# Patient Record
Sex: FEMALE | Race: WHITE | Employment: OTHER | URBAN - METROPOLITAN AREA
[De-identification: names, ages, dates, MRNs, and addresses within clinical notes are randomized per-mention and may not be internally consistent; named-entity substitution may affect disease eponyms.]

---

## 2017-01-02 ENCOUNTER — PATIENT MESSAGE (OUTPATIENT)
Dept: FAMILY MEDICINE CLINIC | Facility: CLINIC | Age: 62
End: 2017-01-02

## 2017-01-03 RX ORDER — PANTOPRAZOLE SODIUM 40 MG/1
40 TABLET, DELAYED RELEASE ORAL
Qty: 30 TABLET | Refills: 11 | Status: SHIPPED | OUTPATIENT
Start: 2017-01-03 | End: 2017-11-19

## 2017-01-06 ENCOUNTER — TELEPHONE (OUTPATIENT)
Dept: FAMILY MEDICINE CLINIC | Facility: CLINIC | Age: 62
End: 2017-01-06

## 2017-01-06 ENCOUNTER — NURSE ONLY (OUTPATIENT)
Dept: FAMILY MEDICINE CLINIC | Facility: CLINIC | Age: 62
End: 2017-01-06

## 2017-01-06 DIAGNOSIS — Z86.711 HISTORY OF PULMONARY EMBOLISM: ICD-10-CM

## 2017-01-06 DIAGNOSIS — Z79.01 ENCOUNTER FOR MONITORING COUMADIN THERAPY: Primary | ICD-10-CM

## 2017-01-06 DIAGNOSIS — Z79.01 ANTICOAGULATION GOAL OF INR 2.5 TO 3.5: ICD-10-CM

## 2017-01-06 DIAGNOSIS — Z51.81 ANTICOAGULATION GOAL OF INR 2.5 TO 3.5: ICD-10-CM

## 2017-01-06 DIAGNOSIS — Z51.81 ENCOUNTER FOR MONITORING COUMADIN THERAPY: Primary | ICD-10-CM

## 2017-01-06 LAB
INR BLD: 2.56 (ref 0.89–1.12)
PSA SERPL DL<=0.01 NG/ML-MCNC: 28.5 SECONDS (ref 12.3–14.8)

## 2017-01-06 PROCEDURE — 85610 PROTHROMBIN TIME: CPT | Performed by: FAMILY MEDICINE

## 2017-01-06 PROCEDURE — 36415 COLL VENOUS BLD VENIPUNCTURE: CPT | Performed by: FAMILY MEDICINE

## 2017-01-09 ENCOUNTER — PATIENT MESSAGE (OUTPATIENT)
Dept: FAMILY MEDICINE CLINIC | Facility: CLINIC | Age: 62
End: 2017-01-09

## 2017-01-09 NOTE — TELEPHONE ENCOUNTER
From: Connie Quintero  To: Jessica Hua MD  Sent: 1/9/2017 1:56 PM CST  Subject: Non-Urgent Medical Question    Just wanted to let you know that when blowing my nose there is blood everytime.  Was doing this for about a week, so I bought a humidifier thinkin

## 2017-01-13 ENCOUNTER — MED REC SCAN ONLY (OUTPATIENT)
Dept: FAMILY MEDICINE CLINIC | Facility: CLINIC | Age: 62
End: 2017-01-13

## 2017-01-19 ENCOUNTER — TELEPHONE (OUTPATIENT)
Dept: FAMILY MEDICINE CLINIC | Facility: CLINIC | Age: 62
End: 2017-01-19

## 2017-01-25 ENCOUNTER — OFFICE VISIT (OUTPATIENT)
Dept: FAMILY MEDICINE CLINIC | Facility: CLINIC | Age: 62
End: 2017-01-25

## 2017-01-25 VITALS
BODY MASS INDEX: 45.79 KG/M2 | OXYGEN SATURATION: 98 % | TEMPERATURE: 98 F | WEIGHT: 242.5 LBS | HEIGHT: 61 IN | SYSTOLIC BLOOD PRESSURE: 124 MMHG | DIASTOLIC BLOOD PRESSURE: 82 MMHG | RESPIRATION RATE: 16 BRPM | HEART RATE: 76 BPM

## 2017-01-25 DIAGNOSIS — K44.9 HIATAL HERNIA: ICD-10-CM

## 2017-01-25 DIAGNOSIS — R05.9 COUGH: ICD-10-CM

## 2017-01-25 DIAGNOSIS — K21.00 GASTROESOPHAGEAL REFLUX DISEASE WITH ESOPHAGITIS: Primary | ICD-10-CM

## 2017-01-25 PROCEDURE — 99214 OFFICE O/P EST MOD 30 MIN: CPT | Performed by: FAMILY MEDICINE

## 2017-01-25 NOTE — PROGRESS NOTES
HPI:   Vivian Campbell is a 64year old female who presents for upper respiratory symptoms for  5  days. Patient reports sore throat, congestion, clear colored nasal discharge, cough is keeping pt up at night.       Current Outpatient Prescriptions:  Budesonid total) by mouth nightly. Disp: 30 tablet Rfl: 0   QUEtiapine Fumarate (SEROQUEL) 100 MG Oral Tab Take 1 tablet by mouth nightly. Disp:  Rfl: 0   Venlafaxine HCl ER (EFFEXOR XR) 150 MG Oral Capsule SR 24 Hr Take 300 mg by mouth daily.    Disp:  Rfl:    Pablo Foote developed, well nourished,in no apparent distress  SKIN: no rashes,no suspicious lesions  EYES:PERRLA, EOMI, normal optic disk,conjunctiva are clear  HEENT: atraumatic, normocephalic,ears and throat are clear  NECK: supple,no adenopathy,no bruits  LUNGS: n

## 2017-01-27 ENCOUNTER — TELEPHONE (OUTPATIENT)
Dept: FAMILY MEDICINE CLINIC | Facility: CLINIC | Age: 62
End: 2017-01-27

## 2017-01-27 NOTE — TELEPHONE ENCOUNTER
Per Dr. Kindra miller to give sample of pulmicort    We have 1 sample    Called the pt and advised- she v/u

## 2017-02-06 ENCOUNTER — NURSE ONLY (OUTPATIENT)
Dept: FAMILY MEDICINE CLINIC | Facility: CLINIC | Age: 62
End: 2017-02-06

## 2017-02-06 ENCOUNTER — PATIENT MESSAGE (OUTPATIENT)
Dept: FAMILY MEDICINE CLINIC | Facility: CLINIC | Age: 62
End: 2017-02-06

## 2017-02-06 DIAGNOSIS — Z79.01 ENCOUNTER FOR MONITORING COUMADIN THERAPY: ICD-10-CM

## 2017-02-06 DIAGNOSIS — Z51.81 ENCOUNTER FOR MONITORING COUMADIN THERAPY: ICD-10-CM

## 2017-02-06 DIAGNOSIS — Z86.711 HISTORY OF PULMONARY EMBOLISM: Primary | ICD-10-CM

## 2017-02-06 PROCEDURE — 85610 PROTHROMBIN TIME: CPT | Performed by: FAMILY MEDICINE

## 2017-02-06 RX ORDER — ATORVASTATIN CALCIUM 40 MG/1
40 TABLET, FILM COATED ORAL NIGHTLY
Qty: 30 TABLET | Refills: 8 | Status: SHIPPED | OUTPATIENT
Start: 2017-02-06 | End: 2017-11-19

## 2017-02-06 RX ORDER — PANTOPRAZOLE SODIUM 40 MG/1
40 TABLET, DELAYED RELEASE ORAL
Qty: 30 TABLET | Refills: 11 | Status: CANCELLED | OUTPATIENT
Start: 2017-02-06

## 2017-02-06 NOTE — TELEPHONE ENCOUNTER
Last OV 1/25/17 (Avelina Kim), 12/20/16 Sobia Gamboa)  Last CMP & Lipid 12-4-2015  Last refilled:  3/22/16 Atorvastatin  #30  8 refills      Pantoprazole already sent 1/3/17 #30  11 refills

## 2017-02-06 NOTE — TELEPHONE ENCOUNTER
From: Rossy Mi  To: Edilberto Avilez MD  Sent: 2/5/2017 8:36 PM CST  Subject: Medication Renewal Request    Original authorizing provider: MD Rossy Jain would like a refill of the following medications:  ATORVASTATIN CALCIUM 40 MG Oral

## 2017-02-06 NOTE — PROGRESS NOTES
Requesting shingles injection. She has medicare for [employer], medicaid for secondary. States she has a Part D and under the impression that this injection is covered.  I checked with her pharmacy, she has [employer] part D the injection @ her pharmacy

## 2017-02-07 LAB
INR BLD: 3.24 (ref 0.89–1.12)
PSA SERPL DL<=0.01 NG/ML-MCNC: 34.3 SECONDS (ref 12.3–14.8)

## 2017-02-07 NOTE — TELEPHONE ENCOUNTER
From: Rachael Saucedo  To: Tip Baker MD  Sent: 2/6/2017 5:32 PM CST  Subject: Non-Urgent Medical Question    Dr. Felicia Herrera I wanted to apologize for the way I acted today while talking to the nurse.  I know it's not an excuse but I had just gotten into an argu

## 2017-02-07 NOTE — TELEPHONE ENCOUNTER
From: Camron Spears  To: Arun Urrutia MD  Sent: 2/6/2017 6:04 PM CST  Subject: Prescription Question    Dr. Cecily Daniel this is the third message I have sent you today but I just got a message that my insurance has denied me any refills on my protonex.

## 2017-02-09 ENCOUNTER — TELEPHONE (OUTPATIENT)
Dept: FAMILY MEDICINE CLINIC | Facility: CLINIC | Age: 62
End: 2017-02-09

## 2017-02-09 NOTE — TELEPHONE ENCOUNTER
Called the pt and she states Roc Loera is pharmacy manager       I called the [facility] to see about a phone number and ID for [employer] so we can get a preferred drug list. The pharmacy tech states that he pantoprazole went through for $1.20    Called t

## 2017-02-20 ENCOUNTER — TELEPHONE (OUTPATIENT)
Dept: FAMILY MEDICINE CLINIC | Facility: CLINIC | Age: 62
End: 2017-02-20

## 2017-02-20 ENCOUNTER — HOSPITAL ENCOUNTER (OUTPATIENT)
Dept: GENERAL RADIOLOGY | Age: 62
Discharge: HOME | End: 2017-02-20
Attending: FAMILY MEDICINE
Payer: MEDICARE

## 2017-02-20 ENCOUNTER — OFFICE VISIT (OUTPATIENT)
Dept: FAMILY MEDICINE CLINIC | Facility: CLINIC | Age: 62
End: 2017-02-20

## 2017-02-20 VITALS
BODY MASS INDEX: 46 KG/M2 | RESPIRATION RATE: 12 BRPM | SYSTOLIC BLOOD PRESSURE: 110 MMHG | TEMPERATURE: 99 F | HEART RATE: 68 BPM | WEIGHT: 241.63 LBS | DIASTOLIC BLOOD PRESSURE: 82 MMHG

## 2017-02-20 DIAGNOSIS — Z59.9 FINANCIAL PROBLEMS: ICD-10-CM

## 2017-02-20 DIAGNOSIS — M54.5 CHRONIC BILATERAL LOW BACK PAIN, WITH SCIATICA PRESENCE UNSPECIFIED: ICD-10-CM

## 2017-02-20 DIAGNOSIS — Z00.00 ENCOUNTER FOR ANNUAL HEALTH EXAMINATION: Primary | ICD-10-CM

## 2017-02-20 DIAGNOSIS — Z51.81 ANTICOAGULATION GOAL OF INR 2.5 TO 3.5: ICD-10-CM

## 2017-02-20 DIAGNOSIS — J30.89 PERENNIAL ALLERGIC RHINITIS, UNSPECIFIED ALLERGIC RHINITIS TRIGGER: ICD-10-CM

## 2017-02-20 DIAGNOSIS — M48.05 SPINAL STENOSIS OF THORACOLUMBAR REGION: ICD-10-CM

## 2017-02-20 DIAGNOSIS — K21.00 GASTROESOPHAGEAL REFLUX DISEASE WITH ESOPHAGITIS: ICD-10-CM

## 2017-02-20 DIAGNOSIS — M17.12 PRIMARY OSTEOARTHRITIS OF LEFT KNEE: ICD-10-CM

## 2017-02-20 DIAGNOSIS — D68.52 PROTHROMBIN MUTATION (HCC): ICD-10-CM

## 2017-02-20 DIAGNOSIS — Z86.711 HISTORY OF PULMONARY EMBOLISM: ICD-10-CM

## 2017-02-20 DIAGNOSIS — F33.41 RECURRENT MAJOR DEPRESSIVE DISORDER, IN PARTIAL REMISSION (HCC): ICD-10-CM

## 2017-02-20 DIAGNOSIS — Z00.00 ENCOUNTER FOR ANNUAL HEALTH EXAMINATION: ICD-10-CM

## 2017-02-20 DIAGNOSIS — Z79.01 ANTICOAGULATION GOAL OF INR 2.5 TO 3.5: ICD-10-CM

## 2017-02-20 DIAGNOSIS — Z65.8: ICD-10-CM

## 2017-02-20 DIAGNOSIS — F41.0 PANIC ATTACKS: ICD-10-CM

## 2017-02-20 DIAGNOSIS — Z01.818 PRE-OP EVALUATION: ICD-10-CM

## 2017-02-20 DIAGNOSIS — G89.29 CHRONIC BILATERAL LOW BACK PAIN, WITH SCIATICA PRESENCE UNSPECIFIED: ICD-10-CM

## 2017-02-20 DIAGNOSIS — E78.2 MIXED HYPERLIPIDEMIA: ICD-10-CM

## 2017-02-20 DIAGNOSIS — Z91.89 AT HIGH RISK FOR SELF HARM: ICD-10-CM

## 2017-02-20 DIAGNOSIS — R73.9 ELEVATED BLOOD SUGAR: ICD-10-CM

## 2017-02-20 DIAGNOSIS — E55.9 VITAMIN D DEFICIENCY: ICD-10-CM

## 2017-02-20 DIAGNOSIS — J45.20 MILD INTERMITTENT ASTHMA WITHOUT COMPLICATION: ICD-10-CM

## 2017-02-20 DIAGNOSIS — K44.9 HIATAL HERNIA: ICD-10-CM

## 2017-02-20 LAB
BASOPHILS # BLD AUTO: 0.05 X10(3) UL (ref 0–0.1)
BASOPHILS NFR BLD AUTO: 1 %
EOSINOPHIL # BLD AUTO: 0.16 X10(3) UL (ref 0–0.3)
EOSINOPHIL NFR BLD AUTO: 3.1 %
ERYTHROCYTE [DISTWIDTH] IN BLOOD BY AUTOMATED COUNT: 13.9 % (ref 11.5–16)
HCT VFR BLD AUTO: 40.5 % (ref 34–50)
HGB BLD-MCNC: 13.7 G/DL (ref 12–16)
IMMATURE GRANULOCYTE COUNT: 0.01 X10(3) UL (ref 0–1)
IMMATURE GRANULOCYTE RATIO %: 0.2 %
INR BLD: 3.2 (ref 0.89–1.12)
LYMPHOCYTES # BLD AUTO: 1.2 X10(3) UL (ref 0.9–4)
LYMPHOCYTES NFR BLD AUTO: 23.1 %
MCH RBC QN AUTO: 31.6 PG (ref 27–33.2)
MCHC RBC AUTO-ENTMCNC: 33.8 G/DL (ref 31–37)
MCV RBC AUTO: 93.5 FL (ref 81–100)
MONOCYTES # BLD AUTO: 0.33 X10(3) UL (ref 0.1–0.6)
MONOCYTES NFR BLD AUTO: 6.3 %
NEUTROPHIL ABS PRELIM: 3.45 X10 (3) UL (ref 1.3–6.7)
NEUTROPHILS # BLD AUTO: 3.45 X10(3) UL (ref 1.3–6.7)
NEUTROPHILS NFR BLD AUTO: 66.3 %
PLATELET # BLD AUTO: 280 10(3)UL (ref 150–450)
PSA SERPL DL<=0.01 NG/ML-MCNC: 34 SECONDS (ref 12.3–14.8)
RBC # BLD AUTO: 4.33 X10(6)UL (ref 3.8–5.1)
RED CELL DISTRIBUTION WIDTH-SD: 47.9 FL (ref 35.1–46.3)
WBC # BLD AUTO: 5.2 X10(3) UL (ref 4–13)

## 2017-02-20 PROCEDURE — 81001 URINALYSIS AUTO W/SCOPE: CPT | Performed by: FAMILY MEDICINE

## 2017-02-20 PROCEDURE — G0009 ADMIN PNEUMOCOCCAL VACCINE: HCPCS | Performed by: FAMILY MEDICINE

## 2017-02-20 PROCEDURE — 80053 COMPREHEN METABOLIC PANEL: CPT | Performed by: FAMILY MEDICINE

## 2017-02-20 PROCEDURE — 90670 PCV13 VACCINE IM: CPT | Performed by: FAMILY MEDICINE

## 2017-02-20 PROCEDURE — 87086 URINE CULTURE/COLONY COUNT: CPT | Performed by: FAMILY MEDICINE

## 2017-02-20 PROCEDURE — 83036 HEMOGLOBIN GLYCOSYLATED A1C: CPT | Performed by: FAMILY MEDICINE

## 2017-02-20 PROCEDURE — 71020 XR CHEST PA + LAT CHEST (CPT=71020): CPT | Performed by: RADIOLOGY

## 2017-02-20 PROCEDURE — 99214 OFFICE O/P EST MOD 30 MIN: CPT | Performed by: FAMILY MEDICINE

## 2017-02-20 PROCEDURE — G0402 INITIAL PREVENTIVE EXAM: HCPCS | Performed by: FAMILY MEDICINE

## 2017-02-20 PROCEDURE — 85730 THROMBOPLASTIN TIME PARTIAL: CPT | Performed by: FAMILY MEDICINE

## 2017-02-20 PROCEDURE — 85610 PROTHROMBIN TIME: CPT | Performed by: FAMILY MEDICINE

## 2017-02-20 PROCEDURE — 85025 COMPLETE CBC W/AUTO DIFF WBC: CPT | Performed by: FAMILY MEDICINE

## 2017-02-20 PROCEDURE — 36415 COLL VENOUS BLD VENIPUNCTURE: CPT | Performed by: FAMILY MEDICINE

## 2017-02-20 PROCEDURE — G0403 EKG FOR INITIAL PREVENT EXAM: HCPCS | Performed by: FAMILY MEDICINE

## 2017-02-20 RX ORDER — ENOXAPARIN SODIUM 100 MG/ML
INJECTION SUBCUTANEOUS
Qty: 2 ML | Refills: 0 | Status: SHIPPED | OUTPATIENT
Start: 2017-02-20 | End: 2017-04-21 | Stop reason: ALTCHOICE

## 2017-02-20 SDOH — ECONOMIC STABILITY - INCOME SECURITY: PROBLEM RELATED TO HOUSING AND ECONOMIC CIRCUMSTANCES, UNSPECIFIED: Z59.9

## 2017-02-20 NOTE — PROGRESS NOTES
HPI:   Jose G Chapman is a 64year old female who presents for a Medicare Subsequent Annual Wellness visit (Pt already had Initial Annual Wellness). Pt is here for her medicare AWV in addition to her pre-op (see H&P).     She has no new health concerns f Mellissa Mosquera MD:  Venlafaxine HCl ER (EFFEXOR XR) 150 MG Oral Capsule SR 24 Hr Take 2 capsules (300 mg total) by mouth daily. QUEtiapine Fumarate 100 MG Oral Tab Take 1 tablet (100 mg total) by mouth nightly.    Atorvastatin Calcium 40 MG Oral Tab Take 1 tabl surgical history that includes tubal ligation (1981); cholecystectomy (2006); ir ivc filter placement; other surgical history; and tonsillectomy (age 25).     Her family history includes Anxiety in her daughter; Cancer in her father; Depression in her daugh H&P)    SUGGESTED VACCINATIONS - Influenza, Pneumococcal, Zoster, Tetanus     Immunization History   Administered Date(s) Administered   • Influenza Vaccine, No Preserv, 3YR + 11/17/2016   • Pneumovax 23 (Lot Mgr) 12/04/2013       ASSESSMENT AND OTHER RELE Future    Mild intermittent asthma without complication    Recurrent major depressive disorder, in partial remission (HCC)    Panic attacks    Vitamin D deficiency    Mixed hyperlipidemia    Perennial allergic rhinitis, unspecified allergic rhinitis trigge aspirin therapy For the following reasons:   Already on other anticoagulant usage such as Plavix, Xarelto, Lovenox, or warfarin          Diet assessment: fair     Advanced Directive:  Living Will on file in [facility] [address]?   Marija Shah does not have a Living Will o Problems?: No      Fall/Risk Assessment                                                              Depression Screening (PHQ-2/PHQ-9): Over the LAST 2 WEEKS   Little interest or pleasure in doing things (over the last two weeks)?: Not at all    Feeling d Bone Density Screening      Dexascan Every two years No results found for this or any previous visit. No flowsheet data found.     Pap and Pelvic      Pap: Every 3 yrs age 21-65 or Pap+HPV every 5 yrs age 33-67, age 72 and older at high risk Pap Smear,3 Y flowsheet data found. Dilated Eye exam  Annually No flowsheet data found. No flowsheet data found. COPD      Spirometry Testing Annually Spirometry date:  No flowsheet data found.          Template: MELODY MANRIQUE MEDICARE ANNUAL ASSESSMENT FEMALE [60885]

## 2017-02-20 NOTE — PATIENT INSTRUCTIONS
Recommended Websites for Advanced Directives    SeekAl[employer].no. org/publications/Documents/personal_dec. pdf  An information packet, including necessary form from the Medical Direct [address] website. http://[email or web address] [place]state. [place]/public/books/adv

## 2017-02-20 NOTE — H&P
Liana Aldridge is a 64year old female who presents for a pre-operative physical exam. Patient is to have total left knee replacement, to be done by Dr. Lj Pan at [facility] on 3/6/17. HPI:   Pt complains of nothing new.  She has been stressed but is (300 mg total) by mouth 2 (two) times daily. Disp: 180 capsule Rfl: 3   cetirizine (ZYRTEC) 10 MG Oral Tab Take 10 mg by mouth daily. Disp:  Rfl:    LORazepam (ATIVAN) 0.5 MG Oral Tab Take 0.5 mg by mouth every 4 (four) hours as needed for Anxiety.  Disp: • Diabetes Mother    • Heart Disorder Mother    • Hypertension Sister    • Hypertension Brother       Social History:     Smoking Status: Never Smoker                      Smokeless Status: Never Used                        Alcohol Use: No following conditions: remote hx of PE with underlying prothrombin mutation: pt on chronic coumadin anticoagulation and has IVC filter in place; she has been given instructions for bridging her coumadin with lovenox leading up to surgery, rec repeat INR day pulmonary embolism  Anticoagulation goal of inr 2.5 to 3.5  Poor conflict management skills  At high risk for self harm  Financial problems  Gastroesophageal reflux disease with esophagitis  Hiatal hernia  Chronic bilateral low back pain, with sciatica pre

## 2017-02-21 ENCOUNTER — TELEPHONE (OUTPATIENT)
Dept: FAMILY MEDICINE CLINIC | Facility: CLINIC | Age: 62
End: 2017-02-21

## 2017-02-21 LAB
ALBUMIN SERPL-MCNC: 3.9 G/DL (ref 3.5–4.8)
ALP LIVER SERPL-CCNC: 82 U/L (ref 50–130)
ALT SERPL-CCNC: 20 U/L (ref 14–54)
APTT PPP: 66.9 SECONDS (ref 25–34)
AST SERPL-CCNC: 16 U/L (ref 15–41)
BILIRUB SERPL-MCNC: 0.4 MG/DL (ref 0.1–2)
BILIRUB UR QL STRIP.AUTO: NEGATIVE
BUN BLD-MCNC: 17 MG/DL (ref 8–20)
CALCIUM BLD-MCNC: 10 MG/DL (ref 8.3–10.3)
CHLORIDE: 108 MMOL/L (ref 101–111)
CO2: 24 MMOL/L (ref 22–32)
COLOR UR AUTO: YELLOW
CREAT BLD-MCNC: 1.07 MG/DL (ref 0.55–1.02)
EST. AVERAGE GLUCOSE BLD GHB EST-MCNC: 123 MG/DL (ref 68–126)
GLUCOSE BLD-MCNC: 94 MG/DL (ref 70–99)
GLUCOSE UR STRIP.AUTO-MCNC: NEGATIVE MG/DL
HBA1C MFR BLD HPLC: 5.9 % (ref ?–5.7)
HYALINE CASTS #/AREA URNS AUTO: PRESENT /LPF
KETONES UR STRIP.AUTO-MCNC: NEGATIVE MG/DL
M PROTEIN MFR SERPL ELPH: 7.3 G/DL (ref 6.1–8.3)
NITRITE UR QL STRIP.AUTO: NEGATIVE
PH UR STRIP.AUTO: 5 [PH] (ref 4.5–8)
POTASSIUM SERPL-SCNC: 4.1 MMOL/L (ref 3.6–5.1)
PROT UR STRIP.AUTO-MCNC: NEGATIVE MG/DL
RBC UR QL AUTO: NEGATIVE
SODIUM SERPL-SCNC: 141 MMOL/L (ref 136–144)
SP GR UR STRIP.AUTO: 1.03 (ref 1–1.03)
UROBILINOGEN UR STRIP.AUTO-MCNC: <2 MG/DL

## 2017-02-21 NOTE — TELEPHONE ENCOUNTER
Yes, take them as long as she's in the parameters of when she has to be NPO (she'll need to double check with her surgeon regarding that)

## 2017-02-22 PROBLEM — D68.52 PROTHROMBIN MUTATION (HCC): Status: ACTIVE | Noted: 2017-02-22

## 2017-03-07 ENCOUNTER — MED REC SCAN ONLY (OUTPATIENT)
Dept: FAMILY MEDICINE CLINIC | Facility: CLINIC | Age: 62
End: 2017-03-07

## 2017-03-08 ENCOUNTER — TELEPHONE (OUTPATIENT)
Dept: FAMILY MEDICINE CLINIC | Facility: CLINIC | Age: 62
End: 2017-03-08

## 2017-03-10 ENCOUNTER — NURSE ONLY (OUTPATIENT)
Dept: FAMILY MEDICINE CLINIC | Facility: CLINIC | Age: 62
End: 2017-03-10

## 2017-03-10 DIAGNOSIS — Z86.718 HISTORY OF DVT (DEEP VEIN THROMBOSIS): Primary | ICD-10-CM

## 2017-03-10 DIAGNOSIS — N17.9 AKI (ACUTE KIDNEY INJURY) (HCC): ICD-10-CM

## 2017-03-10 LAB
BUN BLD-MCNC: 12 MG/DL (ref 8–20)
CALCIUM BLD-MCNC: 9.6 MG/DL (ref 8.3–10.3)
CHLORIDE: 110 MMOL/L (ref 101–111)
CO2: 28 MMOL/L (ref 22–32)
CREAT BLD-MCNC: 1.29 MG/DL (ref 0.55–1.02)
GLUCOSE BLD-MCNC: 88 MG/DL (ref 70–99)
INR BLD: 2.48 (ref 0.89–1.11)
POTASSIUM SERPL-SCNC: 3.7 MMOL/L (ref 3.6–5.1)
PSA SERPL DL<=0.01 NG/ML-MCNC: 27.3 SECONDS (ref 12–14.3)
SODIUM SERPL-SCNC: 145 MMOL/L (ref 136–144)

## 2017-03-10 PROCEDURE — 85610 PROTHROMBIN TIME: CPT | Performed by: FAMILY MEDICINE

## 2017-03-10 PROCEDURE — 80048 BASIC METABOLIC PNL TOTAL CA: CPT | Performed by: FAMILY MEDICINE

## 2017-03-10 PROCEDURE — 36415 COLL VENOUS BLD VENIPUNCTURE: CPT | Performed by: FAMILY MEDICINE

## 2017-03-10 NOTE — PROGRESS NOTES
Patient has order from Dr Misael Alan for BMP and INR  Dx Hx DVT/PE and SHERRON with instructions for PCP to further f/u    Mint and blue tube drawn per Brenda Trevizo right arm X1 attempt

## 2017-03-11 ENCOUNTER — PATIENT MESSAGE (OUTPATIENT)
Dept: FAMILY MEDICINE CLINIC | Facility: CLINIC | Age: 62
End: 2017-03-11

## 2017-03-13 ENCOUNTER — TELEPHONE (OUTPATIENT)
Dept: FAMILY MEDICINE CLINIC | Facility: CLINIC | Age: 62
End: 2017-03-13

## 2017-03-13 NOTE — TELEPHONE ENCOUNTER
From: Dariel Drake  To: Glen Vincent MD  Sent: 3/11/2017 11:23 AM CST  Subject: Test Results Question    Dr. Betzy Fang, Was wondering if you had gotten the results back from my blood work on Thursday, March 10th. Please let me know as soon as you get them.  I

## 2017-03-13 NOTE — TELEPHONE ENCOUNTER
Her INR goal is 2.5-3.5, so we're fine, keep her coumadin at 4mg daily (was her typical pre-op dose as well) and recheck in 1 week

## 2017-03-13 NOTE — TELEPHONE ENCOUNTER
Left message for Cooper Simmons [facility] with the instructions for the coumadin- advised to call back if any questions

## 2017-03-15 DIAGNOSIS — Z51.81 ENCOUNTER FOR MONITORING COUMADIN THERAPY: ICD-10-CM

## 2017-03-15 DIAGNOSIS — R79.89 ELEVATED SERUM CREATININE: Primary | ICD-10-CM

## 2017-03-15 DIAGNOSIS — Z79.01 ENCOUNTER FOR MONITORING COUMADIN THERAPY: ICD-10-CM

## 2017-03-15 DIAGNOSIS — Z86.711 HISTORY OF PULMONARY EMBOLISM: ICD-10-CM

## 2017-03-16 ENCOUNTER — TELEPHONE (OUTPATIENT)
Dept: FAMILY MEDICINE CLINIC | Facility: CLINIC | Age: 62
End: 2017-03-16

## 2017-03-16 NOTE — TELEPHONE ENCOUNTER
Discussed with Dr Avelina Kim who states patient should go to ER for evaluation. Left information on Cuco's voicemail.   Also called and spoke with patient and let her know of Dr Fredo Martinez recommendation to be seen in the ER due to recent surgery and her histor

## 2017-03-16 NOTE — TELEPHONE ENCOUNTER
Spoke with Brynn Deras home health RN who states patient says she is SOB and having chills at night. No fevers. Vitals have been WNL   She is taking minimal pain meds, 2-3 norco daily.    Patient is post op day 10, left knee replacement  States she has been ove

## 2017-03-17 ENCOUNTER — TELEPHONE (OUTPATIENT)
Dept: FAMILY MEDICINE CLINIC | Facility: CLINIC | Age: 62
End: 2017-03-17

## 2017-03-17 NOTE — TELEPHONE ENCOUNTER
Spoke with the pt and the hospital did blood work, CT scan, ultrasound and xray all were negative but there was no answers to why she is SOB- she states that her RBC count was low at 3.33 and her HGB/HCT 1.4/31.1  I advised that the hgb is ok- advised michel

## 2017-03-17 NOTE — TELEPHONE ENCOUNTER
Noted and agree with advice given. I agree atelectasis after surgery could be a cause of this since the ER did not find blood clot or infection or cardiac concern (thank goodness) to explain how she is feeling.  Definitely get back on her effexor--while I'm

## 2017-03-17 NOTE — TELEPHONE ENCOUNTER
Called the pt and advised of the recommendations for the neb treatments to possibly help with her SOB- advised of the notes from Dr. Peri Gay and she v/u  I asked her to call if she has other questions- she v/u

## 2017-03-17 NOTE — TELEPHONE ENCOUNTER
CAlled home health and advised of the information that Dr. Lalitha Willingham recommended for the pt and I did tell the home health that the pt has not been on her antidepressants for about a week and to tread lightly.  Cooper Simmons  Would like to know when to check her INR aga

## 2017-03-17 NOTE — TELEPHONE ENCOUNTER
INR already scheduled for Monday (pt reports she was already notified by home health they are coming out Monday to do INR).  Pt went to ER last night so we did get an update from [place] as well, thanks

## 2017-03-20 ENCOUNTER — TELEPHONE (OUTPATIENT)
Dept: FAMILY MEDICINE CLINIC | Facility: CLINIC | Age: 62
End: 2017-03-20

## 2017-03-20 NOTE — TELEPHONE ENCOUNTER
Spoke with the pt and advised of the notes from Dr. Cande Meehan- she states that the pain is terrible- I advised that per Dr. Cande Meehan it is ok for her to take it.  She v/u she states that she will call Dr. Adamaris Lane office for the med

## 2017-03-24 ENCOUNTER — TELEPHONE (OUTPATIENT)
Dept: FAMILY MEDICINE CLINIC | Facility: CLINIC | Age: 62
End: 2017-03-24

## 2017-03-24 NOTE — TELEPHONE ENCOUNTER
Left message for Mace Gins with the detailed notes from Dr. Luis Lynn on this pt- advised to call if questions

## 2017-03-24 NOTE — TELEPHONE ENCOUNTER
As long as she is on norco continue the miralax 17gm daily, but I would suggest mixing it with 4oz of prune or pear juice which can also stimulate bowels. She could do a dose of MOM as well to get things moving.  INR is fine at 2.5, can recheck monthly now

## 2017-03-24 NOTE — TELEPHONE ENCOUNTER
INR  -2.5   Coumadin 4 mg Q HS. Is compliant with med. She had a salad yesterday. Patient is taking Norco for pain and hasn't had a BM for 2 days. Patient sent a [employer] message last night to Dr Rambo Wright.   Please advise what she can take for constipation

## 2017-03-27 ENCOUNTER — TELEPHONE (OUTPATIENT)
Dept: FAMILY MEDICINE CLINIC | Facility: CLINIC | Age: 62
End: 2017-03-27

## 2017-03-27 RX ORDER — WARFARIN SODIUM 4 MG/1
TABLET ORAL
Qty: 30 TABLET | Refills: 11 | Status: SHIPPED | OUTPATIENT
Start: 2017-03-27 | End: 2018-03-31

## 2017-03-27 RX ORDER — WARFARIN SODIUM 4 MG/1
4 TABLET ORAL DAILY
Qty: 30 TABLET | Refills: 6 | Status: CANCELLED | OUTPATIENT
Start: 2017-03-27

## 2017-03-27 NOTE — TELEPHONE ENCOUNTER
Last fill 8/16/16 #30 with 5 refills  Last OV 2/20/17 physical  Future Appointments  Date Time Provider Juliana Lea   4/24/2017 10:00 AM [facility] NURSE ISABELLA Caceres     Last INR 3/10/17  INR 2.48

## 2017-04-06 ENCOUNTER — PATIENT MESSAGE (OUTPATIENT)
Dept: FAMILY MEDICINE CLINIC | Facility: CLINIC | Age: 62
End: 2017-04-06

## 2017-04-06 RX ORDER — BUPROPION HYDROCHLORIDE 300 MG/1
300 TABLET ORAL DAILY
Qty: 30 TABLET | Refills: 1 | Status: ON HOLD | OUTPATIENT
Start: 2017-04-06 | End: 2017-08-23

## 2017-04-06 NOTE — TELEPHONE ENCOUNTER
From: Dariel Drake  To: Glen Vincent MD  Sent: 4/6/2017 9:48 AM CDT  Subject: Prescription Question    Dr. Betzy Fang   I only take the 300mg now not both so guess the 150 needs to come off my chart.     Thank you    It wouldn't let me reply to your message so

## 2017-04-06 NOTE — TELEPHONE ENCOUNTER
From: Hilario Monsivais  To: Padilla Cho MD  Sent: 4/6/2017 9:18 AM CDT  Subject: Prescription Question    Hi Dr. Luis Lynn,    I am in the process of finding a new Psychiatrist, it's not that I didn't like Dr. Escudero Lie I just could afford to go to [facility]

## 2017-04-21 ENCOUNTER — OFFICE VISIT (OUTPATIENT)
Dept: FAMILY MEDICINE CLINIC | Facility: CLINIC | Age: 62
End: 2017-04-21

## 2017-04-21 VITALS
DIASTOLIC BLOOD PRESSURE: 78 MMHG | WEIGHT: 235.63 LBS | BODY MASS INDEX: 45 KG/M2 | HEART RATE: 84 BPM | SYSTOLIC BLOOD PRESSURE: 118 MMHG | TEMPERATURE: 99 F | RESPIRATION RATE: 16 BRPM | OXYGEN SATURATION: 98 %

## 2017-04-21 DIAGNOSIS — E61.1 IRON DEFICIENCY: ICD-10-CM

## 2017-04-21 DIAGNOSIS — Z51.81 ANTICOAGULATION GOAL OF INR 2.5 TO 3.5: ICD-10-CM

## 2017-04-21 DIAGNOSIS — Z79.01 ANTICOAGULATION GOAL OF INR 2.5 TO 3.5: ICD-10-CM

## 2017-04-21 DIAGNOSIS — Z79.01 ENCOUNTER FOR MONITORING COUMADIN THERAPY: ICD-10-CM

## 2017-04-21 DIAGNOSIS — Z51.81 ENCOUNTER FOR MONITORING COUMADIN THERAPY: ICD-10-CM

## 2017-04-21 DIAGNOSIS — E55.9 VITAMIN D DEFICIENCY: ICD-10-CM

## 2017-04-21 DIAGNOSIS — D68.52 PROTHROMBIN MUTATION (HCC): ICD-10-CM

## 2017-04-21 DIAGNOSIS — R53.83 FATIGUE, UNSPECIFIED TYPE: Primary | ICD-10-CM

## 2017-04-21 PROCEDURE — 82728 ASSAY OF FERRITIN: CPT | Performed by: FAMILY MEDICINE

## 2017-04-21 PROCEDURE — 82607 VITAMIN B-12: CPT | Performed by: FAMILY MEDICINE

## 2017-04-21 PROCEDURE — 82306 VITAMIN D 25 HYDROXY: CPT | Performed by: FAMILY MEDICINE

## 2017-04-21 PROCEDURE — 84443 ASSAY THYROID STIM HORMONE: CPT | Performed by: FAMILY MEDICINE

## 2017-04-21 PROCEDURE — 85610 PROTHROMBIN TIME: CPT | Performed by: FAMILY MEDICINE

## 2017-04-21 PROCEDURE — 80053 COMPREHEN METABOLIC PANEL: CPT | Performed by: FAMILY MEDICINE

## 2017-04-21 PROCEDURE — 85025 COMPLETE CBC W/AUTO DIFF WBC: CPT | Performed by: FAMILY MEDICINE

## 2017-04-21 PROCEDURE — 99214 OFFICE O/P EST MOD 30 MIN: CPT | Performed by: FAMILY MEDICINE

## 2017-04-21 RX ORDER — GABAPENTIN 300 MG/1
CAPSULE ORAL
Qty: 180 CAPSULE | Refills: 3 | Status: ON HOLD | OUTPATIENT
Start: 2017-04-21 | End: 2017-08-23

## 2017-04-21 NOTE — PROGRESS NOTES
Vivian Campbell is a 64year old female. HPI:   Pt is concerned about feeling super tired and run down, just wanting to sleep. She is exhausted after just taking a shower and getting dressed. Wants to go back to sleep.     She is so happy with her knee meadows hours as needed for Wheezing or Shortness of Breath. Disp: 180 vial Rfl: 0   atenolol 25 MG Oral Tab Take 0.5 tablets (12.5 mg total) by mouth 2 (two) times daily.  (Patient taking differently: Take 25 mg by mouth daily.  ) Disp: 90 tablet Rfl: 3   cetirizi exertion  CARDIOVASCULAR: denies chest pain on exertion  GI: denies abdominal pain and denies heartburn; no blood in stool  NEURO: denies headaches    EXAM:   /78 mmHg  Pulse 84  Temp(Src) 99 °F (37.2 °C) (Temporal)  Resp 16  Wt 235 lb 9.6 oz  SpO2 9

## 2017-04-24 ENCOUNTER — MED REC SCAN ONLY (OUTPATIENT)
Dept: FAMILY MEDICINE CLINIC | Facility: CLINIC | Age: 62
End: 2017-04-24

## 2017-04-24 ENCOUNTER — PATIENT MESSAGE (OUTPATIENT)
Dept: FAMILY MEDICINE CLINIC | Facility: CLINIC | Age: 62
End: 2017-04-24

## 2017-04-24 DIAGNOSIS — R53.82 CHRONIC FATIGUE: Primary | ICD-10-CM

## 2017-04-24 NOTE — TELEPHONE ENCOUNTER
From: Max Arce  To: Klaudia Dial MD  Sent: 4/24/2017 10:08 AM CDT  Subject: Other    Dr. Desiree Hanna     If u still want me to do the sleep study I'll agree to do it.      Tay Duval

## 2017-04-25 RX ORDER — ENALAPRIL MALEATE 10 MG/1
TABLET ORAL
Qty: 180 TABLET | Refills: 3 | Status: ON HOLD | OUTPATIENT
Start: 2017-04-25 | End: 2017-08-23

## 2017-05-06 ENCOUNTER — TELEPHONE (OUTPATIENT)
Dept: FAMILY MEDICINE CLINIC | Facility: CLINIC | Age: 62
End: 2017-05-06

## 2017-05-12 RX ORDER — ATENOLOL 25 MG/1
12.5 TABLET ORAL 2 TIMES DAILY
Qty: 90 TABLET | Refills: 3 | Status: ON HOLD | OUTPATIENT
Start: 2017-05-12 | End: 2017-08-23

## 2017-05-12 NOTE — TELEPHONE ENCOUNTER
From: Lashon Lyn  To: Jun Steele MD  Sent: 5/12/2017 1:21 PM CDT  Subject: Medication Renewal Request    Original authorizing provider: MD Lashon Gaviria would like a refill of the following medications:  atenolol 25 MG Oral Tab [Shell

## 2017-05-23 ENCOUNTER — MED REC SCAN ONLY (OUTPATIENT)
Dept: FAMILY MEDICINE CLINIC | Facility: CLINIC | Age: 62
End: 2017-05-23

## 2017-05-23 NOTE — TELEPHONE ENCOUNTER
23-May-2017 01:18 It is minimally excreted by kidneys into urine, so for mild to moderate renal impairment there is no contraindication to trying it, we'll just want to check BMP 1-2 months after starting celebrex, so I'm fine with her trying it

## 2017-06-02 ENCOUNTER — NURSE ONLY (OUTPATIENT)
Dept: FAMILY MEDICINE CLINIC | Facility: CLINIC | Age: 62
End: 2017-06-02

## 2017-06-02 DIAGNOSIS — Z79.01 ENCOUNTER FOR MONITORING COUMADIN THERAPY: ICD-10-CM

## 2017-06-02 DIAGNOSIS — Z51.81 ENCOUNTER FOR MONITORING COUMADIN THERAPY: ICD-10-CM

## 2017-06-02 DIAGNOSIS — Z86.711 HISTORY OF PULMONARY EMBOLISM: ICD-10-CM

## 2017-06-02 PROCEDURE — 85610 PROTHROMBIN TIME: CPT | Performed by: FAMILY MEDICINE

## 2017-06-19 ENCOUNTER — PATIENT MESSAGE (OUTPATIENT)
Dept: FAMILY MEDICINE CLINIC | Facility: CLINIC | Age: 62
End: 2017-06-19

## 2017-06-19 ENCOUNTER — TELEPHONE (OUTPATIENT)
Dept: FAMILY MEDICINE CLINIC | Facility: CLINIC | Age: 62
End: 2017-06-19

## 2017-06-19 NOTE — TELEPHONE ENCOUNTER
Called the pt and scheduled a follow up visit  Future Appointments  Date Time Provider Juliana Lea   6/21/2017 1:20 PM Yevette Severin, MD [facility] Rima Schroeder   7/7/2017 2:00 PM [facility] NURSE ISABELLA Schroeder     Records requested

## 2017-06-19 NOTE — TELEPHONE ENCOUNTER
From: James Ray  To: Yenifer Early MD  Sent: 6/19/2017 12:07 PM CDT  Subject: Other    Hi Dr. Pam Ibarra,    I'm suppose to follow-up with my orthopedic Dr. but I can't get in to see him until July 18th at 4 o'clock.  I was wondering if I should see u or wait

## 2017-06-19 NOTE — TELEPHONE ENCOUNTER
Encounter Messages  Expand All  Collapse All     Other      From   Liana Aldridge    To   Kiera Cano MD    Sent   6/19/2017 12:07 PM        Hi Dr. Daniel Rowe,     I'm suppose to follow-up with my orthopedic DrRebekah but I can't get in to see him until July 18th at 4

## 2017-06-21 ENCOUNTER — OFFICE VISIT (OUTPATIENT)
Dept: FAMILY MEDICINE CLINIC | Facility: CLINIC | Age: 62
End: 2017-06-21

## 2017-06-21 VITALS
SYSTOLIC BLOOD PRESSURE: 100 MMHG | RESPIRATION RATE: 14 BRPM | DIASTOLIC BLOOD PRESSURE: 62 MMHG | TEMPERATURE: 99 F | HEART RATE: 76 BPM

## 2017-06-21 DIAGNOSIS — R39.15 URINARY URGENCY: Primary | ICD-10-CM

## 2017-06-21 DIAGNOSIS — M25.561 ACUTE PAIN OF RIGHT KNEE: ICD-10-CM

## 2017-06-21 DIAGNOSIS — R30.0 DYSURIA: ICD-10-CM

## 2017-06-21 PROCEDURE — 87086 URINE CULTURE/COLONY COUNT: CPT | Performed by: FAMILY MEDICINE

## 2017-06-21 PROCEDURE — 99213 OFFICE O/P EST LOW 20 MIN: CPT | Performed by: FAMILY MEDICINE

## 2017-06-21 PROCEDURE — 81003 URINALYSIS AUTO W/O SCOPE: CPT | Performed by: FAMILY MEDICINE

## 2017-06-21 RX ORDER — HYDROCODONE BITARTRATE AND ACETAMINOPHEN 5; 325 MG/1; MG/1
1 TABLET ORAL EVERY 6 HOURS PRN
Qty: 30 TABLET | Refills: 0 | Status: SHIPPED | OUTPATIENT
Start: 2017-06-21 | End: 2017-09-15 | Stop reason: ALTCHOICE

## 2017-06-21 NOTE — PROGRESS NOTES
Ana Addison is a 64year old female. HPI:   Pt is here for ER f/u.     ER: Shaila 58  Date: 6/18/17  Reason for ER visit: fell on Sunday, tripped over something on her patio at home, landed on her right side, hit knee hard and also gave herself a black eye on r XL, 300 MG Oral Tablet 24 Hr Take 1 tablet (300 mg total) by mouth daily.  Disp: 30 tablet Rfl: 1   WARFARIN SODIUM 4 MG Oral Tab TAKE ONE TABLET BY MOUTH ONCE DAILY Disp: 30 tablet Rfl: 11   Venlafaxine HCl ER (EFFEXOR XR) 150 MG Oral Capsule SR 24 Hr Take CHOLECYSTECTOMY  2006    IR IVC FILTER PLACEMENT      OTHER SURGICAL HISTORY      Comment carpal tunnel right wrist (1981);  Green field filter (PE 2004)    TONSILLECTOMY  age 25      Family History   Problem Relation Age of Onset   • Depression Daughter

## 2017-06-22 ENCOUNTER — TELEPHONE (OUTPATIENT)
Dept: FAMILY MEDICINE CLINIC | Facility: CLINIC | Age: 62
End: 2017-06-22

## 2017-06-22 DIAGNOSIS — M25.561 ACUTE PAIN OF RIGHT KNEE: Primary | ICD-10-CM

## 2017-06-24 ENCOUNTER — TELEPHONE (OUTPATIENT)
Dept: FAMILY MEDICINE CLINIC | Facility: CLINIC | Age: 62
End: 2017-06-24

## 2017-06-24 NOTE — TELEPHONE ENCOUNTER
----- Message from Selina Florentino DO sent at 6/24/2017  8:11 AM CDT -----  Can notify Torey Shaji her urine culture was essentially negative for  a UTI, if her Sx persist she needs to call

## 2017-06-27 ENCOUNTER — TELEPHONE (OUTPATIENT)
Dept: FAMILY MEDICINE CLINIC | Facility: CLINIC | Age: 62
End: 2017-06-27

## 2017-06-27 ENCOUNTER — OFFICE VISIT (OUTPATIENT)
Dept: FAMILY MEDICINE CLINIC | Facility: CLINIC | Age: 62
End: 2017-06-27

## 2017-06-27 VITALS
HEART RATE: 72 BPM | SYSTOLIC BLOOD PRESSURE: 122 MMHG | RESPIRATION RATE: 12 BRPM | TEMPERATURE: 98 F | DIASTOLIC BLOOD PRESSURE: 78 MMHG

## 2017-06-27 DIAGNOSIS — M70.41 PREPATELLAR BURSITIS OF RIGHT KNEE: Primary | ICD-10-CM

## 2017-06-27 DIAGNOSIS — M25.561 ACUTE PAIN OF RIGHT KNEE: ICD-10-CM

## 2017-06-27 DIAGNOSIS — L03.115 CELLULITIS OF KNEE, RIGHT: ICD-10-CM

## 2017-06-27 PROCEDURE — 99214 OFFICE O/P EST MOD 30 MIN: CPT | Performed by: FAMILY MEDICINE

## 2017-06-27 RX ORDER — CEFDINIR 300 MG/1
300 CAPSULE ORAL 2 TIMES DAILY
Status: DISCONTINUED | OUTPATIENT
Start: 2017-06-27 | End: 2017-08-07 | Stop reason: ALTCHOICE

## 2017-06-27 NOTE — TELEPHONE ENCOUNTER
Spoke with Zenaida García at Physical Therapy and she states that the top of the knee is more red and more tender- she can feels some masses around the knee cap. The knee is HOT. Per anton the rest of the leg is healing from a fall. Would like some direction.     Spok

## 2017-06-27 NOTE — PROGRESS NOTES
Camron Shelburn is a 64year old female.   HPI:   Edgard Neighbor fell about a week ago and injured her face and her knees as well, there was a visible laceration across the anterior patella and then in the last couple of days she developed redness and swelling over the by mouth every 4 (four) hours as needed for Anxiety. Disp:  Rfl:    Calcium Carbonate Antacid 600 MG Oral Chew Tab Chew 1,000-1,200 mg by mouth daily. Disp:  Rfl:    cholecalciferol (DRISDOL) 1000 UNITS Oral Cap Take 5,000 Units by mouth daily.  Indications understanding of these issues and agrees to the plan. The patient is asked to return in 10 days to recheck.

## 2017-06-27 NOTE — TELEPHONE ENCOUNTER
Pt is at therapy right now and the pt's knee isn't looking good.  Therapist would like to speak with nurse

## 2017-07-05 ENCOUNTER — OFFICE VISIT (OUTPATIENT)
Dept: FAMILY MEDICINE CLINIC | Facility: CLINIC | Age: 62
End: 2017-07-05

## 2017-07-05 VITALS
TEMPERATURE: 98 F | HEART RATE: 80 BPM | RESPIRATION RATE: 16 BRPM | SYSTOLIC BLOOD PRESSURE: 108 MMHG | BODY MASS INDEX: 43.99 KG/M2 | HEIGHT: 61 IN | WEIGHT: 233 LBS | DIASTOLIC BLOOD PRESSURE: 60 MMHG

## 2017-07-05 DIAGNOSIS — Z79.01 ENCOUNTER FOR MONITORING COUMADIN THERAPY: ICD-10-CM

## 2017-07-05 DIAGNOSIS — Z51.81 ENCOUNTER FOR MONITORING COUMADIN THERAPY: ICD-10-CM

## 2017-07-05 DIAGNOSIS — M25.561 ACUTE PAIN OF RIGHT KNEE: ICD-10-CM

## 2017-07-05 DIAGNOSIS — S80.01XS CONTUSION OF RIGHT KNEE, SEQUELA: ICD-10-CM

## 2017-07-05 DIAGNOSIS — Z79.01 ANTICOAGULATION GOAL OF INR 2.5 TO 3.5: ICD-10-CM

## 2017-07-05 DIAGNOSIS — Z51.81 ANTICOAGULATION GOAL OF INR 2.5 TO 3.5: ICD-10-CM

## 2017-07-05 DIAGNOSIS — Z86.711 HISTORY OF PULMONARY EMBOLISM: Primary | ICD-10-CM

## 2017-07-05 LAB
INR BLD: 2.86 (ref 0.89–1.11)
PSA SERPL DL<=0.01 NG/ML-MCNC: 30.6 SECONDS (ref 12–14.3)

## 2017-07-05 PROCEDURE — 99213 OFFICE O/P EST LOW 20 MIN: CPT | Performed by: FAMILY MEDICINE

## 2017-07-05 PROCEDURE — 85610 PROTHROMBIN TIME: CPT | Performed by: FAMILY MEDICINE

## 2017-07-05 NOTE — PROGRESS NOTES
Bhargavi Mcfarland is a 64year old female.   HPI:   Bam Patel fell about a week ago and injured her face and her knees as well, there was a visible laceration across the anterior patella and then in the last couple of days she developed redness and swelling over the Inhalation Nebu Soln Take 3 mL (2.5 mg total) by nebulization every 4 (four) hours as needed for Wheezing or Shortness of Breath. Disp: 180 vial Rfl: 0   cetirizine (ZYRTEC) 10 MG Oral Tab Take 10 mg by mouth daily.  Disp:  Rfl:    LORazepam (ATIVAN) 0.5 MG edema and tenderness around the patella but much less than it was previously , also noted tenderness to palpation over the insertion of the patellar tendon    ASSESSMENT AND PLAN:   History of pulmonary embolism  (primary encounter diagnosis)  Encounter fo

## 2017-07-06 ENCOUNTER — TELEPHONE (OUTPATIENT)
Dept: FAMILY MEDICINE CLINIC | Facility: CLINIC | Age: 62
End: 2017-07-06

## 2017-07-06 DIAGNOSIS — Z51.81 ANTICOAGULATION GOAL OF INR 2.5 TO 3.5: ICD-10-CM

## 2017-07-06 DIAGNOSIS — Z86.711 HISTORY OF PULMONARY EMBOLISM: Primary | ICD-10-CM

## 2017-07-06 DIAGNOSIS — Z79.01 ENCOUNTER FOR MONITORING COUMADIN THERAPY: ICD-10-CM

## 2017-07-06 DIAGNOSIS — Z51.81 ENCOUNTER FOR MONITORING COUMADIN THERAPY: ICD-10-CM

## 2017-07-06 DIAGNOSIS — Z79.01 ANTICOAGULATION GOAL OF INR 2.5 TO 3.5: ICD-10-CM

## 2017-07-06 NOTE — TELEPHONE ENCOUNTER
----- Message from Ghada Calle DO sent at 7/5/2017  8:34 PM CDT -----  Please let patient know or leave message that the INR is normal, continue current Coumadin dose and repeat INR in 1 month. Thanks.

## 2017-07-13 RX ORDER — ALBUTEROL SULFATE 90 UG/1
2 AEROSOL, METERED RESPIRATORY (INHALATION) EVERY 4 HOURS PRN
Qty: 1 INHALER | Refills: 6 | Status: SHIPPED | OUTPATIENT
Start: 2017-07-13 | End: 2017-07-14

## 2017-07-13 NOTE — TELEPHONE ENCOUNTER
Called the pt to see who fills this for her- she used to get in the mail and is almost out she states that she does have asthma  ACT on 6/22/17=22

## 2017-07-14 ENCOUNTER — MED REC SCAN ONLY (OUTPATIENT)
Dept: FAMILY MEDICINE CLINIC | Facility: CLINIC | Age: 62
End: 2017-07-14

## 2017-07-14 RX ORDER — ALBUTEROL SULFATE 90 UG/1
2 AEROSOL, METERED RESPIRATORY (INHALATION) EVERY 4 HOURS PRN
Qty: 1 INHALER | Refills: 3 | Status: SHIPPED | OUTPATIENT
Start: 2017-07-14 | End: 2019-01-03

## 2017-07-27 ENCOUNTER — TELEPHONE (OUTPATIENT)
Dept: FAMILY MEDICINE CLINIC | Facility: CLINIC | Age: 62
End: 2017-07-27

## 2017-07-27 NOTE — TELEPHONE ENCOUNTER
Patient states that she has only done one nebulizing treatment at 9:30am.  Patient states her o2 sat at physical therapy after sitting for 5 minutes was between 91% and 92%. Patient has taken her inhaler x 3 today. Patient states she still cannot breath.

## 2017-07-27 NOTE — TELEPHONE ENCOUNTER
PT been doing neb treatments as suggested, but still having trouble breathing.  PUlse at Physical Therapy between 91-92

## 2017-07-27 NOTE — TELEPHONE ENCOUNTER
Dr. Morales Quintero from [place] [facility] calls. He saw this patient for bronchitis, exacerbation of asthme. While there they checked her INR = 5.0. She's on 4 mg coumadin qd.  He told her to hold the coumadin this evening and call this office tomorrow for further instructi

## 2017-07-28 ENCOUNTER — TELEPHONE (OUTPATIENT)
Dept: FAMILY MEDICINE CLINIC | Facility: CLINIC | Age: 62
End: 2017-07-28

## 2017-07-28 ENCOUNTER — PATIENT MESSAGE (OUTPATIENT)
Dept: FAMILY MEDICINE CLINIC | Facility: CLINIC | Age: 62
End: 2017-07-28

## 2017-07-28 NOTE — TELEPHONE ENCOUNTER
Patient was seen at [facility] yesterday 07/27/17. DX: Bronchitis. Patient spoke with Dr Quique Jones regarding this and DS wants her to see [address] in 10 days. [address] is full.  Patient has a nurse visit on 08/07/17 at 2 pm and wants to know if we can change it to a visit with Jimmy Fisher

## 2017-07-28 NOTE — TELEPHONE ENCOUNTER
Spoke with the pt and we did change her NV on the 7th to a hospital F/u  Future Appointments  Date Time Provider Juliana Lea   7/31/2017 2:00 PM EM[facility] [address] [place] [address],[address] EMG Olivier Zambrano   8/7/2017 3:00 PM Tomy Vargas MD [facility] EM[facility] Olivier Zambrano

## 2017-07-28 NOTE — TELEPHONE ENCOUNTER
From: Ron Face  To: Fiordaliza Vargas DO  Sent: 7/28/2017 10:16 AM CDT  Subject: Prescription Question    Dr. Emperatriz Almonte ,    I did end up going to [facility] ER last night because I was really struggling to breath.  They did blood work and my INR was 5.0 so the

## 2017-07-31 ENCOUNTER — NURSE ONLY (OUTPATIENT)
Dept: FAMILY MEDICINE CLINIC | Facility: CLINIC | Age: 62
End: 2017-07-31

## 2017-07-31 ENCOUNTER — MED REC SCAN ONLY (OUTPATIENT)
Dept: FAMILY MEDICINE CLINIC | Facility: CLINIC | Age: 62
End: 2017-07-31

## 2017-07-31 DIAGNOSIS — Z79.01 ENCOUNTER FOR MONITORING COUMADIN THERAPY: ICD-10-CM

## 2017-07-31 DIAGNOSIS — Z86.711 HISTORY OF PULMONARY EMBOLISM: ICD-10-CM

## 2017-07-31 DIAGNOSIS — Z51.81 ENCOUNTER FOR MONITORING COUMADIN THERAPY: ICD-10-CM

## 2017-07-31 PROCEDURE — 36415 COLL VENOUS BLD VENIPUNCTURE: CPT | Performed by: FAMILY MEDICINE

## 2017-07-31 PROCEDURE — 85610 PROTHROMBIN TIME: CPT | Performed by: FAMILY MEDICINE

## 2017-07-31 NOTE — PROGRESS NOTES
Blue tube drawn from right arm with straight needle x1.  Pt shannon well    Pt signed care everywhere release forms for [facility], [facility], and [facility]

## 2017-08-01 LAB
INR BLD: 1.95 (ref 0.89–1.11)
PSA SERPL DL<=0.01 NG/ML-MCNC: 22.5 SECONDS (ref 12–14.3)

## 2017-08-07 ENCOUNTER — OFFICE VISIT (OUTPATIENT)
Dept: FAMILY MEDICINE CLINIC | Facility: CLINIC | Age: 62
End: 2017-08-07

## 2017-08-07 VITALS
SYSTOLIC BLOOD PRESSURE: 100 MMHG | WEIGHT: 233.38 LBS | DIASTOLIC BLOOD PRESSURE: 64 MMHG | RESPIRATION RATE: 8 BRPM | TEMPERATURE: 98 F | BODY MASS INDEX: 44 KG/M2 | HEART RATE: 60 BPM

## 2017-08-07 DIAGNOSIS — Z51.81 ANTICOAGULATION GOAL OF INR 2.5 TO 3.5: ICD-10-CM

## 2017-08-07 DIAGNOSIS — Z86.711 HISTORY OF PULMONARY EMBOLISM: ICD-10-CM

## 2017-08-07 DIAGNOSIS — Z09 FOLLOW-UP EXAM AFTER TREATMENT: Primary | ICD-10-CM

## 2017-08-07 DIAGNOSIS — J40 BRONCHITIS: ICD-10-CM

## 2017-08-07 DIAGNOSIS — Z79.01 ENCOUNTER FOR MONITORING COUMADIN THERAPY: ICD-10-CM

## 2017-08-07 DIAGNOSIS — Z51.81 ENCOUNTER FOR MONITORING COUMADIN THERAPY: ICD-10-CM

## 2017-08-07 DIAGNOSIS — Z79.01 ANTICOAGULATION GOAL OF INR 2.5 TO 3.5: ICD-10-CM

## 2017-08-07 PROCEDURE — 99213 OFFICE O/P EST LOW 20 MIN: CPT | Performed by: FAMILY MEDICINE

## 2017-08-07 PROCEDURE — 85610 PROTHROMBIN TIME: CPT | Performed by: FAMILY MEDICINE

## 2017-08-07 NOTE — PROGRESS NOTES
Renetta Deluca is a 64year old female. HPI:   Pt is here for ER f/u.     ER: Rafael Zazueta  Date: 7/27  Reason for ER visit: 3 days of feeling like she could not breathe, could not get a deep breath, then started coughing and then breathing got worse and it w tablet Rfl: 11   Venlafaxine HCl ER (EFFEXOR XR) 150 MG Oral Capsule SR 24 Hr Take 2 capsules (300 mg total) by mouth daily. Disp: 60 capsule Rfl: 2   QUEtiapine Fumarate 100 MG Oral Tab Take 1 tablet (100 mg total) by mouth nightly.  Disp: 30 tablet Rfl: 2 • Hypertension Sister    • Hypertension Brother       Smoking status: Never Smoker                                                              Smokeless tobacco: Never Used                      Alcohol use:  No                   REVIEW OF SYSTEMS:   GEN

## 2017-08-08 LAB
INR BLD: 2.88 (ref 0.89–1.11)
PSA SERPL DL<=0.01 NG/ML-MCNC: 30.8 SECONDS (ref 12–14.3)

## 2017-08-15 ENCOUNTER — TELEPHONE (OUTPATIENT)
Dept: FAMILY MEDICINE CLINIC | Facility: CLINIC | Age: 62
End: 2017-08-15

## 2017-08-15 PROBLEM — M19.90 OSTEOARTHRITIS: Status: ACTIVE | Noted: 2017-08-15

## 2017-08-15 PROBLEM — I10 UNSPECIFIED ESSENTIAL HYPERTENSION: Status: ACTIVE | Noted: 2017-08-15

## 2017-08-15 PROBLEM — E66.01 MORBID OBESITY (HCC): Status: ACTIVE | Noted: 2017-08-15

## 2017-08-15 NOTE — TELEPHONE ENCOUNTER
Pt called because she is in the hospital [facility] for suicidal ideations and she did not get to see the response from Dr. Ambar Ford as she doesn't have her phone right now.   I read the notes from the mychart and the pt states that she feels fine on the lowe

## 2017-08-17 ENCOUNTER — HOSPITAL ENCOUNTER (OUTPATIENT)
Dept: PHYSICAL THERAPY | Facility: HOSPITAL | Age: 62
Setting detail: THERAPIES SERIES
Discharge: HOME | End: 2017-08-17
Payer: MEDICARE

## 2017-08-17 PROCEDURE — 97110 THERAPEUTIC EXERCISES: CPT

## 2017-08-17 PROCEDURE — 97161 PT EVAL LOW COMPLEX 20 MIN: CPT

## 2017-08-21 ENCOUNTER — TELEPHONE (OUTPATIENT)
Dept: FAMILY MEDICINE CLINIC | Facility: CLINIC | Age: 62
End: 2017-08-21

## 2017-08-21 NOTE — TELEPHONE ENCOUNTER
Kristel Almodovar from [facility] called.  Dr Nellie Godinez would like Dr Joaquim Calderon to give her a call so that she can discuss patient and her BP medication, etc.

## 2017-08-21 NOTE — TELEPHONE ENCOUNTER
Spoke with the pt and she has what she thinks is nerve pain in her shoulder- she wonders if there is something that Dr. Brice Keller can do for her.   I advised that unfortunately since Dr. Brice Keller is not the attending physician that she is not going to be able to do

## 2017-08-21 NOTE — TELEPHONE ENCOUNTER
Patient called wanting to talk with Chetan Cabrera RN. Please call patient back at [facility] [phone] and you have to use her password which is  [email or web address] to get to talk to her.

## 2017-08-21 NOTE — TELEPHONE ENCOUNTER
She wanted to touch base (per pt's request/permission), let me know pt medically stable, doing okay, but not sure when discharge will occur, she still struggling with some paranoia and depressive symptoms. BP is running a bit low systolic often <846.  LEt's

## 2017-08-24 ENCOUNTER — TELEPHONE (OUTPATIENT)
Dept: FAMILY MEDICINE CLINIC | Facility: CLINIC | Age: 62
End: 2017-08-24

## 2017-08-24 NOTE — TELEPHONE ENCOUNTER
Pt called, states the dosage for atenolo,atenolol 25 MG Oral Tab,  needs to be corrected-she takes 1 whole tablet a day instead of 1/2. Pharmacy-[facility].     Please call pt at [phone]

## 2017-08-24 NOTE — TELEPHONE ENCOUNTER
Patient states when she was at the hospital her dose was changed. Patient states she just remembered she has an appt with Dr Peri Gay tomorrow and will discuss with MD at that time.   Confirmed appt for 8/25 at 2 pm

## 2017-08-25 ENCOUNTER — OFFICE VISIT (OUTPATIENT)
Dept: FAMILY MEDICINE CLINIC | Facility: CLINIC | Age: 62
End: 2017-08-25

## 2017-08-25 VITALS
WEIGHT: 225.38 LBS | RESPIRATION RATE: 14 BRPM | HEART RATE: 70 BPM | DIASTOLIC BLOOD PRESSURE: 82 MMHG | TEMPERATURE: 98 F | BODY MASS INDEX: 43 KG/M2 | SYSTOLIC BLOOD PRESSURE: 120 MMHG

## 2017-08-25 DIAGNOSIS — F33.1 MODERATE EPISODE OF RECURRENT MAJOR DEPRESSIVE DISORDER (HCC): ICD-10-CM

## 2017-08-25 DIAGNOSIS — M62.838 MUSCLE SPASM: ICD-10-CM

## 2017-08-25 DIAGNOSIS — Z09 HOSPITAL DISCHARGE FOLLOW-UP: ICD-10-CM

## 2017-08-25 DIAGNOSIS — G44.209 TENSION HEADACHE: Primary | ICD-10-CM

## 2017-08-25 PROCEDURE — 99214 OFFICE O/P EST MOD 30 MIN: CPT | Performed by: FAMILY MEDICINE

## 2017-08-25 NOTE — PATIENT INSTRUCTIONS
[employer] You Tube videos by Josesito Castillo on dysfunctional families  ----    Look up podcasts--see if anything else interests you to listen to

## 2017-08-25 NOTE — PROGRESS NOTES
Hugh Benjamin is a 64year old female. HPI:   Pt is here for pain in the back of her head. She was in [facility] for a week recently d/t suicidal ideations (this was her 5th hospitalization for it). She got out on Wed.  She was not happy with her care t Oral Tab TAKE ONE TABLET BY MOUTH ONCE DAILY Disp: 30 tablet Rfl: 11   Atorvastatin Calcium 40 MG Oral Tab Take 1 tablet (40 mg total) by mouth nightly.  Disp: 30 tablet Rfl: 8   Pantoprazole Sodium (PROTONIX) 40 MG Oral Tab EC Take 1 tablet (40 mg total) b Diabetes Mother    • Heart Disorder Mother    • Psychiatric Mother      dementia   • Hypertension Sister    • Hypertension Brother       Smoking status: Never Smoker                                                              Smokeless tobacco: Never Used asked her to continue to discuss this with her counselor and referred her to some online resources for improving dysfunctional relationships; I also asked her to consider new hobbies/reading or listening material just to switch things up for her brain--may

## 2017-08-26 ENCOUNTER — TELEPHONE (OUTPATIENT)
Dept: FAMILY MEDICINE CLINIC | Facility: CLINIC | Age: 62
End: 2017-08-26

## 2017-08-26 NOTE — TELEPHONE ENCOUNTER
Pt called, said she talked to Dr. Sayra Ralph last night. Pt states that Atenolol will not be produced until some time in Oct.  She needs something in place of it. Pt said Dr. Sayra Ralph told her Dr. Carol Collado will be in today (?) to prescribe her something different.

## 2017-08-26 NOTE — TELEPHONE ENCOUNTER
Pt is on atenolol 25mg and it is out of stock indefinetly. Can you change the medication.     I called the pt and she does have enough medication until Monday- advised that I will ask  to address this on Monday- she v/u

## 2017-08-28 RX ORDER — METOPROLOL SUCCINATE 25 MG/1
25 TABLET, EXTENDED RELEASE ORAL DAILY
Qty: 90 TABLET | Refills: 0 | Status: SHIPPED | OUTPATIENT
Start: 2017-08-28 | End: 2017-11-27

## 2017-08-28 NOTE — TELEPHONE ENCOUNTER
Script sent for metoprolol xr 25mg once a day.  Either schedule nurse visit in 2 weeks for bp check or call or my chart in 2 weeks with a few blood pressure readings on new med

## 2017-08-28 NOTE — TELEPHONE ENCOUNTER
Pt has an upcoming nurse visit that we will check her BP  Future Appointments  Date Time Provider Juliana Lea   9/8/2017 3:00 PM  [facility] [address] EM[facility] Jerrell Neves

## 2017-09-08 ENCOUNTER — NURSE ONLY (OUTPATIENT)
Dept: FAMILY MEDICINE CLINIC | Facility: CLINIC | Age: 62
End: 2017-09-08

## 2017-09-08 VITALS — DIASTOLIC BLOOD PRESSURE: 88 MMHG | SYSTOLIC BLOOD PRESSURE: 140 MMHG

## 2017-09-08 DIAGNOSIS — Z51.81 ENCOUNTER FOR MONITORING COUMADIN THERAPY: ICD-10-CM

## 2017-09-08 DIAGNOSIS — Z79.01 ENCOUNTER FOR MONITORING COUMADIN THERAPY: ICD-10-CM

## 2017-09-08 DIAGNOSIS — Z86.711 HISTORY OF PULMONARY EMBOLISM: ICD-10-CM

## 2017-09-08 PROCEDURE — 85610 PROTHROMBIN TIME: CPT | Performed by: FAMILY MEDICINE

## 2017-09-09 LAB
INR BLD: 3.8 (ref 0.89–1.11)
PSA SERPL DL<=0.01 NG/ML-MCNC: 38.4 SECONDS (ref 12–14.3)

## 2017-09-15 ENCOUNTER — OFFICE VISIT (OUTPATIENT)
Dept: FAMILY MEDICINE CLINIC | Facility: CLINIC | Age: 62
End: 2017-09-15

## 2017-09-15 VITALS
TEMPERATURE: 98 F | SYSTOLIC BLOOD PRESSURE: 144 MMHG | DIASTOLIC BLOOD PRESSURE: 90 MMHG | OXYGEN SATURATION: 98 % | BODY MASS INDEX: 43 KG/M2 | WEIGHT: 225 LBS | HEART RATE: 83 BPM | RESPIRATION RATE: 18 BRPM

## 2017-09-15 DIAGNOSIS — J20.9 BRONCHOSPASM WITH BRONCHITIS, ACUTE: ICD-10-CM

## 2017-09-15 DIAGNOSIS — J01.00 ACUTE MAXILLARY SINUSITIS, RECURRENCE NOT SPECIFIED: Primary | ICD-10-CM

## 2017-09-15 PROCEDURE — 99213 OFFICE O/P EST LOW 20 MIN: CPT | Performed by: NURSE PRACTITIONER

## 2017-09-15 RX ORDER — BENZONATATE 200 MG/1
200 CAPSULE ORAL 3 TIMES DAILY PRN
Qty: 20 CAPSULE | Refills: 0 | Status: SHIPPED | OUTPATIENT
Start: 2017-09-15 | End: 2017-12-22

## 2017-09-15 RX ORDER — CEFDINIR 300 MG/1
300 CAPSULE ORAL 2 TIMES DAILY
Qty: 20 CAPSULE | Refills: 0 | Status: SHIPPED | OUTPATIENT
Start: 2017-09-15 | End: 2017-09-25

## 2017-09-15 NOTE — PATIENT INSTRUCTIONS
Bronchospasm (Adult)    Bronchospasm occurs when the airways (bronchial tubes) go into spasm and contract. This makes it hard to breathe and causes wheezing (a high-pitched whistling sound). Bronchospasm can also cause frequent coughing without wheezing. Note: If you are age 72 or older, have a chronic lung disease or condition that affects your immune system, or you smoke, we recommend getting pneumococcal vaccinations, as well as an influenza vaccination (flu shot) every autumn.  Ask your healthcare provi · Drink lots of water or other fluids (at least 10 glasses a day) during an attack. This will loosen lung secretions and make it easier to breathe. If you have heart or kidney disease, check with your doctor before you drink extra fluids.   · Take prescribe

## 2017-09-15 NOTE — PROGRESS NOTES
CHIEF COMPLAINT:   Patient presents with:  Cough      HPI:   Day Wilkerson is a 58year old female who presents for upper respiratory symptoms for  4 days.  Patient reports congestion, cough with green, yellow colored sputum, cough is keeping pt up at [facility] WARFARIN SODIUM 4 MG Oral Tab TAKE ONE TABLET BY MOUTH ONCE DAILY Disp: 30 tablet Rfl: 11   Atorvastatin Calcium 40 MG Oral Tab Take 1 tablet (40 mg total) by mouth nightly.  Disp: 30 tablet Rfl: 8   Pantoprazole Sodium (PROTONIX) 40 MG Oral Tab EC Take 1 t GI: denies N/V/C or abdominal pain  NEURO: Denies headaches    EXAM:   /90 (BP Location: Left arm, Patient Position: Sitting)   Pulse 83   Temp 98.3 °F (36.8 °C) (Oral)   Resp 18   Wt 225 lb   SpO2 98%   BMI 42.51 kg/m²   GENERAL: well developed, wel Bronchospasm is due to irritation, inflammation, or allergic reaction of the airways. People with asthma get bronchospasm. However, not everyone with bronchospasm has asthma.   Being exposed to harmful fumes, a recent case of bronchitis, exercise, or a flar · You are coughing up lots of dark-colored sputum (mucus). · You do not start to improve within 24 hours.   Call 911, or get immediate medical care  Contact emergency services if any of these occur:  · Coughing up bloody sputum (mucus)  · Chest pain with e · Take prescribed medicine exactly at the times advised. If you take an inhaled medicine to help with breathing, do not use it more than once every 4 hours, unless told to do so.  If prescribed an antibiotic or prednisone, take all of the medicine, even if

## 2017-09-21 ENCOUNTER — TELEPHONE (OUTPATIENT)
Dept: FAMILY MEDICINE CLINIC | Facility: CLINIC | Age: 62
End: 2017-09-21

## 2017-09-21 NOTE — TELEPHONE ENCOUNTER
I'd give it a little more time--without any other concerning features this is likely a post infectious cough, should run it's course.  If fever, resp distress, sob, coughing up blood or not resolved in a week let me know

## 2017-09-21 NOTE — TELEPHONE ENCOUNTER
Patient is still on antibiotic from [facility] as well as cough medication. No chills, no more fatigue than normal.  States nothing else out of the ordinary. States cough was better but returned today. Patient aware Dr Kimberly Poole out of the office until tomorrow.

## 2017-09-22 ENCOUNTER — NURSE ONLY (OUTPATIENT)
Dept: FAMILY MEDICINE CLINIC | Facility: CLINIC | Age: 62
End: 2017-09-22

## 2017-09-22 DIAGNOSIS — Z79.01 ANTICOAGULATION GOAL OF INR 2.5 TO 3.5: Primary | ICD-10-CM

## 2017-09-22 DIAGNOSIS — Z51.81 ANTICOAGULATION GOAL OF INR 2.5 TO 3.5: Primary | ICD-10-CM

## 2017-09-22 DIAGNOSIS — Z86.711 HISTORY OF PULMONARY EMBOLISM: ICD-10-CM

## 2017-09-22 LAB
INR BLD: 2.38 (ref 0.89–1.11)
PSA SERPL DL<=0.01 NG/ML-MCNC: 26.4 SECONDS (ref 12–14.3)

## 2017-09-22 PROCEDURE — 85610 PROTHROMBIN TIME: CPT | Performed by: FAMILY MEDICINE

## 2017-09-22 PROCEDURE — 36415 COLL VENOUS BLD VENIPUNCTURE: CPT | Performed by: FAMILY MEDICINE

## 2017-09-25 ENCOUNTER — TELEPHONE (OUTPATIENT)
Dept: FAMILY MEDICINE CLINIC | Facility: CLINIC | Age: 62
End: 2017-09-25

## 2017-09-25 NOTE — TELEPHONE ENCOUNTER
Patient is scheduled for a nurse visit on Friday 10/20/17 at 2 pm for an INR Blood draw. Need Orders.

## 2017-10-09 ENCOUNTER — HOSPITAL ENCOUNTER (OUTPATIENT)
Age: 62
Discharge: HOME | End: 2017-10-09
Attending: EMERGENCY MEDICINE
Payer: MEDICARE

## 2017-10-09 ENCOUNTER — APPOINTMENT (OUTPATIENT)
Dept: GENERAL RADIOLOGY | Age: 62
End: 2017-10-09
Attending: EMERGENCY MEDICINE
Payer: MEDICARE

## 2017-10-09 VITALS
DIASTOLIC BLOOD PRESSURE: 76 MMHG | SYSTOLIC BLOOD PRESSURE: 140 MMHG | TEMPERATURE: 98 F | HEART RATE: 72 BPM | OXYGEN SATURATION: 98 % | RESPIRATION RATE: 16 BRPM

## 2017-10-09 DIAGNOSIS — S46.911A STRAIN OF RIGHT SHOULDER, INITIAL ENCOUNTER: Primary | ICD-10-CM

## 2017-10-09 PROCEDURE — 99213 OFFICE O/P EST LOW 20 MIN: CPT

## 2017-10-09 PROCEDURE — 73030 X-RAY EXAM OF SHOULDER: CPT | Performed by: EMERGENCY MEDICINE

## 2017-10-09 PROCEDURE — 73060 X-RAY EXAM OF HUMERUS: CPT | Performed by: EMERGENCY MEDICINE

## 2017-10-09 RX ORDER — HYDROCODONE BITARTRATE AND ACETAMINOPHEN 5; 325 MG/1; MG/1
1 TABLET ORAL NIGHTLY PRN
Qty: 7 TABLET | Refills: 0 | Status: SHIPPED | OUTPATIENT
Start: 2017-10-09 | End: 2017-10-17

## 2017-10-09 NOTE — ED PROVIDER NOTES
Patient presents with:  Shoulder Pain    HPI:     Hugh Benjamin is a 58year old female who presents with chief complaint of R shoulder injury. 2 hours pta pt was pulling up her pants and felt a pop and intense pain in the R shoulder.   States she has been ago and felt a pop to right lateral upper arm/shoulder. Patient states is it painful to move right arm. Patient is having pain from lateral shoulder down right arm to mid lateral humerus. FINDINGS:  Shoulder arthritis is detailed on the shoulder x-rays. AVS for detailed discharge instructions.

## 2017-10-09 NOTE — ED INITIAL ASSESSMENT (HPI)
2 hours ago, pulled up her pants  And heard a \"pop. \" increase pain right upper shoulder and right upper arm, pain 10/10

## 2017-10-17 ENCOUNTER — TELEPHONE (OUTPATIENT)
Dept: FAMILY MEDICINE CLINIC | Facility: CLINIC | Age: 62
End: 2017-10-17

## 2017-10-17 ENCOUNTER — NURSE ONLY (OUTPATIENT)
Dept: FAMILY MEDICINE CLINIC | Facility: CLINIC | Age: 62
End: 2017-10-17

## 2017-10-17 DIAGNOSIS — Z79.01 ANTICOAGULATION GOAL OF INR 2.5 TO 3.5: ICD-10-CM

## 2017-10-17 DIAGNOSIS — Z51.81 ENCOUNTER FOR MONITORING COUMADIN THERAPY: Primary | ICD-10-CM

## 2017-10-17 DIAGNOSIS — Z79.01 ENCOUNTER FOR MONITORING COUMADIN THERAPY: Primary | ICD-10-CM

## 2017-10-17 DIAGNOSIS — Z51.81 ANTICOAGULATION GOAL OF INR 2.5 TO 3.5: ICD-10-CM

## 2017-10-17 PROCEDURE — 36415 COLL VENOUS BLD VENIPUNCTURE: CPT | Performed by: FAMILY MEDICINE

## 2017-10-17 PROCEDURE — 85610 PROTHROMBIN TIME: CPT | Performed by: FAMILY MEDICINE

## 2017-10-17 RX ORDER — HYDROCODONE BITARTRATE AND ACETAMINOPHEN 5; 325 MG/1; MG/1
1 TABLET ORAL NIGHTLY PRN
Qty: 30 TABLET | Refills: 0 | Status: SHIPPED | OUTPATIENT
Start: 2017-10-17 | End: 2017-10-24

## 2017-10-17 NOTE — TELEPHONE ENCOUNTER
Patient picked up script for [facility], [address] [place] [address] # [id number].

## 2017-10-17 NOTE — PROGRESS NOTES
Blue tube drawn from right arm with butterfly needle- did draw a waste tube prior-d/t using a butterfly needle

## 2017-11-16 ENCOUNTER — NURSE ONLY (OUTPATIENT)
Dept: FAMILY MEDICINE CLINIC | Facility: CLINIC | Age: 62
End: 2017-11-16

## 2017-11-16 DIAGNOSIS — Z51.81 ENCOUNTER FOR MONITORING COUMADIN THERAPY: Primary | ICD-10-CM

## 2017-11-16 DIAGNOSIS — Z79.01 ENCOUNTER FOR MONITORING COUMADIN THERAPY: Primary | ICD-10-CM

## 2017-11-16 PROCEDURE — 85610 PROTHROMBIN TIME: CPT | Performed by: FAMILY MEDICINE

## 2017-11-16 PROCEDURE — 36415 COLL VENOUS BLD VENIPUNCTURE: CPT | Performed by: FAMILY MEDICINE

## 2017-11-17 ENCOUNTER — TELEPHONE (OUTPATIENT)
Dept: FAMILY MEDICINE CLINIC | Facility: CLINIC | Age: 62
End: 2017-11-17

## 2017-11-19 DIAGNOSIS — F32.A DEPRESSION, UNSPECIFIED DEPRESSION TYPE: ICD-10-CM

## 2017-11-20 RX ORDER — QUETIAPINE 200 MG/1
TABLET, FILM COATED ORAL
Qty: 90 TABLET | Refills: 0 | OUTPATIENT
Start: 2017-11-20

## 2017-11-20 RX ORDER — PANTOPRAZOLE SODIUM 40 MG/1
TABLET, DELAYED RELEASE ORAL
Qty: 90 TABLET | Refills: 3 | Status: SHIPPED | OUTPATIENT
Start: 2017-11-20 | End: 2018-10-16

## 2017-11-20 RX ORDER — ATORVASTATIN CALCIUM 40 MG/1
TABLET, FILM COATED ORAL
Qty: 90 TABLET | Refills: 3 | Status: SHIPPED | OUTPATIENT
Start: 2017-11-20 | End: 2018-11-12

## 2017-11-20 NOTE — TELEPHONE ENCOUNTER
LRF   Pantoprazole 1/3/17 #30 with 11  Atorvastatin  2/6/17 #30 with 8  Quetiapine  8/23/17  #30- I am not sure that you have every filled this script- I think it might go through psych?       LOV  8/25/17    She has not had a lipid panel since 12/4/15

## 2017-11-22 ENCOUNTER — HOSPITAL ENCOUNTER (OUTPATIENT)
Dept: MAMMOGRAPHY | Age: 62
Discharge: HOME | End: 2017-11-22
Attending: FAMILY MEDICINE
Payer: MEDICARE

## 2017-11-22 DIAGNOSIS — Z00.00 ROUTINE ADULT HEALTH MAINTENANCE: ICD-10-CM

## 2017-11-22 PROCEDURE — 77067 SCR MAMMO BI INCL CAD: CPT | Performed by: FAMILY MEDICINE

## 2017-11-27 RX ORDER — METOPROLOL SUCCINATE 25 MG/1
25 TABLET, EXTENDED RELEASE ORAL DAILY
Qty: 90 TABLET | Refills: 3 | Status: SHIPPED | OUTPATIENT
Start: 2017-11-27 | End: 2018-11-26

## 2017-11-27 NOTE — TELEPHONE ENCOUNTER
LOV: 8/25/17  /82  Last Refill: 8/28/17  #90 0 refills    Future Appointments  Date Time Provider Juliana Lea   12/14/2017 2:00 PM [facility], 11/27/17, 2:32 PM

## 2017-12-14 ENCOUNTER — NURSE ONLY (OUTPATIENT)
Dept: FAMILY MEDICINE CLINIC | Facility: CLINIC | Age: 62
End: 2017-12-14

## 2017-12-14 DIAGNOSIS — Z51.81 ENCOUNTER FOR MONITORING COUMADIN THERAPY: Primary | ICD-10-CM

## 2017-12-14 DIAGNOSIS — Z79.01 ENCOUNTER FOR MONITORING COUMADIN THERAPY: Primary | ICD-10-CM

## 2017-12-14 PROCEDURE — 85610 PROTHROMBIN TIME: CPT | Performed by: FAMILY MEDICINE

## 2017-12-14 PROCEDURE — 36415 COLL VENOUS BLD VENIPUNCTURE: CPT | Performed by: FAMILY MEDICINE

## 2017-12-14 NOTE — PROGRESS NOTES
Pt arrived for blood draw for PT/INR. 1 blue tube was drawn out of the [facility] using a straight needle and 1 attempt. Pt tolerated and was sent home in stable condition.     Marely Parks, 12/14/17, 2:23 PM

## 2017-12-15 ENCOUNTER — MED REC SCAN ONLY (OUTPATIENT)
Dept: FAMILY MEDICINE CLINIC | Facility: CLINIC | Age: 62
End: 2017-12-15

## 2017-12-20 ENCOUNTER — TELEPHONE (OUTPATIENT)
Dept: FAMILY MEDICINE CLINIC | Facility: CLINIC | Age: 62
End: 2017-12-20

## 2017-12-20 DIAGNOSIS — M25.611 DECREASED ROM OF RIGHT SHOULDER: ICD-10-CM

## 2017-12-20 DIAGNOSIS — R29.898 RIGHT ARM WEAKNESS: Primary | ICD-10-CM

## 2017-12-20 NOTE — TELEPHONE ENCOUNTER
Rosalia Collazo whom is a physical therapist working with pt would wants to touch base with [facility] or nurse.    Please return call to [phone] Option 2

## 2017-12-20 NOTE — TELEPHONE ENCOUNTER
Torn rotator cuff- right  Mini Hinds is the therapist    Per the pt  it is ok to talk to the therapist Mini Hinds

## 2017-12-20 NOTE — TELEPHONE ENCOUNTER
Left message for the pt to call back- I need to get her permission to speak with the gentleman that called

## 2017-12-21 ENCOUNTER — PATIENT MESSAGE (OUTPATIENT)
Dept: FAMILY MEDICINE CLINIC | Facility: CLINIC | Age: 62
End: 2017-12-21

## 2017-12-21 NOTE — TELEPHONE ENCOUNTER
From: Sydni Nelson  To: Tri Fischer MD  Sent: 12/21/2017 2:51 PM CST  Subject: Non-Urgent Medical Question    Hi Dr. Kiya Guadarrama,     Don't know if u got my first message or not. I have a really bad cough again.  I coughed all night last night and my chest is a

## 2017-12-21 NOTE — TELEPHONE ENCOUNTER
Spoke with Florencio Paris at [facility] in [place].     Per Florencio Paris patient contacted him to see if there is anything they can do to help with arm range of motion and functional strength in her right arm from the shoulder injury that happene

## 2017-12-26 ENCOUNTER — PATIENT MESSAGE (OUTPATIENT)
Dept: FAMILY MEDICINE CLINIC | Facility: CLINIC | Age: 62
End: 2017-12-26

## 2017-12-27 ENCOUNTER — HOSPITAL ENCOUNTER (OUTPATIENT)
Age: 62
Discharge: HOME | End: 2017-12-27
Payer: MEDICARE

## 2017-12-27 ENCOUNTER — PATIENT MESSAGE (OUTPATIENT)
Dept: FAMILY MEDICINE CLINIC | Facility: CLINIC | Age: 62
End: 2017-12-27

## 2017-12-27 VITALS
RESPIRATION RATE: 16 BRPM | TEMPERATURE: 99 F | OXYGEN SATURATION: 95 % | BODY MASS INDEX: 42.48 KG/M2 | HEART RATE: 94 BPM | SYSTOLIC BLOOD PRESSURE: 157 MMHG | HEIGHT: 61 IN | WEIGHT: 225 LBS | DIASTOLIC BLOOD PRESSURE: 99 MMHG

## 2017-12-27 DIAGNOSIS — J20.9 ACUTE BRONCHITIS, UNSPECIFIED ORGANISM: Primary | ICD-10-CM

## 2017-12-27 PROCEDURE — 99214 OFFICE O/P EST MOD 30 MIN: CPT

## 2017-12-27 PROCEDURE — 99213 OFFICE O/P EST LOW 20 MIN: CPT

## 2017-12-27 RX ORDER — DOXYCYCLINE HYCLATE 100 MG/1
100 CAPSULE ORAL 2 TIMES DAILY
Qty: 14 CAPSULE | Refills: 0 | Status: SHIPPED | OUTPATIENT
Start: 2017-12-27 | End: 2018-01-03

## 2017-12-27 RX ORDER — CODEINE PHOSPHATE AND GUAIFENESIN 10; 100 MG/5ML; MG/5ML
10 SOLUTION ORAL NIGHTLY PRN
Qty: 118 ML | Refills: 0 | Status: SHIPPED | OUTPATIENT
Start: 2017-12-27 | End: 2018-02-26 | Stop reason: ALTCHOICE

## 2017-12-27 RX ORDER — METHYLPREDNISOLONE 4 MG/1
TABLET ORAL
Qty: 1 PACKAGE | Refills: 0 | Status: SHIPPED | OUTPATIENT
Start: 2017-12-27 | End: 2018-02-26 | Stop reason: ALTCHOICE

## 2017-12-27 NOTE — ED PROVIDER NOTES
Patient Seen in: [id number] [facility]    History   Patient presents with:  Cough/URI    Stated Complaint: cough    HPI    Patient is a pleasant 40-year-old female.   2 months prior to arrival, patient was treated for a \"severe episode of bronc Triage Vitals [12/27/17 1338]  BP: 157/99  Pulse: 94  Resp: 16  Temp: 98.7 °F (37.1 °C)  Temp src: Oral  SpO2: 95 %  O2 Device: None (Room air)    Current:/99   Pulse 94   Temp 98.7 °F (37.1 °C) (Oral)   Resp 16   Ht 154.9 cm (5' 1\")   Wt 102.1 kg Refills: 0    guaiFENesin-codeine (CHERATUSSIN AC) 100-10 MG/5ML Oral Solution  Take 10 mL by mouth nightly as needed for cough. Qty: 118 mL Refills: 0    Doxycycline Hyclate 100 MG Oral Cap  Take 1 capsule (100 mg total) by mouth 2 (two) times daily.   Qt

## 2017-12-27 NOTE — TELEPHONE ENCOUNTER
From: Hilario Monsivais  To: Padilla Cho MD  Sent: 12/26/2017 11:01 PM CST  Subject: Non-Urgent Medical Question    Hi. Dr. Luis Lynn,     Sorry it's so late but wanted to let u know that I still have the cough except it has gotten worse.  When I cough it sounds k

## 2017-12-27 NOTE — TELEPHONE ENCOUNTER
From: Camron Spears  To: Arun Urrutia MD  Sent: 12/27/2017 5:47 PM CST  Subject: Other    Dr. Fausto Boothe,    I want to let you know that I went to [facility] this afternoon. My cough just wasn't going away actually at times it was worse.  The  gave

## 2018-01-08 ENCOUNTER — NURSE ONLY (OUTPATIENT)
Dept: FAMILY MEDICINE CLINIC | Facility: CLINIC | Age: 63
End: 2018-01-08

## 2018-01-08 DIAGNOSIS — Z86.711 HISTORY OF PULMONARY EMBOLISM: ICD-10-CM

## 2018-01-08 DIAGNOSIS — Z79.01 ENCOUNTER FOR MONITORING COUMADIN THERAPY: ICD-10-CM

## 2018-01-08 DIAGNOSIS — Z51.81 ENCOUNTER FOR MONITORING COUMADIN THERAPY: ICD-10-CM

## 2018-01-08 PROCEDURE — 85610 PROTHROMBIN TIME: CPT | Performed by: FAMILY MEDICINE

## 2018-01-08 NOTE — PROGRESS NOTES
Blue tube drawn from right arm with butterfly needle x1.  Pt shannon well  One waste tube drawn prior to the blue tube sample

## 2018-01-09 ENCOUNTER — TELEPHONE (OUTPATIENT)
Dept: FAMILY MEDICINE CLINIC | Facility: CLINIC | Age: 63
End: 2018-01-09

## 2018-01-09 LAB
INR BLD: 5.15 (ref 0.89–1.11)
PSA SERPL DL<=0.01 NG/ML-MCNC: 49 SECONDS (ref 12–14.3)

## 2018-01-09 NOTE — TELEPHONE ENCOUNTER
Received a call from the lab regarding a critical lab. Patient had PT/INR drawn yesterday. Her PT/INR level is 49/5. 15. Per review of chart Patient's range is 2.5-3.5.    I called back Patient, no answer, left message about elevated lab and to hold off on t

## 2018-01-13 ENCOUNTER — NURSE ONLY (OUTPATIENT)
Dept: FAMILY MEDICINE CLINIC | Facility: CLINIC | Age: 63
End: 2018-01-13

## 2018-01-13 ENCOUNTER — PATIENT MESSAGE (OUTPATIENT)
Dept: FAMILY MEDICINE CLINIC | Facility: CLINIC | Age: 63
End: 2018-01-13

## 2018-01-13 ENCOUNTER — TELEPHONE (OUTPATIENT)
Dept: FAMILY MEDICINE CLINIC | Facility: CLINIC | Age: 63
End: 2018-01-13

## 2018-01-13 DIAGNOSIS — Z79.01 ENCOUNTER FOR MONITORING COUMADIN THERAPY: ICD-10-CM

## 2018-01-13 DIAGNOSIS — Z86.711 HISTORY OF PULMONARY EMBOLISM: Primary | ICD-10-CM

## 2018-01-13 DIAGNOSIS — Z51.81 ENCOUNTER FOR MONITORING COUMADIN THERAPY: ICD-10-CM

## 2018-01-13 PROCEDURE — 36415 COLL VENOUS BLD VENIPUNCTURE: CPT | Performed by: FAMILY MEDICINE

## 2018-01-13 NOTE — PROGRESS NOTES
Pt here for PT/INR. Unable to draw blood here in the office. Order given to patient to go [facility] with instructions for [facility] to call office with results.  - jesus Reeves.

## 2018-01-13 NOTE — TELEPHONE ENCOUNTER
Spoke to Ligia at [facility]. Advised the PT/INR needs to be called to Dr. Braulio Nj - on call. Ligia states that labs are picked up at the end of the day and will call if critical.  Otherwise, results will be faxed.

## 2018-01-14 LAB
INR: 1.1
PT: 11.9 SEC (ref 9–11.5)

## 2018-01-15 ENCOUNTER — TELEPHONE (OUTPATIENT)
Dept: FAMILY MEDICINE CLINIC | Facility: CLINIC | Age: 63
End: 2018-01-15

## 2018-01-15 NOTE — TELEPHONE ENCOUNTER
From: Dariusz Ortega  To: Dani Bay MD  Sent: 1/13/2018 5:43 PM CST  Subject: Other    Hi Dr. Cathern Hatchet,    I'm sure you know about my appointment today. I was there for my INR but wasn't able to get it done. I had two different nurses try to draw my blood.

## 2018-01-15 NOTE — TELEPHONE ENCOUNTER
Pt had her INR drawn on Saturday @ [facility] and the result is 1.1  Prior to this her INR was high at 5+. Her instructions were to hold coumadin for 2 doses and then 1/2 dose then back to regular dose of 4mg.  She went back to her dose of 4mg on Friday and zack alvarenga

## 2018-01-15 NOTE — TELEPHONE ENCOUNTER
Left message for the pt with the instructions for the coumadin and recheck- advised to call if question

## 2018-01-18 ENCOUNTER — TELEPHONE (OUTPATIENT)
Dept: FAMILY MEDICINE CLINIC | Facility: CLINIC | Age: 63
End: 2018-01-18

## 2018-01-18 RX ORDER — PANTOPRAZOLE SODIUM 40 MG/1
TABLET, DELAYED RELEASE ORAL
Qty: 30 TABLET | Refills: 0 | OUTPATIENT
Start: 2018-01-18

## 2018-01-18 NOTE — TELEPHONE ENCOUNTER
Pt called, said per her MyChart, a prescription was denied. Pt would like to know why?    Please call pt at [phone]

## 2018-01-18 NOTE — TELEPHONE ENCOUNTER
Medication refilled on 11 20 2017 #90 with 3 refills   Receipt confirmed by pharmacy on 11 20 2017 1:09 pm

## 2018-01-18 NOTE — TELEPHONE ENCOUNTER
Patient advised that the reason Pantoprazole had been canceled yesterday was because this was refilled on 11/20/17 for #90 with 3 refills. Patient verbalized understanding.

## 2018-01-19 ENCOUNTER — NURSE ONLY (OUTPATIENT)
Dept: FAMILY MEDICINE CLINIC | Facility: CLINIC | Age: 63
End: 2018-01-19

## 2018-01-19 DIAGNOSIS — Z79.01 ENCOUNTER FOR MONITORING COUMADIN THERAPY: ICD-10-CM

## 2018-01-19 DIAGNOSIS — R73.9 ELEVATED BLOOD SUGAR: ICD-10-CM

## 2018-01-19 DIAGNOSIS — Z51.81 ANTICOAGULATION GOAL OF INR 2.5 TO 3.5: ICD-10-CM

## 2018-01-19 DIAGNOSIS — Z79.01 ANTICOAGULATION GOAL OF INR 2.5 TO 3.5: ICD-10-CM

## 2018-01-19 DIAGNOSIS — Z51.81 ENCOUNTER FOR MONITORING COUMADIN THERAPY: ICD-10-CM

## 2018-01-19 DIAGNOSIS — E78.2 MIXED HYPERLIPIDEMIA: ICD-10-CM

## 2018-01-19 DIAGNOSIS — Z86.711 HISTORY OF PULMONARY EMBOLISM: ICD-10-CM

## 2018-01-19 LAB
INR BLD: 2.42 (ref 0.89–1.11)
PSA SERPL DL<=0.01 NG/ML-MCNC: 26.8 SECONDS (ref 12–14.3)

## 2018-01-19 PROCEDURE — 36415 COLL VENOUS BLD VENIPUNCTURE: CPT | Performed by: FAMILY MEDICINE

## 2018-01-19 PROCEDURE — 85610 PROTHROMBIN TIME: CPT | Performed by: FAMILY MEDICINE

## 2018-01-22 RX ORDER — PANTOPRAZOLE SODIUM 40 MG/1
TABLET, DELAYED RELEASE ORAL
Qty: 90 TABLET | Refills: 3 | Status: CANCELLED
Start: 2018-01-22

## 2018-01-23 NOTE — TELEPHONE ENCOUNTER
From: Dariel Drake  Sent: 1/22/2018 6:27 PM CST  Subject: Medication Renewal Request    Jacey Saleem.  Inder Gavin would like a refill of the following medications:     PANTOPRAZOLE SODIUM 40 MG Oral Tab EC Nick Valerio MD]    Preferred pharmacy: [place] [id number]

## 2018-01-25 ENCOUNTER — NURSE ONLY (OUTPATIENT)
Dept: FAMILY MEDICINE CLINIC | Facility: CLINIC | Age: 63
End: 2018-01-25

## 2018-01-25 DIAGNOSIS — Z51.81 ENCOUNTER FOR MONITORING COUMADIN THERAPY: ICD-10-CM

## 2018-01-25 DIAGNOSIS — Z79.01 ENCOUNTER FOR MONITORING COUMADIN THERAPY: ICD-10-CM

## 2018-01-25 DIAGNOSIS — Z86.711 HISTORY OF PULMONARY EMBOLISM: ICD-10-CM

## 2018-01-25 LAB
INR BLD: 2.76 (ref 0.89–1.11)
PSA SERPL DL<=0.01 NG/ML-MCNC: 29.7 SECONDS (ref 12–14.3)

## 2018-01-25 PROCEDURE — 85610 PROTHROMBIN TIME: CPT | Performed by: FAMILY MEDICINE

## 2018-01-25 NOTE — PROGRESS NOTES
1 blue tube was collected out of R ac using straight needle and  1 attempt.     Pt tolerated and was sent home in stable condition

## 2018-02-24 ENCOUNTER — MED REC SCAN ONLY (OUTPATIENT)
Dept: FAMILY MEDICINE CLINIC | Facility: CLINIC | Age: 63
End: 2018-02-24

## 2018-02-26 ENCOUNTER — OFFICE VISIT (OUTPATIENT)
Dept: FAMILY MEDICINE CLINIC | Facility: CLINIC | Age: 63
End: 2018-02-26

## 2018-02-26 VITALS
HEART RATE: 74 BPM | BODY MASS INDEX: 44 KG/M2 | SYSTOLIC BLOOD PRESSURE: 122 MMHG | RESPIRATION RATE: 14 BRPM | DIASTOLIC BLOOD PRESSURE: 80 MMHG | TEMPERATURE: 98 F | WEIGHT: 232 LBS

## 2018-02-26 DIAGNOSIS — M15.9 PRIMARY OSTEOARTHRITIS INVOLVING MULTIPLE JOINTS: ICD-10-CM

## 2018-02-26 DIAGNOSIS — Z13.31 DEPRESSION SCREENING: ICD-10-CM

## 2018-02-26 DIAGNOSIS — Z59.9 FINANCIAL PROBLEMS: ICD-10-CM

## 2018-02-26 DIAGNOSIS — Z13.29 THYROID DISORDER SCREEN: ICD-10-CM

## 2018-02-26 DIAGNOSIS — Z51.81 ENCOUNTER FOR MONITORING COUMADIN THERAPY: ICD-10-CM

## 2018-02-26 DIAGNOSIS — K21.00 GASTROESOPHAGEAL REFLUX DISEASE WITH ESOPHAGITIS: ICD-10-CM

## 2018-02-26 DIAGNOSIS — F41.0 PANIC ATTACKS: ICD-10-CM

## 2018-02-26 DIAGNOSIS — Z79.01 ANTICOAGULATION GOAL OF INR 2.5 TO 3.5: ICD-10-CM

## 2018-02-26 DIAGNOSIS — Z79.01 ENCOUNTER FOR MONITORING COUMADIN THERAPY: ICD-10-CM

## 2018-02-26 DIAGNOSIS — Z51.81 ANTICOAGULATION GOAL OF INR 2.5 TO 3.5: ICD-10-CM

## 2018-02-26 DIAGNOSIS — E78.2 MIXED HYPERLIPIDEMIA: ICD-10-CM

## 2018-02-26 DIAGNOSIS — Z00.00 ENCOUNTER FOR ANNUAL HEALTH EXAMINATION: Primary | ICD-10-CM

## 2018-02-26 DIAGNOSIS — R73.9 ELEVATED BLOOD SUGAR: ICD-10-CM

## 2018-02-26 DIAGNOSIS — K44.9 HIATAL HERNIA: ICD-10-CM

## 2018-02-26 DIAGNOSIS — F33.2 MAJOR DEPRESSIVE DISORDER, RECURRENT SEVERE WITHOUT PSYCHOTIC FEATURES (HCC): ICD-10-CM

## 2018-02-26 DIAGNOSIS — Z65.8: ICD-10-CM

## 2018-02-26 DIAGNOSIS — E66.01 MORBID OBESITY (HCC): ICD-10-CM

## 2018-02-26 DIAGNOSIS — M48.05 SPINAL STENOSIS OF THORACOLUMBAR REGION: ICD-10-CM

## 2018-02-26 DIAGNOSIS — M54.5 CHRONIC BILATERAL LOW BACK PAIN, WITH SCIATICA PRESENCE UNSPECIFIED: ICD-10-CM

## 2018-02-26 DIAGNOSIS — E55.9 VITAMIN D DEFICIENCY: ICD-10-CM

## 2018-02-26 DIAGNOSIS — G89.29 CHRONIC BILATERAL LOW BACK PAIN, WITH SCIATICA PRESENCE UNSPECIFIED: ICD-10-CM

## 2018-02-26 DIAGNOSIS — Z86.711 HISTORY OF PULMONARY EMBOLISM: ICD-10-CM

## 2018-02-26 DIAGNOSIS — Z11.59 NEED FOR HEPATITIS C SCREENING TEST: ICD-10-CM

## 2018-02-26 DIAGNOSIS — D68.52 PROTHROMBIN MUTATION (HCC): ICD-10-CM

## 2018-02-26 DIAGNOSIS — J45.20 MILD INTERMITTENT ASTHMA WITHOUT COMPLICATION: ICD-10-CM

## 2018-02-26 DIAGNOSIS — I10 ESSENTIAL HYPERTENSION: ICD-10-CM

## 2018-02-26 LAB
25-HYDROXYVITAMIN D (TOTAL): 48.8 NG/ML (ref 30–100)
ALBUMIN SERPL-MCNC: 3.8 G/DL (ref 3.5–4.8)
ALP LIVER SERPL-CCNC: 106 U/L (ref 50–130)
ALT SERPL-CCNC: 17 U/L (ref 14–54)
AST SERPL-CCNC: 17 U/L (ref 15–41)
BASOPHILS # BLD AUTO: 0.05 X10(3) UL (ref 0–0.1)
BASOPHILS NFR BLD AUTO: 1 %
BILIRUB SERPL-MCNC: 0.3 MG/DL (ref 0.1–2)
BUN BLD-MCNC: 13 MG/DL (ref 8–20)
CALCIUM BLD-MCNC: 9.6 MG/DL (ref 8.3–10.3)
CHLORIDE: 103 MMOL/L (ref 101–111)
CHOLEST SMN-MCNC: 193 MG/DL (ref ?–200)
CO2: 31 MMOL/L (ref 22–32)
CREAT BLD-MCNC: 1.09 MG/DL (ref 0.55–1.02)
EOSINOPHIL # BLD AUTO: 0.2 X10(3) UL (ref 0–0.3)
EOSINOPHIL NFR BLD AUTO: 4.1 %
ERYTHROCYTE [DISTWIDTH] IN BLOOD BY AUTOMATED COUNT: 14.3 % (ref 11.5–16)
EST. AVERAGE GLUCOSE BLD GHB EST-MCNC: 123 MG/DL (ref 68–126)
GLUCOSE BLD-MCNC: 92 MG/DL (ref 70–99)
HBA1C MFR BLD HPLC: 5.9 % (ref ?–5.7)
HCT VFR BLD AUTO: 41.4 % (ref 34–50)
HDLC SERPL-MCNC: 61 MG/DL (ref 45–?)
HDLC SERPL: 3.16 {RATIO} (ref ?–4.44)
HEPATITIS C VIRUS AB INTERPRETATION: NONREACTIVE
HGB BLD-MCNC: 13.4 G/DL (ref 12–16)
IMMATURE GRANULOCYTE COUNT: 0.01 X10(3) UL (ref 0–1)
IMMATURE GRANULOCYTE RATIO %: 0.2 %
INR BLD: 3.66 (ref 0.89–1.11)
LDLC SERPL CALC-MCNC: 92 MG/DL (ref ?–130)
LYMPHOCYTES # BLD AUTO: 1.3 X10(3) UL (ref 0.9–4)
LYMPHOCYTES NFR BLD AUTO: 26.5 %
M PROTEIN MFR SERPL ELPH: 7.6 G/DL (ref 6.1–8.3)
MCH RBC QN AUTO: 30.3 PG (ref 27–33.2)
MCHC RBC AUTO-ENTMCNC: 32.4 G/DL (ref 31–37)
MCV RBC AUTO: 93.7 FL (ref 81–100)
MONOCYTES # BLD AUTO: 0.35 X10(3) UL (ref 0.1–1)
MONOCYTES NFR BLD AUTO: 7.1 %
NEUTROPHIL ABS PRELIM: 2.99 X10 (3) UL (ref 1.3–6.7)
NEUTROPHILS # BLD AUTO: 2.99 X10(3) UL (ref 1.3–6.7)
NEUTROPHILS NFR BLD AUTO: 61.1 %
NONHDLC SERPL-MCNC: 132 MG/DL (ref ?–130)
PLATELET # BLD AUTO: 297 10(3)UL (ref 150–450)
POTASSIUM SERPL-SCNC: 3.9 MMOL/L (ref 3.6–5.1)
PSA SERPL DL<=0.01 NG/ML-MCNC: 36.4 SECONDS (ref 12–14.3)
RBC # BLD AUTO: 4.42 X10(6)UL (ref 3.8–5.1)
RED CELL DISTRIBUTION WIDTH-SD: 49.5 FL (ref 35.1–46.3)
SODIUM SERPL-SCNC: 140 MMOL/L (ref 136–144)
TRIGL SERPL-MCNC: 198 MG/DL (ref ?–150)
TSI SER-ACNC: 1.52 MIU/ML (ref 0.35–5.5)
VLDLC SERPL CALC-MCNC: 40 MG/DL (ref 5–40)
WBC # BLD AUTO: 4.9 X10(3) UL (ref 4–13)

## 2018-02-26 PROCEDURE — 80061 LIPID PANEL: CPT | Performed by: FAMILY MEDICINE

## 2018-02-26 PROCEDURE — G0438 PPPS, INITIAL VISIT: HCPCS | Performed by: FAMILY MEDICINE

## 2018-02-26 PROCEDURE — 82306 VITAMIN D 25 HYDROXY: CPT | Performed by: FAMILY MEDICINE

## 2018-02-26 PROCEDURE — 84443 ASSAY THYROID STIM HORMONE: CPT | Performed by: FAMILY MEDICINE

## 2018-02-26 PROCEDURE — 85025 COMPLETE CBC W/AUTO DIFF WBC: CPT | Performed by: FAMILY MEDICINE

## 2018-02-26 PROCEDURE — 80053 COMPREHEN METABOLIC PANEL: CPT | Performed by: FAMILY MEDICINE

## 2018-02-26 PROCEDURE — 86803 HEPATITIS C AB TEST: CPT | Performed by: FAMILY MEDICINE

## 2018-02-26 PROCEDURE — 83036 HEMOGLOBIN GLYCOSYLATED A1C: CPT | Performed by: FAMILY MEDICINE

## 2018-02-26 PROCEDURE — 85610 PROTHROMBIN TIME: CPT | Performed by: FAMILY MEDICINE

## 2018-02-26 SDOH — ECONOMIC STABILITY - INCOME SECURITY: PROBLEM RELATED TO HOUSING AND ECONOMIC CIRCUMSTANCES, UNSPECIFIED: Z59.9

## 2018-02-26 NOTE — PATIENT INSTRUCTIONS
Recommended Websites for Advanced Directives    [employer]no. org/publications/Documents/personal_dec. pdf  An information packet, including necessary form from the [address] website. http://www. [place]state. [place]/public/books/adv

## 2018-02-26 NOTE — PROGRESS NOTES
HPI:   Dayna Aguilar is a 58year old female who presents for a Medicare Subsequent Annual Wellness visit (Pt already had Initial Annual Wellness). Pt updates me she is planning on R knee replacement in the near future, no date scheduled yet.  She can't months). No new concerns for me today, she has a plan for all her medical issues.     Fall/Risk Assessment abnormal    She has been screened for Falls and is High Risk: Fall/Risk Scorin    Increasing activity, increasing vitamin D, and/or physical t GYNECOLOGY)    Patient Active Problem List:     Poor conflict management skills     Depression     Financial problems     History of pulmonary embolism     Encounter for monitoring coumadin therapy     Anticoagulation goal of INR 2.5 to 3.5     Chronic elke (two) times daily. TraZODone HCl 100 MG Oral Tab Take 1 tablet (100 mg total) by mouth nightly as needed for Sleep. Venlafaxine HCl ER (EFFEXOR XR) 150 MG Oral Capsule SR 24 Hr Take 2 capsules (300 mg total) by mouth daily.    QUEtiapine Fumarate 200 MG EXAM:   /80   Pulse 74   Temp 98 °F (36.7 °C) (Temporal)   Resp 14   Wt 232 lb   BMI 43.84 kg/m²  Estimated body mass index is 43.84 kg/m² as calculated from the following:    Height as of 12/27/17: 61\". Weight as of this encounter: 232 lb. 12/04/2013, 03/20/2014   • TDAP 11/01/2012   • Zoster Vaccine Live (Zostavax) 05/20/2017   Deferred Date(s) Deferred   • Influenza Vaccine, No Preserv, 3YR + 12/04/2015   • Zoster Vaccine Live (Zostavax) 12/04/2015        ASSESSMENT AND OTHER RELEVANT  controlled, CPM  Morbid obesity ([facility] [address]  -encouraged healthy lifetyle habits and modest weight loss  Prothrombin mutation ([facility] [address]  with  History of pulmonary embolism  And need for  Encounter for monitoring coumadin therapy  -CPM with anticoagulation  Primary Exam only, or if medically necessary Electrocardiogram date02/20/2017     Colorectal Cancer Screening      Colonoscopy Screen every 10 years Colonoscopy,10 Years due on 09/04/2022 Update Health Maintenance if applicable    Flex Sigmoidoscopy Screen every 1 your prescription benefits, but Medicare does not cover unless Medically needed    Zoster   Not covered by Medicare Part B No vaccine history found This may be covered with your pharmacy  prescription benefits      [address] [facility] [address] [facility]

## 2018-03-01 ENCOUNTER — MED REC SCAN ONLY (OUTPATIENT)
Dept: FAMILY MEDICINE CLINIC | Facility: CLINIC | Age: 63
End: 2018-03-01

## 2018-03-02 ENCOUNTER — TELEPHONE (OUTPATIENT)
Dept: FAMILY MEDICINE CLINIC | Facility: CLINIC | Age: 63
End: 2018-03-02

## 2018-03-02 NOTE — TELEPHONE ENCOUNTER
Called Dr. Patrice Spence office, spoke to [facility], she was unable to see the report for Fuad Latham. She states she will have Brianda Medina call us back on Monday to possibly give us information Dr. Cleo Salinas is looking for.      FYI to Dr. Cleo Salinas

## 2018-03-02 NOTE — TELEPHONE ENCOUNTER
----- Message from Karina Nugent MD sent at 3/1/2018  1:04 PM CST -----  Regarding: colonoscopy/egd  I received the scope records from Dr Con Camacho office, but it doesn't say when he recommends repeating EGD and colonoscopy, can you please find out for us, t

## 2018-03-05 NOTE — TELEPHONE ENCOUNTER
Message   Received:  Today   Message Contents   Maura Blevins Nurse   Caller: Provider (Today,  9:41 AM)             Provider, Geoffrey Alicea from Dr. Leti Mariano' office, returned our call from this past Friday.   Please call Geoffrey Alicea at [phone]

## 2018-03-05 NOTE — TELEPHONE ENCOUNTER
I thought Dr. Aburto Tirado name was on the last colonoscopy report I received. ..  Doesn't look like it's scanned in yet to confirm that

## 2018-03-07 ENCOUNTER — CHARTING TRANS (OUTPATIENT)
Dept: OTHER | Age: 63
End: 2018-03-07

## 2018-03-07 NOTE — TELEPHONE ENCOUNTER
Called [employer] and Spoke with Jonathon- Gian Frey did a colonoscopy in 2012 and there are no recommendations for repeating the scopes- asked if we could get that recommendation  The doctor is not in the office today but a note will be sent and someone will melly

## 2018-03-08 ENCOUNTER — TELEPHONE (OUTPATIENT)
Dept: FAMILY MEDICINE CLINIC | Facility: CLINIC | Age: 63
End: 2018-03-08

## 2018-03-08 NOTE — TELEPHONE ENCOUNTER
Jazmine from Dr. Davey Aquino office called. Dr. Nivia Patterson has not seen pt in over 4 years. Has no information regarding colonoscopy or who performed it. Dr. Nivia Patterson states we should find out from pt who did the colonoscopy and contact that provider.   Any questions p

## 2018-03-08 NOTE — TELEPHONE ENCOUNTER
Spoke with pt and advised we need name and number of doctor who did colonoscopy. Pt states she can't remember.   Pt stated when she figures out who it was she will call us back

## 2018-03-09 NOTE — TELEPHONE ENCOUNTER
Spoke with Geoffrey Alicea @ Dr. Leti Mariano office to see when the pt was to have follow up scopes- she states 7years so she is due in 2019    Updated her health maintenance

## 2018-03-19 ENCOUNTER — OFFICE VISIT (OUTPATIENT)
Dept: FAMILY MEDICINE CLINIC | Facility: CLINIC | Age: 63
End: 2018-03-19

## 2018-03-19 ENCOUNTER — HOSPITAL ENCOUNTER (OUTPATIENT)
Dept: GENERAL RADIOLOGY | Age: 63
Discharge: HOME | End: 2018-03-19
Attending: FAMILY MEDICINE
Payer: MEDICARE

## 2018-03-19 VITALS
TEMPERATURE: 98 F | SYSTOLIC BLOOD PRESSURE: 124 MMHG | HEART RATE: 74 BPM | DIASTOLIC BLOOD PRESSURE: 76 MMHG | WEIGHT: 233 LBS | BODY MASS INDEX: 44 KG/M2

## 2018-03-19 DIAGNOSIS — M17.11 PRIMARY OSTEOARTHRITIS OF RIGHT KNEE: Primary | ICD-10-CM

## 2018-03-19 DIAGNOSIS — I10 ESSENTIAL HYPERTENSION: ICD-10-CM

## 2018-03-19 DIAGNOSIS — Z95.828 PRESENCE OF IVC FILTER: ICD-10-CM

## 2018-03-19 DIAGNOSIS — Z01.818 PRE-OP EVALUATION: ICD-10-CM

## 2018-03-19 DIAGNOSIS — Z86.711 HISTORY OF PULMONARY EMBOLISM: ICD-10-CM

## 2018-03-19 DIAGNOSIS — Z51.81 ANTICOAGULATION GOAL OF INR 2.5 TO 3.5: ICD-10-CM

## 2018-03-19 DIAGNOSIS — Z79.01 ANTICOAGULATION GOAL OF INR 2.5 TO 3.5: ICD-10-CM

## 2018-03-19 DIAGNOSIS — M17.11 PRIMARY OSTEOARTHRITIS OF RIGHT KNEE: ICD-10-CM

## 2018-03-19 PROCEDURE — 36415 COLL VENOUS BLD VENIPUNCTURE: CPT | Performed by: FAMILY MEDICINE

## 2018-03-19 PROCEDURE — 85025 COMPLETE CBC W/AUTO DIFF WBC: CPT | Performed by: FAMILY MEDICINE

## 2018-03-19 PROCEDURE — 99214 OFFICE O/P EST MOD 30 MIN: CPT | Performed by: FAMILY MEDICINE

## 2018-03-19 PROCEDURE — 93000 ELECTROCARDIOGRAM COMPLETE: CPT | Performed by: FAMILY MEDICINE

## 2018-03-19 PROCEDURE — 85610 PROTHROMBIN TIME: CPT | Performed by: FAMILY MEDICINE

## 2018-03-19 PROCEDURE — 85730 THROMBOPLASTIN TIME PARTIAL: CPT | Performed by: FAMILY MEDICINE

## 2018-03-19 PROCEDURE — 71046 X-RAY EXAM CHEST 2 VIEWS: CPT | Performed by: FAMILY MEDICINE

## 2018-03-19 PROCEDURE — 80053 COMPREHEN METABOLIC PANEL: CPT | Performed by: FAMILY MEDICINE

## 2018-03-19 RX ORDER — ENOXAPARIN SODIUM 100 MG/ML
40 INJECTION SUBCUTANEOUS 2 TIMES DAILY
Qty: 11.2 ML | Refills: 0 | Status: SHIPPED | OUTPATIENT
Start: 2018-03-19 | End: 2018-04-02

## 2018-03-19 RX ORDER — CELECOXIB 200 MG/1
200 CAPSULE ORAL DAILY
COMMUNITY
Start: 2018-03-06 | End: 2018-07-06 | Stop reason: ALTCHOICE

## 2018-03-19 NOTE — PATIENT INSTRUCTIONS
Stop warfarin 6 days before surgery.   2 days later start lovenox 40 twice a day, last dose 24hours before surgery, restarting lovenox and coumadin post-op and continuing until INR 2.5-3.5

## 2018-03-19 NOTE — H&P
Farnaz Waldrop is a 58year old female who presents for a pre-operative physical exam and clearance requested by her surgeon. Patient is to have R TKR, to be done by Dr. Molly Sanchez at [facility] on 4/2/18. HPI:   Pt complains of nothing.   She is excited WARFARIN SODIUM 4 MG Oral Tab TAKE ONE TABLET BY MOUTH ONCE DAILY Disp: 30 tablet Rfl: 11   cetirizine (ZYRTEC) 10 MG Oral Tab Take 10 mg by mouth daily.  Disp:  Rfl:    LORazepam (ATIVAN) 0.5 MG Oral Tab Take 0.5 mg by mouth every 4 (four) hours as neede denies any unusual skin lesions  EYES:denies blurred vision or double vision  HEENT: denies nasal congestion, sinus pain or ST  LUNGS: denies shortness of breath with exertion  CARDIOVASCULAR: denies chest pain on exertion  GI: denies abdominal pain,denies surgery, restarting lovenox and coumadin post-op and continuing lovenox until INR 2.5-3.5

## 2018-03-20 LAB
ALBUMIN SERPL-MCNC: 4.1 G/DL (ref 3.5–4.8)
ALP LIVER SERPL-CCNC: 95 U/L (ref 50–130)
ALT SERPL-CCNC: 22 U/L (ref 14–54)
APTT PPP: 65.3 SECONDS (ref 25–34)
AST SERPL-CCNC: 22 U/L (ref 15–41)
BASOPHILS # BLD AUTO: 0.03 X10(3) UL (ref 0–0.1)
BASOPHILS NFR BLD AUTO: 0.8 %
BILIRUB SERPL-MCNC: 0.4 MG/DL (ref 0.1–2)
BUN BLD-MCNC: 14 MG/DL (ref 8–20)
CALCIUM BLD-MCNC: 9.9 MG/DL (ref 8.3–10.3)
CHLORIDE: 107 MMOL/L (ref 101–111)
CO2: 28 MMOL/L (ref 22–32)
CREAT BLD-MCNC: 1.08 MG/DL (ref 0.55–1.02)
EOSINOPHIL # BLD AUTO: 0.15 X10(3) UL (ref 0–0.3)
EOSINOPHIL NFR BLD AUTO: 4.1 %
ERYTHROCYTE [DISTWIDTH] IN BLOOD BY AUTOMATED COUNT: 14.3 % (ref 11.5–16)
GLUCOSE BLD-MCNC: 82 MG/DL (ref 70–99)
HCT VFR BLD AUTO: 42.8 % (ref 34–50)
HGB BLD-MCNC: 13.5 G/DL (ref 12–16)
IMMATURE GRANULOCYTE COUNT: 0 X10(3) UL (ref 0–1)
IMMATURE GRANULOCYTE RATIO %: 0 %
INR BLD: 3.04 (ref 0.9–1.1)
LYMPHOCYTES # BLD AUTO: 1.17 X10(3) UL (ref 0.9–4)
LYMPHOCYTES NFR BLD AUTO: 31.7 %
M PROTEIN MFR SERPL ELPH: 7.5 G/DL (ref 6.1–8.3)
MCH RBC QN AUTO: 30.5 PG (ref 27–33.2)
MCHC RBC AUTO-ENTMCNC: 31.5 G/DL (ref 31–37)
MCV RBC AUTO: 96.6 FL (ref 81–100)
MONOCYTES # BLD AUTO: 0.34 X10(3) UL (ref 0.1–1)
MONOCYTES NFR BLD AUTO: 9.2 %
NEUTROPHIL ABS PRELIM: 2 X10 (3) UL (ref 1.3–6.7)
NEUTROPHILS # BLD AUTO: 2 X10(3) UL (ref 1.3–6.7)
NEUTROPHILS NFR BLD AUTO: 54.2 %
PLATELET # BLD AUTO: 227 10(3)UL (ref 150–450)
POTASSIUM SERPL-SCNC: 4.2 MMOL/L (ref 3.6–5.1)
PSA SERPL DL<=0.01 NG/ML-MCNC: 32.4 SECONDS (ref 12.4–14.7)
RBC # BLD AUTO: 4.43 X10(6)UL (ref 3.8–5.1)
RED CELL DISTRIBUTION WIDTH-SD: 51.6 FL (ref 35.1–46.3)
SODIUM SERPL-SCNC: 141 MMOL/L (ref 136–144)
WBC # BLD AUTO: 3.7 X10(3) UL (ref 4–13)

## 2018-03-21 ENCOUNTER — TELEPHONE (OUTPATIENT)
Dept: FAMILY MEDICINE CLINIC | Facility: CLINIC | Age: 63
End: 2018-03-21

## 2018-03-21 NOTE — TELEPHONE ENCOUNTER
Pt's cough she had on Monday has gotten worse. Wants to know what else she can do for it.    Please return call to [phone]

## 2018-03-21 NOTE — TELEPHONE ENCOUNTER
Spoke with the pt and she states that it sounds like a seal when she coughs. Using inhaler and taking benadryl- no fever sob chills        She states that she had this same cough on Monday when she was here.   Pt is just concerned- she doesn't want her meadows

## 2018-03-21 NOTE — TELEPHONE ENCOUNTER
Has she responded well to steroid for cough before? IF so, we could try that (though could someone please run by her surgeon if they're okay with steroid leading up to surgery?  I would do 5 day prednisone 60mg x1 day, 40mg x 4 days)

## 2018-03-21 NOTE — TELEPHONE ENCOUNTER
Called the pt and she has responseded well in the past with steroids.  Advised the pt that I will call her once I  Speak with the surgeon's office

## 2018-03-21 NOTE — TELEPHONE ENCOUNTER
[facility] pre-admission is calling to get the H&P, labs, EKG, and chest xray results.    Please Fax to [phone]

## 2018-03-22 NOTE — TELEPHONE ENCOUNTER
Call to patient. Advised that medication was last refilled on 3/19/18. Patient states the pharmacy is waiting on insurance approval before they can fill this medication. Patient states her surgery is April 2.

## 2018-03-23 ENCOUNTER — TELEPHONE (OUTPATIENT)
Dept: FAMILY MEDICINE CLINIC | Facility: CLINIC | Age: 63
End: 2018-03-23

## 2018-03-23 RX ORDER — PREDNISONE 20 MG/1
TABLET ORAL
Qty: 11 TABLET | Refills: 0 | Status: SHIPPED | OUTPATIENT
Start: 2018-03-23 | End: 2018-04-17 | Stop reason: ALTCHOICE

## 2018-03-23 RX ORDER — ENOXAPARIN SODIUM 100 MG/ML
INJECTION SUBCUTANEOUS
Qty: 11.2 ML | Refills: 0 | OUTPATIENT
Start: 2018-03-23

## 2018-03-23 NOTE — TELEPHONE ENCOUNTER
Prior auth for lovenox is done and no prior auth required- called the pharmacy and they tell me that the order was cancelled- I called the pt to verify that she did not get the medication- she states that she did not    Called the pt and advised that the s

## 2018-03-26 NOTE — TELEPHONE ENCOUNTER
Received a fax from [facility] that the quantity override has been approved  For 11.2ml/14days  This authorization is valid until 3/24/19      Left message for the pt that the override has been approved

## 2018-03-31 ENCOUNTER — TELEPHONE (OUTPATIENT)
Dept: FAMILY MEDICINE CLINIC | Facility: CLINIC | Age: 63
End: 2018-03-31

## 2018-03-31 RX ORDER — WARFARIN SODIUM 4 MG/1
TABLET ORAL
Qty: 90 TABLET | Refills: 0 | Status: SHIPPED | OUTPATIENT
Start: 2018-03-31 | End: 2018-06-30

## 2018-03-31 NOTE — TELEPHONE ENCOUNTER
Called Patient regarding interaction with Celebrex and Warfarin. Patient staes she has been taking Celebrex for the past 3 weeks  States she has a surgery on Monday (4/2/18) and her last dose of Celebrex will be on Sunday (4/1/18).    Explained that Anika

## 2018-04-04 ENCOUNTER — TELEPHONE (OUTPATIENT)
Dept: FAMILY MEDICINE CLINIC | Facility: CLINIC | Age: 63
End: 2018-04-04

## 2018-04-05 ENCOUNTER — TELEPHONE (OUTPATIENT)
Dept: FAMILY MEDICINE CLINIC | Facility: CLINIC | Age: 63
End: 2018-04-05

## 2018-04-05 NOTE — TELEPHONE ENCOUNTER
[facility] RN CALLED AND WANTS DR CHATMAN TO KNOW THAT PATIENTS PT WAS 23.7 & HER INR WAS 2.0 THIS MORNING. PATIENT TOOK 4 MG OF COUMADIN & 40 LOVENOX LAST NIGHT. PATIENT HAD  RT KNEE SURGERY ON 04/02/18. PATIENT WILL HAVE  RN & PT AT THIS TIME.

## 2018-04-05 NOTE — TELEPHONE ENCOUNTER
Continue her lovenox and current coumadin dose and recheck INR Monday (need to stay on lovenox until INR 2.5-3.5) thanks

## 2018-04-09 ENCOUNTER — MED REC SCAN ONLY (OUTPATIENT)
Dept: FAMILY MEDICINE CLINIC | Facility: CLINIC | Age: 63
End: 2018-04-09

## 2018-04-09 ENCOUNTER — TELEPHONE (OUTPATIENT)
Dept: FAMILY MEDICINE CLINIC | Facility: CLINIC | Age: 63
End: 2018-04-09

## 2018-04-09 NOTE — TELEPHONE ENCOUNTER
Left message for the home health nurse to stop the lovenox and check INR on Friday      Also sent a mychart to the patient with the instructions

## 2018-04-09 NOTE — TELEPHONE ENCOUNTER
Spoke with home health- Mook Purvis and she has the INR for [facility]    INR 3.0 today   She is on Coumadin- 4 mg night  lovenox BID    Home health scheduled until 4/20/18

## 2018-04-11 ENCOUNTER — PATIENT MESSAGE (OUTPATIENT)
Dept: FAMILY MEDICINE CLINIC | Facility: CLINIC | Age: 63
End: 2018-04-11

## 2018-04-11 NOTE — TELEPHONE ENCOUNTER
From: Max Arce  To: Klaudia Dial MD  Sent: 4/11/2018 2:11 PM CDT  Subject: Non-Urgent Medical Question    Hi Dr. Desiree Hanna,    Just wanted to let you know that I [facility] to [facility] night because I was in so much pain from my right leg and knee.  Kermit Goldberg

## 2018-04-12 ENCOUNTER — TELEPHONE (OUTPATIENT)
Dept: FAMILY MEDICINE CLINIC | Facility: CLINIC | Age: 63
End: 2018-04-12

## 2018-04-12 NOTE — TELEPHONE ENCOUNTER
PT WAS SEEN AT [place] [id number] ON 4/9- PT HAD TOTAL KNEE REPLACEMENT, PT WAS IN A LOT OF PAIN.  POSS CELLULITIS    PLEASE ASK FOR RECORDS, PT COMING IN ON 4/17    THANK YOU

## 2018-04-12 NOTE — TELEPHONE ENCOUNTER
Sent fax request for records from 4/9/18 hospitalization to [facility] [phone].     Future Appointments  Date Time Provider Juliana Lea   4/17/2018 11:40 AM Sabrina Ramirez MD [facility] MARK Medrano

## 2018-04-13 ENCOUNTER — TELEPHONE (OUTPATIENT)
Dept: FAMILY MEDICINE CLINIC | Facility: CLINIC | Age: 63
End: 2018-04-13

## 2018-04-13 NOTE — TELEPHONE ENCOUNTER
Chaim Clark from [address] [place] [address] called, pt's INR result today is 4.9. She also wanted to let us know that pt started a new antibiotic, Clindamycin 300 mg, 4 x's a day for 10 days, on 4/1/18.    Please call Chaim Clark back at [phone]

## 2018-04-13 NOTE — TELEPHONE ENCOUNTER
clinda may be to blame; skip her next coumadin dose, then take 1/2 her usual coumadin dose until further notice (while on clinda) and recheck INR Tues

## 2018-04-13 NOTE — TELEPHONE ENCOUNTER
Called Lillian Sons- home health nurse and advised of the orders for Tay Duval to hold one dose of coumadin and then take 1/2 of her usual dose while on the clinda- Recheck INR on Tuesday    She verbalized understanding

## 2018-04-16 ENCOUNTER — PATIENT MESSAGE (OUTPATIENT)
Dept: FAMILY MEDICINE CLINIC | Facility: CLINIC | Age: 63
End: 2018-04-16

## 2018-04-17 ENCOUNTER — OFFICE VISIT (OUTPATIENT)
Dept: FAMILY MEDICINE CLINIC | Facility: CLINIC | Age: 63
End: 2018-04-17

## 2018-04-17 ENCOUNTER — TELEPHONE (OUTPATIENT)
Dept: FAMILY MEDICINE CLINIC | Facility: CLINIC | Age: 63
End: 2018-04-17

## 2018-04-17 VITALS
HEART RATE: 80 BPM | SYSTOLIC BLOOD PRESSURE: 116 MMHG | TEMPERATURE: 99 F | RESPIRATION RATE: 16 BRPM | DIASTOLIC BLOOD PRESSURE: 84 MMHG | WEIGHT: 226 LBS | HEIGHT: 61 IN | BODY MASS INDEX: 42.67 KG/M2

## 2018-04-17 DIAGNOSIS — Z79.01 CHRONIC ANTICOAGULATION: ICD-10-CM

## 2018-04-17 DIAGNOSIS — Z09 FOLLOW-UP EXAM: ICD-10-CM

## 2018-04-17 DIAGNOSIS — L03.115 CELLULITIS OF RIGHT LOWER EXTREMITY: Primary | ICD-10-CM

## 2018-04-17 PROCEDURE — 36415 COLL VENOUS BLD VENIPUNCTURE: CPT | Performed by: FAMILY MEDICINE

## 2018-04-17 PROCEDURE — 99213 OFFICE O/P EST LOW 20 MIN: CPT | Performed by: FAMILY MEDICINE

## 2018-04-17 PROCEDURE — 1111F DSCHRG MED/CURRENT MED MERGE: CPT | Performed by: FAMILY MEDICINE

## 2018-04-17 PROCEDURE — 85610 PROTHROMBIN TIME: CPT | Performed by: FAMILY MEDICINE

## 2018-04-17 RX ORDER — CLINDAMYCIN HYDROCHLORIDE 300 MG/1
CAPSULE ORAL
Refills: 0 | COMMUNITY
Start: 2018-04-11 | End: 2018-07-06 | Stop reason: ALTCHOICE

## 2018-04-17 NOTE — TELEPHONE ENCOUNTER
From: Rachael Saucedo  To: Tip Baker MD  Sent: 4/16/2018 7:02 PM CDT  Subject: Non-Urgent Medical Question    Hi Dr. Felicia Herrera,    Just talked to the home health nurse and she was wondering if you were going to do the blood draw tomorrow for INR while I'm the

## 2018-04-17 NOTE — TELEPHONE ENCOUNTER
Pt calling with regard to blood draw. Home Health nurse wants to know if Marvin Pulliam should do finger stick or if our office will draw blood today during her appt. Pt sent note via [employer] last night, but needs to know so she can let [place] know.   Can leave detailed me

## 2018-04-17 NOTE — PROGRESS NOTES
Tamiko Vincent is a 58year old female. HPI:   Pt is here for ER f/u.     ER: rush eulalio  Date: 4/10/18  Reason for ER visit: acutely worsening R knee pain 1 week after TKR; diagnosed with cellulitis  Records received and reviewed: YES  Pertinent results/di hours as needed for Wheezing or Shortness of Breath. Disp: 1 Inhaler Rfl: 3   cetirizine (ZYRTEC) 10 MG Oral Tab Take 10 mg by mouth daily. Disp:  Rfl:    LORazepam (ATIVAN) 0.5 MG Oral Tab Take 0.5 mg by mouth every 4 (four) hours as needed for Anxiety.  D Alcohol use:  No                   REVIEW OF SYSTEMS:   GENERAL HEALTH: feels well otherwise  SKIN: denies any new unusual skin lesions or rashes; redness of RLE improving  RESPIRATORY: denies shortness of breath   CARDIOVASCULAR: denies chest pain  G

## 2018-04-17 NOTE — TELEPHONE ENCOUNTER
They can do finger stick--  If she needs other blood work we could do it here, but I don't know that she does and won't until our visit so might as well get the finger stick done if they going out there today, thanks

## 2018-04-18 ENCOUNTER — TELEPHONE (OUTPATIENT)
Dept: FAMILY MEDICINE CLINIC | Facility: CLINIC | Age: 63
End: 2018-04-18

## 2018-04-23 ENCOUNTER — NURSE ONLY (OUTPATIENT)
Dept: FAMILY MEDICINE CLINIC | Facility: CLINIC | Age: 63
End: 2018-04-23

## 2018-04-23 DIAGNOSIS — Z86.711 HISTORY OF PULMONARY EMBOLISM: ICD-10-CM

## 2018-04-23 DIAGNOSIS — Z79.01 ENCOUNTER FOR MONITORING COUMADIN THERAPY: Primary | ICD-10-CM

## 2018-04-23 DIAGNOSIS — Z51.81 ENCOUNTER FOR MONITORING COUMADIN THERAPY: Primary | ICD-10-CM

## 2018-04-23 DIAGNOSIS — Z79.01 ANTICOAGULATION GOAL OF INR 2.5 TO 3.5: ICD-10-CM

## 2018-04-23 DIAGNOSIS — Z51.81 ANTICOAGULATION GOAL OF INR 2.5 TO 3.5: ICD-10-CM

## 2018-04-23 DIAGNOSIS — M25.561 RIGHT KNEE PAIN, UNSPECIFIED CHRONICITY: ICD-10-CM

## 2018-04-23 PROCEDURE — 85610 PROTHROMBIN TIME: CPT | Performed by: FAMILY MEDICINE

## 2018-04-23 PROCEDURE — 36415 COLL VENOUS BLD VENIPUNCTURE: CPT | Performed by: FAMILY MEDICINE

## 2018-04-23 NOTE — PROGRESS NOTES
Blue tube drawn from right hand with 23g butterfly   needle x1, after 1 unsuccessful attempt in the right arm. Pt shannon well.  Pt left the clinic in stable condition

## 2018-04-25 ENCOUNTER — PATIENT MESSAGE (OUTPATIENT)
Dept: FAMILY MEDICINE CLINIC | Facility: CLINIC | Age: 63
End: 2018-04-25

## 2018-04-25 NOTE — TELEPHONE ENCOUNTER
From: Max Arce  To: Klaudia Dial MD  Sent: 4/25/2018 8:03 AM CDT  Subject: Test Results Question    Good morning,    Just wondering about my new INR results from Monday April 23rd. I have not gotten them yet.     Thank you,    Tay Duval

## 2018-04-26 ENCOUNTER — TELEPHONE (OUTPATIENT)
Dept: FAMILY MEDICINE CLINIC | Facility: CLINIC | Age: 63
End: 2018-04-26

## 2018-04-26 DIAGNOSIS — Z51.81 ANTICOAGULATION GOAL OF INR 2.5 TO 3.5: Primary | ICD-10-CM

## 2018-04-26 DIAGNOSIS — Z51.81 ENCOUNTER FOR MONITORING COUMADIN THERAPY: ICD-10-CM

## 2018-04-26 DIAGNOSIS — Z79.01 ENCOUNTER FOR MONITORING COUMADIN THERAPY: ICD-10-CM

## 2018-04-26 DIAGNOSIS — Z79.01 ANTICOAGULATION GOAL OF INR 2.5 TO 3.5: Primary | ICD-10-CM

## 2018-04-30 DIAGNOSIS — F32.A DEPRESSION, UNSPECIFIED DEPRESSION TYPE: ICD-10-CM

## 2018-04-30 RX ORDER — HYDROCODONE BITARTRATE AND ACETAMINOPHEN 5; 325 MG/1; MG/1
TABLET ORAL
Refills: 0 | COMMUNITY
Start: 2018-04-15 | End: 2018-08-09 | Stop reason: ALTCHOICE

## 2018-04-30 RX ORDER — GABAPENTIN 300 MG/1
CAPSULE ORAL
Qty: 180 CAPSULE | Refills: 3 | Status: SHIPPED | OUTPATIENT
Start: 2018-04-30 | End: 2019-04-24

## 2018-04-30 NOTE — TELEPHONE ENCOUNTER
Last refill: 8- #60 with 0 refills  Last Visit: 4-  Next Visit: Future Appointments  Date Time Provider Juliana Lea   5/8/2018 8:45 AM  [facility] [address] [facility] [place] [place]         Forward to Dr. Larry Schuster please advise on refills. Thanks.

## 2018-05-08 ENCOUNTER — NURSE ONLY (OUTPATIENT)
Dept: FAMILY MEDICINE CLINIC | Facility: CLINIC | Age: 63
End: 2018-05-08

## 2018-05-08 DIAGNOSIS — Z79.01 ANTICOAGULATION GOAL OF INR 2.5 TO 3.5: ICD-10-CM

## 2018-05-08 DIAGNOSIS — Z51.81 ENCOUNTER FOR MONITORING COUMADIN THERAPY: ICD-10-CM

## 2018-05-08 DIAGNOSIS — Z79.01 ENCOUNTER FOR MONITORING COUMADIN THERAPY: ICD-10-CM

## 2018-05-08 DIAGNOSIS — Z51.81 ANTICOAGULATION GOAL OF INR 2.5 TO 3.5: ICD-10-CM

## 2018-05-08 PROCEDURE — 36415 COLL VENOUS BLD VENIPUNCTURE: CPT | Performed by: FAMILY MEDICINE

## 2018-05-08 PROCEDURE — 85610 PROTHROMBIN TIME: CPT | Performed by: FAMILY MEDICINE

## 2018-05-08 NOTE — PROGRESS NOTES
Blue waste tube drawn then 1 blue tube drawn from right arm with butterfly needle x1. Pt shannon well.  Pt left the clinic in stable condition

## 2018-05-16 ENCOUNTER — MED REC SCAN ONLY (OUTPATIENT)
Dept: FAMILY MEDICINE CLINIC | Facility: CLINIC | Age: 63
End: 2018-05-16

## 2018-06-06 ENCOUNTER — NURSE ONLY (OUTPATIENT)
Dept: FAMILY MEDICINE CLINIC | Facility: CLINIC | Age: 63
End: 2018-06-06

## 2018-06-06 DIAGNOSIS — M25.561 RIGHT KNEE PAIN, UNSPECIFIED CHRONICITY: ICD-10-CM

## 2018-06-06 DIAGNOSIS — Z51.81 ANTICOAGULATION GOAL OF INR 2.5 TO 3.5: ICD-10-CM

## 2018-06-06 DIAGNOSIS — Z79.01 ENCOUNTER FOR MONITORING COUMADIN THERAPY: ICD-10-CM

## 2018-06-06 DIAGNOSIS — Z51.81 ENCOUNTER FOR MONITORING COUMADIN THERAPY: ICD-10-CM

## 2018-06-06 DIAGNOSIS — Z86.711 HISTORY OF PULMONARY EMBOLISM: ICD-10-CM

## 2018-06-06 DIAGNOSIS — Z79.01 ANTICOAGULATION GOAL OF INR 2.5 TO 3.5: ICD-10-CM

## 2018-06-06 PROCEDURE — 36415 COLL VENOUS BLD VENIPUNCTURE: CPT | Performed by: FAMILY MEDICINE

## 2018-06-06 PROCEDURE — 85610 PROTHROMBIN TIME: CPT | Performed by: FAMILY MEDICINE

## 2018-06-06 NOTE — PROGRESS NOTES
Blue tubes drawn from right arm with 23g butterfly needle x1. Pt shannon well.  Pt left the clinic in stable condition      Did ACT and score is 24 reviewed the medications she takes and how to use the albuterol if she is wheezing-pt asked that we send the AAP

## 2018-06-25 ENCOUNTER — PATIENT MESSAGE (OUTPATIENT)
Dept: FAMILY MEDICINE CLINIC | Facility: CLINIC | Age: 63
End: 2018-06-25

## 2018-06-25 DIAGNOSIS — G47.33 OBSTRUCTIVE SLEEP APNEA SYNDROME: ICD-10-CM

## 2018-06-25 DIAGNOSIS — E66.01 MORBID OBESITY (HCC): Primary | ICD-10-CM

## 2018-06-26 NOTE — TELEPHONE ENCOUNTER
From: Fe Harris  To: Keane Rubinstein, MD  Sent: 6/25/2018 10:24 PM CDT  Subject: Non-Urgent Medical Question    Dr. Pb Angelo,    I have a couple questions for whenever you or somebody else have time to answer them.  First of all do I need another referral to h

## 2018-07-02 RX ORDER — WARFARIN SODIUM 4 MG/1
TABLET ORAL
Qty: 90 TABLET | Refills: 0 | Status: SHIPPED | OUTPATIENT
Start: 2018-07-02 | End: 2018-10-05

## 2018-07-02 NOTE — TELEPHONE ENCOUNTER
Last refilled on 3/31/18 for # 90 with 0 refills  Last PT 33.3  Last seen on 4/17/18  Future Appointments  Date Time Provider Juliana Lea   7/6/2018 2:00 PM Jai Hinton MD [facility]        Thank you.

## 2018-07-03 ENCOUNTER — TELEPHONE (OUTPATIENT)
Dept: FAMILY MEDICINE CLINIC | Facility: CLINIC | Age: 63
End: 2018-07-03

## 2018-07-03 NOTE — TELEPHONE ENCOUNTER
Pt presented to ER with abdominal pain, pt states she drank barium and Ani did a CT. They noted several small kidney stones and diverticulitis. They did not give her any new meds. She is using 4 mg every night of warfarin.  Forward to benjamin Madrigal

## 2018-07-03 NOTE — TELEPHONE ENCOUNTER
Please call to get records of CT scan, I'm guess since they didn't treat her for anything with meds she has diverticulosis not diverticulitis (-losis almost everyone gets eventually and doesn't cause pain, -litis is the inflamation/infection that needs abx

## 2018-07-03 NOTE — TELEPHONE ENCOUNTER
Requested records from [facility]. Patient has been notified, verbalized understanding of information. Denies further questions.        Future Appointments  Date Time Provider Juliana Lea   7/6/2018 2:00 PM Jessica Hua MD [facility] EM[facility] [place]

## 2018-07-06 ENCOUNTER — OFFICE VISIT (OUTPATIENT)
Dept: FAMILY MEDICINE CLINIC | Facility: CLINIC | Age: 63
End: 2018-07-06

## 2018-07-06 VITALS
HEART RATE: 84 BPM | BODY MASS INDEX: 43 KG/M2 | TEMPERATURE: 98 F | WEIGHT: 228.19 LBS | SYSTOLIC BLOOD PRESSURE: 130 MMHG | RESPIRATION RATE: 18 BRPM | DIASTOLIC BLOOD PRESSURE: 88 MMHG

## 2018-07-06 DIAGNOSIS — R10.30 LOWER ABDOMINAL PAIN: ICD-10-CM

## 2018-07-06 DIAGNOSIS — M79.644 PAIN OF FINGER OF RIGHT HAND: ICD-10-CM

## 2018-07-06 DIAGNOSIS — N20.0 KIDNEY STONES: ICD-10-CM

## 2018-07-06 DIAGNOSIS — Z09 FOLLOW-UP EXAM: Primary | ICD-10-CM

## 2018-07-06 DIAGNOSIS — K57.30 DIVERTICULOSIS OF LARGE INTESTINE WITHOUT HEMORRHAGE: ICD-10-CM

## 2018-07-06 PROCEDURE — 99214 OFFICE O/P EST MOD 30 MIN: CPT | Performed by: FAMILY MEDICINE

## 2018-07-06 NOTE — PROGRESS NOTES
Jose G Chapman is a 58year old female. HPI:   Pt is here for ER f/u.     ER: Tory Richards  Date: 7/3/18  Reason for ER visit: abd pain that she woke up with Tuesday morning, mid lower abd, cramping, she'd be doubled over in pain in waves but as the day went on an TABLET BY MOUTH NIGHTLY. Disp: 90 tablet Rfl: 3   BuPROPion HCl ER, SR, 200 MG Oral Tablet 12 Hr Take 1 tablet (200 mg total) by mouth 2 (two) times daily.  Disp: 60 tablet Rfl: 0   TraZODone HCl 100 MG Oral Tab Take 1 tablet (100 mg total) by mouth nightly TUBAL LIGATION   Family History   Problem Relation Age of Onset   • Depression Daughter    • Anxiety Daughter    • Depression Son    • Psychiatric Father      dementia   • Diabetes Mother    • Heart Disorder Mother    • Psychiatric Mother      dementia   • diverticulosis  Pain of finger of right hand  -pt has prettty good ROM and minimal pain; reviewed options of further w/u or treatment but she opted to watch for now given it's not negatively impacting her life much and doesn't see wanting any aggressive tr

## 2018-07-07 ENCOUNTER — MED REC SCAN ONLY (OUTPATIENT)
Dept: FAMILY MEDICINE CLINIC | Facility: CLINIC | Age: 63
End: 2018-07-07

## 2018-07-13 ENCOUNTER — NURSE ONLY (OUTPATIENT)
Dept: FAMILY MEDICINE CLINIC | Facility: CLINIC | Age: 63
End: 2018-07-13

## 2018-07-13 DIAGNOSIS — Z79.01 ENCOUNTER FOR MONITORING COUMADIN THERAPY: ICD-10-CM

## 2018-07-13 DIAGNOSIS — Z79.01 ANTICOAGULATION GOAL OF INR 2.5 TO 3.5: ICD-10-CM

## 2018-07-13 DIAGNOSIS — Z86.711 HISTORY OF PULMONARY EMBOLISM: ICD-10-CM

## 2018-07-13 DIAGNOSIS — Z51.81 ANTICOAGULATION GOAL OF INR 2.5 TO 3.5: ICD-10-CM

## 2018-07-13 DIAGNOSIS — M25.561 RIGHT KNEE PAIN, UNSPECIFIED CHRONICITY: ICD-10-CM

## 2018-07-13 DIAGNOSIS — Z51.81 ENCOUNTER FOR MONITORING COUMADIN THERAPY: ICD-10-CM

## 2018-07-13 LAB
INR BLD: 2.39 (ref 0.9–1.1)
PSA SERPL DL<=0.01 NG/ML-MCNC: 26.9 SECONDS (ref 12.4–14.7)

## 2018-07-13 PROCEDURE — 85610 PROTHROMBIN TIME: CPT | Performed by: FAMILY MEDICINE

## 2018-07-13 PROCEDURE — 36415 COLL VENOUS BLD VENIPUNCTURE: CPT | Performed by: FAMILY MEDICINE

## 2018-07-13 NOTE — PROGRESS NOTES
Blue tubes drawn from right arm with 21g butterfly needle x1. Pt shannon well.  Pt left the clinic in stable condition one blue waste tube collected due to using a butterfly needle

## 2018-07-14 PROBLEM — K57.30 DIVERTICULOSIS OF LARGE INTESTINE WITHOUT HEMORRHAGE: Status: ACTIVE | Noted: 2018-07-14

## 2018-07-14 PROBLEM — N20.0 KIDNEY STONES: Status: ACTIVE | Noted: 2018-07-14

## 2018-07-20 ENCOUNTER — OFFICE VISIT (OUTPATIENT)
Dept: FAMILY MEDICINE CLINIC | Facility: CLINIC | Age: 63
End: 2018-07-20
Payer: MEDICARE

## 2018-07-20 VITALS
SYSTOLIC BLOOD PRESSURE: 128 MMHG | WEIGHT: 229 LBS | TEMPERATURE: 99 F | HEART RATE: 64 BPM | DIASTOLIC BLOOD PRESSURE: 70 MMHG | BODY MASS INDEX: 43 KG/M2 | RESPIRATION RATE: 16 BRPM

## 2018-07-20 DIAGNOSIS — R06.81 WITNESSED EPISODE OF APNEA: ICD-10-CM

## 2018-07-20 DIAGNOSIS — R40.0 DAYTIME SLEEPINESS: ICD-10-CM

## 2018-07-20 DIAGNOSIS — R06.83 SNORING: Primary | ICD-10-CM

## 2018-07-20 DIAGNOSIS — M79.641 PAIN OF RIGHT HAND: ICD-10-CM

## 2018-07-20 PROCEDURE — 99213 OFFICE O/P EST LOW 20 MIN: CPT | Performed by: FAMILY MEDICINE

## 2018-07-20 NOTE — PROGRESS NOTES
HPI:    Patient ID: Dariel Drake is a 58year old female. Pt here to discuss sleep apnea. Has never had sleep study, but during hosp stays for knee replacemnets had witnessed apena, was put on CPAP while in hospital. Dtr has told her she snores.  Ren Rivas by mouth every 4 (four) hours as needed for Anxiety. Disp:  Rfl:    Calcium Carbonate Antacid 600 MG Oral Chew Tab Chew 1,000-1,200 mg by mouth daily. Disp:  Rfl:    cholecalciferol (DRISDOL) 1000 UNITS Oral Cap Take 5,000 Units by mouth daily.  Indications

## 2018-08-02 ENCOUNTER — MED REC SCAN ONLY (OUTPATIENT)
Dept: FAMILY MEDICINE CLINIC | Facility: CLINIC | Age: 63
End: 2018-08-02

## 2018-08-09 ENCOUNTER — OFFICE VISIT (OUTPATIENT)
Dept: FAMILY MEDICINE CLINIC | Facility: CLINIC | Age: 63
End: 2018-08-09
Payer: MEDICARE

## 2018-08-09 ENCOUNTER — NURSE ONLY (OUTPATIENT)
Dept: FAMILY MEDICINE CLINIC | Facility: CLINIC | Age: 63
End: 2018-08-09
Payer: MEDICARE

## 2018-08-09 VITALS
DIASTOLIC BLOOD PRESSURE: 78 MMHG | TEMPERATURE: 98 F | BODY MASS INDEX: 42.86 KG/M2 | HEIGHT: 61 IN | SYSTOLIC BLOOD PRESSURE: 118 MMHG | WEIGHT: 227 LBS | RESPIRATION RATE: 14 BRPM | HEART RATE: 80 BPM

## 2018-08-09 DIAGNOSIS — G47.10 HYPERSOMNIA: Primary | ICD-10-CM

## 2018-08-09 DIAGNOSIS — R06.83 SNORING: ICD-10-CM

## 2018-08-09 DIAGNOSIS — Z51.81 ANTICOAGULATION GOAL OF INR 2.5 TO 3.5: Primary | ICD-10-CM

## 2018-08-09 DIAGNOSIS — Z79.01 ANTICOAGULATION GOAL OF INR 2.5 TO 3.5: Primary | ICD-10-CM

## 2018-08-09 LAB
INR BLD: 3.93 (ref 0.9–1.1)
PSA SERPL DL<=0.01 NG/ML-MCNC: 37.9 SECONDS (ref 12–14.1)

## 2018-08-09 PROCEDURE — 99214 OFFICE O/P EST MOD 30 MIN: CPT | Performed by: FAMILY MEDICINE

## 2018-08-09 PROCEDURE — 85610 PROTHROMBIN TIME: CPT | Performed by: FAMILY MEDICINE

## 2018-08-09 PROCEDURE — 36415 COLL VENOUS BLD VENIPUNCTURE: CPT | Performed by: FAMILY MEDICINE

## 2018-08-09 NOTE — PROGRESS NOTES
Wang Longo presents today for nurse visit. 1 blue, 1 waste blue tube drawn from right ac area with 1 attempt with butterfly. Left office in stable condition.

## 2018-08-09 NOTE — PROGRESS NOTES
[facility]        HPI:   This is a 58year old female coming in for Patient presents with:  Consult: Sleep Consult      HPI: Pt  Was referred by Dr. Daniel Rowe.    Pt states she had knee surgeries bilarerally and she dint' • Unspecified essential hypertension      Past Surgical History:  2006: CHOLECYSTECTOMY  No date: IR IVC FILTER PLACEMENT  03/2017: KNEE REPLACEMENT SURGERY      Comment: L knee  No date: OTHER SURGICAL HISTORY      Comment: carpal tunnel right wrist (19 Venlafaxine HCl ER (EFFEXOR XR) 150 MG Oral Capsule SR 24 Hr Take 2 capsules (300 mg total) by mouth daily. Disp: 60 capsule Rfl: 0   QUEtiapine Fumarate 200 MG Oral Tab Take 1 tablet (200 mg total) by mouth nightly.  Disp: 30 tablet Rfl: 0   Albuterol Juarez Genitourinary: Negative. Musculoskeletal: Negative. Skin: Negative. Allergic/Immunologic: Negative. Neurological: Negative. Hematological: Negative. Psychiatric/Behavioral: Positive for sleep disturbance.    All other systems reviewed an diaphoretic. Psychiatric: She has a normal mood and affect. Her behavior is normal. Judgment and thought content normal.       ASSESSMENT AND PLAN:   1. Hypersomnia  2. Snoring    You have been scheduled for a sleep study at the [facility].

## 2018-08-09 NOTE — PATIENT INSTRUCTIONS
You have been scheduled for a sleep study at the [address] [place] [address]. You will receive a phone call from the Sleep lab to review forms and will receive forms to fill out in the mail.      You should have heard from our precert department within the next

## 2018-08-15 ENCOUNTER — TELEPHONE (OUTPATIENT)
Dept: FAMILY MEDICINE CLINIC | Facility: CLINIC | Age: 63
End: 2018-08-15

## 2018-08-15 NOTE — TELEPHONE ENCOUNTER
If any mental status changes, severe HA, fevers, chills, nausea, vomiting go to UC.   If o/w feels fine then let's start with nurse visit to get orthostatics

## 2018-08-15 NOTE — TELEPHONE ENCOUNTER
Called the pt and advised of the recommendations- she is not having any of the symptoms that would send her to the   UC.  Made an appt for ortho statics  Future Appointments  Date Time Provider Juliana Lea   8/20/2018 10:00 AM [facility]

## 2018-08-15 NOTE — TELEPHONE ENCOUNTER
PT CALLED AND ADV THAT SHE IS HAVING SOME BALANCE ISSUES. PT ADV EVERY TIME PT GETS UP SHE FALLS BACK IN TO BED,CHAIR OR BACKWARDS. PT CALLED PHYSICAL THERAPY AND SURGEON AND THEY THINK MAYBE BP ISSUES.     PT WOULD LIKE TO KNOW WHAT SHE CAN DO? DOES

## 2018-08-21 ENCOUNTER — TELEPHONE (OUTPATIENT)
Dept: FAMILY MEDICINE CLINIC | Facility: CLINIC | Age: 63
End: 2018-08-21

## 2018-08-21 NOTE — TELEPHONE ENCOUNTER
Pt dropped her Warfarin in her show last night and didn't realize it until this morning. She wants to know if she should take it now and then take another one tonight.    Please return call to [phone]

## 2018-08-23 ENCOUNTER — OFFICE VISIT (OUTPATIENT)
Dept: SLEEP CENTER | Age: 63
End: 2018-08-23
Attending: FAMILY MEDICINE
Payer: MEDICARE

## 2018-08-23 ENCOUNTER — TELEPHONE (OUTPATIENT)
Dept: FAMILY MEDICINE CLINIC | Facility: CLINIC | Age: 63
End: 2018-08-23

## 2018-08-23 PROCEDURE — 95810 POLYSOM 6/> YRS 4/> PARAM: CPT

## 2018-08-23 NOTE — TELEPHONE ENCOUNTER
----- Message from Angelica Umanzor sent at 8/23/2018  2:56 PM CDT -----  Regarding: RE: Sleep Study  Patient has Medicare no auth needed    ----- Message -----  From: Jair Bhakta RN  Sent: 8/23/2018   2:03 PM  To: [facility]  Subject: Sleep

## 2018-08-24 ENCOUNTER — NURSE ONLY (OUTPATIENT)
Dept: FAMILY MEDICINE CLINIC | Facility: CLINIC | Age: 63
End: 2018-08-24
Payer: MEDICARE

## 2018-08-24 DIAGNOSIS — M25.561 RIGHT KNEE PAIN, UNSPECIFIED CHRONICITY: ICD-10-CM

## 2018-08-24 DIAGNOSIS — Z51.81 ANTICOAGULATION GOAL OF INR 2.5 TO 3.5: ICD-10-CM

## 2018-08-24 DIAGNOSIS — Z79.01 ENCOUNTER FOR MONITORING COUMADIN THERAPY: ICD-10-CM

## 2018-08-24 DIAGNOSIS — Z51.81 ENCOUNTER FOR MONITORING COUMADIN THERAPY: ICD-10-CM

## 2018-08-24 DIAGNOSIS — Z79.01 ANTICOAGULATION GOAL OF INR 2.5 TO 3.5: ICD-10-CM

## 2018-08-24 DIAGNOSIS — Z86.711 HISTORY OF PULMONARY EMBOLISM: ICD-10-CM

## 2018-08-24 LAB
INR BLD: 2.14 (ref 0.9–1.1)
PSA SERPL DL<=0.01 NG/ML-MCNC: 24.6 SECONDS (ref 12.4–14.7)

## 2018-08-24 PROCEDURE — 36415 COLL VENOUS BLD VENIPUNCTURE: CPT | Performed by: FAMILY MEDICINE

## 2018-08-24 PROCEDURE — 85610 PROTHROMBIN TIME: CPT | Performed by: FAMILY MEDICINE

## 2018-08-24 NOTE — PROGRESS NOTES
Blue tube drawn from right arm with butterfly needle x1. Pt shannon well.  Pt left the clinic in stable condition

## 2018-08-27 ENCOUNTER — TELEPHONE (OUTPATIENT)
Dept: FAMILY MEDICINE CLINIC | Facility: CLINIC | Age: 63
End: 2018-08-27

## 2018-08-27 NOTE — TELEPHONE ENCOUNTER
Spoke with the pt and advised to recheck in 1 month- she v/u  Made appt for Future Appointments  Date Time Provider Juliana Leonora   9/6/2018 11:20 AM Buster Burk MD [facility] [place] [facility] [place]   9/25/2018 10:00 AM  [facility] [address] EMG Allison Chaparro

## 2018-08-27 NOTE — TELEPHONE ENCOUNTER
Requested records    Spoke with the pt and she states that she just needs to know when to have the repeat INR drawn. Advised that once I get the results then I will call her with the instructions.  She v/u

## 2018-08-27 NOTE — TELEPHONE ENCOUNTER
Patient went to the [facility] ER yesterday for a fall, she injured her shoulder. Everything checked out ok, just a sprain, bruising. She did get her INR checked and it was 2.8. Please call back.

## 2018-08-28 NOTE — PROCEDURES
[address] [address],[facility] [address]       Accredited by the [facility] of Sleep Medicine (AASM)    PATIENT'S NAME:        Gabe Jody  ATTENDING PHYSICIAN:   Margret Hutton MD  REFERRING PHYSICIAN:   Bassem Raygoza.  MD Anni  PATIENT A index of 0.4. The patient experienced significant hypopneas and arousals for total AHI of 4.0. Her REM AHI was 12.0. Her RDI index was 5.9. Her REM RDI was 13.1. Her baseline oxygen saturation was 94.7%. Her oxygen primitivo was 84.9%.     LIMB ACTIVITY: [id number]   Height: 5' 1\" Referring Physician: Dr. Shira Cruz   Weight: 234.0 lbs Recording Tech:  Kaylee Esparza   BMI:  44.8 Scoring Tech: -   Study Overview    First Lights Off: 08:55:00 PM  COUNT INDEX   Last Lights On: 05:19:51 AM Awakenings: 15 Apneas & Hyp.  (?3%)  All Apn + Hyp w/Arousals + Hyp w/?3% Desat 16 12 - 28 - 28   AHI (?3% Criteria)  Index of All Apn + Hyp w/Ar + Hyp. w/?3% Desat 2.7 13.1 - 4.2 - 4.2   Apneas & Hyp. (?4%)  All Apn + Hyp w/Arousals + Hyp w/?4% Desat 16 11 - 27 - 2 Comments    -                                                  Dictated By Ovidio Garay MD  d: 08/28/2018 07:23:07  t: 08/28/2018 08:10:49  Job [id number]  [facility]/

## 2018-08-30 ENCOUNTER — TELEPHONE (OUTPATIENT)
Dept: FAMILY MEDICINE CLINIC | Facility: CLINIC | Age: 63
End: 2018-08-30

## 2018-08-30 NOTE — TELEPHONE ENCOUNTER
Future Appointments  Date Time Provider Juliana Lea   9/6/2018 11:20 AM Josseline Mo MD [facility] [place] [facility] [place]   9/25/2018 10:00 AM [facility] NURSE [facility] [place]     Sleep study entered. Patient is scheduled for a f/u.

## 2018-08-30 NOTE — TELEPHONE ENCOUNTER
----- Message from Gina Waldrop MD sent at 8/30/2018  2:13 PM CDT -----  Please call patient,   Update flowsheet and   Schedule folow up.

## 2018-09-10 ENCOUNTER — MED REC SCAN ONLY (OUTPATIENT)
Dept: FAMILY MEDICINE CLINIC | Facility: CLINIC | Age: 63
End: 2018-09-10

## 2018-09-18 ENCOUNTER — OFFICE VISIT (OUTPATIENT)
Dept: FAMILY MEDICINE CLINIC | Facility: CLINIC | Age: 63
End: 2018-09-18
Payer: MEDICARE

## 2018-09-18 VITALS
WEIGHT: 229 LBS | TEMPERATURE: 98 F | DIASTOLIC BLOOD PRESSURE: 76 MMHG | HEIGHT: 61 IN | BODY MASS INDEX: 43.23 KG/M2 | HEART RATE: 64 BPM | SYSTOLIC BLOOD PRESSURE: 128 MMHG | RESPIRATION RATE: 16 BRPM

## 2018-09-18 DIAGNOSIS — H93.13 TINNITUS OF BOTH EARS: Primary | ICD-10-CM

## 2018-09-18 DIAGNOSIS — J30.1 NON-SEASONAL ALLERGIC RHINITIS DUE TO POLLEN: ICD-10-CM

## 2018-09-18 DIAGNOSIS — H65.93 BILATERAL OTITIS MEDIA WITH EFFUSION: ICD-10-CM

## 2018-09-18 PROCEDURE — 99213 OFFICE O/P EST LOW 20 MIN: CPT | Performed by: FAMILY MEDICINE

## 2018-09-18 RX ORDER — MOMETASONE 50 UG/1
2 SPRAY, METERED NASAL DAILY
Qty: 1 INHALER | Refills: 3 | Status: SHIPPED | OUTPATIENT
Start: 2018-09-18 | End: 2018-09-19

## 2018-09-18 NOTE — PROGRESS NOTES
HPI:    Patient ID: Jose G Chapman is a 61year old female. PT c/o intermittent R ear ringing, comes and goes, not constant. 2-3 weeks ago had a sensation of bees buzzing in her head for 2 days tsraight, then went away on it's own. No head injury.   May h every 4 (four) hours as needed for Anxiety. Disp:  Rfl:    Calcium Carbonate Antacid 600 MG Oral Chew Tab Chew 1,000-1,200 mg by mouth daily. Disp:  Rfl:    cholecalciferol (DRISDOL) 1000 UNITS Oral Cap Take 5,000 Units by mouth daily.  Indications: 21-Hydr

## 2018-09-19 ENCOUNTER — PATIENT MESSAGE (OUTPATIENT)
Dept: FAMILY MEDICINE CLINIC | Facility: CLINIC | Age: 63
End: 2018-09-19

## 2018-09-19 RX ORDER — IPRATROPIUM BROMIDE 42 UG/1
2 SPRAY, METERED NASAL 4 TIMES DAILY PRN
Qty: 15 ML | Refills: 3 | Status: SHIPPED | OUTPATIENT
Start: 2018-09-19 | End: 2018-09-19

## 2018-09-20 RX ORDER — IPRATROPIUM BROMIDE 42 UG/1
SPRAY, METERED NASAL
Qty: 135 ML | Refills: 3 | Status: SHIPPED | OUTPATIENT
Start: 2018-09-20 | End: 2020-12-28

## 2018-09-20 NOTE — TELEPHONE ENCOUNTER
Patient requesting 90 day supply of ipratropium bromide. Refill sent 9/19/18 15mL with 3 refills. Advise. Thanks!

## 2018-09-24 NOTE — PROGRESS NOTES
HPI:    Patient ID: Cristhian Olguin is a 61year old female. Reports ongoing and worsening ringing in ears for a few motnhs. No injury, no there neurologic symptoms, not sure ifany hearing loss at all. No ear pain.   Has underlying allergies for which she's every 4 (four) hours as needed for Wheezing or Shortness of Breath. Disp: 1 Inhaler Rfl: 3   cetirizine (ZYRTEC) 10 MG Oral Tab Take 10 mg by mouth daily.  Disp:  Rfl:    LORazepam (ATIVAN) 0.5 MG Oral Tab Take 0.5 mg by mouth every 4 (four) hours as needed were placed in this encounter.       Meds This Visit:  Requested Prescriptions      No prescriptions requested or ordered in this encounter       Imaging & Referrals:  None         #[id number]

## 2018-09-25 ENCOUNTER — NURSE ONLY (OUTPATIENT)
Dept: FAMILY MEDICINE CLINIC | Facility: CLINIC | Age: 63
End: 2018-09-25
Payer: MEDICARE

## 2018-09-25 DIAGNOSIS — Z79.01 ENCOUNTER FOR MONITORING COUMADIN THERAPY: ICD-10-CM

## 2018-09-25 DIAGNOSIS — Z79.01 ANTICOAGULATION GOAL OF INR 2.5 TO 3.5: ICD-10-CM

## 2018-09-25 DIAGNOSIS — Z86.711 HISTORY OF PULMONARY EMBOLISM: ICD-10-CM

## 2018-09-25 DIAGNOSIS — M25.561 RIGHT KNEE PAIN, UNSPECIFIED CHRONICITY: ICD-10-CM

## 2018-09-25 DIAGNOSIS — Z51.81 ANTICOAGULATION GOAL OF INR 2.5 TO 3.5: ICD-10-CM

## 2018-09-25 DIAGNOSIS — Z51.81 ENCOUNTER FOR MONITORING COUMADIN THERAPY: ICD-10-CM

## 2018-09-25 LAB
INR BLD: 3.01 (ref 0.9–1.1)
PSA SERPL DL<=0.01 NG/ML-MCNC: 32.2 SECONDS (ref 12.4–14.7)

## 2018-09-25 PROCEDURE — 36415 COLL VENOUS BLD VENIPUNCTURE: CPT | Performed by: FAMILY MEDICINE

## 2018-09-25 PROCEDURE — 85610 PROTHROMBIN TIME: CPT | Performed by: FAMILY MEDICINE

## 2018-09-25 NOTE — PROGRESS NOTES
Blue tube collected after wast tube collected drawn from right armwith 23g butterfly needle x1. Pt shannon well.  Pt left the clinic in stable condition

## 2018-10-01 ENCOUNTER — PATIENT MESSAGE (OUTPATIENT)
Dept: FAMILY MEDICINE CLINIC | Facility: CLINIC | Age: 63
End: 2018-10-01

## 2018-10-02 RX ORDER — ALBUTEROL SULFATE 2.5 MG/3ML
2.5 SOLUTION RESPIRATORY (INHALATION) EVERY 4 HOURS PRN
Qty: 90 ML | Refills: 3 | Status: SHIPPED | OUTPATIENT
Start: 2018-10-02 | End: 2020-08-11 | Stop reason: ALTCHOICE

## 2018-10-02 NOTE — TELEPHONE ENCOUNTER
From: Marija Shah  To: Nael Thomas MD  Sent: 10/1/2018 8:06 PM CDT  Subject: Prescription Question    Hi Dr. Carol Collado,    Was wondering if you would give me a new script for albuterol for my nebulizer.  Since I've moved I still haven't found all of my thing

## 2018-10-08 ENCOUNTER — PATIENT MESSAGE (OUTPATIENT)
Dept: FAMILY MEDICINE CLINIC | Facility: CLINIC | Age: 63
End: 2018-10-08

## 2018-10-08 RX ORDER — WARFARIN SODIUM 4 MG/1
TABLET ORAL
Qty: 90 TABLET | Refills: 3 | Status: SHIPPED | OUTPATIENT
Start: 2018-10-08 | End: 2019-09-21

## 2018-10-08 NOTE — TELEPHONE ENCOUNTER
From: Bayron Smiley  To: Tomy Vargas MD  Sent: 10/8/2018 8:45 AM CDT  Subject: Non-Urgent Medical Question    Dr. Sonam Spear,    I am still fighting this nasty cough that started 2 weeks ago.  You gave me a script for albuterol last Tuesday and I have been doin

## 2018-10-09 ENCOUNTER — MED REC SCAN ONLY (OUTPATIENT)
Dept: FAMILY MEDICINE CLINIC | Facility: CLINIC | Age: 63
End: 2018-10-09

## 2018-10-09 RX ORDER — PREDNISONE 20 MG/1
TABLET ORAL
Qty: 10 TABLET | Refills: 0 | Status: SHIPPED | OUTPATIENT
Start: 2018-10-09 | End: 2018-10-14

## 2018-10-11 ENCOUNTER — TELEPHONE (OUTPATIENT)
Dept: FAMILY MEDICINE CLINIC | Facility: CLINIC | Age: 63
End: 2018-10-11

## 2018-10-11 ENCOUNTER — OFFICE VISIT (OUTPATIENT)
Dept: FAMILY MEDICINE CLINIC | Facility: CLINIC | Age: 63
End: 2018-10-11
Payer: MEDICARE

## 2018-10-11 VITALS
HEART RATE: 88 BPM | RESPIRATION RATE: 16 BRPM | SYSTOLIC BLOOD PRESSURE: 138 MMHG | HEIGHT: 61 IN | DIASTOLIC BLOOD PRESSURE: 74 MMHG | TEMPERATURE: 99 F | BODY MASS INDEX: 43.23 KG/M2 | WEIGHT: 229 LBS

## 2018-10-11 DIAGNOSIS — R20.2 PARESTHESIA: ICD-10-CM

## 2018-10-11 DIAGNOSIS — E55.9 VITAMIN D DEFICIENCY: ICD-10-CM

## 2018-10-11 DIAGNOSIS — G47.61 PLMD (PERIODIC LIMB MOVEMENT DISORDER): ICD-10-CM

## 2018-10-11 DIAGNOSIS — D68.8 OTHER SPECIFIED COAGULATION DEFECTS (HCC): ICD-10-CM

## 2018-10-11 DIAGNOSIS — G47.33 OSA (OBSTRUCTIVE SLEEP APNEA): Primary | ICD-10-CM

## 2018-10-11 PROCEDURE — 82306 VITAMIN D 25 HYDROXY: CPT | Performed by: FAMILY MEDICINE

## 2018-10-11 PROCEDURE — 99214 OFFICE O/P EST MOD 30 MIN: CPT | Performed by: FAMILY MEDICINE

## 2018-10-11 PROCEDURE — 82728 ASSAY OF FERRITIN: CPT | Performed by: FAMILY MEDICINE

## 2018-10-11 PROCEDURE — 82607 VITAMIN B-12: CPT | Performed by: FAMILY MEDICINE

## 2018-10-11 PROCEDURE — 83540 ASSAY OF IRON: CPT | Performed by: FAMILY MEDICINE

## 2018-10-11 PROCEDURE — 83550 IRON BINDING TEST: CPT | Performed by: FAMILY MEDICINE

## 2018-10-11 NOTE — PROGRESS NOTES
[facility]        HPI:   This is a 61year old female coming in for No chief complaint on file. HPI:   Has been sick x 3 weeks. Still using a nebulizer, and now on prednisone.   Her asthma has been very botherso reflux    • Obesity    • Osteoarthritis    • Other and unspecified hyperlipidemia    • PE (pulmonary embolism) Feb 2004 and March 2004    x2.  prothrombin mutation found after 2nd one   • Prothrombin mutation [facility])    • Spinal stenosis    • Unspecified esse CAPSULE BY MOUTH TWICE DAILY. Disp: 180 capsule Rfl: 3   Metoprolol Succinate ER 25 MG Oral Tablet 24 Hr Take 1 tablet (25 mg total) by mouth daily.  Disp: 90 tablet Rfl: 3   PANTOPRAZOLE SODIUM 40 MG Oral Tab EC TAKE 1 TABLET BY MOUTH EVERY MORNING BEFORE complication     Elevated blood sugar     Gastroesophageal reflux disease with esophagitis     Hiatal hernia     Prothrombin mutation (HCC)     Osteoarthritis     Essential hypertension     Presence of IVC filter     Morbid obesity with body mass index of External ear normal.   Nose: Nose normal.   Mouth/Throat: Oropharynx is clear and moist. No oropharyngeal exudate. Dentures  Mal 2  Tonsils 0. Eyes: Conjunctivae and EOM are normal. Right eye exhibits no discharge. Left eye exhibits no discharge.  No s My nurse will call you when your study is read to discuss your follow up appointment. My nurse will call you when your study is read and orders have been sent to [facility].. Call [facility]. [phone] to  machine after called by our nurses.

## 2018-10-11 NOTE — PATIENT INSTRUCTIONS
You have been scheduled for a sleep study at the [address] [place] [address]. You will receive a phone call from the Sleep lab to review forms and will receive forms to fill out in the mail.     My nurse will call you when your study is read to discuss your fol

## 2018-10-11 NOTE — TELEPHONE ENCOUNTER
Called patient and asked about Discharge application for total disability. States that if this needs to be filled out why she is on disability so that her student loans can be discharged.

## 2018-10-11 NOTE — TELEPHONE ENCOUNTER
Left detailed message advising Dr Caro Castillo note and to call office with any questions.  [id number] Radha Frey per [facility] [employer] form    Future Appointments   Date Time Provider Juliana Lea   10/26/2018 11:00 AM [facility] NURSE ISABELLA Caceres   11/29/2018  8:00 PM [facility]

## 2018-10-11 NOTE — TELEPHONE ENCOUNTER
Pt is coming in for labs on 10/26. she wants to know if Mk wants her to keep this appt or move it out. Says that the predniSONE 20 MG Oral Tab sometime messes things up.  Please call back

## 2018-10-12 NOTE — TELEPHONE ENCOUNTER
William Raya Nurse   Caller: self (Today,  2:57 PM)             Pt said she was returning BS call, Please call back at [phone] with the pt and she states that it would be the same as her parking placard-chronic back p

## 2018-10-16 RX ORDER — PANTOPRAZOLE SODIUM 40 MG/1
TABLET, DELAYED RELEASE ORAL
Qty: 90 TABLET | Refills: 3 | Status: SHIPPED | OUTPATIENT
Start: 2018-10-16 | End: 2019-09-26

## 2018-10-19 ENCOUNTER — TELEPHONE (OUTPATIENT)
Dept: FAMILY MEDICINE CLINIC | Facility: CLINIC | Age: 63
End: 2018-10-19

## 2018-10-19 RX ORDER — FLUTICASONE PROPIONATE AND SALMETEROL 250; 50 UG/1; UG/1
1 POWDER RESPIRATORY (INHALATION) EVERY 12 HOURS SCHEDULED
Qty: 2 EACH | Refills: 0 | COMMUNITY
Start: 2018-10-19 | End: 2018-10-24

## 2018-10-19 NOTE — TELEPHONE ENCOUNTER
Pt went to  the ADVAIR from pharmacy and it was $400. IS there something else she can take.      Please return call to [phone]

## 2018-10-19 NOTE — TELEPHONE ENCOUNTER
Called the pt to see which company manages her scripts and she tells me [employer]    I called the pharmacy to get the ID # and the phone number to call for preferrred formulary- ID# [id number]  Phone # [phone] or [phone]    I spoke with Singh Ayala

## 2018-10-24 RX ORDER — BUDESONIDE AND FORMOTEROL FUMARATE DIHYDRATE 160; 4.5 UG/1; UG/1
1 AEROSOL RESPIRATORY (INHALATION) 2 TIMES DAILY
Qty: 1 INHALER | Refills: 3 | Status: SHIPPED | OUTPATIENT
Start: 2018-10-24 | End: 2019-06-21

## 2018-10-24 NOTE — TELEPHONE ENCOUNTER
Researched [employer] Drug List Online. It appears patient may have to go to [place] in order for Advair Diskus to be covered. Please call patient to confirm preferred [facility]. Then we can send there to see if this is covered.

## 2018-10-26 ENCOUNTER — NURSE ONLY (OUTPATIENT)
Dept: FAMILY MEDICINE CLINIC | Facility: CLINIC | Age: 63
End: 2018-10-26
Payer: MEDICARE

## 2018-10-26 DIAGNOSIS — Z51.81 ENCOUNTER FOR MONITORING COUMADIN THERAPY: Primary | ICD-10-CM

## 2018-10-26 DIAGNOSIS — Z79.01 ENCOUNTER FOR MONITORING COUMADIN THERAPY: Primary | ICD-10-CM

## 2018-10-26 PROCEDURE — 85610 PROTHROMBIN TIME: CPT | Performed by: FAMILY MEDICINE

## 2018-10-26 PROCEDURE — 36415 COLL VENOUS BLD VENIPUNCTURE: CPT | Performed by: FAMILY MEDICINE

## 2018-10-26 NOTE — PROGRESS NOTES
Bayron Smiley presents today for nurse visit. See lab note from 9/25/18. Order placed. 1 blue drawn from right ac area with 1 attempt with straight needle. Left office in stable condition.

## 2018-11-01 ENCOUNTER — PATIENT MESSAGE (OUTPATIENT)
Dept: FAMILY MEDICINE CLINIC | Facility: CLINIC | Age: 63
End: 2018-11-01

## 2018-11-01 NOTE — TELEPHONE ENCOUNTER
From: Lashon Lyn  To: Jun Steele MD  Sent: 11/1/2018 11:28 AM CDT  Subject: Non-Urgent Medical Question    Dr. Jamal Maciel,    Wanted to let you know that for the last couple of mornings when I get up I'm coughing and after I take my inhaler I cough for [facility]

## 2018-11-07 RX ORDER — MONTELUKAST SODIUM 10 MG/1
10 TABLET ORAL DAILY
Qty: 90 TABLET | Refills: 3 | Status: SHIPPED | OUTPATIENT
Start: 2018-11-07 | End: 2019-10-26

## 2018-11-09 ENCOUNTER — NURSE ONLY (OUTPATIENT)
Dept: FAMILY MEDICINE CLINIC | Facility: CLINIC | Age: 63
End: 2018-11-09
Payer: MEDICARE

## 2018-11-09 DIAGNOSIS — M25.561 RIGHT KNEE PAIN, UNSPECIFIED CHRONICITY: ICD-10-CM

## 2018-11-09 DIAGNOSIS — Z86.711 HISTORY OF PULMONARY EMBOLISM: ICD-10-CM

## 2018-11-09 DIAGNOSIS — Z79.01 ENCOUNTER FOR MONITORING COUMADIN THERAPY: ICD-10-CM

## 2018-11-09 DIAGNOSIS — Z51.81 ENCOUNTER FOR MONITORING COUMADIN THERAPY: ICD-10-CM

## 2018-11-09 DIAGNOSIS — Z51.81 ANTICOAGULATION GOAL OF INR 2.5 TO 3.5: ICD-10-CM

## 2018-11-09 DIAGNOSIS — Z79.01 ANTICOAGULATION GOAL OF INR 2.5 TO 3.5: ICD-10-CM

## 2018-11-09 PROCEDURE — 85610 PROTHROMBIN TIME: CPT | Performed by: FAMILY MEDICINE

## 2018-11-09 PROCEDURE — 36415 COLL VENOUS BLD VENIPUNCTURE: CPT | Performed by: FAMILY MEDICINE

## 2018-11-09 NOTE — PROGRESS NOTES
Blue tubes drawn from right arm with butterfly needle x1, 1 waste tube collected also. Pt shannon well.  Pt left the clinic in stable condition

## 2018-11-12 RX ORDER — ATORVASTATIN CALCIUM 40 MG/1
TABLET, FILM COATED ORAL
Qty: 90 TABLET | Refills: 3 | Status: SHIPPED | OUTPATIENT
Start: 2018-11-12 | End: 2019-10-26

## 2018-11-12 NOTE — TELEPHONE ENCOUNTER
Last refilled on 11/20/17 for # 90 with 3 refills  Last lipid 2/26/18   Last seen on 9/18/18  Future Appointments   Date Time Provider Juliana Lea   11/29/2018  8:00 PM [address] [facility]   2/27/2019  9:30 AM Dani Bay MD [facility]

## 2018-11-19 ENCOUNTER — MED REC SCAN ONLY (OUTPATIENT)
Dept: FAMILY MEDICINE CLINIC | Facility: CLINIC | Age: 63
End: 2018-11-19

## 2018-11-23 ENCOUNTER — IMAGING SERVICES (OUTPATIENT)
Dept: OTHER | Age: 63
End: 2018-11-23

## 2018-11-26 RX ORDER — METOPROLOL SUCCINATE 25 MG/1
TABLET, EXTENDED RELEASE ORAL
Qty: 90 TABLET | Refills: 3 | Status: SHIPPED | OUTPATIENT
Start: 2018-11-26 | End: 2019-10-26

## 2018-11-26 NOTE — TELEPHONE ENCOUNTER
Last refilled on 11/27/17 for # 90 with 3 refills  Last OV 9/18/18, /74  Future Appointments   Date Time Provider Juliana Lea   11/29/2018  8:00 PM [address] [facility]   12/7/2018 10:00 AM [facility] NURSE ISABELLA Hernandez   2/

## 2018-11-29 ENCOUNTER — OFFICE VISIT (OUTPATIENT)
Dept: SLEEP CENTER | Age: 63
End: 2018-11-29
Attending: FAMILY MEDICINE
Payer: MEDICARE

## 2018-11-29 DIAGNOSIS — G47.61 PLMD (PERIODIC LIMB MOVEMENT DISORDER): ICD-10-CM

## 2018-11-29 DIAGNOSIS — G47.33 OSA (OBSTRUCTIVE SLEEP APNEA): ICD-10-CM

## 2018-11-29 PROCEDURE — 95811 POLYSOM 6/>YRS CPAP 4/> PARM: CPT

## 2018-12-03 NOTE — PROCEDURES
[address] [address],[facility] 100       Accredited by the [facility] of Sleep Medicine (AASM)    PATIENT'S NAME:        Katya Romaine  ATTENDING PHYSICIAN:   Margret Hutton MD  REFERRING PHYSICIAN:   Jamilah Simpson.  MD Anni  PATIENT A an apnea index of 0.3 and 39 hypopneas with a hypopnea index of 5.3 through the night, giving a combined apnea-hypopnea index of 5.6. At her final pressure of 12 cm of water, the patient had an AHI of 0.8 and a minimum oxygen saturation of 92%.   The entir Minimum Oxygen Saturation during Desaturation:  86.0%      Sleep Architecture                   % of Time in Bed  Stages TIME (mins) % SLEEP TIME   WAKE 17.0    Stage N1 1.5 0.3%   Stage N2 331.5 75.3%   Stage N3 40.5 9.2%   REM 66.5 15.1%     Arousal Summ (seconds) 16.4 - 23.8 20.6   Maximum (seconds) 17.3 - 47.1 24.1     Oxygen Saturation Summary     WAKE NREM REM   Average OSat (%) 95.9% 94.9% 94.7%   Minimum OSat (%) 90.1% 86.0% 89.2%   Maximum OSat (%) 99.5% 99.2% 98.0%                            Oxygen 9 - 84.0 5.5 47.0 31.5 93.5% - - - 6 5 4.6 3.8 LevelIsUsed 7.6 90.1 34 26.0 26 19.9   CPAP 10 - 59.5 0.0 59.5 0.0 100.0% - 1 - 4 4 5.0 5.0 LevelIsUsed 12.1 90.6 94 94.8 50 50.4   CPAP 11 - 56.0 1.0 34.0 21.0 98.2% - 1 - 4 4 5.5 5.5 LevelIsUsed 8.7 91.9 8 8

## 2018-12-04 ENCOUNTER — TELEPHONE (OUTPATIENT)
Dept: FAMILY MEDICINE CLINIC | Facility: CLINIC | Age: 63
End: 2018-12-04

## 2018-12-04 ENCOUNTER — PATIENT MESSAGE (OUTPATIENT)
Dept: FAMILY MEDICINE CLINIC | Facility: CLINIC | Age: 63
End: 2018-12-04

## 2018-12-04 DIAGNOSIS — G47.33 OSA (OBSTRUCTIVE SLEEP APNEA): Primary | ICD-10-CM

## 2018-12-04 NOTE — TELEPHONE ENCOUNTER
PT HAD SLEEP STUDY DONE ON 11/29-GOT RESULTS OFF MYCHART    WAS RECOMMENDED FOR EXTRA BLOOD WORK-PT WOULD LIKE TO KNOW WHO WILL PLACE ORDERS. PT HAS NURSE APT ON 12/7 FOR INR. WOULD LIKE TO KNOW IF PT CAN GET ALL BLOOD WORK DONE ON SAME DAY.     PLEASE PLAC

## 2018-12-04 NOTE — TELEPHONE ENCOUNTER
Spoke with the pt and advised of the notes from Dr. Cathern Hatchet- she states that she has an email out to Dr. Nasim Alejandra for recommendation

## 2018-12-04 NOTE — TELEPHONE ENCOUNTER
She already had those labs done with Dr. Karrie Del Cid in October, can discuss further ercs with Dr. Karrie Del Cid.

## 2018-12-04 NOTE — PROGRESS NOTES
[place] CPAP at 12  Recommend routine follow up to assure compliance and efficacy. Avoid alcohol, sedatives and other cns depressants that may worsen sleep apnea and disrupt normal sleep architecture.   Sleep hygiene should be review to assess factors th

## 2018-12-05 NOTE — TELEPHONE ENCOUNTER
Patient notified of results and recommendations and expressed understanding.   Script and all related documents faxed to [facility] for     Katie Drain, 12/06/18, 3:17 PM

## 2018-12-05 NOTE — TELEPHONE ENCOUNTER
From: Dariel Drake  To: Neto Mcnamara MD  Sent: 12/4/2018 10:20 AM CST  Subject: Non-Urgent Medical Question    Dr. Naomie Kim,    I read my results for my sleep study but I'm not sure where I get the CPAP and is Medicare paying for it.      Thank    Nessa Hernandez

## 2018-12-05 NOTE — TELEPHONE ENCOUNTER
----- Message from Batsheva Etienne MD sent at 12/4/2018  9:12 AM CST -----  [place] CPAP at 12  Recommend routine follow up to assure compliance and efficacy.   Avoid alcohol, sedatives and other cns depressants that may worsen sleep apnea and disrupt obdulia

## 2018-12-07 ENCOUNTER — NURSE ONLY (OUTPATIENT)
Dept: FAMILY MEDICINE CLINIC | Facility: CLINIC | Age: 63
End: 2018-12-07
Payer: MEDICARE

## 2018-12-07 DIAGNOSIS — Z86.711 HISTORY OF PULMONARY EMBOLISM: ICD-10-CM

## 2018-12-07 DIAGNOSIS — Z79.01 ENCOUNTER FOR MONITORING COUMADIN THERAPY: ICD-10-CM

## 2018-12-07 DIAGNOSIS — Z51.81 ENCOUNTER FOR MONITORING COUMADIN THERAPY: ICD-10-CM

## 2018-12-07 DIAGNOSIS — Z79.01 ANTICOAGULATION GOAL OF INR 2.5 TO 3.5: ICD-10-CM

## 2018-12-07 DIAGNOSIS — M25.561 RIGHT KNEE PAIN, UNSPECIFIED CHRONICITY: ICD-10-CM

## 2018-12-07 DIAGNOSIS — Z51.81 ANTICOAGULATION GOAL OF INR 2.5 TO 3.5: ICD-10-CM

## 2018-12-07 PROCEDURE — 36415 COLL VENOUS BLD VENIPUNCTURE: CPT | Performed by: FAMILY MEDICINE

## 2018-12-07 PROCEDURE — 85610 PROTHROMBIN TIME: CPT | Performed by: FAMILY MEDICINE

## 2018-12-07 NOTE — PROGRESS NOTES
Blue tubes drawn from right arm with 23g butterfly  needle x1. Pt shannon well.  Pt left the clinic in stable condition

## 2018-12-10 ENCOUNTER — TELEPHONE (OUTPATIENT)
Dept: FAMILY MEDICINE CLINIC | Facility: CLINIC | Age: 63
End: 2018-12-10

## 2018-12-10 DIAGNOSIS — Z86.711 HISTORY OF PULMONARY EMBOLISM: Primary | ICD-10-CM

## 2018-12-10 DIAGNOSIS — Z51.81 ANTICOAGULATION GOAL OF INR 2.5 TO 3.5: ICD-10-CM

## 2018-12-10 DIAGNOSIS — Z79.01 ANTICOAGULATION GOAL OF INR 2.5 TO 3.5: ICD-10-CM

## 2018-12-27 ENCOUNTER — PATIENT MESSAGE (OUTPATIENT)
Dept: FAMILY MEDICINE CLINIC | Facility: CLINIC | Age: 63
End: 2018-12-27

## 2018-12-27 NOTE — TELEPHONE ENCOUNTER
From: Wang Handing  To: To Aguilar MD  Sent: 12/27/2018 3:11 PM CST  Subject: Non-Urgent Medical Question    I received my cpap today and was told to set an appointment to see you in 6 weeks. I need it to be in [place] please.     Thank you    Pily Ly

## 2019-01-03 RX ORDER — ALBUTEROL SULFATE 90 UG/1
2 AEROSOL, METERED RESPIRATORY (INHALATION) EVERY 4 HOURS PRN
Qty: 1 INHALER | Refills: 3 | Status: SHIPPED | OUTPATIENT
Start: 2019-01-03 | End: 2020-08-10

## 2019-01-03 NOTE — TELEPHONE ENCOUNTER
Last refilled on 7/14/17 for # 1 with 3 refills  Last OV 9/18/18  Future Appointments   Date Time Provider Juliana Lea   1/4/2019 10:00 AM [facility] KEVEN CHAVEZ [facility] Kesha [place]   2/27/2019  9:30 AM Sabrina Ramirez MD [facility] [place]   3/14/2019  9

## 2019-01-04 ENCOUNTER — NURSE ONLY (OUTPATIENT)
Dept: FAMILY MEDICINE CLINIC | Facility: CLINIC | Age: 64
End: 2019-01-04
Payer: MEDICARE

## 2019-01-04 DIAGNOSIS — Z86.711 HISTORY OF PULMONARY EMBOLISM: Primary | ICD-10-CM

## 2019-01-04 DIAGNOSIS — Z79.01 ENCOUNTER FOR MONITORING COUMADIN THERAPY: ICD-10-CM

## 2019-01-04 DIAGNOSIS — Z51.81 ENCOUNTER FOR MONITORING COUMADIN THERAPY: ICD-10-CM

## 2019-01-04 LAB
INR BLD: 3.29 (ref 0.9–1.1)
PSA SERPL DL<=0.01 NG/ML-MCNC: 34.5 SECONDS (ref 12.4–14.7)

## 2019-01-04 PROCEDURE — 85610 PROTHROMBIN TIME: CPT | Performed by: FAMILY MEDICINE

## 2019-01-04 PROCEDURE — 36415 COLL VENOUS BLD VENIPUNCTURE: CPT | Performed by: FAMILY MEDICINE

## 2019-01-05 ENCOUNTER — TELEPHONE (OUTPATIENT)
Dept: FAMILY MEDICINE CLINIC | Facility: CLINIC | Age: 64
End: 2019-01-05

## 2019-01-05 NOTE — TELEPHONE ENCOUNTER
Order for INR in Epic.     Future Appointments   Date Time Provider Juliana Lea   2/1/2019 10:00 AM [facility] [place] NURSE [address] [facility]   2/27/2019  9:30 AM MD [facility] [address] [facility]ier [facility]   3/14/2019  9:30 AM MD [facility] [address] [facility] Sarai Mendoza

## 2019-01-24 ENCOUNTER — TELEPHONE (OUTPATIENT)
Dept: FAMILY MEDICINE CLINIC | Facility: CLINIC | Age: 64
End: 2019-01-24

## 2019-01-24 ENCOUNTER — IMAGING SERVICES (OUTPATIENT)
Dept: OTHER | Age: 64
End: 2019-01-24

## 2019-01-24 NOTE — TELEPHONE ENCOUNTER
Can try Mucinex DM to calm the cough  Continue the humidifier  Can also use nasal saline spray to keep the nose moist.  See one of us if symptoms worsen.   THANKS

## 2019-01-24 NOTE — TELEPHONE ENCOUNTER
Call from patient. For past 3 days she has had dry cough, runny nose. States when she blows her nose, dry blood comes out. Also states both of her ears are clogged-but not painful. States she has been using a humidifier in the house daily.  Usually uses CPA

## 2019-02-01 ENCOUNTER — TELEPHONE (OUTPATIENT)
Dept: FAMILY MEDICINE CLINIC | Facility: CLINIC | Age: 64
End: 2019-02-01

## 2019-02-01 NOTE — TELEPHONE ENCOUNTER
I'm fine with Thursday.  Let's check her INR then while she's on the abx to get an idea of what it does to her INR in case she's on abx again in future we can decide to emperically change warfarin dose while on it if needed

## 2019-02-01 NOTE — TELEPHONE ENCOUNTER
We will need to check her INR- do you want to see her also for follow up?   She has an appt for [place] not Mondayunless you need to see her on Monday  Requested records

## 2019-02-01 NOTE — TELEPHONE ENCOUNTER
PT ADV THAT PT WAS AT [facility]-POSS PNEUMONIA . PT HAS APT FOR F/U ON Monday 2/4/19 FOR  COUGH AND BLOOD WORK. PT ADV THAT PT IS NOW ON LEVAQUIN AND THIS WILL ALTER BLOOD TEST RESULTS.     PT WONDERING IF SHE SHOULD COME IN OR RESCHEDULE FOR LATER T

## 2019-02-01 NOTE — TELEPHONE ENCOUNTER
Spoke with the pt and confirmed the appt for Thursday advised that we will check her INR at that time- she v/u  Advised to call if she gets worse- she v/u

## 2019-02-04 ENCOUNTER — TELEPHONE (OUTPATIENT)
Dept: FAMILY MEDICINE CLINIC | Facility: CLINIC | Age: 64
End: 2019-02-04

## 2019-02-04 NOTE — TELEPHONE ENCOUNTER
Patient went to [facility] today to be evaluated for her fall and head injury. They did a CT, they said it all looks normal. Patient said her results will be in her \"rush eulalio mychart\" by tonight. Call back if any questions.

## 2019-02-04 NOTE — TELEPHONE ENCOUNTER
Patient fell yesterday and hit her head pretty hard on a wood cabinet. Has a bad headache, very tired, no vomiting and a very sore left shoulder (can hardly move it) shoulder does hurt when she moves it.  Does Dr Jamal Maciel want to see her or should she go to the

## 2019-02-04 NOTE — TELEPHONE ENCOUNTER
Called the pt and advised that since she is on blood thinners that Dr. Kahlil Ram like her to be evaluated at [facility] or ER- she v/u

## 2019-02-07 ENCOUNTER — TELEPHONE (OUTPATIENT)
Dept: FAMILY MEDICINE CLINIC | Facility: CLINIC | Age: 64
End: 2019-02-07

## 2019-02-07 ENCOUNTER — OFFICE VISIT (OUTPATIENT)
Dept: FAMILY MEDICINE CLINIC | Facility: CLINIC | Age: 64
End: 2019-02-07
Payer: MEDICARE

## 2019-02-07 VITALS
WEIGHT: 231.38 LBS | DIASTOLIC BLOOD PRESSURE: 84 MMHG | TEMPERATURE: 98 F | HEIGHT: 61.5 IN | RESPIRATION RATE: 14 BRPM | BODY MASS INDEX: 43.13 KG/M2 | SYSTOLIC BLOOD PRESSURE: 134 MMHG | HEART RATE: 80 BPM

## 2019-02-07 DIAGNOSIS — M25.512 ACUTE PAIN OF LEFT SHOULDER: ICD-10-CM

## 2019-02-07 DIAGNOSIS — R05.9 COUGH: ICD-10-CM

## 2019-02-07 DIAGNOSIS — Z09 FOLLOW-UP EXAM: Primary | ICD-10-CM

## 2019-02-07 DIAGNOSIS — Z12.39 BREAST CANCER SCREENING: Primary | ICD-10-CM

## 2019-02-07 DIAGNOSIS — J18.9 PNEUMONIA DUE TO INFECTIOUS ORGANISM, UNSPECIFIED LATERALITY, UNSPECIFIED PART OF LUNG: ICD-10-CM

## 2019-02-07 PROCEDURE — 99214 OFFICE O/P EST MOD 30 MIN: CPT | Performed by: FAMILY MEDICINE

## 2019-02-07 RX ORDER — DIAZEPAM 2 MG/1
2 TABLET ORAL NIGHTLY PRN
Qty: 10 TABLET | Refills: 0 | Status: SHIPPED
Start: 2019-02-17 | End: 2019-02-27

## 2019-02-07 NOTE — PROGRESS NOTES
Ana Addison is a 61year old female. HPI:   Pt is here for ER/UC/hospital f/u. ER/UC or hospital: 2/1/19 and 2/4/19    Date(s): Josue Salgado    Reason for initial ER visit: Friday was for cough, sob, diagnosed with pneumonia.     On Monday went in b/c s (SYMBICORT) 160-4.5 MCG/ACT Inhalation Aerosol Inhale 1 puff into the lungs 2 (two) times daily.  Disp: 1 Inhaler Rfl: 3   PANTOPRAZOLE SODIUM 40 MG Oral Tab EC TAKE 1 TABLET BY MOUTH EVERY MORNING BEFORE BREAKFAST Disp: 90 tablet Rfl: 3   WARFARIN SODIUM 4 • Other and unspecified hyperlipidemia    • PE (pulmonary embolism) Feb 2004 and March 2004    x2.  prothrombin mutation found after 2nd one   • Prothrombin mutation [facility])    • Spinal stenosis    • Unspecified essential hypertension       Past Surgical Hi obvious swelling, no redness/warmth; tenderness upper left back and upper left arm  EXTREMITIES: no cyanosis, clubbing or edema    ASSESSMENT AND PLAN:   1. Follow-up exam  fter 2 ER visits for    2. Cough  D/t    3.  Pneumonia due to infectious organism, u

## 2019-02-07 NOTE — TELEPHONE ENCOUNTER
----- Message from Kathya Kiran sent at 2/7/2019  1:00 PM CST -----  Contact: Pt  Pt returned our call.   Please call pt at [phone]

## 2019-02-08 ENCOUNTER — MED REC SCAN ONLY (OUTPATIENT)
Dept: FAMILY MEDICINE CLINIC | Facility: CLINIC | Age: 64
End: 2019-02-08

## 2019-02-13 ENCOUNTER — TELEPHONE (OUTPATIENT)
Dept: FAMILY MEDICINE CLINIC | Facility: CLINIC | Age: 64
End: 2019-02-13

## 2019-02-27 ENCOUNTER — PATIENT MESSAGE (OUTPATIENT)
Dept: FAMILY MEDICINE CLINIC | Facility: CLINIC | Age: 64
End: 2019-02-27

## 2019-02-27 ENCOUNTER — OFFICE VISIT (OUTPATIENT)
Dept: FAMILY MEDICINE CLINIC | Facility: CLINIC | Age: 64
End: 2019-02-27
Payer: MEDICARE

## 2019-02-27 ENCOUNTER — HOSPITAL ENCOUNTER (OUTPATIENT)
Dept: MAMMOGRAPHY | Age: 64
Discharge: HOME | End: 2019-02-27
Attending: FAMILY MEDICINE
Payer: MEDICARE

## 2019-02-27 VITALS
RESPIRATION RATE: 18 BRPM | BODY MASS INDEX: 43.92 KG/M2 | DIASTOLIC BLOOD PRESSURE: 84 MMHG | HEIGHT: 61.5 IN | SYSTOLIC BLOOD PRESSURE: 122 MMHG | WEIGHT: 235.63 LBS | TEMPERATURE: 98 F | HEART RATE: 86 BPM

## 2019-02-27 DIAGNOSIS — R73.9 ELEVATED BLOOD SUGAR: ICD-10-CM

## 2019-02-27 DIAGNOSIS — Z00.00 ENCOUNTER FOR ANNUAL HEALTH EXAMINATION: Primary | ICD-10-CM

## 2019-02-27 DIAGNOSIS — R42 DIZZINESS: ICD-10-CM

## 2019-02-27 DIAGNOSIS — Z59.9 FINANCIAL PROBLEMS: ICD-10-CM

## 2019-02-27 DIAGNOSIS — Z95.828 PRESENCE OF IVC FILTER: ICD-10-CM

## 2019-02-27 DIAGNOSIS — K57.30 DIVERTICULOSIS OF LARGE INTESTINE WITHOUT HEMORRHAGE: ICD-10-CM

## 2019-02-27 DIAGNOSIS — Z51.81 ANTICOAGULATION GOAL OF INR 2.5 TO 3.5: ICD-10-CM

## 2019-02-27 DIAGNOSIS — F41.0 PANIC ATTACKS: ICD-10-CM

## 2019-02-27 DIAGNOSIS — R73.09 ELEVATED HEMOGLOBIN A1C: ICD-10-CM

## 2019-02-27 DIAGNOSIS — M54.5 CHRONIC BILATERAL LOW BACK PAIN, WITH SCIATICA PRESENCE UNSPECIFIED: ICD-10-CM

## 2019-02-27 DIAGNOSIS — Z12.39 BREAST CANCER SCREENING: ICD-10-CM

## 2019-02-27 DIAGNOSIS — Z28.21 IMMUNIZATION NOT CARRIED OUT BECAUSE OF PATIENT REFUSAL: ICD-10-CM

## 2019-02-27 DIAGNOSIS — J45.20 MILD INTERMITTENT ASTHMA WITHOUT COMPLICATION: ICD-10-CM

## 2019-02-27 DIAGNOSIS — K44.9 HIATAL HERNIA: ICD-10-CM

## 2019-02-27 DIAGNOSIS — Z51.81 ENCOUNTER FOR MONITORING COUMADIN THERAPY: ICD-10-CM

## 2019-02-27 DIAGNOSIS — M48.05 SPINAL STENOSIS OF THORACOLUMBAR REGION: ICD-10-CM

## 2019-02-27 DIAGNOSIS — E55.9 VITAMIN D DEFICIENCY: ICD-10-CM

## 2019-02-27 DIAGNOSIS — J30.89 NON-SEASONAL ALLERGIC RHINITIS, UNSPECIFIED TRIGGER: ICD-10-CM

## 2019-02-27 DIAGNOSIS — F33.41 RECURRENT MAJOR DEPRESSIVE DISORDER, IN PARTIAL REMISSION (HCC): ICD-10-CM

## 2019-02-27 DIAGNOSIS — E78.2 MIXED HYPERLIPIDEMIA: ICD-10-CM

## 2019-02-27 DIAGNOSIS — Z79.01 ANTICOAGULATION GOAL OF INR 2.5 TO 3.5: ICD-10-CM

## 2019-02-27 DIAGNOSIS — K21.00 GASTROESOPHAGEAL REFLUX DISEASE WITH ESOPHAGITIS: ICD-10-CM

## 2019-02-27 DIAGNOSIS — D68.52 PROTHROMBIN MUTATION (HCC): ICD-10-CM

## 2019-02-27 DIAGNOSIS — M15.9 PRIMARY OSTEOARTHRITIS INVOLVING MULTIPLE JOINTS: ICD-10-CM

## 2019-02-27 DIAGNOSIS — Z79.01 ENCOUNTER FOR MONITORING COUMADIN THERAPY: ICD-10-CM

## 2019-02-27 DIAGNOSIS — Z65.8: ICD-10-CM

## 2019-02-27 DIAGNOSIS — I10 ESSENTIAL HYPERTENSION: ICD-10-CM

## 2019-02-27 DIAGNOSIS — G89.29 CHRONIC BILATERAL LOW BACK PAIN, WITH SCIATICA PRESENCE UNSPECIFIED: ICD-10-CM

## 2019-02-27 DIAGNOSIS — Z86.711 HISTORY OF PULMONARY EMBOLISM: ICD-10-CM

## 2019-02-27 LAB
ALBUMIN SERPL-MCNC: 3.7 G/DL (ref 3.4–5)
ALBUMIN/GLOB SERPL: 0.9 {RATIO} (ref 1–2)
ALP LIVER SERPL-CCNC: 91 U/L (ref 50–130)
ALT SERPL-CCNC: 22 U/L (ref 13–56)
ANION GAP SERPL CALC-SCNC: 6 MMOL/L (ref 0–18)
AST SERPL-CCNC: 26 U/L (ref 15–37)
BASOPHILS # BLD AUTO: 0.04 X10(3) UL (ref 0–0.2)
BASOPHILS NFR BLD AUTO: 0.7 %
BILIRUB SERPL-MCNC: 0.4 MG/DL (ref 0.1–2)
BUN BLD-MCNC: 20 MG/DL (ref 7–18)
BUN/CREAT SERPL: 18 (ref 10–20)
CALCIUM BLD-MCNC: 10.1 MG/DL (ref 8.5–10.1)
CHLORIDE SERPL-SCNC: 106 MMOL/L (ref 98–107)
CHOLEST SMN-MCNC: 151 MG/DL (ref ?–200)
CO2 SERPL-SCNC: 28 MMOL/L (ref 21–32)
CREAT BLD-MCNC: 1.11 MG/DL (ref 0.55–1.02)
DEPRECATED RDW RBC AUTO: 49.4 FL (ref 35.1–46.3)
EOSINOPHIL # BLD AUTO: 0.14 X10(3) UL (ref 0–0.7)
EOSINOPHIL NFR BLD AUTO: 2.4 %
ERYTHROCYTE [DISTWIDTH] IN BLOOD BY AUTOMATED COUNT: 14.2 % (ref 11–15)
EST. AVERAGE GLUCOSE BLD GHB EST-MCNC: 128 MG/DL (ref 68–126)
GLOBULIN PLAS-MCNC: 3.9 G/DL (ref 2.8–4.4)
GLUCOSE BLD-MCNC: 74 MG/DL (ref 70–99)
HBA1C MFR BLD HPLC: 6.1 % (ref ?–5.7)
HCT VFR BLD AUTO: 41.2 % (ref 35–48)
HDLC SERPL-MCNC: 63 MG/DL (ref 40–59)
HGB BLD-MCNC: 13.7 G/DL (ref 12–16)
IMM GRANULOCYTES # BLD AUTO: 0.01 X10(3) UL (ref 0–1)
IMM GRANULOCYTES NFR BLD: 0.2 %
INR BLD: 2.74 (ref 0.9–1.1)
LDLC SERPL CALC-MCNC: 67 MG/DL (ref ?–100)
LYMPHOCYTES # BLD AUTO: 1.18 X10(3) UL (ref 1–4)
LYMPHOCYTES NFR BLD AUTO: 19.9 %
M PROTEIN MFR SERPL ELPH: 7.6 G/DL (ref 6.4–8.2)
MCH RBC QN AUTO: 31.5 PG (ref 26–34)
MCHC RBC AUTO-ENTMCNC: 33.3 G/DL (ref 31–37)
MCV RBC AUTO: 94.7 FL (ref 80–100)
MONOCYTES # BLD AUTO: 0.45 X10(3) UL (ref 0.1–1)
MONOCYTES NFR BLD AUTO: 7.6 %
NEUTROPHILS # BLD AUTO: 4.12 X10 (3) UL (ref 1.5–7.7)
NEUTROPHILS # BLD AUTO: 4.12 X10(3) UL (ref 1.5–7.7)
NEUTROPHILS NFR BLD AUTO: 69.2 %
NONHDLC SERPL-MCNC: 88 MG/DL (ref ?–130)
OSMOLALITY SERPL CALC.SUM OF ELEC: 291 MOSM/KG (ref 275–295)
PLATELET # BLD AUTO: 254 10(3)UL (ref 150–450)
POTASSIUM SERPL-SCNC: 4.5 MMOL/L (ref 3.5–5.1)
PSA SERPL DL<=0.01 NG/ML-MCNC: 30.9 SECONDS (ref 12.5–14.7)
RBC # BLD AUTO: 4.35 X10(6)UL (ref 3.8–5.3)
SODIUM SERPL-SCNC: 140 MMOL/L (ref 136–145)
TRIGL SERPL-MCNC: 106 MG/DL (ref 30–149)
TSI SER-ACNC: 1.39 MIU/ML (ref 0.36–3.74)
VLDLC SERPL CALC-MCNC: 21 MG/DL (ref 0–30)
WBC # BLD AUTO: 5.9 X10(3) UL (ref 4–11)

## 2019-02-27 PROCEDURE — G0439 PPPS, SUBSEQ VISIT: HCPCS | Performed by: FAMILY MEDICINE

## 2019-02-27 PROCEDURE — 85610 PROTHROMBIN TIME: CPT | Performed by: FAMILY MEDICINE

## 2019-02-27 PROCEDURE — 80053 COMPREHEN METABOLIC PANEL: CPT | Performed by: FAMILY MEDICINE

## 2019-02-27 PROCEDURE — 84443 ASSAY THYROID STIM HORMONE: CPT | Performed by: FAMILY MEDICINE

## 2019-02-27 PROCEDURE — 85025 COMPLETE CBC W/AUTO DIFF WBC: CPT | Performed by: FAMILY MEDICINE

## 2019-02-27 PROCEDURE — 80061 LIPID PANEL: CPT | Performed by: FAMILY MEDICINE

## 2019-02-27 PROCEDURE — 83036 HEMOGLOBIN GLYCOSYLATED A1C: CPT | Performed by: FAMILY MEDICINE

## 2019-02-27 PROCEDURE — 36415 COLL VENOUS BLD VENIPUNCTURE: CPT | Performed by: FAMILY MEDICINE

## 2019-02-27 PROCEDURE — 77067 SCR MAMMO BI INCL CAD: CPT | Performed by: FAMILY MEDICINE

## 2019-02-27 SDOH — ECONOMIC STABILITY - INCOME SECURITY: PROBLEM RELATED TO HOUSING AND ECONOMIC CIRCUMSTANCES, UNSPECIFIED: Z59.9

## 2019-02-27 NOTE — PROGRESS NOTES
HPI:   Ana Addison is a 61year old female who presents for a Medicare Subsequent Annual Wellness visit (Pt already had Initial Annual Wellness). Patient doing quite well overall.   No major concers, other than since her concussion she getts dizzy/off screening of functional status. Shop for groceries: Need some help           She has Vision problems based on screening of functional status. Vision Problems? : Yes   She has Walking problems based on screening of functional status.    Difficulty walkin oz  02/07/19 : 231 lb 6.4 oz  10/11/18 : 229 lb     Last Cholesterol Labs:   Lab Results   Component Value Date    CHOLEST 193 02/26/2018    HDL 61 02/26/2018    LDL 92 02/26/2018    TRIG 198 (H) 02/26/2018          Last Chemistry Labs:   Lab Results   Com BuPROPion HCl ER, SR, 200 MG Oral Tablet 12 Hr Take 1 tablet (200 mg total) by mouth 2 (two) times daily. TraZODone HCl 100 MG Oral Tab Take 1 tablet (100 mg total) by mouth nightly as needed for Sleep.    Venlafaxine HCl ER (EFFEXOR XR) 150 MG Oral Cap pain or ST  LUNGS: denies cough or new or unusual shortness of breath with exertion; hasn't used albuterol since starting symbicort  CARDIOVASCULAR: denies chest pain on exertion; no heart palps  GI: denies abdominal pain, denies bothersome heartburn  : muscle spasm upper back/parcervical muscles L>R   Lungs:   Clear to auscultation bilaterally, respirations unlabored   Heart:  Regular rate and rhythm, S1 and S2 normal, no murmur, rub, or gallop   Abdomen:   Soft, non-tender, bowel sounds active all four FLULAVAL INFLUENZA VACCINE QUAD PRESERVATIVE FREE 0.5 ML  -     INFLUENZA REFUSED DMG    Elevated hemoglobin A1c  --assess control today  -     VENIPUNCTURE  -     COMP METABOLIC PANEL (14);  Future  -     HEMOGLOBIN A1C; Future    BMI 40.0-44.9, [facility] to left upper back/neck  Prothrombin mutation ([facility]  Due for  Encounter for monitoring Coumadin therapy  with  Anticoagulation goal of INR 2.5 to 3.5  INR today  -     VENIPUNCTURE  -     PROTHROMBIN TIME (PT); Future    Elevated blood sugar  --assess cont Screen every 10 years No results found for this or any previous visit. No flowsheet data found. Fecal Occult Blood Annually No results found for: FOB No flowsheet data found.     Glaucoma Screening      Ophthalmology Visit Annually: Diabetics, FHx [facility] Lab or Procedure      Annual Monitoring of Persistent     Medications (ACE/ARB, digoxin diuretics, anticonvulsants.)    Potassium  Annually Potassium (mmol/L)   Date Value   03/19/2018 4.2     POTASSIUM (mmol/L)   Date Value   09/21/2013 3.4 (L)    No flow

## 2019-02-27 NOTE — TELEPHONE ENCOUNTER
From: Jose G Chapman  To: Bebe Medina MD  Sent: 2/27/2019 4:22 PM CST  Subject: Other    Dr. Patrizia Pressley,    Just wanted to let you know that I have an appointment tomorrow evening at 5:30 to see the eye Dr. Moseley Base I had the mammogram done before I left today.     Solange Lackey

## 2019-02-27 NOTE — PATIENT INSTRUCTIONS
Zoey Wright's SCREENING SCHEDULE   Tests on this list are recommended by your physician but may not be covered, or covered at this frequency, by your insurer. Please check with your insurance carrier before scheduling to verify coverage.    PREVENTATIVE up to Age 76     Colonoscopy Screen   Covered every 10 years- more often if abnormal Colonoscopy due on 02/20/2022 Update [facility] if applicable    Flex Sigmoidoscopy Screen  Covered every 5 years No results found for this or any previous visit. Orders placed or performed in visit on 02/20/17   • PNEUMOCOCCAL VACC, 13 JAMES IM    Please get once after your 65th birthday    Pneumococcal 23 (Pneumovax)  Covered Once after 65 No orders found for this or any previous visit.  Please get once after your 65 website. [email or web address] [place].[email or web address] [place]/public/books/advin.htm  A link to the [employer]. This site has a lot of good information including definitions of the different types of Advance Directives.  It also has the [facility] forms available

## 2019-03-02 NOTE — PROGRESS NOTES
She'll be getting them when results released (I'll do that later this weekend when looking at remainder of results)

## 2019-03-07 ENCOUNTER — TELEPHONE (OUTPATIENT)
Dept: FAMILY MEDICINE CLINIC | Facility: CLINIC | Age: 64
End: 2019-03-07

## 2019-03-07 NOTE — TELEPHONE ENCOUNTER
Call from patient. Wanted to know if Dr Desiree Hanna sent over rx for test strips to the pharmacy. Advised that there is nothing in Epic of something being sent. Patient asked that they be sent to [facility] in [place]. Advise. Thanks!

## 2019-03-07 NOTE — TELEPHONE ENCOUNTER
Pt called, inquiring if we sent over a script to the pharmacy for test strips?   Please call pt at [phone]

## 2019-03-08 ENCOUNTER — TELEPHONE (OUTPATIENT)
Dept: FAMILY MEDICINE CLINIC | Facility: CLINIC | Age: 64
End: 2019-03-08

## 2019-03-08 NOTE — TELEPHONE ENCOUNTER
Called the patient to find out which strips- it is the one touch ultra Quality 110: Preventive Care And Screening: Influenza Immunization: Influenza immunization was not ordered or administered, reason not given Quality 131: Pain Assessment And Follow-Up: Pain assessment using a standardized tool is documented as negative, no follow-up plan required Quality 226: Preventive Care And Screening: Tobacco Use: Screening And Cessation Intervention: Tobacco Screening not Performed for Medical Reasons Quality 130: Documentation Of Current Medications In The Medical Record: Current Medications Documented Detail Level: Detailed Quality 431: Preventive Care And Screening: Unhealthy Alcohol Use - Screening: Patient screened for unhealthy alcohol use using a single question and scores less than 2 times per year

## 2019-03-08 NOTE — TELEPHONE ENCOUNTER
Spoke with the pt and advised that I hve not received anything from the pharmacy at this time. Advised that they might be working on it.  Asked her to call if she hears anything else and I will do the same- she v/u

## 2019-03-08 NOTE — TELEPHONE ENCOUNTER
Patient called and she received an email from the pharmacy stating that her medication (test strips)  is delayed due to insurance issue. Patient would like to discuss this with Janeth Smith RN.

## 2019-03-12 ENCOUNTER — TELEPHONE (OUTPATIENT)
Dept: FAMILY MEDICINE CLINIC | Facility: CLINIC | Age: 64
End: 2019-03-12

## 2019-03-14 ENCOUNTER — OFFICE VISIT (OUTPATIENT)
Dept: FAMILY MEDICINE CLINIC | Facility: CLINIC | Age: 64
End: 2019-03-14
Payer: MEDICARE

## 2019-03-14 ENCOUNTER — PATIENT MESSAGE (OUTPATIENT)
Dept: FAMILY MEDICINE CLINIC | Facility: CLINIC | Age: 64
End: 2019-03-14

## 2019-03-14 VITALS
HEART RATE: 72 BPM | HEIGHT: 61 IN | SYSTOLIC BLOOD PRESSURE: 148 MMHG | WEIGHT: 231.63 LBS | DIASTOLIC BLOOD PRESSURE: 84 MMHG | TEMPERATURE: 99 F | BODY MASS INDEX: 43.73 KG/M2 | RESPIRATION RATE: 16 BRPM

## 2019-03-14 DIAGNOSIS — G47.33 OBSTRUCTIVE SLEEP APNEA (ADULT) (PEDIATRIC): Primary | ICD-10-CM

## 2019-03-14 PROCEDURE — 99214 OFFICE O/P EST MOD 30 MIN: CPT | Performed by: NURSE PRACTITIONER

## 2019-03-14 NOTE — PROGRESS NOTES
[facility]  SLEEP PROGRESS NOTE        HPI:   This is a 61year old female coming in for Patient presents with:  Obstructive Sleep Apnea (CHINTAN)      HPI:     Started CPAP therapy and doing well with using.  States under a lot of stress at Gender Male? - -   Stop Bang Score - -   Obstructive Sleep Apnea Risk - -         Past Medical History:   Diagnosis Date   • Allergic rhinitis    • Asthma    • Back pain    • Depression    • Esophageal reflux    • Obesity    • Osteoarthritis    • Other a Breath. Disp: 1 Inhaler Rfl: 3   METOPROLOL SUCCINATE ER 25 MG Oral Tablet 24 Hr TAKE 1 TABLET(25 MG) BY MOUTH DAILY Disp: 90 tablet Rfl: 3   ATORVASTATIN 40 MG Oral Tab TAKE 1 TABLET BY MOUTH NIGHTLY.  Disp: 90 tablet Rfl: 3   Montelukast Sodium (SINGULAIR Take 5,000 Units by mouth daily.  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 Physical Exam   Constitutional: She is oriented to person, place, and time. She appears well-developed and well-nourished. HENT:   Head: Normocephalic and atraumatic.    Right Ear: External ear normal.   Left Ear: External ear normal.   Nose: N increase in risk of heart attack, stroke, abnormal heart rhythm  and death,  increased risk of driving accidents. Advised to refrain from driving when sleepy. COMPLIANCE is required by insurance for 4 hours a night most nights of the week.   Recommend

## 2019-03-15 ENCOUNTER — TELEPHONE (OUTPATIENT)
Dept: FAMILY MEDICINE CLINIC | Facility: CLINIC | Age: 64
End: 2019-03-15

## 2019-03-15 NOTE — TELEPHONE ENCOUNTER
Spoke with the pt and advised of the recommendations- she v/u  We scheduled her appt  Future Appointments   Date Time Provider Juliana Arorai   3/18/2019 11:30 AM Migue Mayorga MD [facility] EMG Yony Nettles   4/25/2019  9:30 AM Harsh Olsen APRN [facility] EM

## 2019-03-15 NOTE — TELEPHONE ENCOUNTER
Patient was in yesterday and her BP was 148/84. She went to PT and her BP was 140/100 & 140/95. Her BP today at PT was 140/90. Patient is taking her BP meds but it is not working. What should she do?

## 2019-03-15 NOTE — TELEPHONE ENCOUNTER
She needs to be seen for this. Have her see one of us next week. The myriad of meds she is on as well as her multiple medical issues makes this a complicated issue. Abstain from etoh and salty foods if she does use them.  Avoid decongestants and Motrin type

## 2019-03-15 NOTE — TELEPHONE ENCOUNTER
From: Lashon Lyn  To: Jun Steele MD  Sent: 3/14/2019 5:34 PM CDT  Subject: Non-Urgent Medical Question    Dr. Jamal Maciel,    When I was at the office to see Chen Avila my BP was 148/84.  When I went to PT this afternoon I had the PT take it for me

## 2019-03-15 NOTE — TELEPHONE ENCOUNTER
Spoke with the pt and asked about another meds and she not taking anything else. I asked her if she has ever seen a cardiologist she denies. She is having lots of increased stress at home.    No chest pain, no SOB- but she is having a headache- she attribu

## 2019-03-15 NOTE — TELEPHONE ENCOUNTER
She has an exhaustive medication list and the only BP med I see is Metoprolol. Is she taking anything else? Does she see anybody else that monitors her BP?

## 2019-03-18 ENCOUNTER — MED REC SCAN ONLY (OUTPATIENT)
Dept: FAMILY MEDICINE CLINIC | Facility: CLINIC | Age: 64
End: 2019-03-18

## 2019-03-18 ENCOUNTER — OFFICE VISIT (OUTPATIENT)
Dept: FAMILY MEDICINE CLINIC | Facility: CLINIC | Age: 64
End: 2019-03-18
Payer: MEDICARE

## 2019-03-18 VITALS
BODY MASS INDEX: 44 KG/M2 | HEART RATE: 86 BPM | TEMPERATURE: 98 F | SYSTOLIC BLOOD PRESSURE: 138 MMHG | RESPIRATION RATE: 10 BRPM | DIASTOLIC BLOOD PRESSURE: 80 MMHG | WEIGHT: 233.19 LBS

## 2019-03-18 DIAGNOSIS — I10 ESSENTIAL HYPERTENSION: Primary | ICD-10-CM

## 2019-03-18 PROCEDURE — 99214 OFFICE O/P EST MOD 30 MIN: CPT | Performed by: FAMILY MEDICINE

## 2019-03-18 RX ORDER — ENALAPRIL MALEATE 5 MG/1
TABLET ORAL
Qty: 90 TABLET | Refills: 0 | OUTPATIENT
Start: 2019-03-18

## 2019-03-18 RX ORDER — ENALAPRIL MALEATE 5 MG/1
5 TABLET ORAL DAILY
Qty: 30 TABLET | Refills: 0 | Status: SHIPPED | OUTPATIENT
Start: 2019-03-18 | End: 2019-04-02

## 2019-03-18 NOTE — TELEPHONE ENCOUNTER
Enalapril refilled by Dr Shaquille Parker #[phone] refills. Covering for Dr Michael Morgan. 90 day supply not appropriate. Request refused.

## 2019-03-18 NOTE — PROGRESS NOTES
Hilario Monsivais is a 61year old female.     CC:  Patient presents with:  Blood Pressure: per pt       HPI:  Here to follow up hypertension  Home BP readings: 140-160s/90-100s  Medication side effects: none  Chest pain: none  Headaches: yes  Visual changes: Nasal Solution USE 2 SPRAYS IN EACH NOSTRIL FOUR TIMES DAILY AS NEEDED FOR RHINITIS Disp: 135 mL Rfl: 3   GABAPENTIN 300 MG Oral Cap TAKE ONE CAPSULE BY MOUTH TWICE DAILY.  Disp: 180 capsule Rfl: 3   BuPROPion HCl ER, SR, 200 MG Oral Tablet 12 Hr Take 1 tab • Depression Daughter    • Anxiety Daughter    • Depression Son    • Psychiatric Father         dementia   • Diabetes Mother    • Heart Disorder Mother    • Psychiatric Mother         dementia   • Hypertension Sister    • Hypertension Brother       Famil Enalapril Maleate 5 MG Oral Tab 30 tablet 0     Sig: Take 1 tablet (5 mg total) by mouth daily. No Follow-up on file.       Authorized by Omar Lima M.D.

## 2019-03-27 ENCOUNTER — TELEPHONE (OUTPATIENT)
Dept: FAMILY MEDICINE CLINIC | Facility: CLINIC | Age: 64
End: 2019-03-27

## 2019-03-27 RX ORDER — GLUCOSAMINE HCL/CHONDROITIN SU 500-400 MG
CAPSULE ORAL
Qty: 60 STRIP | Refills: 11 | Status: CANCELLED | OUTPATIENT
Start: 2019-03-27

## 2019-03-27 NOTE — TELEPHONE ENCOUNTER
Donaldo Reynolds from [facility] in [place] called, since we sent a fax that said they can substitute, do we want to send an order for strips, new meter and lancets also? They need to order them all.   Please call Donaldo Reynolds at [phone]

## 2019-03-27 NOTE — TELEPHONE ENCOUNTER
I thought her insurance didn't cover the meter and she dind't pick it up?  If that's not the case and she has a meter without the rest of the supplies then yes let's prescribe what she needs thanks

## 2019-04-01 ENCOUNTER — TELEPHONE (OUTPATIENT)
Dept: FAMILY MEDICINE CLINIC | Facility: CLINIC | Age: 64
End: 2019-04-01

## 2019-04-02 ENCOUNTER — OFFICE VISIT (OUTPATIENT)
Dept: FAMILY MEDICINE CLINIC | Facility: CLINIC | Age: 64
End: 2019-04-02
Payer: MEDICARE

## 2019-04-02 VITALS
DIASTOLIC BLOOD PRESSURE: 86 MMHG | TEMPERATURE: 98 F | WEIGHT: 228 LBS | SYSTOLIC BLOOD PRESSURE: 138 MMHG | RESPIRATION RATE: 10 BRPM | BODY MASS INDEX: 43 KG/M2 | HEART RATE: 74 BPM

## 2019-04-02 DIAGNOSIS — Z86.711 HISTORY OF PULMONARY EMBOLISM: ICD-10-CM

## 2019-04-02 DIAGNOSIS — F43.9 STRESS AT HOME: ICD-10-CM

## 2019-04-02 DIAGNOSIS — I10 ESSENTIAL HYPERTENSION: Primary | ICD-10-CM

## 2019-04-02 DIAGNOSIS — Z51.81 ANTICOAGULATION GOAL OF INR 2.5 TO 3.5: ICD-10-CM

## 2019-04-02 DIAGNOSIS — Z59.9 FINANCIAL PROBLEMS: ICD-10-CM

## 2019-04-02 DIAGNOSIS — Z79.01 ANTICOAGULATION GOAL OF INR 2.5 TO 3.5: ICD-10-CM

## 2019-04-02 PROCEDURE — 36415 COLL VENOUS BLD VENIPUNCTURE: CPT | Performed by: FAMILY MEDICINE

## 2019-04-02 PROCEDURE — 99214 OFFICE O/P EST MOD 30 MIN: CPT | Performed by: FAMILY MEDICINE

## 2019-04-02 PROCEDURE — 85610 PROTHROMBIN TIME: CPT | Performed by: FAMILY MEDICINE

## 2019-04-02 RX ORDER — HYDROXYZINE HYDROCHLORIDE 10 MG/1
TABLET, FILM COATED ORAL
Refills: 0 | COMMUNITY
Start: 2019-03-21 | End: 2020-02-28 | Stop reason: ALTCHOICE

## 2019-04-02 RX ORDER — ENALAPRIL MALEATE 5 MG/1
5 TABLET ORAL DAILY
Qty: 90 TABLET | Refills: 3 | Status: SHIPPED | OUTPATIENT
Start: 2019-04-02 | End: 2020-03-19

## 2019-04-02 SDOH — ECONOMIC STABILITY - INCOME SECURITY: PROBLEM RELATED TO HOUSING AND ECONOMIC CIRCUMSTANCES, UNSPECIFIED: Z59.9

## 2019-04-10 ENCOUNTER — MED REC SCAN ONLY (OUTPATIENT)
Dept: FAMILY MEDICINE CLINIC | Facility: CLINIC | Age: 64
End: 2019-04-10

## 2019-04-24 DIAGNOSIS — F32.A DEPRESSION, UNSPECIFIED DEPRESSION TYPE: ICD-10-CM

## 2019-04-24 RX ORDER — GABAPENTIN 300 MG/1
CAPSULE ORAL
Qty: 180 CAPSULE | Refills: 3 | Status: SHIPPED | OUTPATIENT
Start: 2019-04-24 | End: 2019-09-11

## 2019-04-24 NOTE — TELEPHONE ENCOUNTER
Last refilled on 4/30/18 for # 180 with 3 refills  Last OV 4/2/19  Future Appointments   Date Time Provider Juliana Lea   4/25/2019  9:30 AM ARNOLD Du [facility] EMG Karen Stapleton   8/27/2019  9:30 AM Jarred Johnson MD [facility] EMG Lily Melendrez

## 2019-04-25 ENCOUNTER — OFFICE VISIT (OUTPATIENT)
Dept: FAMILY MEDICINE CLINIC | Facility: CLINIC | Age: 64
End: 2019-04-25
Payer: MEDICARE

## 2019-04-25 VITALS
BODY MASS INDEX: 42.71 KG/M2 | TEMPERATURE: 98 F | SYSTOLIC BLOOD PRESSURE: 142 MMHG | RESPIRATION RATE: 18 BRPM | HEIGHT: 61 IN | WEIGHT: 226.19 LBS | DIASTOLIC BLOOD PRESSURE: 84 MMHG | HEART RATE: 88 BPM

## 2019-04-25 DIAGNOSIS — G47.33 OBSTRUCTIVE SLEEP APNEA (ADULT) (PEDIATRIC): Primary | ICD-10-CM

## 2019-04-25 PROCEDURE — 99214 OFFICE O/P EST MOD 30 MIN: CPT | Performed by: NURSE PRACTITIONER

## 2019-04-25 NOTE — PATIENT INSTRUCTIONS
Continue sleep therapy. Get new mask and smaller head gear. Follow-up in 6 weeks (June 13) - sooner if needed.      Advised if still with sleep apnea and not using CPAP has a  7 fold increase in risk of heart attack, stroke, abnormal heart rhythm  and de

## 2019-04-25 NOTE — PROGRESS NOTES
[facility]  [facility]        HPI:   This is a 61year old female coming in for Patient presents with:  Obstructive Sleep Apnea (CHINTAN): sleep compliance      HPI:     Patient is present to follow-up on CPAP therapy.  States that l your sleep? - -   Do you have or are you being treated for high blood pressure? - -   BMI more than 35kg/mg2? - -   Age over 48years old? - -   Neck circumference >16 inches (40 cm)?  - -   Gender Male? - -   Stop Bang Score - -   Obstructive Sleep Apnea R Disp: 180 capsule Rfl: 3   hydrOXYzine HCl 10 MG Oral Tab TK 1 T PO Daily as needed Disp:  Rfl: 0   Enalapril Maleate 5 MG Oral Tab Take 1 tablet (5 mg total) by mouth daily.  Disp: 90 tablet Rfl: 3   Glucose Blood In Vitro Strip Use BID as directed to [facility] 200 MG Oral Tab Take 1 tablet (200 mg total) by mouth nightly. Disp: 30 tablet Rfl: 0   cetirizine (ZYRTEC) 10 MG Oral Tab Take 10 mg by mouth daily. Disp:  Rfl:    Calcium Carbonate Antacid 600 MG Oral Chew Tab Chew 1,000-1,200 mg by mouth daily.  Disp:  R Encounters:  04/25/19 : 226 lb 3.2 oz  04/02/19 : 228 lb  03/18/19 : 233 lb 3.2 oz  03/14/19 : 231 lb 9.6 oz  02/27/19 : 235 lb 9.6 oz  02/07/19 : 231 lb 6.4 oz    BP Readings from Last 3 Encounters:  04/25/19 : 142/84  04/02/19 : 138/86  03/18/19 : 138/80 and maintain and optimal BMI with Exercise 30 minutes most days of the week to target heart rate .      Advised patient to change filters,masks,hoses  and tubes and equiptment on a  regular schedule.   Filters and seals shall be changed every 1 month,  Hose

## 2019-05-01 ENCOUNTER — NURSE ONLY (OUTPATIENT)
Dept: FAMILY MEDICINE CLINIC | Facility: CLINIC | Age: 64
End: 2019-05-01
Payer: MEDICARE

## 2019-05-01 DIAGNOSIS — Z51.81 ENCOUNTER FOR MONITORING COUMADIN THERAPY: Primary | ICD-10-CM

## 2019-05-01 DIAGNOSIS — Z79.01 ANTICOAGULATION GOAL OF INR 2.5 TO 3.5: ICD-10-CM

## 2019-05-01 DIAGNOSIS — Z95.828 PRESENCE OF IVC FILTER: ICD-10-CM

## 2019-05-01 DIAGNOSIS — Z51.81 ANTICOAGULATION GOAL OF INR 2.5 TO 3.5: ICD-10-CM

## 2019-05-01 DIAGNOSIS — Z79.01 ENCOUNTER FOR MONITORING COUMADIN THERAPY: Primary | ICD-10-CM

## 2019-05-01 DIAGNOSIS — D68.52 PROTHROMBIN MUTATION (HCC): ICD-10-CM

## 2019-05-01 PROCEDURE — 36415 COLL VENOUS BLD VENIPUNCTURE: CPT | Performed by: FAMILY MEDICINE

## 2019-05-01 PROCEDURE — 85610 PROTHROMBIN TIME: CPT | Performed by: FAMILY MEDICINE

## 2019-05-01 NOTE — PROGRESS NOTES
2 blue tubes tubes drawn from right arm with 23g butterfly needle x1. Pt shannon well.  Pt left the clinic in stable condition

## 2019-05-10 ENCOUNTER — TELEPHONE (OUTPATIENT)
Dept: FAMILY MEDICINE CLINIC | Facility: CLINIC | Age: 64
End: 2019-05-10

## 2019-05-10 NOTE — TELEPHONE ENCOUNTER
I'd wrap it up tight and not peek for an hour (unless it bleed through her bandage), if still bleeding a good amount go to UC

## 2019-05-10 NOTE — TELEPHONE ENCOUNTER
Spoke with the pt and it is V shaped laceration and about an inch long- it is bleeding - she states that it just laid the skin back on it. When she holds pressure it stops some, but when not it is still bleeding.  She has been holding pressure sine 250, but

## 2019-05-17 ENCOUNTER — OFFICE VISIT (OUTPATIENT)
Dept: FAMILY MEDICINE CLINIC | Facility: CLINIC | Age: 64
End: 2019-05-17
Payer: MEDICARE

## 2019-05-17 VITALS
TEMPERATURE: 98 F | BODY MASS INDEX: 43 KG/M2 | WEIGHT: 229 LBS | OXYGEN SATURATION: 98 % | RESPIRATION RATE: 20 BRPM | HEART RATE: 89 BPM | SYSTOLIC BLOOD PRESSURE: 116 MMHG | DIASTOLIC BLOOD PRESSURE: 78 MMHG

## 2019-05-17 DIAGNOSIS — S51.811D SKIN TEAR OF RIGHT FOREARM WITHOUT COMPLICATION, SUBSEQUENT ENCOUNTER: Primary | ICD-10-CM

## 2019-05-17 PROCEDURE — 99213 OFFICE O/P EST LOW 20 MIN: CPT | Performed by: FAMILY MEDICINE

## 2019-05-17 NOTE — PROGRESS NOTES
Farnaz Waldrop is a 61year old female. HPI:   Pt is here for ER/UC/hospital f/u. ER/UC or hospital: [facility]a Hennessy    Date(s): 5/10/19    Reason for initial ER visit: per HPI: \"61year-old female presents with a small V-shaped skin tear to her right forearm.  This Budesonide-Formoterol Fumarate (SYMBICORT) 160-4.5 MCG/ACT Inhalation Aerosol Inhale 1 puff into the lungs 2 (two) times daily.  Disp: 1 Inhaler Rfl: 3   PANTOPRAZOLE SODIUM 40 MG Oral Tab EC TAKE 1 TABLET BY MOUTH EVERY MORNING BEFORE BREAKFAST Disp: 90 hypertension       Past Surgical History:   Procedure Laterality Date   • CHOLECYSTECTOMY  2006   • IR IVC FILTER PLACEMENT     • KNEE REPLACEMENT SURGERY  03/2017    L knee   • OTHER SURGICAL HISTORY      carpal tunnel right wrist (1981);  [place] filt

## 2019-05-31 ENCOUNTER — APPOINTMENT (OUTPATIENT)
Dept: GENERAL RADIOLOGY | Age: 64
End: 2019-05-31
Attending: FAMILY MEDICINE
Payer: MEDICARE

## 2019-05-31 ENCOUNTER — HOSPITAL ENCOUNTER (OUTPATIENT)
Age: 64
Discharge: HOME | End: 2019-05-31
Attending: FAMILY MEDICINE
Payer: MEDICARE

## 2019-05-31 VITALS
DIASTOLIC BLOOD PRESSURE: 72 MMHG | WEIGHT: 225 LBS | RESPIRATION RATE: 14 BRPM | SYSTOLIC BLOOD PRESSURE: 142 MMHG | TEMPERATURE: 99 F | OXYGEN SATURATION: 96 % | HEART RATE: 80 BPM | BODY MASS INDEX: 42.48 KG/M2 | HEIGHT: 61 IN

## 2019-05-31 DIAGNOSIS — J20.9 ACUTE BRONCHITIS, UNSPECIFIED ORGANISM: Primary | ICD-10-CM

## 2019-05-31 PROCEDURE — 71046 X-RAY EXAM CHEST 2 VIEWS: CPT | Performed by: FAMILY MEDICINE

## 2019-05-31 PROCEDURE — 99213 OFFICE O/P EST LOW 20 MIN: CPT

## 2019-05-31 PROCEDURE — 99214 OFFICE O/P EST MOD 30 MIN: CPT

## 2019-05-31 RX ORDER — DOXYCYCLINE 100 MG/1
100 CAPSULE ORAL 2 TIMES DAILY
Qty: 14 CAPSULE | Refills: 0 | Status: SHIPPED | OUTPATIENT
Start: 2019-05-31 | End: 2019-05-31

## 2019-05-31 RX ORDER — BENZONATATE 200 MG/1
200 CAPSULE ORAL 3 TIMES DAILY PRN
Qty: 15 CAPSULE | Refills: 0 | Status: SHIPPED | OUTPATIENT
Start: 2019-05-31 | End: 2019-06-13 | Stop reason: ALTCHOICE

## 2019-05-31 RX ORDER — CEFDINIR 300 MG/1
300 CAPSULE ORAL 2 TIMES DAILY
Qty: 14 CAPSULE | Refills: 0 | Status: SHIPPED | OUTPATIENT
Start: 2019-05-31 | End: 2019-06-07

## 2019-05-31 RX ORDER — PREDNISONE 20 MG/1
40 TABLET ORAL DAILY
Qty: 10 TABLET | Refills: 0 | Status: SHIPPED | OUTPATIENT
Start: 2019-05-31 | End: 2019-06-05

## 2019-05-31 RX ORDER — ALBUTEROL SULFATE 90 UG/1
2 AEROSOL, METERED RESPIRATORY (INHALATION) EVERY 4 HOURS PRN
Qty: 1 INHALER | Refills: 6 | Status: SHIPPED | OUTPATIENT
Start: 2019-05-31 | End: 2019-06-10

## 2019-05-31 NOTE — ED PROVIDER NOTES
.        Patient Seen in: [id number] [facility]    History   Patient presents with:  Cough/URI    Stated Complaint: cough x 5 days    HPI    *24-year-old female with a known history of asthma, pneumonia and bronchitis presents to the immediate ca [05/31/19 3477]   /72   Pulse 80   Resp 14   Temp 98.7 °F (37.1 °C)   Temp src Temporal   SpO2 96 %   O2 Device None (Room air)       Current:/72   Pulse 80   Temp 98.7 °F (37.1 °C) (Temporal)   Resp 14   Ht 154.9 cm (5' 1\")   Wt 102.1 kg   Sp evaluation. Some subtle basilar opacities are favored represent areas of atelectasis or scarring. No focal consolidation or pleural effusion is seen. If there is persistent clinical concern then consider CT.     Dictated by: Gerald Bhandari MD on 5/31/2019 at

## 2019-05-31 NOTE — ED INITIAL ASSESSMENT (HPI)
Cough and congestion x 5 days. States it seems barky. Ears are plugged. Took some otc cough medicine and it wasn't working so she stopped.

## 2019-06-01 NOTE — TELEPHONE ENCOUNTER
06/01/19 1000   Group 1   Start Time 0930   Stop Time 1010   Length (min) 40 Min   Group Name Check In   Focus of Group Symptoms and goals   Attendance Not present      From: Richard Sutton  To: Katelyn Robertson MD  Sent: 3/25/2017 4:36 PM CDT  Subject: Medication Renewal Request    Original authorizing provider: MD Richard Nobles would like a refill of the following medications:  Warfarin Sodium 4 MG Oral Tab

## 2019-06-05 ENCOUNTER — OFFICE VISIT (OUTPATIENT)
Dept: FAMILY MEDICINE CLINIC | Facility: CLINIC | Age: 64
End: 2019-06-05
Payer: MEDICARE

## 2019-06-05 ENCOUNTER — TELEPHONE (OUTPATIENT)
Dept: FAMILY MEDICINE CLINIC | Facility: CLINIC | Age: 64
End: 2019-06-05

## 2019-06-05 VITALS
SYSTOLIC BLOOD PRESSURE: 140 MMHG | OXYGEN SATURATION: 96 % | TEMPERATURE: 99 F | BODY MASS INDEX: 43.27 KG/M2 | HEIGHT: 61 IN | WEIGHT: 229.19 LBS | HEART RATE: 90 BPM | DIASTOLIC BLOOD PRESSURE: 84 MMHG

## 2019-06-05 DIAGNOSIS — R05.9 COUGH: Primary | ICD-10-CM

## 2019-06-05 DIAGNOSIS — J40 BRONCHITIS: ICD-10-CM

## 2019-06-05 DIAGNOSIS — Z79.01 ENCOUNTER FOR MONITORING COUMADIN THERAPY: ICD-10-CM

## 2019-06-05 DIAGNOSIS — Z51.81 ANTICOAGULATION GOAL OF INR 2.5 TO 3.5: ICD-10-CM

## 2019-06-05 DIAGNOSIS — Z51.81 ENCOUNTER FOR MONITORING COUMADIN THERAPY: ICD-10-CM

## 2019-06-05 DIAGNOSIS — Z09 FOLLOW-UP EXAM AFTER TREATMENT: ICD-10-CM

## 2019-06-05 DIAGNOSIS — Z79.01 ANTICOAGULATION GOAL OF INR 2.5 TO 3.5: ICD-10-CM

## 2019-06-05 PROCEDURE — 99214 OFFICE O/P EST MOD 30 MIN: CPT | Performed by: FAMILY MEDICINE

## 2019-06-05 PROCEDURE — 85610 PROTHROMBIN TIME: CPT | Performed by: FAMILY MEDICINE

## 2019-06-05 PROCEDURE — 94640 AIRWAY INHALATION TREATMENT: CPT | Performed by: FAMILY MEDICINE

## 2019-06-05 RX ORDER — IPRATROPIUM BROMIDE AND ALBUTEROL SULFATE 2.5; .5 MG/3ML; MG/3ML
SOLUTION RESPIRATORY (INHALATION)
Qty: 1620 ML | Refills: 1 | Status: SHIPPED | OUTPATIENT
Start: 2019-06-05 | End: 2020-08-11

## 2019-06-05 RX ORDER — IPRATROPIUM BROMIDE AND ALBUTEROL SULFATE 2.5; .5 MG/3ML; MG/3ML
3 SOLUTION RESPIRATORY (INHALATION) ONCE
Status: COMPLETED | OUTPATIENT
Start: 2019-06-05 | End: 2019-06-05

## 2019-06-05 RX ORDER — IPRATROPIUM BROMIDE AND ALBUTEROL SULFATE 2.5; .5 MG/3ML; MG/3ML
3 SOLUTION RESPIRATORY (INHALATION) EVERY 4 HOURS PRN
Qty: 90 ML | Refills: 1 | Status: SHIPPED | OUTPATIENT
Start: 2019-06-05 | End: 2019-06-05

## 2019-06-05 RX ADMIN — IPRATROPIUM BROMIDE AND ALBUTEROL SULFATE 3 ML: 2.5; .5 SOLUTION RESPIRATORY (INHALATION) at 12:00:00

## 2019-06-05 NOTE — TELEPHONE ENCOUNTER
PT CALLED TO ADV THAT MEDICATION THAT WAS CALLED IN TODAY IS HAVING ISSUES WITH INSURANCE. PT WOULD LIKE TO KNOW IF WE CAN CALL PHARMACY OR CALL SOMETHING ELSE IN.     Ac Albright

## 2019-06-05 NOTE — TELEPHONE ENCOUNTER
callled the pharmacy to find out what the problem is with the duo neb+  Spoke with Dominique Thomson and he states that the insurance needs to know when the pt got her neb machine  I called the pt to find out and she states that she got the machine from us and I went

## 2019-06-05 NOTE — PROGRESS NOTES
Wang Longo is a 61year old female. HPI:   Pt is here for ER/UC/hospital f/u.     ER/UC or hospital: [facility]    Date(s): 5/31/19    Reason for initial ER visit: cough, sob     Stated Complaint: cough x 5 days     HPI     *42-year-old female with a know feeling [place], still coughing a lot, no sob at rest unless she talks. No known fever. Current Outpatient Medications:  predniSONE 20 MG Oral Tab Take 2 tablets (40 mg total) by mouth daily for 5 days.  Disp: 10 tablet Rfl: 0   Albuterol Sulfate HFA (P albuterol sulfate (2.5 MG/3ML) 0.083% Inhalation Nebu Soln Take 3 mL (2.5 mg total) by nebulization every 4 (four) hours as needed for Wheezing or Shortness of Breath (cough).  Disp: 90 mL Rfl: 3   IPRATROPIUM BROMIDE 0.06 % Nasal Solution USE 2 SPRAYS IN • TUBAL LIGATION  1981      Family History   Problem Relation Age of Onset   • Depression Daughter    • Anxiety Daughter    • Depression Son    • Psychiatric Father         dementia   • Diabetes Mother    • Heart Disorder Mother    • Psychiatric Mother compmletely resolved in 1 week  . The patient indicates understanding of these issues and agrees to the plan. The patient is asked to return as needed.

## 2019-06-10 ENCOUNTER — TELEPHONE (OUTPATIENT)
Dept: FAMILY MEDICINE CLINIC | Facility: CLINIC | Age: 64
End: 2019-06-10

## 2019-06-10 NOTE — TELEPHONE ENCOUNTER
Received Inhalation Detailed Written Order from [facility]. Form was filled out, signed, and faxed back to [facility].

## 2019-06-13 ENCOUNTER — TELEPHONE (OUTPATIENT)
Dept: FAMILY MEDICINE CLINIC | Facility: CLINIC | Age: 64
End: 2019-06-13

## 2019-06-13 ENCOUNTER — OFFICE VISIT (OUTPATIENT)
Dept: FAMILY MEDICINE CLINIC | Facility: CLINIC | Age: 64
End: 2019-06-13
Payer: MEDICARE

## 2019-06-13 VITALS
HEART RATE: 62 BPM | RESPIRATION RATE: 16 BRPM | BODY MASS INDEX: 43.73 KG/M2 | WEIGHT: 231.63 LBS | TEMPERATURE: 98 F | HEIGHT: 61 IN | SYSTOLIC BLOOD PRESSURE: 102 MMHG | DIASTOLIC BLOOD PRESSURE: 60 MMHG

## 2019-06-13 DIAGNOSIS — Z51.81 ANTICOAGULATION GOAL OF INR 2.5 TO 3.5: Primary | ICD-10-CM

## 2019-06-13 DIAGNOSIS — G47.61 PLMD (PERIODIC LIMB MOVEMENT DISORDER): ICD-10-CM

## 2019-06-13 DIAGNOSIS — G47.33 OBSTRUCTIVE SLEEP APNEA (ADULT) (PEDIATRIC): Primary | ICD-10-CM

## 2019-06-13 DIAGNOSIS — Z79.01 ANTICOAGULATION GOAL OF INR 2.5 TO 3.5: Primary | ICD-10-CM

## 2019-06-13 PROCEDURE — 99214 OFFICE O/P EST MOD 30 MIN: CPT | Performed by: FAMILY MEDICINE

## 2019-06-13 RX ORDER — ROPINIROLE 0.5 MG/1
0.5 TABLET, FILM COATED ORAL EVERY EVENING
Qty: 30 TABLET | Refills: 3 | Status: SHIPPED | OUTPATIENT
Start: 2019-06-13 | End: 2019-09-21

## 2019-06-13 NOTE — PATIENT INSTRUCTIONS
Advised if still with sleep apnea and not using CPAP has a  7 fold increase in risk of heart attack, stroke, abnormal heart rhythm  and death,  increased risk of driving accidents. Advised to refrain from driving when sleepy.       COMPLIANCE is require

## 2019-06-13 NOTE — TELEPHONE ENCOUNTER
Noted. Order placed from lab results note from Dr Cathern Hatchet on 6/5/19    Future Appointments   Date Time Provider Juliana Lea   7/8/2019 10:00 AM EMG KEVEN NURSE [facility] EMG Samuel Bones   8/8/2019  1:20 PM Jose Edwards MD [facility] EM[facility]

## 2019-06-13 NOTE — PROGRESS NOTES
1  [facility]  [facility]        HPI:   This is a 61year old female coming in for Patient presents with:  Obstructive Sleep Apnea (CHINTAN): f/u      HPI:   She has started to use the chin strap and that is working fabulously.   She Polysomnogram Dx - -   LOWEST O2SAT IN STUDY - -   AHI  (/hr) - -   RDI (/hr) - -   PLM INDEX (/hr) - -   SS Facility - -   Sleep Study CM (CM H2O) - -   Sleep EFFC (%) - -   Sleep REM (%) - -   TOT Sleep TM (min) - -   Do you snore loudly (loud enough t Use      Smoking status: Never Smoker      Smokeless tobacco: Never Used    Alcohol use: No      Alcohol/week: 0.0 oz    Drug use: No    Family History:  Family History   Problem Relation Age of Onset   • Depression Daughter    • Anxiety Daughter    • Depr Inhale 1 puff into the lungs 2 (two) times daily.  Disp: 1 Inhaler Rfl: 3   PANTOPRAZOLE SODIUM 40 MG Oral Tab EC TAKE 1 TABLET BY MOUTH EVERY MORNING BEFORE BREAKFAST Disp: 90 tablet Rfl: 3   WARFARIN SODIUM 4 MG Oral Tab TAKE 1 TABLET BY MOUTH DAILY Disp: Hiatal hernia     Prothrombin mutation (HCC)     Osteoarthritis     Essential hypertension     Presence of IVC filter     Morbid obesity with body mass index of 40.0-44.9 in adult [facility])     Kidney stones     Diverticulosis of large intestine without hemorr Oropharynx is clear and moist.   Mal 4, tonsils 0, has dentures   Eyes: Conjunctivae are normal.   Neck: Normal range of motion. Neck supple. No thyromegaly present.    Cardiovascular: Normal rate, regular rhythm, normal heart sounds and intact distal pulse using CPAP has a  7 fold increase in risk of heart attack, stroke, abnormal heart rhythm  and death,  increased risk of driving accidents. Advised to refrain from driving when sleepy.     COMPLIANCE is required by insurance for 4 hours a night most nights

## 2019-06-19 ENCOUNTER — TELEPHONE (OUTPATIENT)
Dept: FAMILY MEDICINE CLINIC | Facility: CLINIC | Age: 64
End: 2019-06-19

## 2019-06-19 NOTE — TELEPHONE ENCOUNTER
Dr. Anny Garcia is out of the office today. Recommend to hold Requip tonight. Will forward message to Dr. Anny Garcia for her review tomorrow.

## 2019-06-19 NOTE — TELEPHONE ENCOUNTER
Pt informed, pt agreed to hold Requip at this time. Pt will await further input from Dr. Juliet Schmitt.

## 2019-06-19 NOTE — TELEPHONE ENCOUNTER
----- Message from Jacey Saleem.  Inder Gavin sent at 6/19/2019  8:33 AM CDT -----  Regarding: Non-Urgent Medical Question  Contact: [phone]  Dr. Naomie Kim,    Wanted to let you know that I fell out of bed last night but don't really remember falling but I remember try

## 2019-06-19 NOTE — TELEPHONE ENCOUNTER
Pt was recently seen per KM on 6/13/19. Pt started Requip .5mg on 6/13. Pt was doing fine on medication up until last night. Pt states she woke up on the floor, states she doesn't remember falling.   Pt was lying on right side when pt woke up, but rajan

## 2019-06-19 NOTE — TELEPHONE ENCOUNTER
Hold requip. May need to let wash out for a week  Phone call with progress report in one week. Possible that patient tried to get up and was still sleeping. IF recurs consider repeat sleep study to look for rem behavior disorder.    If sleep is disrupt

## 2019-06-19 NOTE — TELEPHONE ENCOUNTER
Pt called back, instructions given. Pt verbalized understanding. Pt agreed to call update in one week.

## 2019-06-21 RX ORDER — BUDESONIDE AND FORMOTEROL FUMARATE DIHYDRATE 160; 4.5 UG/1; UG/1
AEROSOL RESPIRATORY (INHALATION)
Qty: 1 INHALER | Refills: 3 | Status: SHIPPED | OUTPATIENT
Start: 2019-06-21 | End: 2019-08-19

## 2019-06-21 NOTE — TELEPHONE ENCOUNTER
Last refilled on 10/24/18 for # 1 with 3 refills  Last OV 6/5/19  Future Appointments   Date Time Provider Juliana Lea   7/8/2019 10:00 AM EMG KEVEN CHAVZE [facility] EMG Kesha Medrano   8/8/2019  1:20 PM To Aguilar MD [facility] EMG Kesha Medrano        Thank yo

## 2019-06-25 ENCOUNTER — PATIENT MESSAGE (OUTPATIENT)
Dept: FAMILY MEDICINE CLINIC | Facility: CLINIC | Age: 64
End: 2019-06-25

## 2019-06-25 NOTE — TELEPHONE ENCOUNTER
From: Blanca Spurling  To:  Jodee Garcia MD  Sent: 6/25/2019 11:53 AM CDT  Subject: Other    Dr. Lazaro Daily,    Just wanted to let you know that I haven't had anymore episodes of falling and not remembering it since I stopped taking the meds for restless leg syndr

## 2019-07-09 ENCOUNTER — NURSE ONLY (OUTPATIENT)
Dept: FAMILY MEDICINE CLINIC | Facility: CLINIC | Age: 64
End: 2019-07-09
Payer: MEDICARE

## 2019-07-09 DIAGNOSIS — Z79.01 ANTICOAGULATION GOAL OF INR 2.5 TO 3.5: ICD-10-CM

## 2019-07-09 DIAGNOSIS — Z51.81 ANTICOAGULATION GOAL OF INR 2.5 TO 3.5: ICD-10-CM

## 2019-07-09 PROCEDURE — 85610 PROTHROMBIN TIME: CPT | Performed by: FAMILY MEDICINE

## 2019-07-09 NOTE — PROGRESS NOTES
PT/INR drawn from the right [facility] space without difficulty.  She is currently taking 4 mg warfarin gq

## 2019-07-10 LAB
INR BLD: 3.63 (ref 0.9–1.1)
PSA SERPL DL<=0.01 NG/ML-MCNC: 38.8 SECONDS (ref 12.5–14.7)

## 2019-07-22 ENCOUNTER — NURSE ONLY (OUTPATIENT)
Dept: FAMILY MEDICINE CLINIC | Facility: CLINIC | Age: 64
End: 2019-07-22
Payer: MEDICARE

## 2019-07-22 DIAGNOSIS — Z79.01 ANTICOAGULATION GOAL OF INR 2.5 TO 3.5: ICD-10-CM

## 2019-07-22 DIAGNOSIS — Z51.81 ANTICOAGULATION GOAL OF INR 2.5 TO 3.5: ICD-10-CM

## 2019-07-22 DIAGNOSIS — Z51.81 ENCOUNTER FOR MONITORING COUMADIN THERAPY: ICD-10-CM

## 2019-07-22 DIAGNOSIS — Z79.01 ENCOUNTER FOR MONITORING COUMADIN THERAPY: ICD-10-CM

## 2019-07-22 LAB
INR BLD: 3.58 (ref 0.9–1.1)
PSA SERPL DL<=0.01 NG/ML-MCNC: 38.4 SECONDS (ref 12.5–14.7)

## 2019-07-22 PROCEDURE — 85610 PROTHROMBIN TIME: CPT | Performed by: FAMILY MEDICINE

## 2019-07-22 NOTE — PROGRESS NOTES
Blue tube drawn from right arm with 23g butterfly needle x1. Pt shannon well.  Pt left the clinic in stable condition

## 2019-07-23 ENCOUNTER — TELEPHONE (OUTPATIENT)
Dept: FAMILY MEDICINE CLINIC | Facility: CLINIC | Age: 64
End: 2019-07-23

## 2019-07-31 ENCOUNTER — TELEPHONE (OUTPATIENT)
Dept: FAMILY MEDICINE CLINIC | Facility: CLINIC | Age: 64
End: 2019-07-31

## 2019-08-06 ENCOUNTER — NURSE ONLY (OUTPATIENT)
Dept: FAMILY MEDICINE CLINIC | Facility: CLINIC | Age: 64
End: 2019-08-06
Payer: MEDICARE

## 2019-08-06 DIAGNOSIS — Z51.81 ANTICOAGULATION GOAL OF INR 2.5 TO 3.5: ICD-10-CM

## 2019-08-06 DIAGNOSIS — Z79.01 ENCOUNTER FOR MONITORING COUMADIN THERAPY: ICD-10-CM

## 2019-08-06 DIAGNOSIS — Z51.81 ENCOUNTER FOR MONITORING COUMADIN THERAPY: ICD-10-CM

## 2019-08-06 DIAGNOSIS — Z79.01 ANTICOAGULATION GOAL OF INR 2.5 TO 3.5: ICD-10-CM

## 2019-08-06 PROCEDURE — 85610 PROTHROMBIN TIME: CPT | Performed by: FAMILY MEDICINE

## 2019-08-06 NOTE — PROGRESS NOTES
Pt here for INR-     2 blue tubes drawn from right arm with butterfly needle x1. Pt shannon well. Pt left the clinic in stable condition.

## 2019-08-07 LAB
INR BLD: 2.84 (ref 0.9–1.1)
PSA SERPL DL<=0.01 NG/ML-MCNC: 31.8 SECONDS (ref 12.5–14.7)

## 2019-08-08 ENCOUNTER — OFFICE VISIT (OUTPATIENT)
Dept: FAMILY MEDICINE CLINIC | Facility: CLINIC | Age: 64
End: 2019-08-08
Payer: MEDICARE

## 2019-08-08 VITALS
OXYGEN SATURATION: 97 % | BODY MASS INDEX: 43.68 KG/M2 | WEIGHT: 231.38 LBS | HEART RATE: 77 BPM | TEMPERATURE: 98 F | DIASTOLIC BLOOD PRESSURE: 68 MMHG | SYSTOLIC BLOOD PRESSURE: 126 MMHG | RESPIRATION RATE: 20 BRPM | HEIGHT: 61 IN

## 2019-08-08 DIAGNOSIS — G47.33 OBSTRUCTIVE SLEEP APNEA (ADULT) (PEDIATRIC): Primary | ICD-10-CM

## 2019-08-08 PROCEDURE — 99214 OFFICE O/P EST MOD 30 MIN: CPT | Performed by: FAMILY MEDICINE

## 2019-08-08 NOTE — PROGRESS NOTES
[facility]        HPI:   This is a 61year old female coming in for Patient presents with: Follow - Up: sleep       HPI: pt states that she si sleepy and tired, despite using cpap for 8 hours a night.   She has paula yanely Singer   Sleep Modum - Yes   Sleep Study Date - -   Sleep Study Type - -   Study Scored By? - -   Polysomnogram Dx - -   LOWEST O2SAT IN STUDY - -   AHI  (/hr) - -   RDI (/hr) - -   PLM INDEX (/hr) - -   SS Facility - -   Sleep Study CM (CM H2O) - Social History:  Social History    Social History Narrative      Not on file    Social History    Tobacco Use      Smoking status: Never Smoker      Smokeless tobacco: Never Used    Alcohol use: No      Alcohol/week: 0.0 standard drinks    Drug use: No tablet Rfl: 3   PANTOPRAZOLE SODIUM 40 MG Oral Tab EC TAKE 1 TABLET BY MOUTH EVERY MORNING BEFORE BREAKFAST Disp: 90 tablet Rfl: 3   WARFARIN SODIUM 4 MG Oral Tab TAKE 1 TABLET BY MOUTH DAILY Disp: 90 tablet Rfl: 3   albuterol sulfate (2.5 MG/3ML) 0.083% I complication     Elevated blood sugar     Gastroesophageal reflux disease with esophagitis     Hiatal hernia     Prothrombin mutation (HCC)     Osteoarthritis     Essential hypertension     Presence of IVC filter     Morbid obesity with body mass index of atraumatic. Right Ear: External ear normal.   Left Ear: External ear normal.   Nose: Nose normal.   Mouth/Throat: Oropharynx is clear and moist.   Mal 4, tonsils 0, has dentures   Eyes: Conjunctivae are normal.   Neck: Normal range of motion.  Neck supple encounter       Outcome: Parent verbalizes understanding. Parent is notified to call with any questions, complications, allergies, or worsening or changing symptoms.   Parent is to call with any side effects or complications from the treatments as a result

## 2019-08-09 ENCOUNTER — TELEPHONE (OUTPATIENT)
Dept: FAMILY MEDICINE CLINIC | Facility: CLINIC | Age: 64
End: 2019-08-09

## 2019-08-09 NOTE — TELEPHONE ENCOUNTER
CMN received from [facility]'s r/t pt's cpap equipment and supplies. Signed and faxed.

## 2019-08-15 ENCOUNTER — PATIENT MESSAGE (OUTPATIENT)
Dept: FAMILY MEDICINE CLINIC | Facility: CLINIC | Age: 64
End: 2019-08-15

## 2019-08-15 NOTE — TELEPHONE ENCOUNTER
From: Hilario Monsivais  To: Maryanne Pritchard MD  Sent: 8/15/2019 4:30 PM CDT  Subject: Non-Urgent Medical Question    Dr. Becca Chambers,    I am having a real hard time sleeping with the new headgear.  The first time I used it was Monday night and it took me at least a ha

## 2019-08-19 ENCOUNTER — TELEPHONE (OUTPATIENT)
Dept: FAMILY MEDICINE CLINIC | Facility: CLINIC | Age: 64
End: 2019-08-19

## 2019-08-19 RX ORDER — BUDESONIDE AND FORMOTEROL FUMARATE DIHYDRATE 160; 4.5 UG/1; UG/1
AEROSOL RESPIRATORY (INHALATION)
Qty: 3 INHALER | Refills: 3 | Status: SHIPPED | OUTPATIENT
Start: 2019-08-19 | End: 2020-05-19

## 2019-08-19 NOTE — TELEPHONE ENCOUNTER
Pt called, needs refill on SYMBICORT 160-4.5 MCG/ACT Inhalation Aerosol. Pharmacy-[facility].   Please call pt at [phone]

## 2019-08-19 NOTE — TELEPHONE ENCOUNTER
Last refill: 6/21/19 #1 w/ 3 refills  Last OV: 6/5/19    --- DELBERT ---   Called pt to inform her she should have refills but she informed me pharmacy left her 2 messages that the pt had not refills and that it was too soon to refill.  Pt has been using [facility]

## 2019-08-20 ENCOUNTER — MED REC SCAN ONLY (OUTPATIENT)
Dept: FAMILY MEDICINE CLINIC | Facility: CLINIC | Age: 64
End: 2019-08-20

## 2019-08-27 ENCOUNTER — TELEPHONE (OUTPATIENT)
Dept: FAMILY MEDICINE CLINIC | Facility: CLINIC | Age: 64
End: 2019-08-27

## 2019-08-27 NOTE — TELEPHONE ENCOUNTER
Left detailed message for the pt with the notes from Dr. Linette Candelario advised to call if questions

## 2019-08-27 NOTE — TELEPHONE ENCOUNTER
Spoke with the pt and advised of the recommendations- she v/u  I offered to try and get her in with another physician- she declined states that she will go to the local ER for eval

## 2019-08-27 NOTE — TELEPHONE ENCOUNTER
Pt called to let Mk know she went to [facility] bc he foot was swollen and hurt to walk on. The er doctor determined it was a big bite. Pt isn't sure if [facility] will want her to follow up with her or not.  Please call back

## 2019-08-27 NOTE — TELEPHONE ENCOUNTER
PT CALLED AND ADV SHE HAS SEVERE PAIN IN R FOOT. ADV PT HAS A KNOT ON THE BOTTOM OF ARCH. HURTS TO STEP DOWN. TOP OF FOOT VERY SORE, 2 INCHES MORE SWOLLEN THAN THE OTHER FOOT.     LOOKING FOR RECOMMENDATIONS    PLEASE CALL

## 2019-08-27 NOTE — TELEPHONE ENCOUNTER
Spoke with the pt and she states that the top of the foot that is most tender woke up yesterday and not able to walk on it  She went to see the physical therapist today and there were areas that the therapist touched and the pt wanted to jump off the table

## 2019-08-27 NOTE — TELEPHONE ENCOUNTER
Hmmmmm. ... acute onset redness/swelling/pain without trauma could be gout, though a bit unusal to be this part of the foot and with a \"lump\" on the bottom.  I wonder if the lump is from an injured plantar facsia (torn fibers all balled up), tough to say wh

## 2019-08-28 ENCOUNTER — TELEPHONE (OUTPATIENT)
Dept: FAMILY MEDICINE CLINIC | Facility: CLINIC | Age: 64
End: 2019-08-28

## 2019-08-28 NOTE — TELEPHONE ENCOUNTER
ER records are in [address]   Date Time Provider Juliana Lea   8/29/2019  9:00 AM Ameena Kilgore MD [facility] EMG Luis Dixon   9/9/2019 10:00 AM EMG [place] NURSE [facility] EMG Luis Dixon   10/10/2019 10:10 AM Xuan Morrell MD EM

## 2019-08-28 NOTE — TELEPHONE ENCOUNTER
Pt scheduled for tomorrow, Thurs 8/29/19 at 9:00 with Dr. Rachel Pina for ER follow up visit regarding visit to [facility] on 8/27/19 for a bug bite. Please get records.

## 2019-09-10 ENCOUNTER — NURSE ONLY (OUTPATIENT)
Dept: FAMILY MEDICINE CLINIC | Facility: CLINIC | Age: 64
End: 2019-09-10
Payer: MEDICARE

## 2019-09-10 ENCOUNTER — MED REC SCAN ONLY (OUTPATIENT)
Dept: FAMILY MEDICINE CLINIC | Facility: CLINIC | Age: 64
End: 2019-09-10

## 2019-09-10 ENCOUNTER — PATIENT MESSAGE (OUTPATIENT)
Dept: FAMILY MEDICINE CLINIC | Facility: CLINIC | Age: 64
End: 2019-09-10

## 2019-09-10 DIAGNOSIS — F32.A DEPRESSION, UNSPECIFIED DEPRESSION TYPE: ICD-10-CM

## 2019-09-10 DIAGNOSIS — Z51.81 ENCOUNTER FOR MONITORING COUMADIN THERAPY: ICD-10-CM

## 2019-09-10 DIAGNOSIS — Z51.81 ANTICOAGULATION GOAL OF INR 2.5 TO 3.5: ICD-10-CM

## 2019-09-10 DIAGNOSIS — Z79.01 ENCOUNTER FOR MONITORING COUMADIN THERAPY: ICD-10-CM

## 2019-09-10 DIAGNOSIS — Z79.01 ANTICOAGULATION GOAL OF INR 2.5 TO 3.5: ICD-10-CM

## 2019-09-10 LAB
INR BLD: 3.41 (ref 0.9–1.1)
PSA SERPL DL<=0.01 NG/ML-MCNC: 36.9 SECONDS (ref 12.5–14.7)

## 2019-09-10 PROCEDURE — 85610 PROTHROMBIN TIME: CPT | Performed by: FAMILY MEDICINE

## 2019-09-10 NOTE — PROGRESS NOTES
Blue tube drawn from right arm with 21g butterfly needle x1. Pt shannon well.  Pt left the clinic in stable condition

## 2019-09-11 ENCOUNTER — TELEPHONE (OUTPATIENT)
Dept: FAMILY MEDICINE CLINIC | Facility: CLINIC | Age: 64
End: 2019-09-11

## 2019-09-11 RX ORDER — GABAPENTIN 300 MG/1
600 CAPSULE ORAL 2 TIMES DAILY
COMMUNITY
Start: 2019-09-11 | End: 2020-05-28

## 2019-09-21 RX ORDER — WARFARIN SODIUM 4 MG/1
TABLET ORAL
Qty: 90 TABLET | Refills: 0 | Status: SHIPPED | OUTPATIENT
Start: 2019-09-21 | End: 2019-12-08

## 2019-09-21 RX ORDER — ROPINIROLE 0.5 MG/1
TABLET, FILM COATED ORAL
Qty: 90 TABLET | Refills: 3 | Status: SHIPPED | OUTPATIENT
Start: 2019-09-21 | End: 2019-11-24

## 2019-09-21 NOTE — TELEPHONE ENCOUNTER
LOV: 6/5/19   Last Refill: 10/8/18 #90 3 RF    Future Appointments   Date Time Provider Juliana Lea   10/10/2019 10:10 AM Vangie Gomez MD [facility] EMG Barbara Escobar   10/10/2019 11:00 AM EMG [address] [place] [address],[address] EMG Barbara Escobar

## 2019-09-21 NOTE — TELEPHONE ENCOUNTER
Last refill: 6/13/2019 #30 with 3 refills  Last Visit: 6/05/2019  Next Visit:   Future Appointments   Date Time Provider Juliana Leonora   10/10/2019 10:10 AM Verner Cones, MD [facility] EMG Megan Tellez   10/10/2019 11:00 AM EMG [address] [place] [address],[address] EMG Nina Carcamo

## 2019-09-26 ENCOUNTER — TELEPHONE (OUTPATIENT)
Dept: FAMILY MEDICINE CLINIC | Facility: CLINIC | Age: 64
End: 2019-09-26

## 2019-09-26 RX ORDER — PANTOPRAZOLE SODIUM 40 MG/1
TABLET, DELAYED RELEASE ORAL
Qty: 90 TABLET | Refills: 3 | Status: SHIPPED | OUTPATIENT
Start: 2019-09-26 | End: 2019-12-27

## 2019-09-26 NOTE — TELEPHONE ENCOUNTER
LOV: 6/5/19   Last Refill: 10/16/18 #90 3 RF    Future Appointments   Date Time Provider Juliana Lea   10/10/2019 10:10 AM Karolina Ashraf MD [facility] EMG Venetta Gloss   10/10/2019 11:00 AM  [facility] [place],2Nd Floor EMG Venetta Gloss

## 2019-09-26 NOTE — TELEPHONE ENCOUNTER
PATIENT  SAW DR BEHL ORTHO AT [facility] PUT HER ON PREDNISONE WILL IT AFFECT HER COUMADIN. HE GAVE HER 21 TABS 4 MG SHE STARTED WITH 6.

## 2019-10-03 ENCOUNTER — NURSE ONLY (OUTPATIENT)
Dept: FAMILY MEDICINE CLINIC | Facility: CLINIC | Age: 64
End: 2019-10-03
Payer: MEDICARE

## 2019-10-03 DIAGNOSIS — Z79.01 ENCOUNTER FOR MONITORING COUMADIN THERAPY: Primary | ICD-10-CM

## 2019-10-03 DIAGNOSIS — Z51.81 ENCOUNTER FOR MONITORING COUMADIN THERAPY: Primary | ICD-10-CM

## 2019-10-03 PROCEDURE — 85610 PROTHROMBIN TIME: CPT | Performed by: FAMILY MEDICINE

## 2019-10-04 DIAGNOSIS — Z51.81 ENCOUNTER FOR MONITORING COUMADIN THERAPY: Primary | ICD-10-CM

## 2019-10-04 DIAGNOSIS — Z79.01 ENCOUNTER FOR MONITORING COUMADIN THERAPY: Primary | ICD-10-CM

## 2019-10-10 ENCOUNTER — OFFICE VISIT (OUTPATIENT)
Dept: FAMILY MEDICINE CLINIC | Facility: CLINIC | Age: 64
End: 2019-10-10
Payer: MEDICARE

## 2019-10-10 VITALS
SYSTOLIC BLOOD PRESSURE: 118 MMHG | DIASTOLIC BLOOD PRESSURE: 78 MMHG | WEIGHT: 221.19 LBS | TEMPERATURE: 98 F | HEART RATE: 64 BPM | RESPIRATION RATE: 20 BRPM | OXYGEN SATURATION: 98 % | HEIGHT: 61 IN | BODY MASS INDEX: 41.76 KG/M2

## 2019-10-10 DIAGNOSIS — F41.0 PANIC ATTACKS: ICD-10-CM

## 2019-10-10 DIAGNOSIS — G47.33 OBSTRUCTIVE SLEEP APNEA (ADULT) (PEDIATRIC): Primary | ICD-10-CM

## 2019-10-10 PROCEDURE — 99214 OFFICE O/P EST MOD 30 MIN: CPT | Performed by: FAMILY MEDICINE

## 2019-10-10 NOTE — PATIENT INSTRUCTIONS
You have been scheduled for a BIPAP titration at [facility]. Please  Call the sleep center at  to review forms and receive forms to fill out in the mail. The sleep center will pre-certify your study.  If they have any diffi

## 2019-10-10 NOTE — PROGRESS NOTES
[facility]        HPI:   This is a 59year old female coming in for Patient presents with: Follow - Up: sleep      HPI: Patient is here for follow-up of her sleep apnea.   She is overall doing okay however she wakes sleepy during the day? - -   Has anyone observed you stop breathing during your sleep? - -   Do you have or are you being treated for high blood pressure? - -   BMI more than 35kg/mg2?  - -   Age over 48years old? - -   Neck circumference >16 inches (40 cm Mother    • Heart Disorder Mother    • Psychiatric Mother         dementia   • Hypertension Sister    • Hypertension Brother      Allergies:    Contrast Dye [Gadol*    HIVES  Macrobid [Nitrofura*    RASH  Nitrofurantoin          RASH    Comment:Other react Rfl: 3   IPRATROPIUM BROMIDE 0.06 % Nasal Solution USE 2 SPRAYS IN EACH NOSTRIL FOUR TIMES DAILY AS NEEDED FOR RHINITIS Disp: 135 mL Rfl: 3   BuPROPion HCl ER, SR, 200 MG Oral Tablet 12 Hr Take 1 tablet (200 mg total) by mouth 2 (two) times daily.  Disp: 60 (pediatric)      REVIEW OF SYSTEMS:   Review of Systems   Constitutional: Positive for fatigue. HENT: Negative. Negative for congestion. Eyes: Negative. Respiratory: Positive for apnea. Cardiovascular: Negative. Gastrointestinal: Negative. motion. Neck supple. No JVD present. No tracheal deviation present. No thyromegaly present. Cardiovascular: Normal rate, regular rhythm, normal heart sounds and intact distal pulses.    Pulmonary/Chest: Effort normal.   Lymphadenopathy:     She has no cer driving when sleepy. COMPLIANCE is required by insurance for 4 hours a night most nights of the week. Recommend weight loss, and maintain and optimal BMI with Exercise 30 minutes most days of the week to target heart rate .      Advised patient to pastor

## 2019-10-12 ENCOUNTER — TELEPHONE (OUTPATIENT)
Dept: FAMILY MEDICINE CLINIC | Facility: CLINIC | Age: 64
End: 2019-10-12

## 2019-10-12 ENCOUNTER — OFFICE VISIT (OUTPATIENT)
Dept: FAMILY MEDICINE CLINIC | Facility: CLINIC | Age: 64
End: 2019-10-12
Payer: MEDICARE

## 2019-10-12 VITALS
DIASTOLIC BLOOD PRESSURE: 82 MMHG | HEART RATE: 84 BPM | RESPIRATION RATE: 18 BRPM | HEIGHT: 61 IN | OXYGEN SATURATION: 98 % | TEMPERATURE: 99 F | WEIGHT: 221 LBS | SYSTOLIC BLOOD PRESSURE: 122 MMHG | BODY MASS INDEX: 41.72 KG/M2

## 2019-10-12 DIAGNOSIS — Z79.01 CURRENT USE OF LONG TERM ANTICOAGULATION: ICD-10-CM

## 2019-10-12 DIAGNOSIS — R35.0 FREQUENCY OF URINATION: ICD-10-CM

## 2019-10-12 DIAGNOSIS — N30.01 ACUTE CYSTITIS WITH HEMATURIA: Primary | ICD-10-CM

## 2019-10-12 PROCEDURE — 87086 URINE CULTURE/COLONY COUNT: CPT | Performed by: PHYSICIAN ASSISTANT

## 2019-10-12 PROCEDURE — 81003 URINALYSIS AUTO W/O SCOPE: CPT | Performed by: PHYSICIAN ASSISTANT

## 2019-10-12 PROCEDURE — 99214 OFFICE O/P EST MOD 30 MIN: CPT | Performed by: PHYSICIAN ASSISTANT

## 2019-10-12 RX ORDER — CEPHALEXIN 500 MG/1
500 CAPSULE ORAL 2 TIMES DAILY
Qty: 14 CAPSULE | Refills: 0 | Status: SHIPPED | OUTPATIENT
Start: 2019-10-12 | End: 2020-02-14 | Stop reason: ALTCHOICE

## 2019-10-12 NOTE — PATIENT INSTRUCTIONS
1. Urinalysis today consistent with UTI.   2 . Keflex 500 mg twice daily for 7 days   3. Contact whomever is managing your coumadin, will need to be more closely monitored while on antibiotics. 4.  Urine culture results in 2-3 days.

## 2019-10-12 NOTE — TELEPHONE ENCOUNTER
Patient notified and verbalized understanding.     States she knows where [facility] is in [place]

## 2019-10-12 NOTE — TELEPHONE ENCOUNTER
I would recommend [facility] today given the lower back pain. I want someone to look at her and make sure it hasn't gone to the kidneys if infection is there.

## 2019-10-12 NOTE — PROGRESS NOTES
CHIEF COMPLAINT:   Patient presents with:  UTI: FREQUENCY, BURNING WHILE URINATING , LOW BACK PAIN X 1 WK       HPI:   Chelsie Celaya is a 59year old female who presents with symptoms of UTI. Complaining of urinary frequency, urgency, dysuria for 1 weeks. Tab, TAKE 1 TABLET BY MOUTH NIGHTLY., Disp: 90 tablet, Rfl: 3  Montelukast Sodium (SINGULAIR) 10 MG Oral Tab, Take 1 tablet (10 mg total) by mouth daily. , Disp: 90 tablet, Rfl: 3  albuterol sulfate (2.5 MG/3ML) 0.083% Inhalation Nebu Soln, Take 3 mL (2.5 m one   • Prothrombin mutation [facility])    • Spinal stenosis    • Unspecified essential hypertension       Social History:  Social History    Tobacco Use      Smoking status: Never Smoker      Smokeless tobacco: Never Used    Alcohol use: No      Alcohol/week: tolerated omnicef in May without reaction. Meds & Refills for this Visit:  Requested Prescriptions     Signed Prescriptions Disp Refills   • cephALEXin 500 MG Oral Cap 14 capsule 0     Sig: Take 1 capsule (500 mg total) by mouth 2 (two) times daily.

## 2019-10-14 ENCOUNTER — TELEPHONE (OUTPATIENT)
Dept: FAMILY MEDICINE CLINIC | Facility: CLINIC | Age: 64
End: 2019-10-14

## 2019-10-14 NOTE — TELEPHONE ENCOUNTER
Pt is done with the ABX and was told to stop today because culture was negative. Gave information below, had no further questions.

## 2019-10-14 NOTE — TELEPHONE ENCOUNTER
Proabably a good idea to come in and get checked if she is on the abx for longer than a week or if she has bleeding.  Otherwise, even if the INR is off a little for a few days, it should go back to normal.

## 2019-10-14 NOTE — TELEPHONE ENCOUNTER
PT CALLED AND ADV THAT PT WENT TO [facility] FOR POSS UTI. PT DID GET PRESCRIBED CEPHALEXIN. PT ADV TOOK IT Saturday,SUNDAY AND TODAY. WONDERING IF PT NEED TO COME IN FOR INR TEST? DOES NOT KNOW IF IT WILL EFFECT.     PLEASE ADV    THANK YOU

## 2019-10-21 ENCOUNTER — PATIENT MESSAGE (OUTPATIENT)
Dept: FAMILY MEDICINE CLINIC | Facility: CLINIC | Age: 64
End: 2019-10-21

## 2019-10-22 NOTE — PROGRESS NOTES
We can write a letter if that helps. I have done it in the past for patients dependent on cpap. .  Let me know if you want us to write one and if that will help.

## 2019-10-22 NOTE — TELEPHONE ENCOUNTER
From: Kenneth Muñiz  To: Nasima Hinojosa MD  Sent: 10/21/2019 10:59 AM CDT  Subject: Other    Dr. Praveen Lane   Just wanted to let you know that there shutting my lights off either today or tomorrow because I can't give them the amount there requiring.  Which means I

## 2019-10-24 ENCOUNTER — TELEPHONE (OUTPATIENT)
Dept: FAMILY MEDICINE CLINIC | Facility: CLINIC | Age: 64
End: 2019-10-24

## 2019-10-24 NOTE — TELEPHONE ENCOUNTER
Patient has an order from another physician for lab work. She is wondering if she can have it done here. Please call back. Patient has the physical order with her.

## 2019-10-24 NOTE — TELEPHONE ENCOUNTER
Pt states labs are for A1C, CBC, CMP Lipid and Thyroid.     These labs are for Dr. Michelle Ellison- psych    Please advise if you are okay with pt getting labs done in our office for this provider

## 2019-10-25 NOTE — TELEPHONE ENCOUNTER
Fine with me (I think our office protocol we decided it was okay, right?  Or am I remebering that wrong)

## 2019-10-25 NOTE — TELEPHONE ENCOUNTER
Spoke with the pt and advised that we can do these labs and to bring the orders with her- she v/u    We scheduled an appt  Future Appointments   Date Time Provider Juliana Lea   10/29/2019 11:00 AM  [facility],2Nd Floor EMG Richard Morris   10/30/2019

## 2019-10-27 RX ORDER — METOPROLOL SUCCINATE 25 MG/1
TABLET, EXTENDED RELEASE ORAL
Qty: 90 TABLET | Refills: 3 | Status: SHIPPED | OUTPATIENT
Start: 2019-10-27 | End: 2020-07-13

## 2019-10-27 RX ORDER — ATORVASTATIN CALCIUM 40 MG/1
TABLET, FILM COATED ORAL
Qty: 90 TABLET | Refills: 3 | Status: SHIPPED | OUTPATIENT
Start: 2019-10-27 | End: 2020-09-09

## 2019-10-27 RX ORDER — MONTELUKAST SODIUM 10 MG/1
TABLET ORAL
Qty: 90 TABLET | Refills: 3 | Status: SHIPPED | OUTPATIENT
Start: 2019-10-27 | End: 2020-09-09

## 2019-10-28 PROCEDURE — 83036 HEMOGLOBIN GLYCOSYLATED A1C: CPT | Performed by: FAMILY MEDICINE

## 2019-10-28 PROCEDURE — 82306 VITAMIN D 25 HYDROXY: CPT | Performed by: FAMILY MEDICINE

## 2019-10-28 PROCEDURE — 82607 VITAMIN B-12: CPT | Performed by: FAMILY MEDICINE

## 2019-10-28 PROCEDURE — 80053 COMPREHEN METABOLIC PANEL: CPT | Performed by: FAMILY MEDICINE

## 2019-10-28 PROCEDURE — 80061 LIPID PANEL: CPT | Performed by: FAMILY MEDICINE

## 2019-10-28 PROCEDURE — 85025 COMPLETE CBC W/AUTO DIFF WBC: CPT | Performed by: FAMILY MEDICINE

## 2019-10-28 PROCEDURE — 84443 ASSAY THYROID STIM HORMONE: CPT | Performed by: FAMILY MEDICINE

## 2019-10-28 NOTE — PROGRESS NOTES
Gold, mint and lav tubes drawn from right arm with 21g butterfly needle x1. Pt shannon well.  Pt left the clinic in stable condition

## 2019-11-05 ENCOUNTER — OFFICE VISIT (OUTPATIENT)
Dept: SLEEP CENTER | Age: 64
End: 2019-11-05
Attending: FAMILY MEDICINE
Payer: MEDICARE

## 2019-11-05 DIAGNOSIS — G47.33 OBSTRUCTIVE SLEEP APNEA (ADULT) (PEDIATRIC): ICD-10-CM

## 2019-11-05 DIAGNOSIS — F41.0 PANIC ATTACKS: ICD-10-CM

## 2019-11-05 PROCEDURE — 95811 POLYSOM 6/>YRS CPAP 4/> PARM: CPT

## 2019-11-11 ENCOUNTER — PATIENT MESSAGE (OUTPATIENT)
Dept: FAMILY MEDICINE CLINIC | Facility: CLINIC | Age: 64
End: 2019-11-11

## 2019-11-11 ENCOUNTER — SLEEP STUDY (OUTPATIENT)
Dept: FAMILY MEDICINE CLINIC | Facility: CLINIC | Age: 64
End: 2019-11-11
Payer: MEDICARE

## 2019-11-11 ENCOUNTER — TELEPHONE (OUTPATIENT)
Dept: FAMILY MEDICINE CLINIC | Facility: CLINIC | Age: 64
End: 2019-11-11

## 2019-11-11 DIAGNOSIS — G47.33 OSA (OBSTRUCTIVE SLEEP APNEA): Primary | ICD-10-CM

## 2019-11-11 PROCEDURE — 95811 POLYSOM 6/>YRS CPAP 4/> PARM: CPT | Performed by: FAMILY MEDICINE

## 2019-11-11 NOTE — TELEPHONE ENCOUNTER
[employer] message responded to patient. \"Good afternoon Laxmi Zuleta,    At this time, Dr. Colby Wills is wanting to set you up with a BiPAP machine. We will send all the information needed to [facility] for you.   They will be able to set you up with your edwin

## 2019-11-11 NOTE — TELEPHONE ENCOUNTER
----- Message from Justino Beltran MD sent at 11/11/2019 11:58 AM CST -----  [place] Bipap. Call DME supplier to obtain machine. Recommend routine follow up to assure compliance and efficacy.   Avoid alcohol, sedatives and other cns depressants that may

## 2019-11-11 NOTE — PROGRESS NOTES
[place] Bipap. Call DME supplier to obtain machine. Recommend routine follow up to assure compliance and efficacy. Avoid alcohol, sedatives and other cns depressants that may worsen sleep apnea and disrupt normal sleep architecture.   Sleep hygiene s

## 2019-11-11 NOTE — TELEPHONE ENCOUNTER
From: Wang Handing  To: Dayanna Gabriel MD  Sent: 11/11/2019 3:34 PM CST  Subject: Non-Urgent Gearldine Jose Dr. Smith Promise the test results back from the sleep study but not sure what I'm suppose to do if anything.   Thank you  Denise Burt

## 2019-11-11 NOTE — PROCEDURES
[address] [address],[address]       Accredited by the [facility] of Sleep Medicine (AASM)    PATIENT'S NAME:        [facility]  ATTENDING PHYSICIAN:   Margret Hutton MD  REFERRING PHYSICIAN:   Lu Hutton MD  PATIENT A titrated from 10/6 to 16/12. At the final pressure of 16/12, no supine REM was seen. Supine REM was seen prior to that. The patient's apnea-hypopnea index at 16/12 was 0, and her minimum oxygen saturation was 91%. Her sleep efficiency was 96.4%.   The p 95.7% PLM Index: 49.6   RERA Count: 1 RERA Index: 0.1   RDI: 5.1 Sa02 Jovi: 78.0%     COMMENTS    Patient arrived at the [facility] at 8:00 pm for a CPAP Titration study. Patient was oriented to testing procedure and bedroom.  Previous sleep study 19. 7       OXYGEN SATURATION SUMMARY     Wake REM NREM Total Record   Minimum OSat (%) 88.0% 92.0% 78.0% 78.0%   Maximum OSat (%) 99.0% 97.0% 100.0% 100.0%   Average OSat (%) 96.5% 95.9% 95.1% 95.2%       Range(%) Time in range (min) Time in range (%)   90 1.3 - 1.3 - 1.3 100.0% 90.0   12  8  0 40.0 0.0 40.0 0.0 12.0 - 12.0 4.5 7.5 100.0% 84.0   13  9  0 9.5 0.0 9.5 0.0 18.9 18.9 37.9 18.9 6.3 100.0% 78.0   14  10  0 43.5 0.0 43.0 0.5 11.2 4.2 15.3 8.4 7.0 98.9% 82.0   15  11  0 175.5 35.0 137.0 3.5 4.5 0.7

## 2019-11-11 NOTE — TELEPHONE ENCOUNTER
Patient notified via [employer] message today. Flowsheet updated. Sleep study, OV notes, BiPAP order, and demographic sheet faxed to [facility],  [address].

## 2019-11-13 ENCOUNTER — OFFICE VISIT (OUTPATIENT)
Dept: FAMILY MEDICINE CLINIC | Facility: CLINIC | Age: 64
End: 2019-11-13
Payer: MEDICARE

## 2019-11-13 VITALS
BODY MASS INDEX: 40.78 KG/M2 | SYSTOLIC BLOOD PRESSURE: 104 MMHG | OXYGEN SATURATION: 100 % | HEART RATE: 74 BPM | WEIGHT: 216 LBS | DIASTOLIC BLOOD PRESSURE: 66 MMHG | HEIGHT: 61 IN | TEMPERATURE: 98 F

## 2019-11-13 DIAGNOSIS — J06.9 ACUTE UPPER RESPIRATORY INFECTION: Primary | ICD-10-CM

## 2019-11-13 PROCEDURE — 99213 OFFICE O/P EST LOW 20 MIN: CPT | Performed by: PHYSICIAN ASSISTANT

## 2019-11-13 RX ORDER — BENZONATATE 200 MG/1
200 CAPSULE ORAL 3 TIMES DAILY PRN
Qty: 20 CAPSULE | Refills: 0 | Status: SHIPPED | OUTPATIENT
Start: 2019-11-13 | End: 2019-11-24

## 2019-11-13 NOTE — PROGRESS NOTES
CHIEF COMPLAINT:   Patient presents with:  Cough: cough, congestion x 3 days. no fever. no wheezing    HPI:   Zeferino Mendenhall is a 59year old female who presents for upper respiratory symptoms for  3 days.  Patient reports congestion, clear colored nasal disch BID as directed to check blood sugar and guide food decisions dx:R73.09 elevated HGBA1C 200 each 3   • Albuterol Sulfate HFA (VENTOLIN HFA) 108 (90 Base) MCG/ACT Inhalation Aero Soln Inhale 2 puffs into the lungs every 4 (four) hours as needed for Wheezing Procedure Laterality Date   • CHOLECYSTECTOMY  2006   • IR IVC FILTER PLACEMENT     • KNEE REPLACEMENT SURGERY  03/2017    L knee   • OTHER SURGICAL HISTORY      carpal tunnel right wrist (1981);  Green field filter (PE 2004)   • OTHER SURGICAL HISTORY  0 benefits, and side effects of medication explained and discussed. Discussed likely viral etiology. Reviewed symptom relief measures with patient. To f/u with PCP if sx do not resolve as anticipated.   The patient indicates understanding of these issues a NSAIDs, such as ibuprofen (Advil, Motrin) or naproxen (Aleve) work well for pain, fever and also have the added benefit of helping with inflammation.     The typical duration of upper respiratory infections is usually anywhere from 7-14 days, with the first

## 2019-11-13 NOTE — PATIENT INSTRUCTIONS
Please follow up with your PCP if no improvement within 5-7 days. Go directly to the ER for any acute worsening of symptoms. Based off the duration and nature of your symptoms, you most likely have a viral upper respiratory infection.  Unfortunately, anti followed by worsening (double sickening) then please schedule a follow up with your primary care provider, or go to one of our [facility] or the [facility] for further evaluation

## 2019-11-14 ENCOUNTER — ANCILLARY ORDERS (OUTPATIENT)
Dept: FAMILY MEDICINE CLINIC | Facility: CLINIC | Age: 64
End: 2019-11-14

## 2019-11-14 ENCOUNTER — NURSE ONLY (OUTPATIENT)
Dept: FAMILY MEDICINE CLINIC | Facility: CLINIC | Age: 64
End: 2019-11-14
Payer: MEDICARE

## 2019-11-14 DIAGNOSIS — Z51.81 ENCOUNTER FOR MONITORING COUMADIN THERAPY: ICD-10-CM

## 2019-11-14 DIAGNOSIS — Z79.01 ENCOUNTER FOR MONITORING COUMADIN THERAPY: ICD-10-CM

## 2019-11-14 DIAGNOSIS — Z12.39 BREAST CANCER SCREENING: ICD-10-CM

## 2019-11-14 PROCEDURE — 85610 PROTHROMBIN TIME: CPT | Performed by: FAMILY MEDICINE

## 2019-11-14 NOTE — PROGRESS NOTES
Patient to clinic for INR per [facility]    Light blue tube drawn right AC x 1 attempt

## 2019-11-15 ENCOUNTER — PATIENT MESSAGE (OUTPATIENT)
Dept: FAMILY MEDICINE CLINIC | Facility: CLINIC | Age: 64
End: 2019-11-15

## 2019-11-16 NOTE — TELEPHONE ENCOUNTER
From: Sydni Nelson  To: Vanessa Neri MD  Sent: 11/15/2019 3:58 PM CST  Subject: Non-Urgent Medical Question    Dr. Saqib Alarcon,  Just wanted to let you know that I haven't been able to wear my CPAP for the last couple of nights.  I have an upper respiratory viral

## 2019-11-20 ENCOUNTER — PATIENT MESSAGE (OUTPATIENT)
Dept: FAMILY MEDICINE CLINIC | Facility: CLINIC | Age: 64
End: 2019-11-20

## 2019-11-21 NOTE — TELEPHONE ENCOUNTER
She needs to be seen, I see I have openinig tomorrow morning, can you please call her to schedule, thank you!

## 2019-11-22 ENCOUNTER — OFFICE VISIT (OUTPATIENT)
Dept: FAMILY MEDICINE CLINIC | Facility: CLINIC | Age: 64
End: 2019-11-22
Payer: MEDICARE

## 2019-11-22 VITALS
HEART RATE: 76 BPM | TEMPERATURE: 98 F | BODY MASS INDEX: 40 KG/M2 | WEIGHT: 211.81 LBS | DIASTOLIC BLOOD PRESSURE: 78 MMHG | SYSTOLIC BLOOD PRESSURE: 120 MMHG

## 2019-11-22 DIAGNOSIS — R63.0 DECREASED APPETITE: ICD-10-CM

## 2019-11-22 DIAGNOSIS — R41.89 COGNITIVE CHANGES: ICD-10-CM

## 2019-11-22 DIAGNOSIS — Z79.01 ANTICOAGULATION GOAL OF INR 2.5 TO 3.5: ICD-10-CM

## 2019-11-22 DIAGNOSIS — Z51.81 ANTICOAGULATION GOAL OF INR 2.5 TO 3.5: ICD-10-CM

## 2019-11-22 DIAGNOSIS — R63.4 UNINTENTIONAL WEIGHT LOSS: Primary | ICD-10-CM

## 2019-11-22 DIAGNOSIS — Z79.01 ENCOUNTER FOR MONITORING COUMADIN THERAPY: ICD-10-CM

## 2019-11-22 DIAGNOSIS — R68.81 EARLY SATIETY: ICD-10-CM

## 2019-11-22 DIAGNOSIS — Z51.81 ENCOUNTER FOR MONITORING COUMADIN THERAPY: ICD-10-CM

## 2019-11-22 PROCEDURE — 85610 PROTHROMBIN TIME: CPT | Performed by: FAMILY MEDICINE

## 2019-11-22 PROCEDURE — 99214 OFFICE O/P EST MOD 30 MIN: CPT | Performed by: FAMILY MEDICINE

## 2019-11-22 RX ORDER — PREDNISONE 50 MG/1
TABLET ORAL
Qty: 3 TABLET | Refills: 1 | Status: SHIPPED | OUTPATIENT
Start: 2019-11-22 | End: 2020-02-05

## 2019-11-22 NOTE — TELEPHONE ENCOUNTER
Pt has has appt today  Future Appointments   Date Time Provider Juliana Lea   11/22/2019  9:00 AM Lennox Alanis, MD [facility] EMG Fazal Mulligan   11/25/2019 10:00 AM EMG KEVEN CHAVEZ [facility] EMG Fazal Mulligan   2/28/2020  9:40 AM RISHABH FISHER [facility]   2/28

## 2019-11-25 NOTE — PROGRESS NOTES
Rossy Mi is a 59year old female. HPI:   Patient c/o 2-3 months of early satiety, unintenntional weight loss and cognitive changes, memory lapses and loss. No hallucination, no overt confusion, but feels spacier, more forgetful,.   Is well versed with HCl 10 MG Oral Tab TK 1 T PO Daily as needed  0   • Enalapril Maleate 5 MG Oral Tab Take 1 tablet (5 mg total) by mouth daily.  90 tablet 3   • Glucose Blood In Vitro Strip Use BID as directed to check blood sugar and guide food decisions dx:R73.09 elevated stenosis    • Unspecified essential hypertension       Past Surgical History:   Procedure Laterality Date   • CHOLECYSTECTOMY  2006   • IR IVC FILTER PLACEMENT     • KNEE REPLACEMENT SURGERY  03/2017    L knee   • OTHER SURGICAL HISTORY      carpal tunnel for this visit:    Unintentional weight loss  -     CT CHEST+ABDOMEN+PELVIS(ALL CNTRST ONLY)(HZO=28925/07300); Future    Early satiety  -     CT CHEST+ABDOMEN+PELVIS(ALL CNTRST ONLY)(DBF=80630/49084);  Future    Decreased appetite  -     CT CHEST+ABDOMEN+PE

## 2019-11-26 ENCOUNTER — MED REC SCAN ONLY (OUTPATIENT)
Dept: FAMILY MEDICINE CLINIC | Facility: CLINIC | Age: 64
End: 2019-11-26

## 2019-12-01 ENCOUNTER — PATIENT MESSAGE (OUTPATIENT)
Dept: FAMILY MEDICINE CLINIC | Facility: CLINIC | Age: 64
End: 2019-12-01

## 2019-12-01 DIAGNOSIS — F39 MOOD DISORDER (HCC): ICD-10-CM

## 2019-12-01 DIAGNOSIS — G47.10 HYPERSOMNIA: Primary | ICD-10-CM

## 2019-12-01 DIAGNOSIS — G47.19 EXCESSIVE DAYTIME SLEEPINESS: ICD-10-CM

## 2019-12-02 NOTE — TELEPHONE ENCOUNTER
Did not tolerate with requip,   And interfered with ehr Seroquel and trazadone and fell out of bed and can't remember. Could trial increasing her gabapentin. Could repeat baseline study. Call to Dr. Hussein Fuentes. Her mood is now stable.  With her

## 2019-12-02 NOTE — TELEPHONE ENCOUNTER
Spoke with Diane Rodriguez at [address] [place] [address] who states patient doesn't qualifty for a BiPAP machine based off her latest sleep study. States patient's AHI was not high enough.   Diane Rodriguez states she spoke with Dr. To Parks regarding this and that a new study was going to ne

## 2019-12-02 NOTE — TELEPHONE ENCOUNTER
From: Connie Quintero  To: Amy Hardy MD  Sent: 12/1/2019 9:01 PM CST  Subject: Non-Urgent Medical Question    Dr. Rosa Long called last week and said that Medicare won't pay for the BIPAP because there's somethings that need to be done from you first

## 2019-12-03 ENCOUNTER — TELEPHONE (OUTPATIENT)
Dept: FAMILY MEDICINE CLINIC | Facility: CLINIC | Age: 64
End: 2019-12-03

## 2019-12-03 ENCOUNTER — NURSE ONLY (OUTPATIENT)
Dept: FAMILY MEDICINE CLINIC | Facility: CLINIC | Age: 64
End: 2019-12-03
Payer: MEDICARE

## 2019-12-03 DIAGNOSIS — Z79.02 LONG TERM (CURRENT) USE OF ANTITHROMBOTICS/ANTIPLATELETS: ICD-10-CM

## 2019-12-03 DIAGNOSIS — Z79.01 ANTICOAGULATION GOAL OF INR 2.5 TO 3.5: ICD-10-CM

## 2019-12-03 DIAGNOSIS — Z51.81 ANTICOAGULATION GOAL OF INR 2.5 TO 3.5: ICD-10-CM

## 2019-12-03 DIAGNOSIS — Z23 NEED FOR VACCINATION: ICD-10-CM

## 2019-12-03 DIAGNOSIS — Z51.81 ENCOUNTER FOR MONITORING COUMADIN THERAPY: ICD-10-CM

## 2019-12-03 DIAGNOSIS — Z79.01 ENCOUNTER FOR MONITORING COUMADIN THERAPY: ICD-10-CM

## 2019-12-03 DIAGNOSIS — Z86.711 HISTORY OF PULMONARY EMBOLISM: ICD-10-CM

## 2019-12-03 DIAGNOSIS — Z51.81 ANTICOAGULATION GOAL OF INR 2.5 TO 3.5: Primary | ICD-10-CM

## 2019-12-03 DIAGNOSIS — Z79.01 ANTICOAGULATION GOAL OF INR 2.5 TO 3.5: Primary | ICD-10-CM

## 2019-12-03 PROCEDURE — G0008 ADMIN INFLUENZA VIRUS VAC: HCPCS | Performed by: FAMILY MEDICINE

## 2019-12-03 PROCEDURE — 85610 PROTHROMBIN TIME: CPT | Performed by: FAMILY MEDICINE

## 2019-12-03 PROCEDURE — 90686 IIV4 VACC NO PRSV 0.5 ML IM: CPT | Performed by: FAMILY MEDICINE

## 2019-12-03 NOTE — PROGRESS NOTES
Blue tube drawn from right arm with 21g butterfly needle x1. Pt shannon well. Pt left the clinic in stable condition      Pt also requests a Fluc vaccine while she is here.

## 2019-12-03 NOTE — TELEPHONE ENCOUNTER
Rosary Lipoma, what happened? Please call patient, see if she's taken medication like usual? Anything different? Feeling okay? Bruising or bleeding anywhere?   If doing okay, please have her hold coumadin next 2 doses then recheck INR

## 2019-12-03 NOTE — TELEPHONE ENCOUNTER
Patient has mild sleep apnea, but worst problem is her periodic leg movement, concerned her seroquel, trazodone and effexor may be making it worse. Could increase gabapentin but would hate to add more meds and possibly make her more tired.   I'll send mess

## 2019-12-03 NOTE — TELEPHONE ENCOUNTER
Pt scheduled for December for repeat sleep study. Discussed she should talk with psychiatry about her medications and reduction. Patient notes that she is not eating well   Advise to talk with Dr. Kimberly Poole.

## 2019-12-03 NOTE — TELEPHONE ENCOUNTER
Spoke with the pt and advised of the notes from Dr. Joaquim Calderon. She notes that the only thing she has changed is that she is not eating much    Advised to hold the med for 2 doses and recheck on the 3rd day.     appt scheduled for Thursday    Future Appointments

## 2019-12-04 ENCOUNTER — MED REC SCAN ONLY (OUTPATIENT)
Dept: FAMILY MEDICINE CLINIC | Facility: CLINIC | Age: 64
End: 2019-12-04

## 2019-12-05 ENCOUNTER — NURSE ONLY (OUTPATIENT)
Dept: FAMILY MEDICINE CLINIC | Facility: CLINIC | Age: 64
End: 2019-12-05
Payer: MEDICARE

## 2019-12-05 DIAGNOSIS — R63.30 FEEDING DIFFICULTIES: ICD-10-CM

## 2019-12-05 DIAGNOSIS — Z79.01 ANTICOAGULATION GOAL OF INR 2.5 TO 3.5: ICD-10-CM

## 2019-12-05 DIAGNOSIS — Z79.01 ENCOUNTER FOR MONITORING COUMADIN THERAPY: ICD-10-CM

## 2019-12-05 DIAGNOSIS — Z79.02 LONG TERM (CURRENT) USE OF ANTITHROMBOTICS/ANTIPLATELETS: ICD-10-CM

## 2019-12-05 DIAGNOSIS — R63.0 DECREASED APPETITE: ICD-10-CM

## 2019-12-05 DIAGNOSIS — I95.9 HYPOTENSION, UNSPECIFIED HYPOTENSION TYPE: ICD-10-CM

## 2019-12-05 DIAGNOSIS — Z51.81 ANTICOAGULATION GOAL OF INR 2.5 TO 3.5: ICD-10-CM

## 2019-12-05 DIAGNOSIS — R26.89 POOR BALANCE: ICD-10-CM

## 2019-12-05 DIAGNOSIS — Z51.81 ENCOUNTER FOR MONITORING COUMADIN THERAPY: ICD-10-CM

## 2019-12-05 DIAGNOSIS — Z86.711 HISTORY OF PULMONARY EMBOLISM: ICD-10-CM

## 2019-12-05 PROCEDURE — 82525 ASSAY OF COPPER: CPT | Performed by: FAMILY MEDICINE

## 2019-12-05 PROCEDURE — 85610 PROTHROMBIN TIME: CPT | Performed by: FAMILY MEDICINE

## 2019-12-05 PROCEDURE — 84630 ASSAY OF ZINC: CPT | Performed by: FAMILY MEDICINE

## 2019-12-05 NOTE — PROGRESS NOTES
Patient to clinic for labs per [facility].    2 K2EDTA tubes and 1 light blue tube drawn right AC x 1 attempt

## 2019-12-06 ENCOUNTER — TELEPHONE (OUTPATIENT)
Dept: FAMILY MEDICINE CLINIC | Facility: CLINIC | Age: 64
End: 2019-12-06

## 2019-12-06 ENCOUNTER — PATIENT MESSAGE (OUTPATIENT)
Dept: FAMILY MEDICINE CLINIC | Facility: CLINIC | Age: 64
End: 2019-12-06

## 2019-12-06 NOTE — TELEPHONE ENCOUNTER
From: Liana Aldridge  To: Kiera Cano MD  Sent: 12/6/2019 5:34 PM CST  Subject: Non-Urgent Medical Question    Dr. Daniel Rowe,    Just an FYI my vitamin K won't be ready until Monday.     Thank you    Sharita Ruff

## 2019-12-06 NOTE — TELEPHONE ENCOUNTER
Spoke with the RN working tomorrow and ok to add at [phone] or so.     Spoke with the pt and she states that she is not eating anything- she is so nauseas- I advised that maybe I can talk with Dr. Nieves Emmanuel about something for the nausea- she states that she has antonia

## 2019-12-06 NOTE — TELEPHONE ENCOUNTER
I'd go ahead and do Vitamin K x 1 dose (script sent) since she's not eating     If she has zofran 4mg take 2 at one time. If already has 8mg and it's not helping, can try OTC edmund and benadryl 25-50mg q6hrs prn    She is meeting with her psych today.

## 2019-12-06 NOTE — TELEPHONE ENCOUNTER
Pt Called and said she got a call from [facility] and he said her numbers were very high and she needed to come in on Saturday to do her A1C again. Is this ok to add another nurse visit tomorrow.    Please return call to [phone]

## 2019-12-06 NOTE — TELEPHONE ENCOUNTER
Spoke with the pt and advised of the notes from Dr. Fausto Boothe- she v/u  She states that she didn't have an appt with her psych today, she was scheduled to see her counselor but had to cancel because she didn't have a car.  She states that she did talk to her ove

## 2019-12-07 ENCOUNTER — NURSE ONLY (OUTPATIENT)
Dept: FAMILY MEDICINE CLINIC | Facility: CLINIC | Age: 64
End: 2019-12-07
Payer: MEDICARE

## 2019-12-07 DIAGNOSIS — Z51.81 ENCOUNTER FOR MONITORING COUMADIN THERAPY: ICD-10-CM

## 2019-12-07 DIAGNOSIS — Z51.81 ANTICOAGULATION GOAL OF INR 2.5 TO 3.5: ICD-10-CM

## 2019-12-07 DIAGNOSIS — Z79.01 ENCOUNTER FOR MONITORING COUMADIN THERAPY: ICD-10-CM

## 2019-12-07 DIAGNOSIS — Z79.01 ANTICOAGULATION GOAL OF INR 2.5 TO 3.5: ICD-10-CM

## 2019-12-07 PROCEDURE — 85610 PROTHROMBIN TIME: CPT | Performed by: FAMILY MEDICINE

## 2019-12-08 ENCOUNTER — PATIENT MESSAGE (OUTPATIENT)
Dept: FAMILY MEDICINE CLINIC | Facility: CLINIC | Age: 64
End: 2019-12-08

## 2019-12-08 RX ORDER — WARFARIN SODIUM 4 MG/1
2 TABLET ORAL
COMMUNITY
Start: 2019-12-08 | End: 2019-12-27

## 2019-12-09 ENCOUNTER — TELEPHONE (OUTPATIENT)
Dept: FAMILY MEDICINE CLINIC | Facility: CLINIC | Age: 64
End: 2019-12-09

## 2019-12-09 DIAGNOSIS — Z51.81 ANTICOAGULATION GOAL OF INR 2.5 TO 3.5: Primary | ICD-10-CM

## 2019-12-09 DIAGNOSIS — Z79.02 LONG TERM (CURRENT) USE OF ANTITHROMBOTICS/ANTIPLATELETS: ICD-10-CM

## 2019-12-09 DIAGNOSIS — Z79.01 ANTICOAGULATION GOAL OF INR 2.5 TO 3.5: Primary | ICD-10-CM

## 2019-12-09 NOTE — TELEPHONE ENCOUNTER
Call from patient. States she wants to clarify  Her coumadin dosing schedule and also when she is due for her next INR.      Per lab results note from 12/7/19: I want you to take 4mg coumadin today, and thereafter do coumadin at 1/2 your previous dose, so 2

## 2019-12-09 NOTE — TELEPHONE ENCOUNTER
Patient advised of coumadin directions and due for INR on 12/12/19. Verbalized understanding. Future orders placed.     Future Appointments   Date Time Provider Juliana Lea   12/12/2019 11:00 AM [facility] NURSE ISABELLA Caceres   12/19/2019  8:0

## 2019-12-09 NOTE — TELEPHONE ENCOUNTER
From: Janusz Ortiz  To: Jaswinder Villegas MD  Sent: 12/8/2019 12:03 PM CST  Subject: Test Results Question    Dr. Brice Keller,    I told you I wasn't being stuck any more. But I guess I can do it ONE more time.  I'm really not feeling good today I just feel really wea

## 2019-12-09 NOTE — TELEPHONE ENCOUNTER
Pt calling bc she is confused on how to take her medication. She remembers something special about day four. Just isn't exactly sure what she was suppose to do. Please call back.

## 2019-12-12 ENCOUNTER — NURSE ONLY (OUTPATIENT)
Dept: FAMILY MEDICINE CLINIC | Facility: CLINIC | Age: 64
End: 2019-12-12
Payer: MEDICARE

## 2019-12-12 DIAGNOSIS — Z79.02 LONG TERM (CURRENT) USE OF ANTITHROMBOTICS/ANTIPLATELETS: ICD-10-CM

## 2019-12-12 DIAGNOSIS — Z79.01 ANTICOAGULATION GOAL OF INR 2.5 TO 3.5: ICD-10-CM

## 2019-12-12 DIAGNOSIS — Z51.81 ANTICOAGULATION GOAL OF INR 2.5 TO 3.5: ICD-10-CM

## 2019-12-12 PROCEDURE — 85610 PROTHROMBIN TIME: CPT | Performed by: FAMILY MEDICINE

## 2019-12-12 NOTE — PROGRESS NOTES
Blue tubex2  drawn from right arm with 21g butterfly needle x1. Pt shannon well.  Pt left the clinic in stable condition

## 2019-12-13 ENCOUNTER — PATIENT MESSAGE (OUTPATIENT)
Dept: FAMILY MEDICINE CLINIC | Facility: CLINIC | Age: 64
End: 2019-12-13

## 2019-12-13 ENCOUNTER — TELEPHONE (OUTPATIENT)
Dept: FAMILY MEDICINE CLINIC | Facility: CLINIC | Age: 64
End: 2019-12-13

## 2019-12-13 RX ORDER — ENOXAPARIN SODIUM 100 MG/ML
INJECTION SUBCUTANEOUS
Qty: 11.2 ML | Refills: 0 | Status: SHIPPED | OUTPATIENT
Start: 2019-12-13 | End: 2020-02-05

## 2019-12-13 NOTE — TELEPHONE ENCOUNTER
Script printed to be faxed (too long to escribe). No more coumadin until post c-scope. Start lovenox tonight, BID tomorrow, do it Niko morning then stop in prep for the procedure Monday.   Restart lovenox and coumadin Monday night if no obvious bleeding

## 2019-12-13 NOTE — TELEPHONE ENCOUNTER
Pt called to let [address] know that Dr Jonathon Al is doing a scope on Monday.  She needs lovenox   Please return call to [phone]

## 2019-12-13 NOTE — TELEPHONE ENCOUNTER
Called the [facility] to check on this med  Jin De Souza pharmacist states that the med requires [address] @ [phone] spoke with Wiley Londono and she tells me that the lovenox doesn't need PA     Ref # [id number]       called the pharmacy back and the med i

## 2019-12-13 NOTE — TELEPHONE ENCOUNTER
Left message for the pt with her instructions  Asked her to call back to let me know that she got the message with her instuctions.     Also sent her a mychart with instructions

## 2019-12-13 NOTE — TELEPHONE ENCOUNTER
Message   Received: Today   Message Contents   [place] Nurse   Caller: Unspecified (Today,  2:38 PM)             PT RETURNED CALL, [facility].      PT ADV NOT ABLE TO GET MEDS-INSURANCE NOT COVERING     PLEASE CB

## 2019-12-13 NOTE — TELEPHONE ENCOUNTER
From: Camron Spears  To: Natalie Metcalf MD  Sent: 12/13/2019 3:52 PM CST  Subject: Prescription Question    Dr. Fausto Boothe,    What do I do if I don't get my prescription for tonight?     Edgard Lawson

## 2019-12-14 ENCOUNTER — TELEPHONE (OUTPATIENT)
Dept: FAMILY MEDICINE CLINIC | Facility: CLINIC | Age: 64
End: 2019-12-14

## 2019-12-14 NOTE — TELEPHONE ENCOUNTER
Values in Epic. Waiting for Dr. Gilbert Jha to result with final recommendations.     Future Appointments   Date Time Provider Juliana Lea   12/18/2019 10:00 AM [facility] NURSE ISABELLA EM[facility] Kesha Medrano   12/19/2019  8:00 PM [facility] VICTORIA Knox   2/

## 2019-12-16 ENCOUNTER — PATIENT MESSAGE (OUTPATIENT)
Dept: FAMILY MEDICINE CLINIC | Facility: CLINIC | Age: 64
End: 2019-12-16

## 2019-12-16 NOTE — TELEPHONE ENCOUNTER
From: Dayna Aguilar  To: Karina Nugent MD  Sent: 12/16/2019 2:14 PM CST  Subject: Non-Urgent Medical Question    Dr. Jerry Jenkins,  I had the endoscopy done this morning so you probably have access to it.  I need to know if I can continue to take the zofran because

## 2019-12-18 ENCOUNTER — NURSE ONLY (OUTPATIENT)
Dept: FAMILY MEDICINE CLINIC | Facility: CLINIC | Age: 64
End: 2019-12-18
Payer: MEDICARE

## 2019-12-18 DIAGNOSIS — Z51.81 ANTICOAGULATION GOAL OF INR 2.5 TO 3.5: ICD-10-CM

## 2019-12-18 DIAGNOSIS — Z79.01 ANTICOAGULATION GOAL OF INR 2.5 TO 3.5: ICD-10-CM

## 2019-12-18 DIAGNOSIS — Z79.01 ENCOUNTER FOR MONITORING COUMADIN THERAPY: ICD-10-CM

## 2019-12-18 DIAGNOSIS — Z51.81 ENCOUNTER FOR MONITORING COUMADIN THERAPY: ICD-10-CM

## 2019-12-18 PROCEDURE — 85610 PROTHROMBIN TIME: CPT | Performed by: FAMILY MEDICINE

## 2019-12-18 NOTE — PROGRESS NOTES
Pt here for INR she is taking 4mg coumadin    She is taking the lovenox BID    Blue tube drawn from right arm with 21g butterfly needle x1. Pt shannon well.  Pt left the clinic in stable condition

## 2019-12-19 ENCOUNTER — OFFICE VISIT (OUTPATIENT)
Dept: SLEEP CENTER | Age: 64
End: 2019-12-19
Attending: FAMILY MEDICINE
Payer: MEDICARE

## 2019-12-19 DIAGNOSIS — F39 MOOD DISORDER (HCC): ICD-10-CM

## 2019-12-19 DIAGNOSIS — G47.10 HYPERSOMNIA: ICD-10-CM

## 2019-12-19 DIAGNOSIS — G47.19 EXCESSIVE DAYTIME SLEEPINESS: ICD-10-CM

## 2019-12-19 PROCEDURE — 95810 POLYSOM 6/> YRS 4/> PARAM: CPT

## 2019-12-20 ENCOUNTER — TELEPHONE (OUTPATIENT)
Dept: FAMILY MEDICINE CLINIC | Facility: CLINIC | Age: 64
End: 2019-12-20

## 2019-12-20 ENCOUNTER — PATIENT MESSAGE (OUTPATIENT)
Dept: FAMILY MEDICINE CLINIC | Facility: CLINIC | Age: 64
End: 2019-12-20

## 2019-12-20 DIAGNOSIS — Z51.81 ENCOUNTER FOR MONITORING COUMADIN THERAPY: Primary | ICD-10-CM

## 2019-12-20 DIAGNOSIS — Z79.02 LONG TERM (CURRENT) USE OF ANTITHROMBOTICS/ANTIPLATELETS: ICD-10-CM

## 2019-12-20 DIAGNOSIS — Z79.01 ENCOUNTER FOR MONITORING COUMADIN THERAPY: Primary | ICD-10-CM

## 2019-12-20 NOTE — TELEPHONE ENCOUNTER
Pt states she was on 4mg of coumadin daily. She is still on Lovenox, she was advised to stay on until INR was in range. Forward to Dr. Ambar Ford. Please advise.

## 2019-12-20 NOTE — TELEPHONE ENCOUNTER
----- Message from Leonid Barrios MD sent at 12/20/2019  7:29 AM CST -----  Patient's INR a bit low. Her coumadin was recently interrupted due to a procedure.   Please call patient find out what dose of coumadin she on (and if it's the same as her pre-proce

## 2019-12-20 NOTE — TELEPHONE ENCOUNTER
Patient has been notified, verbalized understanding of information. Denies further questions.       Future Appointments   Date Time Provider Juliana Lea   12/23/2019 10:00 AM  [facility] EMG Constanza Gonzales

## 2019-12-20 NOTE — TELEPHONE ENCOUNTER
From: Ana Addison  To: Haley Marshall MD  Sent: 12/20/2019 9:58 AM CST  Subject: Non-Urgent Medical Question    Dr. Gallo Code,  I have read the results from the endoscopy Monday. But haven't heard from Dr. Greenfield Lia office yet.  So not sure what the diagnosis is or

## 2019-12-20 NOTE — TELEPHONE ENCOUNTER
Perfect.   Take 8 mg coumadin today, than resume her usual 4 mg daily, cont lovenox, recheck inr monday

## 2019-12-23 ENCOUNTER — NURSE ONLY (OUTPATIENT)
Dept: FAMILY MEDICINE CLINIC | Facility: CLINIC | Age: 64
End: 2019-12-23
Payer: MEDICARE

## 2019-12-23 ENCOUNTER — SLEEP STUDY (OUTPATIENT)
Dept: FAMILY MEDICINE CLINIC | Facility: CLINIC | Age: 64
End: 2019-12-23
Payer: MEDICARE

## 2019-12-23 ENCOUNTER — TELEPHONE (OUTPATIENT)
Dept: FAMILY MEDICINE CLINIC | Facility: CLINIC | Age: 64
End: 2019-12-23

## 2019-12-23 DIAGNOSIS — Z79.01 ENCOUNTER FOR MONITORING COUMADIN THERAPY: ICD-10-CM

## 2019-12-23 DIAGNOSIS — Z51.81 ENCOUNTER FOR MONITORING COUMADIN THERAPY: ICD-10-CM

## 2019-12-23 DIAGNOSIS — G47.33 OSA (OBSTRUCTIVE SLEEP APNEA): Primary | ICD-10-CM

## 2019-12-23 DIAGNOSIS — Z79.02 LONG TERM (CURRENT) USE OF ANTITHROMBOTICS/ANTIPLATELETS: ICD-10-CM

## 2019-12-23 PROCEDURE — 95810 POLYSOM 6/> YRS 4/> PARAM: CPT | Performed by: FAMILY MEDICINE

## 2019-12-23 PROCEDURE — 85610 PROTHROMBIN TIME: CPT | Performed by: FAMILY MEDICINE

## 2019-12-23 NOTE — PROCEDURES
[address] [address],[facility]       Accredited by the [facility] of [facility] (AASM)    PATIENT'S NAME:        José King  ATTENDING PHYSICIAN:   Margret Hutton MD  REFERRING PHYSICIAN:   Chet Hutton MD  PATIENT A Baseline oxygen saturation was 92.3. The lowest oxygen saturation was 70.8%. LIMB ACTIVITY:  There were 346 limb movements recorded. Of this, 289 were classified as PLMs. Of the PLMs, 39 were associated with arousals. The limb movement index was 46. CHINTAN, hypopneas  Snoring Events: mild   Limb Movements:  severe   Sleep Positions Recorded: supine    Discharge Note: Patient discharged to home. Patient instructed to follow up with   Dr. Soraida Gardiner per physician order.            SLEEP PROFILE        Time (min) Index 1.4 1.9 1.5 - 1.5   AHI 14.4 30.2 16.7 - 16.7   RDI 14.4 30.2 16.7 - 16.7   Snores - - - - -   Snore Index - - - - -        451.5     RESPIRATORY EVENT DURATIONS     NREM REM    Average (secs) Maximum (secs) Average (secs) Maximum (secs)   Apnea 15. 8

## 2019-12-27 ENCOUNTER — TELEPHONE (OUTPATIENT)
Dept: FAMILY MEDICINE CLINIC | Facility: CLINIC | Age: 64
End: 2019-12-27

## 2019-12-27 ENCOUNTER — NURSE ONLY (OUTPATIENT)
Dept: FAMILY MEDICINE CLINIC | Facility: CLINIC | Age: 64
End: 2019-12-27
Payer: MEDICARE

## 2019-12-27 DIAGNOSIS — Z86.711 HISTORY OF PULMONARY EMBOLISM: ICD-10-CM

## 2019-12-27 DIAGNOSIS — Z51.81 ANTICOAGULATION GOAL OF INR 2.5 TO 3.5: Primary | ICD-10-CM

## 2019-12-27 DIAGNOSIS — Z79.01 ANTICOAGULATION GOAL OF INR 2.5 TO 3.5: Primary | ICD-10-CM

## 2019-12-27 PROCEDURE — 85610 PROTHROMBIN TIME: CPT | Performed by: FAMILY MEDICINE

## 2019-12-27 RX ORDER — PANTOPRAZOLE SODIUM 40 MG/1
40 TABLET, DELAYED RELEASE ORAL 2 TIMES DAILY
COMMUNITY
Start: 2019-12-27 | End: 2020-02-05

## 2019-12-27 RX ORDER — WARFARIN SODIUM 4 MG/1
TABLET ORAL
Qty: 90 TABLET | Refills: 0 | Status: SHIPPED | OUTPATIENT
Start: 2019-12-27 | End: 2020-02-15 | Stop reason: ALTCHOICE

## 2019-12-27 NOTE — TELEPHONE ENCOUNTER
----- Message from Sohail Gifford MD sent at 12/23/2019  4:42 PM CST -----  Notify pt.    Update flow sheet 'fax to RUKHSANA Perez

## 2019-12-27 NOTE — PROGRESS NOTES
Patient to clinic for labs per [address]    inr ordered.   Light blue tube drawn right AC x 1 attempt

## 2019-12-27 NOTE — TELEPHONE ENCOUNTER
----- Message from Kvng Hawthorne MD sent at 12/23/2019  4:42 PM CST -----  Notify pt.    Update flow sheet 'fax to RUKHSANA Perez

## 2020-01-01 ENCOUNTER — EXTERNAL RECORD (OUTPATIENT)
Dept: HEALTH INFORMATION MANAGEMENT | Facility: OTHER | Age: 65
End: 2020-01-01

## 2020-01-03 ENCOUNTER — NURSE ONLY (OUTPATIENT)
Dept: FAMILY MEDICINE CLINIC | Facility: CLINIC | Age: 65
End: 2020-01-03
Payer: MEDICARE

## 2020-01-03 DIAGNOSIS — Z51.81 ENCOUNTER FOR MONITORING COUMADIN THERAPY: Primary | ICD-10-CM

## 2020-01-03 DIAGNOSIS — Z79.01 ENCOUNTER FOR MONITORING COUMADIN THERAPY: Primary | ICD-10-CM

## 2020-01-03 DIAGNOSIS — Z79.02 LONG TERM (CURRENT) USE OF ANTITHROMBOTICS/ANTIPLATELETS: ICD-10-CM

## 2020-01-03 LAB
INR BLD: 2.81 (ref 0.9–1.1)
PSA SERPL DL<=0.01 NG/ML-MCNC: 31.5 SECONDS (ref 12.5–14.7)

## 2020-01-03 PROCEDURE — 85610 PROTHROMBIN TIME: CPT | Performed by: FAMILY MEDICINE

## 2020-01-03 NOTE — PROGRESS NOTES
Patient presented for lab work ordered by Jaden Dixon. One blue tube drawn with a butterfly needle X one attempt in right AC space. Pt tolerated procedure well.

## 2020-01-06 ENCOUNTER — PATIENT MESSAGE (OUTPATIENT)
Dept: FAMILY MEDICINE CLINIC | Facility: CLINIC | Age: 65
End: 2020-01-06

## 2020-01-06 RX ORDER — ONDANSETRON HYDROCHLORIDE 8 MG/1
8 TABLET, FILM COATED ORAL EVERY 8 HOURS PRN
Qty: 30 TABLET | Refills: 1 | Status: SHIPPED | OUTPATIENT
Start: 2020-01-06

## 2020-01-06 NOTE — TELEPHONE ENCOUNTER
From: Hilario Monsivais  To: James Richard MD  Sent: 1/6/2020 5:06 PM CST  Subject: Prescription Question    Dr. Luis Lynn,    Was wondering if you would be willing to give me a prescription for zofran I am still having much nausea if I eat or not.  Which I'm still n

## 2020-01-16 ENCOUNTER — MED REC SCAN ONLY (OUTPATIENT)
Dept: FAMILY MEDICINE CLINIC | Facility: CLINIC | Age: 65
End: 2020-01-16

## 2020-01-20 ENCOUNTER — NURSE ONLY (OUTPATIENT)
Dept: FAMILY MEDICINE CLINIC | Facility: CLINIC | Age: 65
End: 2020-01-20
Payer: MEDICARE

## 2020-01-20 ENCOUNTER — PATIENT OUTREACH (OUTPATIENT)
Dept: FAMILY MEDICINE CLINIC | Facility: CLINIC | Age: 65
End: 2020-01-20

## 2020-01-20 DIAGNOSIS — Z51.81 ANTICOAGULATION GOAL OF INR 2.5 TO 3.5: ICD-10-CM

## 2020-01-20 DIAGNOSIS — Z79.01 ANTICOAGULATION GOAL OF INR 2.5 TO 3.5: ICD-10-CM

## 2020-01-20 DIAGNOSIS — Z51.81 ENCOUNTER FOR MONITORING COUMADIN THERAPY: ICD-10-CM

## 2020-01-20 DIAGNOSIS — Z79.01 ENCOUNTER FOR MONITORING COUMADIN THERAPY: ICD-10-CM

## 2020-01-20 DIAGNOSIS — R79.89 ELEVATED SERUM CREATININE: ICD-10-CM

## 2020-01-20 PROCEDURE — 80048 BASIC METABOLIC PNL TOTAL CA: CPT | Performed by: FAMILY MEDICINE

## 2020-01-20 PROCEDURE — 81001 URINALYSIS AUTO W/SCOPE: CPT | Performed by: FAMILY MEDICINE

## 2020-01-20 PROCEDURE — 85610 PROTHROMBIN TIME: CPT | Performed by: FAMILY MEDICINE

## 2020-01-20 NOTE — PROGRESS NOTES
Blue and mint  tubes drawn from right arm with butterfly needle x1. Pt shannon well.  Pt left the clinic in stable condition    Redressed the venipuncture site with extra jh    Bleeding stopped and pt left the clinic in stable condition

## 2020-01-21 LAB
ANION GAP SERPL CALC-SCNC: 4 MMOL/L (ref 0–18)
BILIRUB UR QL STRIP.AUTO: NEGATIVE
BUN BLD-MCNC: 10 MG/DL (ref 7–18)
BUN/CREAT SERPL: 8.3 (ref 10–20)
CALCIUM BLD-MCNC: 9.8 MG/DL (ref 8.5–10.1)
CHLORIDE SERPL-SCNC: 111 MMOL/L (ref 98–112)
CO2 SERPL-SCNC: 30 MMOL/L (ref 21–32)
COLOR UR AUTO: YELLOW
CREAT BLD-MCNC: 1.21 MG/DL (ref 0.55–1.02)
GLUCOSE BLD-MCNC: 96 MG/DL (ref 70–99)
GLUCOSE UR STRIP.AUTO-MCNC: NEGATIVE MG/DL
HYALINE CASTS #/AREA URNS AUTO: PRESENT /LPF
INR BLD: 4.91 (ref 0.9–1.1)
KETONES UR STRIP.AUTO-MCNC: NEGATIVE MG/DL
NITRITE UR QL STRIP.AUTO: NEGATIVE
OSMOLALITY SERPL CALC.SUM OF ELEC: 299 MOSM/KG (ref 275–295)
PATIENT FASTING Y/N/NP: NO
PH UR STRIP.AUTO: 5 [PH] (ref 4.5–8)
POTASSIUM SERPL-SCNC: 3.7 MMOL/L (ref 3.5–5.1)
PROT UR STRIP.AUTO-MCNC: NEGATIVE MG/DL
PSA SERPL DL<=0.01 NG/ML-MCNC: 49.6 SECONDS (ref 12.5–14.7)
RBC UR QL AUTO: NEGATIVE
SODIUM SERPL-SCNC: 145 MMOL/L (ref 136–145)
SP GR UR STRIP.AUTO: 1.02 (ref 1–1.03)
UROBILINOGEN UR STRIP.AUTO-MCNC: <2 MG/DL

## 2020-01-23 ENCOUNTER — TELEPHONE (OUTPATIENT)
Dept: FAMILY MEDICINE CLINIC | Facility: CLINIC | Age: 65
End: 2020-01-23

## 2020-01-23 NOTE — TELEPHONE ENCOUNTER
Patient advised of an appointment on Wednesday with Emmanuel Forrester - patient does not feel that she can wait that long, She feels very weak- can hardly walk, She has lost 20 pounds since October. She is not eating.  Patient was wondering if she should go to the ER

## 2020-01-23 NOTE — TELEPHONE ENCOUNTER
PT called she did end up call 911 earlier today. At ER they did check her INR it was 7.7. They told her not take her WARFARIN SODIUM 4 MG Oral Tab. They advised her to call primary for future instructions on what to do for INR.

## 2020-01-23 NOTE — TELEPHONE ENCOUNTER
Pt wants to be seen today. She has had a lot of weight lost. She is cold and tired all the time. Wants to know what is going on with her health.

## 2020-01-23 NOTE — TELEPHONE ENCOUNTER
Patient advised of the information per Dr. Patt Ulloa. Patient verbalized understanding. She said that she was not following  directions.

## 2020-01-27 ENCOUNTER — NURSE ONLY (OUTPATIENT)
Dept: FAMILY MEDICINE CLINIC | Facility: CLINIC | Age: 65
End: 2020-01-27
Payer: MEDICARE

## 2020-01-27 DIAGNOSIS — Z51.81 ENCOUNTER FOR MONITORING COUMADIN THERAPY: ICD-10-CM

## 2020-01-27 DIAGNOSIS — Z79.01 ANTICOAGULATION GOAL OF INR 2.5 TO 3.5: ICD-10-CM

## 2020-01-27 DIAGNOSIS — Z79.01 ENCOUNTER FOR MONITORING COUMADIN THERAPY: ICD-10-CM

## 2020-01-27 DIAGNOSIS — Z51.81 ANTICOAGULATION GOAL OF INR 2.5 TO 3.5: ICD-10-CM

## 2020-01-27 LAB
INR BLD: 2.6 (ref 0.9–1.1)
PSA SERPL DL<=0.01 NG/ML-MCNC: 29.6 SECONDS (ref 12.5–14.7)

## 2020-01-27 PROCEDURE — 85610 PROTHROMBIN TIME: CPT | Performed by: FAMILY MEDICINE

## 2020-01-27 NOTE — PROGRESS NOTES
Pt here for INR  2 Blue tubes drawn from right arm with butterfly needle x1. Pt shannon well.  Pt left the clinic in stable condition

## 2020-01-29 ENCOUNTER — PATIENT MESSAGE (OUTPATIENT)
Dept: FAMILY MEDICINE CLINIC | Facility: CLINIC | Age: 65
End: 2020-01-29

## 2020-01-29 NOTE — TELEPHONE ENCOUNTER
From: Dariusz Ortega  To: Aren Tapia MD  Sent: 1/29/2020 1:47 PM CST  Subject: Non-Urgent Medical Question    Dr. Cathern Hatchet,  I just came from Dr. Umair Polanco office and he thinks my problem with not eating may have something to do with my depression.  Have you cont

## 2020-01-29 NOTE — TELEPHONE ENCOUNTER
From: Ron Face  To: Leonid Barrios MD  Sent: 1/29/2020 3:09 PM CST  Subject: Other    Dr. Mimi Flanagan,  I am so frustrated I am done with everything and I'm ready to give up and stop taking everything and cancel all of my Dr appointments.  I don't know what Radha Smoke

## 2020-01-30 ENCOUNTER — PATIENT MESSAGE (OUTPATIENT)
Dept: FAMILY MEDICINE CLINIC | Facility: CLINIC | Age: 65
End: 2020-01-30

## 2020-01-31 ENCOUNTER — TELEPHONE (OUTPATIENT)
Dept: FAMILY MEDICINE CLINIC | Facility: CLINIC | Age: 65
End: 2020-01-31

## 2020-01-31 ENCOUNTER — MED REC SCAN ONLY (OUTPATIENT)
Dept: FAMILY MEDICINE CLINIC | Facility: CLINIC | Age: 65
End: 2020-01-31

## 2020-01-31 NOTE — TELEPHONE ENCOUNTER
From: Rossy Mi  To: Rose Marin MD  Sent: 1/30/2020 7:03 PM CST  Subject: Prescription Question    I have an appointment set for tomorrow to have my INR drawn and I'm not going to be able to make it.  I am taking 2mg every night but since I won't have

## 2020-01-31 NOTE — TELEPHONE ENCOUNTER
PT CALLED AND ADV THAT SHE WAS SUPPOSE TO COME IN TODAY FOR BLOOD DRAW-WAS NOT ABLE TO COME IN CAUSE SHE DOES NOT HAVE A RIDE. SHE IS NOT AWARE OF WHAT SHE SHOULD TAKE FOR HER COUMADIN AMOUNT.     PLEASE CALL AND ADV    THANK YOU

## 2020-01-31 NOTE — TELEPHONE ENCOUNTER
Spoke with the pt and advised of the instructions she v/u     She states that she has an appt on Tuesday  Future Appointments   Date Time Provider Juliana Lea   2/4/2020  2:15 PM [facility]   2/28/2020  9:40 AM [facility]

## 2020-01-31 NOTE — TELEPHONE ENCOUNTER
From: Reynaldo Tran  To: Cain Pineda MD  Sent: 1/30/2020 7:12 PM CST  Subject: Prescription Question    Dr. Jordy Govea,  I need to tell you something.  I am frustrated because I can't get Dr. Tonya Cole to tell me what he thinks is causing my weight loss, not eating,

## 2020-02-05 ENCOUNTER — TELEPHONE (OUTPATIENT)
Dept: FAMILY MEDICINE CLINIC | Facility: CLINIC | Age: 65
End: 2020-02-05

## 2020-02-05 PROCEDURE — 85610 PROTHROMBIN TIME: CPT | Performed by: FAMILY MEDICINE

## 2020-02-05 NOTE — TELEPHONE ENCOUNTER
ABRAHAM. EMMIE Duarte to discuss patient's case. She has lost 40# since august, has had decreased appetite and depressive symptoms--low hope, low motivation, withdrawn. No suicidal intentions.   Feeling very overwhelmed by some decisions in

## 2020-02-05 NOTE — PROGRESS NOTES
Sydni Nelson is a 59year old female. HPI:   Pt is here for ER/UC/hospital f/u.     ER/UC or hospital: [facility]    Date(s): 1/23/2020    Reason for initial ER visit: extreme weakness     Records received and reviewed: yes    Pertinent results/diagnoses from ER: as needed  0   • Enalapril Maleate 5 MG Oral Tab Take 1 tablet (5 mg total) by mouth daily.  90 tablet 3   • Glucose Blood In Vitro Strip Use BID as directed to check blood sugar and guide food decisions dx:R73.09 elevated HGBA1C 200 each 3   • Albuterol Juarez (pulmonary embolism) Feb 2004 and March 2004    x2.  prothrombin mutation found after 2nd one   • Prothrombin mutation [facility])    • Spinal stenosis    • Unspecified essential hypertension       Past Surgical History:   Procedure Laterality Date   • CHOLECYSTE recurrent major depressive disorder, without psychotic features ([facility] [address]  -     [facility]    Anticoagulation goal of INR 2.5 to 3.5  -     PROTHROMBIN TIME (PT)    Encounter for monitoring Coumadin therapy  -     PROTHROMBIN TIME (PT)    Abnorma

## 2020-02-07 ENCOUNTER — TELEPHONE (OUTPATIENT)
Dept: FAMILY MEDICINE CLINIC | Facility: CLINIC | Age: 65
End: 2020-02-07

## 2020-02-07 NOTE — TELEPHONE ENCOUNTER
Spoke with Sharon Kilgore and she's on board, she'll head over to [facility] for psych eval today or tomorrow

## 2020-02-07 NOTE — TELEPHONE ENCOUNTER
Rai from [employer] in [place] is calling back after talking to [address] to see if we can fax of patients Last Lab works & Med List  Fax # [phone] would like to let [address] know the the ritalin can effect pt's Coumadin levels.

## 2020-02-07 NOTE — TELEPHONE ENCOUNTER
Spoke with Gayatri. She recommends medication wash out at this time inpatient, [facility] has a great program, would have to call behavioral health to get intake done.      I told Micki Martinez I'll reach out to patient and let her know recs and Micki Martinez will do the same

## 2020-02-07 NOTE — TELEPHONE ENCOUNTER
Pt called, wanted to let us know that her psychologist wants to admit pt to monitor her medication. She doesn't know when though. She just wants us to know.   Any questions please call pt at [phone]

## 2020-02-08 NOTE — TELEPHONE ENCOUNTER
Pt called this morning, she went last night for a mater of 4 hours. They sent her home bc they told her they don't keep ppl to get meds straightened out.

## 2020-02-11 NOTE — TELEPHONE ENCOUNTER
Spoke with Karolina Claire and she said she had talked to the behavioral health team at the ER where Andres Cheema went twice on Friday and they refused to admit because she's wasn't suicidal.  Andres Cheema has follow-up scheduled with her in near future and they're planning on a w

## 2020-02-12 ENCOUNTER — PATIENT MESSAGE (OUTPATIENT)
Dept: FAMILY MEDICINE CLINIC | Facility: CLINIC | Age: 65
End: 2020-02-12

## 2020-02-12 ENCOUNTER — NURSE ONLY (OUTPATIENT)
Dept: FAMILY MEDICINE CLINIC | Facility: CLINIC | Age: 65
End: 2020-02-12

## 2020-02-12 DIAGNOSIS — F32.A DEPRESSION, UNSPECIFIED DEPRESSION TYPE: ICD-10-CM

## 2020-02-12 DIAGNOSIS — Z79.01 ANTICOAGULATION GOAL OF INR 2.5 TO 3.5: ICD-10-CM

## 2020-02-12 DIAGNOSIS — Z79.01 ENCOUNTER FOR MONITORING COUMADIN THERAPY: ICD-10-CM

## 2020-02-12 DIAGNOSIS — Z51.81 ANTICOAGULATION GOAL OF INR 2.5 TO 3.5: ICD-10-CM

## 2020-02-12 DIAGNOSIS — Z51.81 ENCOUNTER FOR MONITORING COUMADIN THERAPY: ICD-10-CM

## 2020-02-12 LAB
INR BLD: 2.14 (ref 0.9–1.1)
PSA SERPL DL<=0.01 NG/ML-MCNC: 25.2 SECONDS (ref 12.5–14.7)

## 2020-02-12 PROCEDURE — 85610 PROTHROMBIN TIME: CPT | Performed by: FAMILY MEDICINE

## 2020-02-12 NOTE — PROGRESS NOTES
Blue tubes drawn from right arm with 21g butterfly needle x1. Pt shannon well.  Pt left the clinic in stable condition

## 2020-02-13 ENCOUNTER — PATIENT MESSAGE (OUTPATIENT)
Dept: FAMILY MEDICINE CLINIC | Facility: CLINIC | Age: 65
End: 2020-02-13

## 2020-02-13 NOTE — TELEPHONE ENCOUNTER
From: Hilario Monsivais  To: James Richard MD  Sent: 2/13/2020 1:16 PM CST  Subject: Prescription Question    Dr. Luis Lynn,  Just want to make sure I take the right dose of warfarin. I'm currently 4mg every night. So is that what I should continue with.  By the way

## 2020-02-13 NOTE — TELEPHONE ENCOUNTER
From: Lashon Lyn  To: Elvira Morel MD  Sent: 2/12/2020 8:05 PM CST  Subject: Other    Dr. Jamal Maciel,  I saw Jimmy Heads today and she changed my venlaxine two 150mg capsules each morning to 75mg in the morning and 150mg at night.  She said then she can decrease it e

## 2020-02-14 RX ORDER — VENLAFAXINE HYDROCHLORIDE 150 MG/1
CAPSULE, EXTENDED RELEASE ORAL
Qty: 60 CAPSULE | Refills: 0 | COMMUNITY
Start: 2020-02-14 | End: 2020-03-23 | Stop reason: DRUGHIGH

## 2020-02-15 RX ORDER — WARFARIN SODIUM 2 MG/1
TABLET ORAL
Qty: 52.5 TABLET | Refills: 1 | Status: SHIPPED | OUTPATIENT
Start: 2020-02-15 | End: 2020-02-16

## 2020-02-15 NOTE — TELEPHONE ENCOUNTER
Forward to Dr. Michael Morgan, pharmacy is requesting 90 days, this was sent today. Please advise if ok to send for 90?

## 2020-02-16 RX ORDER — WARFARIN SODIUM 2 MG/1
TABLET ORAL
Qty: 159 TABLET | Refills: 0 | Status: ON HOLD | OUTPATIENT
Start: 2020-02-16 | End: 2020-04-25

## 2020-02-19 NOTE — PROGRESS NOTES
Kenneth Muñiz is a 59year old female. HPI:   Patient here to f/u on her weight, meds and the addition of ritalin. She has noticed her appetite has improved, still gets some nausea but is eating better.     She reports apathy and low motivation still, li LUNGS TWICE DAILY 3 Inhaler 3   • IPRATROPIUM-ALBUTEROL 0.5-2.5 (3) MG/3ML Inhalation Solution USE 3 ML VIA NEBULIZER EVERY 4 HOURS AS NEEDED 1620 mL 1   • hydrOXYzine HCl 10 MG Oral Tab TK 1 T PO Daily as needed  0   • Enalapril Maleate 5 MG Oral Tab Take Prothrombin mutation [facility])    • Spinal stenosis    • Unspecified essential hypertension       Past Surgical History:   Procedure Laterality Date   • CHOLECYSTECTOMY  2006   • IR IVC FILTER PLACEMENT     • KNEE REPLACEMENT SURGERY  03/2017    L knee   • OTH slightly guarded   EXTREMITIES: no cyanosis, clubbing or edema    ASSESSMENT AND PLAN:   Diagnoses and all orders for this visit:  This visit is primarily for counseling, spent 27 min with pt >50% of time in counseling and review of below:     Severe episo

## 2020-02-25 ENCOUNTER — TELEPHONE (OUTPATIENT)
Dept: FAMILY MEDICINE CLINIC | Facility: CLINIC | Age: 65
End: 2020-02-25

## 2020-02-27 ENCOUNTER — MED REC SCAN ONLY (OUTPATIENT)
Dept: FAMILY MEDICINE CLINIC | Facility: CLINIC | Age: 65
End: 2020-02-27

## 2020-02-28 ENCOUNTER — HOSPITAL ENCOUNTER (OUTPATIENT)
Dept: MAMMOGRAPHY | Age: 65
Discharge: HOME | End: 2020-02-28
Attending: FAMILY MEDICINE
Payer: MEDICARE

## 2020-02-28 ENCOUNTER — OFFICE VISIT (OUTPATIENT)
Dept: FAMILY MEDICINE CLINIC | Facility: CLINIC | Age: 65
End: 2020-02-28
Payer: MEDICARE

## 2020-02-28 ENCOUNTER — PATIENT MESSAGE (OUTPATIENT)
Dept: FAMILY MEDICINE CLINIC | Facility: CLINIC | Age: 65
End: 2020-02-28

## 2020-02-28 VITALS
RESPIRATION RATE: 22 BRPM | HEART RATE: 82 BPM | DIASTOLIC BLOOD PRESSURE: 74 MMHG | TEMPERATURE: 98 F | WEIGHT: 191.38 LBS | BODY MASS INDEX: 35.67 KG/M2 | HEIGHT: 61.5 IN | SYSTOLIC BLOOD PRESSURE: 130 MMHG | OXYGEN SATURATION: 98 %

## 2020-02-28 DIAGNOSIS — J30.89 NON-SEASONAL ALLERGIC RHINITIS, UNSPECIFIED TRIGGER: ICD-10-CM

## 2020-02-28 DIAGNOSIS — I10 ESSENTIAL HYPERTENSION: ICD-10-CM

## 2020-02-28 DIAGNOSIS — Z79.01 ANTICOAGULATION GOAL OF INR 2.5 TO 3.5: ICD-10-CM

## 2020-02-28 DIAGNOSIS — M15.9 PRIMARY OSTEOARTHRITIS INVOLVING MULTIPLE JOINTS: ICD-10-CM

## 2020-02-28 DIAGNOSIS — M54.50 CHRONIC BILATERAL LOW BACK PAIN, UNSPECIFIED WHETHER SCIATICA PRESENT: ICD-10-CM

## 2020-02-28 DIAGNOSIS — R73.9 ELEVATED BLOOD SUGAR: ICD-10-CM

## 2020-02-28 DIAGNOSIS — D68.52 PROTHROMBIN MUTATION (HCC): ICD-10-CM

## 2020-02-28 DIAGNOSIS — G89.29 CHRONIC BILATERAL LOW BACK PAIN, UNSPECIFIED WHETHER SCIATICA PRESENT: ICD-10-CM

## 2020-02-28 DIAGNOSIS — K44.9 HIATAL HERNIA: ICD-10-CM

## 2020-02-28 DIAGNOSIS — K57.30 DIVERTICULOSIS OF LARGE INTESTINE WITHOUT HEMORRHAGE: ICD-10-CM

## 2020-02-28 DIAGNOSIS — G47.33 OBSTRUCTIVE SLEEP APNEA (ADULT) (PEDIATRIC): ICD-10-CM

## 2020-02-28 DIAGNOSIS — J45.30 MILD PERSISTENT ASTHMA WITHOUT COMPLICATION: ICD-10-CM

## 2020-02-28 DIAGNOSIS — Z95.828 PRESENCE OF IVC FILTER: ICD-10-CM

## 2020-02-28 DIAGNOSIS — Z79.01 ENCOUNTER FOR MONITORING COUMADIN THERAPY: ICD-10-CM

## 2020-02-28 DIAGNOSIS — E78.2 MIXED HYPERLIPIDEMIA: ICD-10-CM

## 2020-02-28 DIAGNOSIS — M48.05 SPINAL STENOSIS OF THORACOLUMBAR REGION: ICD-10-CM

## 2020-02-28 DIAGNOSIS — Z51.81 ANTICOAGULATION GOAL OF INR 2.5 TO 3.5: ICD-10-CM

## 2020-02-28 DIAGNOSIS — N20.0 KIDNEY STONES: ICD-10-CM

## 2020-02-28 DIAGNOSIS — Z12.39 BREAST CANCER SCREENING: ICD-10-CM

## 2020-02-28 DIAGNOSIS — Z59.9 FINANCIAL PROBLEMS: ICD-10-CM

## 2020-02-28 DIAGNOSIS — Z00.00 ENCOUNTER FOR ANNUAL HEALTH EXAMINATION: Primary | ICD-10-CM

## 2020-02-28 DIAGNOSIS — Z65.8: ICD-10-CM

## 2020-02-28 DIAGNOSIS — F41.0 PANIC ATTACKS: ICD-10-CM

## 2020-02-28 DIAGNOSIS — E55.9 VITAMIN D DEFICIENCY: ICD-10-CM

## 2020-02-28 DIAGNOSIS — K21.00 GASTROESOPHAGEAL REFLUX DISEASE WITH ESOPHAGITIS: ICD-10-CM

## 2020-02-28 DIAGNOSIS — Z51.81 ENCOUNTER FOR MONITORING COUMADIN THERAPY: ICD-10-CM

## 2020-02-28 DIAGNOSIS — Z86.711 HISTORY OF PULMONARY EMBOLISM: ICD-10-CM

## 2020-02-28 DIAGNOSIS — R79.89 ELEVATED SERUM CREATININE: ICD-10-CM

## 2020-02-28 DIAGNOSIS — F33.2 SEVERE EPISODE OF RECURRENT MAJOR DEPRESSIVE DISORDER, WITHOUT PSYCHOTIC FEATURES (HCC): ICD-10-CM

## 2020-02-28 PROBLEM — H65.93 BILATERAL OTITIS MEDIA WITH EFFUSION: Status: RESOLVED | Noted: 2018-09-18 | Resolved: 2020-02-28

## 2020-02-28 LAB
ANION GAP SERPL CALC-SCNC: 5 MMOL/L (ref 0–18)
BUN BLD-MCNC: 12 MG/DL (ref 7–18)
BUN/CREAT SERPL: 11.3 (ref 10–20)
CALCIUM BLD-MCNC: 9.9 MG/DL (ref 8.5–10.1)
CHLORIDE SERPL-SCNC: 109 MMOL/L (ref 98–112)
CO2 SERPL-SCNC: 29 MMOL/L (ref 21–32)
CREAT BLD-MCNC: 1.06 MG/DL (ref 0.55–1.02)
GLUCOSE BLD-MCNC: 74 MG/DL (ref 70–99)
INR BLD: 1.52 (ref 0.9–1.1)
OSMOLALITY SERPL CALC.SUM OF ELEC: 294 MOSM/KG (ref 275–295)
PATIENT FASTING Y/N/NP: NO
POTASSIUM SERPL-SCNC: 4.2 MMOL/L (ref 3.5–5.1)
PSA SERPL DL<=0.01 NG/ML-MCNC: 19.1 SECONDS (ref 12.5–14.7)
SODIUM SERPL-SCNC: 143 MMOL/L (ref 136–145)

## 2020-02-28 PROCEDURE — 77067 SCR MAMMO BI INCL CAD: CPT | Performed by: FAMILY MEDICINE

## 2020-02-28 PROCEDURE — 85610 PROTHROMBIN TIME: CPT | Performed by: FAMILY MEDICINE

## 2020-02-28 PROCEDURE — 80048 BASIC METABOLIC PNL TOTAL CA: CPT | Performed by: FAMILY MEDICINE

## 2020-02-28 PROCEDURE — G0439 PPPS, SUBSEQ VISIT: HCPCS | Performed by: FAMILY MEDICINE

## 2020-02-28 RX ORDER — HYDROXYZINE HYDROCHLORIDE 25 MG/1
50 TABLET, FILM COATED ORAL 2 TIMES DAILY
COMMUNITY
Start: 2020-02-12

## 2020-02-28 RX ORDER — OMEPRAZOLE 40 MG/1
40 CAPSULE, DELAYED RELEASE ORAL DAILY
COMMUNITY
Start: 2020-02-28 | End: 2020-09-20 | Stop reason: ALTCHOICE

## 2020-02-28 SDOH — ECONOMIC STABILITY - INCOME SECURITY: PROBLEM RELATED TO HOUSING AND ECONOMIC CIRCUMSTANCES, UNSPECIFIED: Z59.9

## 2020-02-28 NOTE — PROGRESS NOTES
HPI:   Rossy Mi is a 59year old female who presents for a Medicare Subsequent Annual Wellness visit (Pt already had Initial Annual Wellness). Patient reports still being depressed and appetite down, nothing new or worse.   She continues with thera in doing things (over the last two weeks)?: Not at all  Feeling down, depressed, or hopeless (over the last two weeks)?: Several days  PHQ-2 SCORE: 1     Advanced Directive:   She does NOT have a Living Will on file in [place].    Not Discussed         She sims (H) 01/20/2020    GLU 96 01/20/2020        CBC  (most recent labs)   Lab Results   Component Value Date    WBC 5.7 10/28/2019    HGB 12.6 10/28/2019    .0 10/28/2019        ALLERGIES:   She is allergic to contrast dye [gadolinium derivatives]; macro MG/3ML) 0.083% Inhalation Nebu Soln, Take 3 mL (2.5 mg total) by nebulization every 4 (four) hours as needed for Wheezing or Shortness of Breath (cough).   IPRATROPIUM BROMIDE 0.06 % Nasal Solution, USE 2 SPRAYS IN EACH NOSTRIL FOUR TIMES DAILY AS NEEDED FO blurred vision or double vision  HEENT: denies nasal congestion, sinus pain or ST  LUNGS: denies new or unusual shortness of breath with exertion  CARDIOVASCULAR: denies chest pain on exertion, no heart palps  GI: denies abdominal pain, denies heartburn  G Clear to auscultation bilaterally, respirations unlabored   Heart:  Regular rate and rhythm, S1 and S2 normal, no murmur, rub, or gallop   Abdomen:   Soft, non-tender, bowel sounds active all four quadrants,  no masses, no organomegaly   Pelvic: Deferred PROTHROMBIN TIME (PT); Future  -     VENIPUNCTURE    Severe episode of recurrent major depressive disorder, without psychotic features ([facility] [address] and   Panic attacks and   Financial problems    Poor conflict management skills  -stable, cont f/u with therapy and six months, have you lost more than 10 pounds without trying?: (P) 1 - Yes  Has your appetite been poor?: (P) Yes  How would you describe your daily physical activity?: (P) None  How would you describe your current health state?: (P) Fair  How do you maint 02/28/2021 Update Health Maintenance if applicable     Immunizations (Update Immunization Activity if applicable)     Influenza  Covered Annually 12/3/2019 Please get every year    Pneumococcal 13 (Prevnar)  Covered Once after 65 02/20/2017 Please get once

## 2020-02-29 NOTE — TELEPHONE ENCOUNTER
From: Dayna Aguilar  To:  Karina Nugent MD  Sent: 2/28/2020 7:40 PM CST  Subject: Other    No I did not stop taking any of my meds

## 2020-03-03 ENCOUNTER — PATIENT MESSAGE (OUTPATIENT)
Dept: FAMILY MEDICINE CLINIC | Facility: CLINIC | Age: 65
End: 2020-03-03

## 2020-03-03 NOTE — TELEPHONE ENCOUNTER
Pt called in, She wanted to let us know that her /69, Pulse - 82, Blood Sugar 110 after eating a rice krispie treat. She was very shaky after this happened. She said it was very weird.    Please return call to [phone]

## 2020-03-03 NOTE — TELEPHONE ENCOUNTER
Spoke with the pt and asked her about this situation she states that she is not sure what happened, but that she was a little disoriented but she is fine now and no other episodes.     She states that she feels ok

## 2020-03-03 NOTE — TELEPHONE ENCOUNTER
From: Chelsie Celaya  To: Jesika Vuong MD  Sent: 3/3/2020 12:25 PM CST  Subject: Other    Just wanted to let you know that I am sitting in my apt and I could hear people talking but I had no clue where I was. This has never happened before.   Gaby aMrie

## 2020-03-06 RX ORDER — METHYLPHENIDATE HYDROCHLORIDE 5 MG/1
5 TABLET ORAL EVERY MORNING
Qty: 30 TABLET | Refills: 0 | Status: SHIPPED | OUTPATIENT
Start: 2020-03-06 | End: 2020-03-30

## 2020-03-06 NOTE — TELEPHONE ENCOUNTER
Methylphenidate HCl (RITALIN) 5 MG Oral Tab    [id number] - [place], [address] AT [address] [place] [address] ([address]), [phone], [phone]    Pt has enough to get buy until Tuesday morning.

## 2020-03-06 NOTE — TELEPHONE ENCOUNTER
No refill protocol for this medication.     Last refill: 2/05/2020 #30 with 0 refills  Last Visit: 2/28/2020  Next Visit:   Future Appointments   Date Time Provider Juliana Lea   5/5/2020 10:00 AM Arun Urrutia MD [facility] MARK alberto

## 2020-03-09 ENCOUNTER — NURSE ONLY (OUTPATIENT)
Dept: FAMILY MEDICINE CLINIC | Facility: CLINIC | Age: 65
End: 2020-03-09
Payer: MEDICARE

## 2020-03-09 DIAGNOSIS — D68.52 PROTHROMBIN MUTATION (HCC): Primary | ICD-10-CM

## 2020-03-09 DIAGNOSIS — Z79.01 ENCOUNTER FOR MONITORING COUMADIN THERAPY: ICD-10-CM

## 2020-03-09 DIAGNOSIS — Z51.81 ENCOUNTER FOR MONITORING COUMADIN THERAPY: ICD-10-CM

## 2020-03-09 LAB
INR BLD: 2.76 (ref 0.9–1.1)
PSA SERPL DL<=0.01 NG/ML-MCNC: 31 SECONDS (ref 12.5–14.7)

## 2020-03-09 PROCEDURE — 85610 PROTHROMBIN TIME: CPT | Performed by: FAMILY MEDICINE

## 2020-03-09 NOTE — PROGRESS NOTES
Patient presented for lab work ordered by Kanwal Henderson. One blue tube drawn with a butterfly needle in right AC space X one attempt. Pt tolerated procedure well.

## 2020-03-10 ENCOUNTER — TELEPHONE (OUTPATIENT)
Dept: FAMILY MEDICINE CLINIC | Facility: CLINIC | Age: 65
End: 2020-03-10

## 2020-03-10 NOTE — TELEPHONE ENCOUNTER
INR is nicely in range.   We've made several adjustments to lissett lately, please clarify with her eactly how she's been taking it

## 2020-03-11 ENCOUNTER — TELEPHONE (OUTPATIENT)
Dept: FAMILY MEDICINE CLINIC | Facility: CLINIC | Age: 65
End: 2020-03-11

## 2020-03-11 DIAGNOSIS — G47.33 OSA (OBSTRUCTIVE SLEEP APNEA): Primary | ICD-10-CM

## 2020-03-11 NOTE — TELEPHONE ENCOUNTER
Received note from [facility] that they want pt to have a new bipap order due to the timing of orders, and studies. PLEASE FAX NEW ORDER TO [facility] .

## 2020-03-19 RX ORDER — ENALAPRIL MALEATE 5 MG/1
TABLET ORAL
Qty: 90 TABLET | Refills: 1 | Status: SHIPPED | OUTPATIENT
Start: 2020-03-19 | End: 2020-05-07

## 2020-03-19 NOTE — TELEPHONE ENCOUNTER
Hypertension Medications Protocol Passed3/19 5:47 AM   CMP or BMP in past 12 months    Last serum creatinine< 2.0    Appointment in past 6 or next 3 months     Last refill - 4/2/19 - #90 with 3 refills  Last BMP - 2/28/20 - creatinine - 1.06  Last office v

## 2020-03-23 ENCOUNTER — TELEPHONE (OUTPATIENT)
Dept: FAMILY MEDICINE CLINIC | Facility: CLINIC | Age: 65
End: 2020-03-23

## 2020-03-27 ENCOUNTER — NURSE ONLY (OUTPATIENT)
Dept: FAMILY MEDICINE CLINIC | Facility: CLINIC | Age: 65
End: 2020-03-27
Payer: MEDICARE

## 2020-03-27 DIAGNOSIS — Z51.81 ENCOUNTER FOR THERAPEUTIC DRUG MONITORING: Primary | ICD-10-CM

## 2020-03-27 LAB
INR BLD: 3.37 (ref 0.89–1.11)
PSA SERPL DL<=0.01 NG/ML-MCNC: 34.9 SECONDS (ref 12.4–14.6)

## 2020-03-27 PROCEDURE — 85610 PROTHROMBIN TIME: CPT | Performed by: FAMILY MEDICINE

## 2020-03-29 ENCOUNTER — PATIENT MESSAGE (OUTPATIENT)
Dept: FAMILY MEDICINE CLINIC | Facility: CLINIC | Age: 65
End: 2020-03-29

## 2020-03-30 ENCOUNTER — TELEPHONE (OUTPATIENT)
Dept: FAMILY MEDICINE CLINIC | Facility: CLINIC | Age: 65
End: 2020-03-30

## 2020-03-30 RX ORDER — METHYLPHENIDATE HYDROCHLORIDE 5 MG/1
5 TABLET ORAL EVERY MORNING
Qty: 30 TABLET | Refills: 0 | Status: SHIPPED | OUTPATIENT
Start: 2020-03-30 | End: 2020-05-14

## 2020-03-30 NOTE — TELEPHONE ENCOUNTER
Patient was constipated for about two months. She took miralax and thought she was better. She is not, her stomach is cramping really bad. Doesn't want to do the miralax again it was a very bad experience. Please call back.

## 2020-03-30 NOTE — TELEPHONE ENCOUNTER
Call from patient. States she was constipated for 2 months. Didn't think anything of it. Daughter brought her miralax. Took some Saturday night and no BM. Took more Sunday morning and had very large BM this morning.  Some blood but states she has hemorrhoid

## 2020-03-30 NOTE — TELEPHONE ENCOUNTER
From: Cristhian Olguin  To: Shelly Fernando MD  Sent: 3/29/2020 12:38 PM CDT  Subject: Other    Well the miralax worked actually to good. I drank the first one with diet 7up and it really didn't do anything which I figured.  Then I took the next one with my coffe

## 2020-03-30 NOTE — TELEPHONE ENCOUNTER
I imagine she's not producing as much stool with less food intake, hence seeming constipated.   For the cramping/mild nausea stick with a BRAT diet for the next several days and once it's calmed down I want her to get metamucil wafers and start taking 2 wa

## 2020-04-10 ENCOUNTER — PATIENT MESSAGE (OUTPATIENT)
Dept: FAMILY MEDICINE CLINIC | Facility: CLINIC | Age: 65
End: 2020-04-10

## 2020-04-10 ENCOUNTER — NURSE ONLY (OUTPATIENT)
Dept: FAMILY MEDICINE CLINIC | Facility: CLINIC | Age: 65
End: 2020-04-10
Payer: MEDICARE

## 2020-04-10 DIAGNOSIS — Z79.01 ANTICOAGULATION GOAL OF INR 2.5 TO 3.5: ICD-10-CM

## 2020-04-10 DIAGNOSIS — Z51.81 ANTICOAGULATION GOAL OF INR 2.5 TO 3.5: ICD-10-CM

## 2020-04-10 DIAGNOSIS — Z86.711 HISTORY OF PULMONARY EMBOLISM: Primary | ICD-10-CM

## 2020-04-10 PROCEDURE — 85610 PROTHROMBIN TIME: CPT | Performed by: FAMILY MEDICINE

## 2020-04-10 NOTE — PROGRESS NOTES
Patient to clinic for INR per [facility]    Light blue tube drawn left medial AC x  Attempt    One attempt right lateral ac unsuccessful

## 2020-04-11 ENCOUNTER — PATIENT MESSAGE (OUTPATIENT)
Dept: FAMILY MEDICINE CLINIC | Facility: CLINIC | Age: 65
End: 2020-04-11

## 2020-04-11 DIAGNOSIS — Z79.01 ENCOUNTER FOR MONITORING COUMADIN THERAPY: ICD-10-CM

## 2020-04-11 DIAGNOSIS — Z79.01 ANTICOAGULATION GOAL OF INR 2.5 TO 3.5: ICD-10-CM

## 2020-04-11 DIAGNOSIS — Z95.828 PRESENCE OF IVC FILTER: ICD-10-CM

## 2020-04-11 DIAGNOSIS — Z86.711 HISTORY OF PULMONARY EMBOLISM: Primary | ICD-10-CM

## 2020-04-11 DIAGNOSIS — Z51.81 ENCOUNTER FOR MONITORING COUMADIN THERAPY: ICD-10-CM

## 2020-04-11 DIAGNOSIS — Z51.81 ANTICOAGULATION GOAL OF INR 2.5 TO 3.5: ICD-10-CM

## 2020-04-13 ENCOUNTER — PATIENT MESSAGE (OUTPATIENT)
Dept: FAMILY MEDICINE CLINIC | Facility: CLINIC | Age: 65
End: 2020-04-13

## 2020-04-13 RX ORDER — WARFARIN SODIUM 4 MG/1
TABLET ORAL
Qty: 90 TABLET | Refills: 0 | OUTPATIENT
Start: 2020-04-13

## 2020-04-13 NOTE — TELEPHONE ENCOUNTER
From: Dariusz Ortega  To: Aren Tapia MD  Sent: 4/10/2020 4:04 PM CDT  Subject: Non-Urgent Medical Question    Dr. Cathern Hatchet    Have a quick question. I forgot to take all of my meds last night so will that have an impact on my blooddraw today?   Shirly Rinne

## 2020-04-13 NOTE — TELEPHONE ENCOUNTER
Oscar Su, can you please look into the home monitoring INR for [facility] as you did for our other patient last week? Thank you.

## 2020-04-13 NOTE — TELEPHONE ENCOUNTER
From: Blanca Spurling  To: Gurpreet Nowak MD  Sent: 4/11/2020 9:15 AM CDT  Subject: Visit Follow-up Question    You might as well check on the home kit and see what they say. I can do that don't think it would be to hard to do.  Let me know what you find out pl

## 2020-04-13 NOTE — TELEPHONE ENCOUNTER
Spoke with Muriel Oro at [facility] who states they work with patient insurance.   Will need order, face sheet, insurance info and notes faxed to [phone]

## 2020-04-13 NOTE — TELEPHONE ENCOUNTER
Copy of order, insurance card,  Face sheet, med list, snap shot with pt diagnosis faxed to [facility] in [place]

## 2020-04-13 NOTE — TELEPHONE ENCOUNTER
From: Ron Face  To: Leonid Barrios MD  Sent: 4/13/2020 12:17 PM CDT  Subject: Other    I'm sorry I found them in the Health section where they always are. When I read the date I thought that was the time before.  I can't even remember what day of the wee

## 2020-04-14 ENCOUNTER — TELEPHONE (OUTPATIENT)
Dept: FAMILY MEDICINE CLINIC | Facility: CLINIC | Age: 65
End: 2020-04-14

## 2020-04-16 ENCOUNTER — MED REC SCAN ONLY (OUTPATIENT)
Dept: FAMILY MEDICINE CLINIC | Facility: CLINIC | Age: 65
End: 2020-04-16

## 2020-04-20 ENCOUNTER — PATIENT MESSAGE (OUTPATIENT)
Dept: FAMILY MEDICINE CLINIC | Facility: CLINIC | Age: 65
End: 2020-04-20

## 2020-04-20 NOTE — TELEPHONE ENCOUNTER
From: Connie Quintero  To:  Lopez Coreas MD  Sent: 2020 12:52 PM CDT  Subject: Other    Good afternoon Dr. Sylvie Reyna,    The place that my INR machine is coming from just called and said they were putting it in the mail today so I should get it by Thursday an

## 2020-04-25 ENCOUNTER — PATIENT MESSAGE (OUTPATIENT)
Dept: FAMILY MEDICINE CLINIC | Facility: CLINIC | Age: 65
End: 2020-04-25

## 2020-04-25 ENCOUNTER — HOSPITAL ENCOUNTER (INPATIENT)
Facility: HOSPITAL | Age: 65
LOS: 3 days | Discharge: NURSING, REHAB OR LONG-TERM CARE | DRG: 092 | End: 2020-04-28
Attending: INTERNAL MEDICINE | Admitting: INTERNAL MEDICINE
Payer: MEDICARE

## 2020-04-25 ENCOUNTER — TELEPHONE (OUTPATIENT)
Dept: FAMILY MEDICINE CLINIC | Facility: CLINIC | Age: 65
End: 2020-04-25

## 2020-04-25 PROBLEM — R27.0 ATAXIA: Status: ACTIVE | Noted: 2020-04-25

## 2020-04-25 PROCEDURE — 99223 1ST HOSP IP/OBS HIGH 75: CPT | Performed by: INTERNAL MEDICINE

## 2020-04-25 RX ORDER — WARFARIN SODIUM 2 MG/1
2 TABLET ORAL NIGHTLY
Status: DISCONTINUED | OUTPATIENT
Start: 2020-04-25 | End: 2020-04-26

## 2020-04-25 RX ORDER — VENLAFAXINE 37.5 MG/1
37.5 TABLET ORAL DAILY
Status: DISCONTINUED | OUTPATIENT
Start: 2020-04-26 | End: 2020-04-28

## 2020-04-25 RX ORDER — ALBUTEROL SULFATE 90 UG/1
2 AEROSOL, METERED RESPIRATORY (INHALATION) EVERY 4 HOURS PRN
Status: DISCONTINUED | OUTPATIENT
Start: 2020-04-25 | End: 2020-04-25

## 2020-04-25 RX ORDER — GABAPENTIN 300 MG/1
600 CAPSULE ORAL 2 TIMES DAILY
Status: DISCONTINUED | OUTPATIENT
Start: 2020-04-25 | End: 2020-04-28

## 2020-04-25 RX ORDER — ACETAMINOPHEN 325 MG/1
650 TABLET ORAL EVERY 4 HOURS PRN
Status: DISCONTINUED | OUTPATIENT
Start: 2020-04-25 | End: 2020-04-28

## 2020-04-25 RX ORDER — ASPIRIN 325 MG
325 TABLET ORAL DAILY
Status: DISCONTINUED | OUTPATIENT
Start: 2020-04-26 | End: 2020-04-27

## 2020-04-25 RX ORDER — METHYLPHENIDATE HYDROCHLORIDE 5 MG/1
5 TABLET ORAL EVERY MORNING
Status: DISCONTINUED | OUTPATIENT
Start: 2020-04-26 | End: 2020-04-28

## 2020-04-25 RX ORDER — ALBUTEROL SULFATE 2.5 MG/3ML
2.5 SOLUTION RESPIRATORY (INHALATION) EVERY 4 HOURS PRN
Status: DISCONTINUED | OUTPATIENT
Start: 2020-04-25 | End: 2020-04-28

## 2020-04-25 RX ORDER — SODIUM CHLORIDE 9 MG/ML
INJECTION, SOLUTION INTRAVENOUS CONTINUOUS
Status: DISCONTINUED | OUTPATIENT
Start: 2020-04-25 | End: 2020-04-26

## 2020-04-25 RX ORDER — ACETAMINOPHEN 650 MG/1
650 SUPPOSITORY RECTAL EVERY 4 HOURS PRN
Status: DISCONTINUED | OUTPATIENT
Start: 2020-04-25 | End: 2020-04-28

## 2020-04-25 RX ORDER — BUPROPION HYDROCHLORIDE 100 MG/1
200 TABLET, EXTENDED RELEASE ORAL 2 TIMES DAILY
Status: DISCONTINUED | OUTPATIENT
Start: 2020-04-25 | End: 2020-04-28

## 2020-04-25 RX ORDER — PANTOPRAZOLE SODIUM 40 MG/1
40 TABLET, DELAYED RELEASE ORAL
Status: DISCONTINUED | OUTPATIENT
Start: 2020-04-26 | End: 2020-04-28

## 2020-04-25 RX ORDER — METOCLOPRAMIDE HYDROCHLORIDE 5 MG/ML
10 INJECTION INTRAMUSCULAR; INTRAVENOUS EVERY 8 HOURS PRN
Status: DISCONTINUED | OUTPATIENT
Start: 2020-04-25 | End: 2020-04-28

## 2020-04-25 RX ORDER — ONDANSETRON 2 MG/ML
4 INJECTION INTRAMUSCULAR; INTRAVENOUS EVERY 6 HOURS PRN
Status: DISCONTINUED | OUTPATIENT
Start: 2020-04-25 | End: 2020-04-28

## 2020-04-25 RX ORDER — ATORVASTATIN CALCIUM 80 MG/1
80 TABLET, FILM COATED ORAL NIGHTLY
Status: DISCONTINUED | OUTPATIENT
Start: 2020-04-25 | End: 2020-04-27

## 2020-04-25 RX ORDER — CYANOCOBALAMIN (VITAMIN B-12) 1000 MCG
2 TABLET, EXTENDED RELEASE ORAL DAILY
COMMUNITY
End: 2020-05-14

## 2020-04-25 RX ORDER — HYDROXYZINE HYDROCHLORIDE 25 MG/1
25 TABLET, FILM COATED ORAL 3 TIMES DAILY
Status: DISCONTINUED | OUTPATIENT
Start: 2020-04-25 | End: 2020-04-28

## 2020-04-25 RX ORDER — WARFARIN SODIUM 4 MG/1
4 TABLET ORAL DAILY
COMMUNITY
End: 2020-06-01

## 2020-04-25 RX ORDER — ASPIRIN 300 MG
300 SUPPOSITORY, RECTAL RECTAL DAILY
Status: DISCONTINUED | OUTPATIENT
Start: 2020-04-26 | End: 2020-04-27

## 2020-04-25 RX ORDER — LABETALOL HYDROCHLORIDE 5 MG/ML
10 INJECTION, SOLUTION INTRAVENOUS EVERY 10 MIN PRN
Status: DISCONTINUED | OUTPATIENT
Start: 2020-04-25 | End: 2020-04-28

## 2020-04-25 RX ORDER — MONTELUKAST SODIUM 10 MG/1
10 TABLET ORAL DAILY
Status: DISCONTINUED | OUTPATIENT
Start: 2020-04-26 | End: 2020-04-28

## 2020-04-25 NOTE — TELEPHONE ENCOUNTER
Patient had sent me a distressed my chart message Friday night, I had offered to call her this weekend and she accepted.   I called her today and she was in the ER, having called 911 to get her there after she woke up this morning able to stand up, but not

## 2020-04-25 NOTE — PROGRESS NOTES
My Chart/ Video/Telephone Visit Check-In Due to [address] verbally consents to a Telephone  Check-In service through the on call service on 04/25/20.    Patient understands and accepts financial responsibility for any deductible, co-i TAKE 1 TABLET(10 MG) BY MOUTH DAILY 90 tablet 3   • ATORVASTATIN 40 MG Oral Tab TAKE 1 TABLET BY MOUTH NIGHTLY.  90 tablet 3   • METOPROLOL SUCCINATE ER 25 MG Oral Tablet 24 Hr TAKE 1 TABLET(25 MG) BY MOUTH DAILY 90 tablet 3   • gabapentin 300 MG Oral Cap T Depression    • Esophageal reflux    • Obesity    • Obstructive sleep apnea (adult) (pediatric) 4/25/2019   • Osteoarthritis    • Other and unspecified hyperlipidemia    • PE (pulmonary embolism) Feb 2004 and March 2004    x2.  prothrombin mutation found af this encounter. None    Meds & Refills for this Visit:  Requested Prescriptions      No prescriptions requested or ordered in this encounter         No follow-ups on file. Authorized by Leeanna Garzon M.D.          Please note that the following v

## 2020-04-26 ENCOUNTER — APPOINTMENT (OUTPATIENT)
Dept: CV DIAGNOSTICS | Facility: HOSPITAL | Age: 65
DRG: 092 | End: 2020-04-26
Attending: INTERNAL MEDICINE
Payer: MEDICARE

## 2020-04-26 ENCOUNTER — APPOINTMENT (OUTPATIENT)
Dept: ULTRASOUND IMAGING | Facility: HOSPITAL | Age: 65
DRG: 092 | End: 2020-04-26
Attending: Other
Payer: MEDICARE

## 2020-04-26 ENCOUNTER — APPOINTMENT (OUTPATIENT)
Dept: MRI IMAGING | Facility: HOSPITAL | Age: 65
DRG: 092 | End: 2020-04-26
Attending: Other
Payer: MEDICARE

## 2020-04-26 PROBLEM — G47.33 OSA TREATED WITH BIPAP: Status: ACTIVE | Noted: 2019-04-25

## 2020-04-26 PROCEDURE — 99223 1ST HOSP IP/OBS HIGH 75: CPT | Performed by: OTHER

## 2020-04-26 PROCEDURE — 70551 MRI BRAIN STEM W/O DYE: CPT | Performed by: OTHER

## 2020-04-26 PROCEDURE — 93306 TTE W/DOPPLER COMPLETE: CPT | Performed by: INTERNAL MEDICINE

## 2020-04-26 PROCEDURE — 70544 MR ANGIOGRAPHY HEAD W/O DYE: CPT | Performed by: OTHER

## 2020-04-26 PROCEDURE — 72148 MRI LUMBAR SPINE W/O DYE: CPT | Performed by: OTHER

## 2020-04-26 PROCEDURE — 99232 SBSQ HOSP IP/OBS MODERATE 35: CPT | Performed by: INTERNAL MEDICINE

## 2020-04-26 PROCEDURE — 93880 EXTRACRANIAL BILAT STUDY: CPT | Performed by: OTHER

## 2020-04-26 RX ORDER — VENLAFAXINE 75 MG/1
150 TABLET ORAL NIGHTLY
Status: DISCONTINUED | OUTPATIENT
Start: 2020-04-26 | End: 2020-04-28

## 2020-04-26 RX ORDER — WARFARIN SODIUM 2 MG/1
4 TABLET ORAL NIGHTLY
Status: DISCONTINUED | OUTPATIENT
Start: 2020-04-26 | End: 2020-04-28

## 2020-04-26 RX ORDER — QUETIAPINE 25 MG/1
25 TABLET, FILM COATED ORAL NIGHTLY
Status: DISCONTINUED | OUTPATIENT
Start: 2020-04-26 | End: 2020-04-28

## 2020-04-26 RX ORDER — TRAZODONE HYDROCHLORIDE 100 MG/1
100 TABLET ORAL NIGHTLY
Status: DISCONTINUED | OUTPATIENT
Start: 2020-04-26 | End: 2020-04-28

## 2020-04-26 NOTE — PROGRESS NOTES
04/26/20 1739   Clinical Encounter Type   Visited With Patient   Routine Visit Follow-up   Continue Visiting No   Patient Spiritual Encounters   Spiritual Assessment Completed Yes   Spiritual Needs  assessed for spirituak needs.    Spiritual Inte

## 2020-04-26 NOTE — PHYSICAL THERAPY NOTE
PT eval and treat orders received per stroke protocol. ADT [email or web address]. MRI and Neuro consult pending. Patient on 24 hour bedrest.  Will hold until seen by neuro and activity is upgraded.

## 2020-04-26 NOTE — PROGRESS NOTES
[facility]  Progress Note     James Ray Patient Status:  Inpatient    9/10/1955 MRN [id number]   [facility] 7NE-A Attending Dawson Hurst, DO   Hosp Day # 1 PCP Jose Guadalupe Polanco MD     Chief Complaint: Ataxia    S: Patient seen and • Warfarin Sodium  4 mg Oral Nightly   • Venlafaxine HCl  150 mg Oral Nightly   • traZODone HCl  100 mg Oral Nightly   • Fluticasone Furoate-Vilanterol  1 puff Inhalation Daily   • buPROPion HCl ER (SR)  200 mg Oral BID   • gabapentin  600 mg Oral BID

## 2020-04-26 NOTE — SLP NOTE
Re-attempted this afternoon to evaluate patient, however, patient currently gone from floor for procedure. Will follow-up with patient tomorrow.

## 2020-04-26 NOTE — CONSULTS
Neurology H&P    Max Arce Patient Status:  Inpatient    9/10/1955 MRN [id number]   [facility] 7NE-A Attending Ritchie Samuel, DO   Hosp Day # 1 PCP Travon Tipton MD     Subjective:  Max Arce is a(n) 59year old female with a PMH sign Depression     Financial problems     History of pulmonary embolism     Encounter for monitoring Coumadin therapy     Anticoagulation goal of INR 2.5 to 3.5     Chronic back pain     Spinal stenosis     Allergic rhinitis     Hyperlipidemia     Vitamin D de • Heart Disorder Mother    • Psychiatric Mother         dementia   • Hypertension Sister    • Hypertension Brother             ROS:  10 point ROS completed and was negative, except for pertinent positive and negatives stated in subjective.     Objective/P 04/26/2020    PTP 27.5 04/26/2020    PGLU 83 04/26/2020       Imaging:  MRI/MRA 4/26/20     CONCLUSION:       1. Minimal punctate FLAIR abnormalities the white matter are noted. These may be sequelae of migraine headaches.   This may also be sequelae of mi bleed and no significant flow limiting stenosis. Her symptoms as described (no weakness but acute onset shuffling gait) is not very typical for a stroke. No SUMMER territory infarcts seen on MRI.  She had no numbness or weakness on exam and DTRs present in pa

## 2020-04-26 NOTE — PLAN OF CARE
NURSING ADMISSION NOTE      Patient admitted via cart. Oriented to room. Safety precautions initiated. Bed in low position. Call light in reach. Received pt at approx 2000. Denies pain/discomfort at this time.   Neuro checks q2h initiated at juan strengthening/mobility  - Encourage toileting schedule  Outcome: Progressing     Problem: DISCHARGE PLANNING  Goal: Discharge to home or other facility with appropriate resources  Description  INTERVENTIONS:  - Identify barriers to discharge w/pt and careg

## 2020-04-26 NOTE — PROGRESS NOTES
04/26/20 1249   Clinical Encounter Type   Visited With Health care provider  (PAYAM Yeung)   Routine Visit   (Responded to patient request for prayer )   Patient's Supportive Strategies/Resources 1:1 interaction with patient's RN   Attempted visit.  Per RN

## 2020-04-26 NOTE — SLP NOTE
Orders received. Spoke with RN who stated patient is occupied with procedure and then neurology testing. Will attempt to return later today to evaluate.

## 2020-04-26 NOTE — PHYSICAL THERAPY NOTE
PHYSICAL THERAPY EVALUATION - INPATIENT     Room Number: 5852/9952-U  Evaluation Date: 4/26/2020  Type of Evaluation: Initial  Physician Order: PT Eval and Treat    Presenting Problem: ataxia  Reason for Therapy: Mobility Dysfunction and Discharge Plan hyperlipidemia    • PE (pulmonary embolism) Feb 2004 and March 2004    x2.  prothrombin mutation found after 2nd one   • Prothrombin mutation [facility])    • Spinal stenosis    • Unspecified essential hypertension        Past Surgical History  Past Surgical Hist PAIN ASSESSMENT  Ratin  Location: denies at this time       COGNITION  · Arousal/Alertness:  appropriate responses to stimuli  · Following Commands:  follows one step commands without difficulty  · Motor Planning: intact    RANGE OF MOTION AND STRE (ft): 2  Assistive Device: Rolling walker  Pattern: R Foot flat;L Foot flat; Ataxic  Stoop/Curb Assistance: Not tested  Comment : n/a      Skilled Therapy Provided:     During eval and treat, pt on room air.      Bed Mobility:  Rolling right = indep   Dallas Royal tolerated  Neuromuscular re-educate  Posture  Transfer training    Patient End of Session: Up in chair;Needs met;Call light within reach;RN aware of session/findings; All patient questions and concerns addressed    Eval completed.     ASSESSMENT     Patient Goal #3 Patient is able to ambulate 25 feet with assist device: walker - rolling at assistance level: moderate assistance     Goal #4    Goal #5    Goal #6    Goal Comments: Goals established on 4/26/2020    Therapist treating pt in surgical mask and duncan

## 2020-04-26 NOTE — OCCUPATIONAL THERAPY NOTE
OCCUPATIONAL THERAPY EVALUATION - INPATIENT     Room Number: 3508/1980-U  Evaluation Date: 4/26/2020  Type of Evaluation: Initial  Presenting Problem: Ataxia    Physician Order: IP Consult to Occupational Therapy  Reason for Therapy: ADL/IADL Dysfunction a Osteoarthritis    • Other and unspecified hyperlipidemia    • PE (pulmonary embolism) Feb 2004 and March 2004    x2.  prothrombin mutation found after 2nd one   • Prothrombin mutation [facility])    • Spinal stenosis    • Unspecified essential hypertension redirect  Orientation Level:  oriented to place, oriented to time and oriented to situation  Memory:  impaired working memory, decreased recall of precautions and decreased recall of recent events  Following Commands:  follows one step commands with increa need…  -   Putting on and taking off regular lower body clothing?: A Lot  -   Bathing (including washing, rinsing, drying)?: A Lot  -   Toileting, which includes using toilet, bedpan or urinal? : A Lot  -   Putting on and taking off regular upper body clot staff;Needs met;Call light within reach;RN aware of session/findings; All patient questions and concerns addressed; Alarm set    ASSESSMENT     Patient is a 59year old female admitted on 4/25/2020 for Ataxia.  Complete medical history and occupational profil deficits impacting engagement in ADL/IADL HIGH  5+ performance deficits    Client Assessment/Performance Deficits HIGH - Comorbidities and significant modifications of tasks    Clinical Decision Making HIGH - Analysis of occupational profile, comprehensive

## 2020-04-26 NOTE — OCCUPATIONAL THERAPY NOTE
OT eval and treat orders received per stroke protocol. ADT Can@[employer]. MRI and Neuro consult pending. Patient on 24 hour bedrest.  Will hold until seen by neuro and activity is upgraded.

## 2020-04-26 NOTE — RESPIRATORY THERAPY NOTE
CHINTAN : EQUIPMENT USE: DAILY SUMMARY                                            SET MODE: BILEVEL WITH BIFLEX                                          USAGE IN HOURS: 6:41                                          90%

## 2020-04-26 NOTE — H&P
[facility]  History and Physical     Zeferinomaureen Mendenhall Patient Status:  Inpatient    9/10/1955 MRN [id number]   [facility] 7NE-A Attending Magali Ashley MD   Hosp Day # 0 PCP Jony Alex MD     Chief Complaint: ataxia    History of Heart Disorder Mother    • Psychiatric Mother         dementia   • Hypertension Sister    • Hypertension Brother       Allergies:   Contrast Dye [Gadol*    HIVES  Macrobid [Nitrofura*    RASH  Penicillins             RASH  Sulfa Antibiotics       UNKNOWN NEBULIZER EVERY 4 HOURS AS NEEDED, Disp: 1620 mL, Rfl: 1  Glucose Blood In Vitro Strip, Use BID as directed to check blood sugar and guide food decisions dx:R73.09 elevated HGBA1C, Disp: 200 each, Rfl: 3  Albuterol Sulfate HFA (VENTOLIN HFA) 108 (90 Base) rhonchi. Cardiovascular: S1, S2. Regular rate and rhythm. No murmurs, rubs or gallops. Equal pulses. Chest and Back: No tenderness or deformity. Abdomen: Soft, nontender, nondistended. Positive bowel sounds. No rebound, guarding or organomegaly.   Linda Prince

## 2020-04-26 NOTE — PLAN OF CARE
Assumed care @0730  low sbp overnight and once @1200, Next sbp stable. A&Ox4, Neuro's q4 with no changes. NIH 0.   RA    NSR,  Denies Pain, Up with 2 assist and walker stand pivot. Tolerating Regular diet.   Low blood sugar (56) in AM.  Pt received 2 b planning  - Arrange for needed discharge resources and transportation as appropriate  - Identify discharge learning needs (meds, wound care, etc)  - Arrange for interpreters to assist at discharge as needed  - Consider post-discharge preferences of patient

## 2020-04-27 ENCOUNTER — VIRTUAL PHONE E/M (OUTPATIENT)
Dept: FAMILY MEDICINE CLINIC | Facility: CLINIC | Age: 65
End: 2020-04-27
Payer: MEDICARE

## 2020-04-27 DIAGNOSIS — R53.1 WEAKNESS: ICD-10-CM

## 2020-04-27 DIAGNOSIS — R25.1 SHAKINESS: Primary | ICD-10-CM

## 2020-04-27 PROCEDURE — 99233 SBSQ HOSP IP/OBS HIGH 50: CPT | Performed by: OTHER

## 2020-04-27 PROCEDURE — 99441 PHONE E/M BY PHYS 5-10 MIN: CPT | Performed by: FAMILY MEDICINE

## 2020-04-27 PROCEDURE — 99232 SBSQ HOSP IP/OBS MODERATE 35: CPT | Performed by: HOSPITALIST

## 2020-04-27 RX ORDER — ATORVASTATIN CALCIUM 40 MG/1
40 TABLET, FILM COATED ORAL NIGHTLY
Status: DISCONTINUED | OUTPATIENT
Start: 2020-04-27 | End: 2020-04-28

## 2020-04-27 NOTE — DIETARY NOTE
[facility]     Admitting diagnosis:  ataxia  Ataxia    Ht: 157.5 cm (5' 2\")  Wt: 88 kg (194 lb). This is 176 % of IBW  Body mass index is 35.48 kg/m².   IBW: 50kg    Labs/Meds reviewed    Diet: Orders Placed This E

## 2020-04-27 NOTE — PHYSICAL THERAPY NOTE
PHYSICAL THERAPY TREATMENT NOTE - INPATIENT    Room Number: 8639/9201-C     Session: 1   Number of Visits to Meet Established Goals: 5    Presenting Problem: ataxia     History related to current admission:      Pt is 59year old female admitted on 4/25/2 Prothrombin mutation [facility])    • Spinal stenosis    • Unspecified essential hypertension        Past Surgical History  Past Surgical History:   Procedure Laterality Date   • CHOLECYSTECTOMY  2006   • IR IVC FILTER PLACEMENT     • KNEE REPLACEMENT SURGERY  0 75  Assistive Device: Rolling walker  Pattern: Ataxic; Shuffle  Stoop/Curb Assistance: Not tested  Comment : n/a    Skilled Therapy Provided: Pt performed sit to stand with min A.  Pt ambulated within hallway with RW min A for balance, chair follow for safe assistance level: moderate assistance   met  New: 150' with CGA   Goal #4     Goal #5     Goal #6     Goal Comments: Goals established on 4/26/2020;goals ongoing 4/27

## 2020-04-27 NOTE — CONSULTS
[address] [place] [address] Patient Status:  Inpatient    9/10/1955 MRN [id number]   [facility] 7NE-A Attending Toya Ambrocio MD   Hosp Day # 2 PCP Natalie Metcalf MD     Patient Identification  Camron Spears is a 59year old female.   DO Circumstances (i.e., potential caregivers): lives alone  Home Environment / Accessibility: 1-story house/ trailer, apartment  Current Functional Status: Min A 76 ft      Past Medical History:  @Medical hx@  Past Medical History:   Diagnosis Date   • Allerg 200 mg, 200 mg, Oral, BID  gabapentin (NEURONTIN) cap 600 mg, 600 mg, Oral, BID  hydrOXYzine HCl (ATARAX) tab 25 mg, 25 mg, Oral, TID  Methylphenidate HCl (RITALIN) tab 5 mg, 5 mg, Oral, QAM  Pantoprazole Sodium (PROTONIX) EC tab 40 mg, 40 mg, Oral, QAM AC pressure 105/84, pulse 81, temperature 98 °F (36.7 °C), temperature source Oral, resp. rate 14, height 5' 2\" (1.575 m), weight 194 lb (88 kg), SpO2 95 %, not currently breastfeeding.     Intake/Output Summary (Last 24 hours) at 4/27/2020 4612  Last data fi ASSESSMENT:  Impaired mobility and self-care secondary to new onset gait ataxia    H/o falls    Anxiety/depression    H/o PE.        PLAN:          Please consider Cervical spine imaging also to r/o Cervical Myelopathy         Base

## 2020-04-27 NOTE — TELEPHONE ENCOUNTER
From: Cristhian Olguin  To: Shelly Fernando MD  Sent: 4/25/2020 11:18 AM CDT  Subject: Non-Urgent Medical Question    Did Dr Jael Mcnamara talk to you earlier? Sorry can't spell his name. I called the on call line and I he was on call so I talked to him.  When I stand

## 2020-04-27 NOTE — PLAN OF CARE
Assumed care at 0730  A&Ox4  Neuro checks done Q4H  NSR on tele  Denies pain or discomfort  Able to stand and pivot to chair/commode  Having difficulty walking, shuffling feet, neuro aware  Plan for acute rehab at d/c, SW made aware  Will continue to monit

## 2020-04-27 NOTE — SLP NOTE
RN notified clinician speech therapy orders discontinued. Will sign off. Thank you.     Sabine Syed M.S. [id number] [facility]  Pager [phone]

## 2020-04-27 NOTE — BH PROGRESS NOTE
[facility] Resource Referral Counselor Note    Bayron Smiley Patient Status:  Inpatient    9/10/1955 MRN [id number]   [facility] 7NE-A Attending Kathya Dang MD   Hosp Day # 2 PCP Berny Garza MD       S(subjective) The pt admit

## 2020-04-27 NOTE — PROGRESS NOTES
[id number] [facility] Neurology Progress Note    Bayron Smiley Patient Status:  Inpatient    9/10/1955 MRN [id number]   [facility] 7NE-A Attending Kathya Dang MD   Hosp Day # 2 PCP Berny Garza MD         Subjective:  Bayron Smiley is a(n) carotid bulb identified. Incidental discovery of solid thyroid nodule. Suggest six-month thyroid ultrasound follow-up. MRI/MRA 4/26/20     CONCLUSION:       1.  Minimal punctate FLAIR abnormalities the white matter are noted. Lelon Bonine may be sequelae of Furoate-Vilanterol  1 puff Inhalation Daily   • buPROPion HCl ER (SR)  200 mg Oral BID   • gabapentin  600 mg Oral BID   • hydrOXYzine HCl  25 mg Oral TID   • Methylphenidate HCl  5 mg Oral QAM   • Pantoprazole Sodium  40 mg Oral QAM AC   • Montelukast Sod shuffle her feet but not able to lift her feet up to walk, She denies back pain, PT with recs for acute rehab   She is already on coumadin, MRI brain is negative with discuss with Dr Inocencia Godinez on discontinuing aspirin and stroke protocol     Nubia Ramirez clonus  Sensory: intact to light touch, pin, vibration and proprioception throughout   Coord: FNF intact with no tremor or dysmetria  Romberg: deferred  Gait: deferred    Imaging: no new imaging; prior imaging as noted above    Labs:   BMP:   No results fo or parkinsonian features noted on exam - B12 was done and normal - no recent injury or neck pain - shoulders weak but patient states this is chronic from rotator cuff injury - patient reportedly walking with aid of PT and would recommend continue PT / OT a

## 2020-04-27 NOTE — TELEPHONE ENCOUNTER
From: Renetta Deluca  To: Luis Manuel Pelaez MD  Sent: 4/25/2020 4:14 PM CDT  Subject: Other    Just wanted to let you know that their transferring me to [facility] in about an hour. They think I might have had a stroke something in the middle of the night.   Sushma Diaz

## 2020-04-27 NOTE — OCCUPATIONAL THERAPY NOTE
OCCUPATIONAL THERAPY TREATMENT NOTE - INPATIENT     Room Number: 8073/0393-C  Session: 1   Number of Visits to Meet Established Goals: 7    Presenting Problem: Ataxia    History related to current admission: Pt is 59year old female admitted on 4/25/2020 f x2. prothrombin mutation found after 2nd one   • Prothrombin mutation [facility])    • Spinal stenosis    • Unspecified essential hypertension        Past Surgical History  Past Surgical History:   Procedure Laterality Date   • CHOLECYSTECTOMY  2006   • IR IVC F Completed B UE AROM. Fine motor and strength equal.  Chair alarm on. PPE worn by this OT: surgical mask and gloves. Patient End of Session: Up in chair;Needs met;Call light within reach;RN aware of session/findings; All patient questions and concerns

## 2020-04-27 NOTE — PLAN OF CARE
Patient is alert and oriented. Denies pain or nausea. Oxygen saturation remains above 90% on room air. Bipap on while sleeping. Blood pressure in the 90's. Doctor updated. Patient asymptomatic.  Transfers to bedside commode with two assist. Discharge [facility] post-discharge preferences of patient/family/discharge partner  - Complete POLST form as appropriate  - Assess patient's ability to be responsible for managing their own health  - Refer to Case Management Department for coordinating discharge planning if t

## 2020-04-27 NOTE — CM/SW NOTE
04/27/20 1100   CM/SW Referral Data   Referral Source Physician   Reason for Referral Discharge planning   Informant Patient   Patient Info   Patient's Mental Status Alert;Oriented   Patient's Home Environment Condo/Apt with elevator   Patient lives wit

## 2020-04-27 NOTE — RESPIRATORY THERAPY NOTE
CHINTAN - Equipment Use Daily Summary:                  . Set Mode: BI-FLEX                . Usage in hours: 9:30                . 90% Pressure (EPAP) level: 10                . 90% Insp. Pressure (IPAP): 12                . AHI: 7.7                .  Supplement

## 2020-04-27 NOTE — PROGRESS NOTES
[facility]  Progress Note     Chelsie Belia Patient Status:  Inpatient    9/10/1955 MRN [id number]   [facility] 7NE-A Attending Nanci Latham, DO   Hosp Day # 2 PCP Jesika Vuong MD     Chief Complaint: Ataxia    S: Patient seen and mg Oral Nightly   • QUEtiapine Fumarate  25 mg Oral Nightly   • Warfarin Sodium  4 mg Oral Nightly   • Venlafaxine HCl  150 mg Oral Nightly   • traZODone HCl  100 mg Oral Nightly   • Fluticasone Furoate-Vilanterol  1 puff Inhalation Daily   • buPROPion HCl

## 2020-04-28 ENCOUNTER — TELEPHONE (OUTPATIENT)
Dept: FAMILY MEDICINE CLINIC | Facility: CLINIC | Age: 65
End: 2020-04-28

## 2020-04-28 ENCOUNTER — TELEPHONE (OUTPATIENT)
Dept: CARDIOLOGY UNIT | Facility: HOSPITAL | Age: 65
End: 2020-04-28

## 2020-04-28 VITALS
DIASTOLIC BLOOD PRESSURE: 81 MMHG | BODY MASS INDEX: 35.7 KG/M2 | WEIGHT: 194 LBS | HEART RATE: 87 BPM | RESPIRATION RATE: 18 BRPM | SYSTOLIC BLOOD PRESSURE: 112 MMHG | HEIGHT: 62 IN | TEMPERATURE: 98 F | OXYGEN SATURATION: 98 %

## 2020-04-28 PROCEDURE — 99239 HOSP IP/OBS DSCHRG MGMT >30: CPT | Performed by: HOSPITALIST

## 2020-04-28 NOTE — PROGRESS NOTES
[facility]  Progress Note     Jose G Chapman Patient Status:  Inpatient    9/10/1955 MRN [id number]   [facility] 7NE-A Attending Magalys Marquis, DO   Hosp Day # 3 PCP Antoinette Maher MD     Chief Complaint: Ataxia    S: Patient seen and Epic.    Medications:   • atorvastatin  40 mg Oral Nightly   • QUEtiapine Fumarate  25 mg Oral Nightly   • Warfarin Sodium  4 mg Oral Nightly   • Venlafaxine HCl  150 mg Oral Nightly   • traZODone HCl  100 mg Oral Nightly   • Fluticasone Furoate-Vilanterol

## 2020-04-28 NOTE — PHYSICAL THERAPY NOTE
PHYSICAL THERAPY TREATMENT NOTE - INPATIENT    Room Number: 0973/0807-X     Session: 2  Number of Visits to Meet Established Goals: 5    Presenting Problem: ataxia     History related to current admission:      Pt is 59year old female admitted on 4/25/20 Prothrombin mutation [facility])    • Spinal stenosis    • Unspecified essential hypertension        Past Surgical History  Past Surgical History:   Procedure Laterality Date   • CHOLECYSTECTOMY  2006   • IR IVC FILTER PLACEMENT     • KNEE REPLACEMENT SURGERY  0 75  Assistive Device: Rolling walker  Pattern: Ataxic; Shuffle  Stoop/Curb Assistance: Not tested  Comment : n/a    Skilled Therapy Provided: The pt performed balance ex in standing: marching in place, hip abduction.  Needs extra time to maintain stability,

## 2020-04-28 NOTE — CM/SW NOTE
04/28/20 1217   Discharge disposition   Expected discharge disposition Rehab [address]   Name of Facillity/Home Care/Hospice [facility]   Patient is Discharged to a [address] [place] [address] Yes   Discharge transportation [facility]

## 2020-04-28 NOTE — CM/SW NOTE
Pt has been cleared for d/c. Pt will [facility] to [facility] said [facility] will go into Room 3302. RN to call report to [phone]. Pt can go by [employer] - she agreeable to the cost although her medicaid may pay for it.   [facility] is scheduled to pick

## 2020-04-28 NOTE — PLAN OF CARE
Assumed care at [address]  Not in acute distress  No SOB at RA  Denies chest pain, NSR per tele  Denies pain  Voids freely  Up with assistance   Plan discharge today to rehab-

## 2020-04-28 NOTE — PLAN OF CARE
NURSING DISCHARGE NOTE    Discharged Rehab facility-[address] via Wheelchair. Accompanied by Nina Levine EMT. Belongings Taken by patient/family. Report given to [address] [place] [address].

## 2020-04-28 NOTE — PLAN OF CARE
PT A/O, 96% ON RA WA, CPAP AT NIGHT, SR, SCDS ON, VOIDS, DENIES PAIN, MOVES ALL EXTREMITIES, WEAKNESS IN LE, LABS IN AM, MJ TO SEE TODAY.      Problem: SAFETY ADULT - FALL  Goal: Free from fall injury  Description  INTERVENTIONS:  - Assess pt frequently for

## 2020-04-28 NOTE — CM/SW NOTE
Anu Mckee from [place] [address] called to say that [place] [address] wants pt tested for COVID because she is considered high risk because she wears a CPAP and may have a respiratory condition.   If they test her at [place] [address] and pt turns out to be positive for COVID, they only have one negative

## 2020-04-28 NOTE — TELEPHONE ENCOUNTER
Meli Norton verbally consents to/ a Virtual/Telephone Check-In service on 04/29/2020  Patient understands and accepts financial responsibility for any deductible, co-insurance and/or co-pays associated with this service

## 2020-04-29 PROBLEM — F44.9 CONVERSION DISORDER: Status: ACTIVE | Noted: 2020-04-29

## 2020-04-29 NOTE — PROGRESS NOTES
Tamiko Vincent is a 59year old female.   HPI:   Virtual Video/Telephone Check-In    Tamkio Vincent verbally consents to a Virtual/Telephone Check-In visit on 4/29/2020    Patient understands and accepts financial responsibility for any deductible, co-insurance vomiting. No recent traveling. Pt has no urinary frequency, no burning with urination, no urgency, no blood in urine.    Pt has no focal muscle weakness, no focal numbness or tingling, no slurred speech, no blurry or double vision, no bladder or bowel inc TABLET(10 MG) BY MOUTH DAILY 90 tablet 3   • ATORVASTATIN 40 MG Oral Tab TAKE 1 TABLET BY MOUTH NIGHTLY.  90 tablet 3   • METOPROLOL SUCCINATE ER 25 MG Oral Tablet 24 Hr TAKE 1 TABLET(25 MG) BY MOUTH DAILY 90 tablet 3   • gabapentin 300 MG Oral Cap Take 2 c (adult) (pediatric) 4/25/2019   • Osteoarthritis    • Other and unspecified hyperlipidemia    • PE (pulmonary embolism) Feb 2004 and March 2004    x2.  prothrombin mutation found after 2nd one   • Prothrombin mutation [facility])    • Spinal stenosis    • Unspeci most likely being a reaction to that stress and is hopeful for improvement  -we'll touch base next week after a week of therapy       The patient indicates understanding of these issues and agrees to the plan. Phone call f/u scheduled for next week .

## 2020-04-30 ENCOUNTER — PATIENT MESSAGE (OUTPATIENT)
Dept: FAMILY MEDICINE CLINIC | Facility: CLINIC | Age: 65
End: 2020-04-30

## 2020-04-30 NOTE — DISCHARGE SUMMARY
[facility] HOSPITALIST  DISCHARGE SUMMARY     [facility] [place] [facility]:  [facility]    9/10/1955 MRN [id number]   [facility] 7NE-A Attending No att. providers found   Hosp Day # 3 PCP Travon Tipton MD     Date of Admission: 2020  Date of wheezing, shortness of breath, chest tightness      USE 2 SPRAYS IN EACH NOSTRIL FOUR TIMES DAILY AS NEEDED FOR RHINITIS   Quantity:  135 mL  Refills:  3     traZODone HCl 100 MG Tabs  Commonly known as:  DESYREL  What changed:  when to take this  Notes to MG) BY MOUTH DAILY   Quantity:  90 tablet  Refills:  1     gabapentin 300 MG Caps  Commonly known as:  NEURONTIN      Take 2 capsules (600 mg total) by mouth 2 (two) times daily.    Refills:  0     Glucose Blood Strp      Use BID as directed to check blood as soon as possible for a visit in 2 weeks      Appointments for Next 30 Days 4/30/2020 - 5/30/2020      Date Arrival Time Visit Type Length Department Provider     5/5/2020 10:00 AM  [facility] [place] VIRTUAL PHONE VISIT [[id number]] 30 min.  Fariha 26, Gloria

## 2020-05-01 ENCOUNTER — TELEPHONE (OUTPATIENT)
Dept: FAMILY MEDICINE CLINIC | Facility: CLINIC | Age: 65
End: 2020-05-01

## 2020-05-01 NOTE — TELEPHONE ENCOUNTER
From: Hugh Benjamin  To: Holger Gilmore MD  Sent: 4/30/2020 6:10 PM CDT  Subject: Other    Dr. Kelley Kern needs to get me out of here before they kill me. My INR was 2.9 this morning so they kept me at 2.5 mg of warfarin for tonight again.  They have ch

## 2020-05-06 ENCOUNTER — PATIENT MESSAGE (OUTPATIENT)
Dept: FAMILY MEDICINE CLINIC | Facility: CLINIC | Age: 65
End: 2020-05-06

## 2020-05-06 ENCOUNTER — TELEPHONE (OUTPATIENT)
Dept: FAMILY MEDICINE CLINIC | Facility: CLINIC | Age: 65
End: 2020-05-06

## 2020-05-06 NOTE — TELEPHONE ENCOUNTER
Results in care everywhere under [facility]  Patient notified [facility] can see results

## 2020-05-06 NOTE — TELEPHONE ENCOUNTER
From: Lashon Lyn  To: Elvira Morel MD  Sent: 5/6/2020 3:18 PM CDT  Subject: Other    Dr. Jamal Maciel,  Wanted to let you know my INR this morning before I left Rehab was 3.0. I have a printout of all my readings while I was there.  Do you want me to send it to

## 2020-05-06 NOTE — TELEPHONE ENCOUNTER
Rossy Mi verbally consents to a Virtual/Telephone Check-In service on 05/07/2020  Patient understands and accepts financial responsibility for any deductible, co-insurance and/or co-pays associated with this service

## 2020-05-07 ENCOUNTER — TELEPHONE (OUTPATIENT)
Dept: FAMILY MEDICINE CLINIC | Facility: CLINIC | Age: 65
End: 2020-05-07

## 2020-05-07 ENCOUNTER — TELEMEDICINE (OUTPATIENT)
Dept: FAMILY MEDICINE CLINIC | Facility: CLINIC | Age: 65
End: 2020-05-07

## 2020-05-07 VITALS
HEART RATE: 67 BPM | DIASTOLIC BLOOD PRESSURE: 65 MMHG | SYSTOLIC BLOOD PRESSURE: 111 MMHG | WEIGHT: 183 LBS | HEIGHT: 62 IN | BODY MASS INDEX: 33.68 KG/M2

## 2020-05-07 DIAGNOSIS — Z78.9 IMPAIRED MOBILITY AND ACTIVITIES OF DAILY LIVING: Primary | ICD-10-CM

## 2020-05-07 DIAGNOSIS — F33.0 MILD EPISODE OF RECURRENT MAJOR DEPRESSIVE DISORDER (HCC): ICD-10-CM

## 2020-05-07 DIAGNOSIS — Z74.09 IMPAIRED MOBILITY AND ACTIVITIES OF DAILY LIVING: Primary | ICD-10-CM

## 2020-05-07 DIAGNOSIS — F44.9 CONVERSION DISORDER: ICD-10-CM

## 2020-05-07 DIAGNOSIS — R27.0 ATAXIA: ICD-10-CM

## 2020-05-07 DIAGNOSIS — I10 ESSENTIAL HYPERTENSION: ICD-10-CM

## 2020-05-07 PROCEDURE — 99215 OFFICE O/P EST HI 40 MIN: CPT | Performed by: FAMILY MEDICINE

## 2020-05-07 NOTE — TELEPHONE ENCOUNTER
Ana Azael verbally consents to a Virtual/Telephone Check-In service on 5/28/20. Patient understands and accepts financial responsibility for any deductible, co-insurance and/or co-pays associated with this service

## 2020-05-11 ENCOUNTER — TELEPHONE (OUTPATIENT)
Dept: FAMILY MEDICINE CLINIC | Facility: CLINIC | Age: 65
End: 2020-05-11

## 2020-05-11 NOTE — TELEPHONE ENCOUNTER
Angeli Ledezma from [facility] called to give INR result of  2.6  &  PT  Is 30.7. Patient is taking 4mg daily.

## 2020-05-12 ENCOUNTER — TELEPHONE (OUTPATIENT)
Dept: FAMILY MEDICINE CLINIC | Facility: CLINIC | Age: 65
End: 2020-05-12

## 2020-05-12 NOTE — TELEPHONE ENCOUNTER
Yes I got it. Why did she check it again? It was checked yesterday and I instructed to do in 2 weeks.  I wouldn't do anything different, INR isn't perfectly static, I don't go chasing small daily INR fluctuations

## 2020-05-12 NOTE — TELEPHONE ENCOUNTER
She had her INR done today and she is asking if we have received it yet?  She is concerned it is 2.3 please call

## 2020-05-12 NOTE — TELEPHONE ENCOUNTER
Patient states she was setting up her INR monitor with the company rep and had to check the INR  Will continue current meds.     States will need to check weekly per the company

## 2020-05-13 ENCOUNTER — MED REC SCAN ONLY (OUTPATIENT)
Dept: FAMILY MEDICINE CLINIC | Facility: CLINIC | Age: 65
End: 2020-05-13

## 2020-05-13 ENCOUNTER — PATIENT MESSAGE (OUTPATIENT)
Dept: FAMILY MEDICINE CLINIC | Facility: CLINIC | Age: 65
End: 2020-05-13

## 2020-05-13 NOTE — TELEPHONE ENCOUNTER
Called patient for more info. States when she woke up this morning she felt very shakey-had not eaten anything since yesterday afternoon (had crackers for lunch). Drank 3 cups of coffee-still felt shakey and nauseous.  Didn't check sugar at the time as she

## 2020-05-13 NOTE — TELEPHONE ENCOUNTER
From: Liana Aldridge  To:  Kiera Cano MD  Sent: 5/13/2020 3:21 PM CDT  Subject: Other    Good afternoon Dr. Daniel Rowe,   Just wanted to let you know when I got up this morning I was real shakey and after I had about a third of a cup of coffee I got very nauseas

## 2020-05-14 ENCOUNTER — TELEMEDICINE (OUTPATIENT)
Dept: FAMILY MEDICINE CLINIC | Facility: CLINIC | Age: 65
End: 2020-05-14

## 2020-05-14 ENCOUNTER — PATIENT MESSAGE (OUTPATIENT)
Dept: FAMILY MEDICINE CLINIC | Facility: CLINIC | Age: 65
End: 2020-05-14

## 2020-05-14 ENCOUNTER — TELEPHONE (OUTPATIENT)
Dept: FAMILY MEDICINE CLINIC | Facility: CLINIC | Age: 65
End: 2020-05-14

## 2020-05-14 DIAGNOSIS — G47.33 OSA (OBSTRUCTIVE SLEEP APNEA): Primary | ICD-10-CM

## 2020-05-14 DIAGNOSIS — G45.9 TIA (TRANSIENT ISCHEMIC ATTACK): ICD-10-CM

## 2020-05-14 DIAGNOSIS — R41.3 MEMORY LOSS: ICD-10-CM

## 2020-05-14 DIAGNOSIS — G47.31 CSA (CENTRAL SLEEP APNEA): ICD-10-CM

## 2020-05-14 PROCEDURE — 99214 OFFICE O/P EST MOD 30 MIN: CPT | Performed by: FAMILY MEDICINE

## 2020-05-14 NOTE — PROGRESS NOTES
[facility]LE[facility]        HPI:   This is a 59year old female coming in for Patient presents with: Follow - Up: Sleep follow up      HPI: Patient has been using her CPAP regularly.   She notes that she was put in [facility] CHINTAN   LOWEST O2SAT IN STUDY - 70.8   AHI  (/hr) - 16.7   RDI (/hr) - 16.7   PLM INDEX (/hr) - 35.7   SS Facility - [address] [place] [address]   Sleep Study CM (CM H2O) - -   Sleep EFFC (%) - -   Sleep REM (%) - -   TOT Sleep TM (min) - -   Do you snore loudly (lo information on file (likely too young).     Social History Narrative      Not on file    Social History    Tobacco Use      Smoking status: Never Smoker      Smokeless tobacco: Never Used    Alcohol use: No      Alcohol/week: 0.0 standard drinks    Drug use Inhalation Aerosol INHALE 1 PUFF INTO THE LUNGS TWICE DAILY 3 Inhaler 3   • IPRATROPIUM-ALBUTEROL 0.5-2.5 (3) MG/3ML Inhalation Solution USE 3 ML VIA NEBULIZER EVERY 4 HOURS AS NEEDED 1620 mL 1   • Glucose Blood In Vitro Strip Use BID as directed to check Prothrombin mutation ([facility] [address]     Osteoarthritis     Essential hypertension     Presence of IVC filter     Kidney stones     Diverticulosis of large intestine without hemorrhage     CHINTAN treated with BiPAP     Ataxia     Uncomplicated asthma     Hypercoagulabl motion. Pulmonary/Chest: Effort normal.   Neurological: She is alert and oriented to person, place, and time. Skin:   No noted rashes. Psychiatric: She has a normal mood and affect.  Her behavior is normal. Judgment and thought content normal. communication after patient consent.   This has been done in good claudia to provide continuity of care in the best interest of the provider-patient relationship, due to the ongoing national public health crisis/national emergency and because of restrictions

## 2020-05-15 RX ORDER — METHYLPHENIDATE HYDROCHLORIDE 5 MG/1
5 TABLET ORAL EVERY MORNING
Qty: 30 TABLET | Refills: 0 | Status: SHIPPED | OUTPATIENT
Start: 2020-05-15 | End: 2020-06-18

## 2020-05-15 NOTE — TELEPHONE ENCOUNTER
Methylphenidate HCl (RITALIN) 5 MG Oral Tab    Last refilled: 3/30/20 - 30 tabs - 0 refills    Last OV: 5/14/20 - CHINTAN - Dr. Connors Repress: 5/7/20 - impaired mobility -     Your appointments     Date & Time Appointment Department [facility])    May 28

## 2020-05-15 NOTE — TELEPHONE ENCOUNTER
From: Liana Aldridge  To: Kiera Cano MD  Sent: 5/14/2020 6:25 PM CDT  Subject: Other    Sorry to bother you tonight but wanted to let you know that I called Remi Mcintyre my psychiatrist and left a message that I needed to talk to her.  Her nurse called and said sh

## 2020-05-19 RX ORDER — BUDESONIDE AND FORMOTEROL FUMARATE DIHYDRATE 160; 4.5 UG/1; UG/1
AEROSOL RESPIRATORY (INHALATION)
Qty: 3 INHALER | Refills: 0 | Status: SHIPPED | OUTPATIENT
Start: 2020-05-19 | End: 2020-09-09

## 2020-05-19 NOTE — TELEPHONE ENCOUNTER
Asthma & COPD Medication Protocol Passed5/19 12:15 PM   Asthma Action Score greater than or equal to 20    Appointment in past 6 or next 3 months     AAP/ACT given in last 12 months     Last refill - 8/19/19 - #3 with 3 refills  Last ACT - 2/19/20 - 25  La

## 2020-05-20 ENCOUNTER — TELEPHONE (OUTPATIENT)
Dept: FAMILY MEDICINE CLINIC | Facility: CLINIC | Age: 65
End: 2020-05-20

## 2020-05-26 ENCOUNTER — TELEPHONE (OUTPATIENT)
Dept: FAMILY MEDICINE CLINIC | Facility: CLINIC | Age: 65
End: 2020-05-26

## 2020-05-26 NOTE — TELEPHONE ENCOUNTER
That's probably what dropped the INR.   If she plans on continuing to eat more greens I can bump her baseline dose of coumadin up, if not, then keep baseline coumadin dose the same  But just for today take an extra 2mg (in addition to usual dose) and rechec

## 2020-05-26 NOTE — TELEPHONE ENCOUNTER
Patient notified and verbalized understanding. States her daughter brought her pea salad over the weekend but it is gone now. Advised to take additional 2 mg today then go back to regular dose and recheck in 1 week. Patient verbalized understanding.

## 2020-05-26 NOTE — TELEPHONE ENCOUNTER
Pt checked her INR at home and it was 1.2. she does want [address] to know she has been eating pea salad for the last few days.

## 2020-05-27 ENCOUNTER — PATIENT MESSAGE (OUTPATIENT)
Dept: FAMILY MEDICINE CLINIC | Facility: CLINIC | Age: 65
End: 2020-05-27

## 2020-05-27 ENCOUNTER — TELEPHONE (OUTPATIENT)
Dept: FAMILY MEDICINE CLINIC | Facility: CLINIC | Age: 65
End: 2020-05-27

## 2020-05-27 NOTE — TELEPHONE ENCOUNTER
From: Lashon Lyn  To:  Elvira Morel MD  Sent: 5/27/2020 10:55 AM CDT  Subject: Non-Urgent Medical Question    Dr. Jamal Maciel,  I was talking to [address] about my meds and I'm having trouble making sure I have the right meds in the right place A.M., P.M. I usually

## 2020-05-28 ENCOUNTER — TELEMEDICINE (OUTPATIENT)
Dept: FAMILY MEDICINE CLINIC | Facility: CLINIC | Age: 65
End: 2020-05-28

## 2020-05-28 VITALS
SYSTOLIC BLOOD PRESSURE: 117 MMHG | WEIGHT: 194 LBS | HEART RATE: 71 BPM | BODY MASS INDEX: 35 KG/M2 | DIASTOLIC BLOOD PRESSURE: 72 MMHG

## 2020-05-28 DIAGNOSIS — R27.0 ATAXIA: ICD-10-CM

## 2020-05-28 DIAGNOSIS — Z51.81 ANTICOAGULATION GOAL OF INR 2.5 TO 3.5: ICD-10-CM

## 2020-05-28 DIAGNOSIS — F33.0 MILD EPISODE OF RECURRENT MAJOR DEPRESSIVE DISORDER (HCC): ICD-10-CM

## 2020-05-28 DIAGNOSIS — I10 ESSENTIAL HYPERTENSION: ICD-10-CM

## 2020-05-28 DIAGNOSIS — Z78.9 IMPAIRED MOBILITY AND ACTIVITIES OF DAILY LIVING: Primary | ICD-10-CM

## 2020-05-28 DIAGNOSIS — F44.9 CONVERSION DISORDER: ICD-10-CM

## 2020-05-28 DIAGNOSIS — Z74.09 IMPAIRED MOBILITY AND ACTIVITIES OF DAILY LIVING: Primary | ICD-10-CM

## 2020-05-28 DIAGNOSIS — Z79.01 ANTICOAGULATION GOAL OF INR 2.5 TO 3.5: ICD-10-CM

## 2020-05-28 DIAGNOSIS — D68.52 PROTHROMBIN MUTATION (HCC): ICD-10-CM

## 2020-05-28 PROCEDURE — 99215 OFFICE O/P EST HI 40 MIN: CPT | Performed by: FAMILY MEDICINE

## 2020-05-28 RX ORDER — GABAPENTIN 600 MG/1
TABLET ORAL 2 TIMES DAILY
COMMUNITY
Start: 2020-05-05 | End: 2020-09-09 | Stop reason: ALTCHOICE

## 2020-05-28 RX ORDER — CALCIUM CARBONATE/VITAMIN D3 600 MG-20
1 TABLET ORAL DAILY
COMMUNITY
Start: 2020-05-05 | End: 2020-09-20

## 2020-05-28 NOTE — PROGRESS NOTES
Virtual Video/Telephone Check-In    Kennethnat Muñiz verbally consents to a Virtual/Telephone Check-In visit on 5/28/2020    Patient understands and accepts financial responsibility for any deductible, co-insurance and/or co-pays associated with this service. Walking unassisted. She didn't want to go to [facility] nor do PHYSICAL THERAPY at first, but in hindsight she's glad she did both. She feels safe at home. I ask her about her mood and visits with Gayatri and she says \"things are okay. \"  Her anxiety met a man, someone she's known for years, they were actually engaged once before in high school, then they broke up and  other people,. They've not been intimate but she wonders if there's anything to worry about should they become intimate.   The l 108 (90 Base) MCG/ACT Inhalation Aero Soln Inhale 2 puffs into the lungs every 4 (four) hours as needed for Wheezing or Shortness of Breath.  1 Inhaler 3   • albuterol sulfate (2.5 MG/3ML) 0.083% Inhalation Nebu Soln Take 3 mL (2.5 mg total) by nebulization Psychiatric Father         dementia   • Diabetes Mother    • Heart Disorder Mother    • Psychiatric Mother         dementia   • Hypertension Sister    • Hypertension Brother       Social History    Tobacco Use      Smoking status: Never Smoker      Smokele the next 2 weeks and we'll touch base with VV in 2 weeks  Prothrombin mutation ([address]  with  Anticoagulation goal of INR 2.5 to 3.5  -compliant with coumadin and monitoring (at home now) cpm  Essential hypertension  -Well controlled, CPM        The patient

## 2020-05-29 ENCOUNTER — PATIENT MESSAGE (OUTPATIENT)
Dept: FAMILY MEDICINE CLINIC | Facility: CLINIC | Age: 65
End: 2020-05-29

## 2020-05-29 NOTE — TELEPHONE ENCOUNTER
Spoke to pt, rescheduled appt for 6/11 at 9:30am.    Day Radhajudy verbally consents to a Virtual/Telephone Check-In service on 6/11/20.   Patient understands and accepts financial responsibility for any deductible, co-insurance and/or co-pays associated wit

## 2020-05-29 NOTE — TELEPHONE ENCOUNTER
From: Remus Fuel  To: Babs Dolan MD  Sent: 5/29/2020 11:26 AM CDT  Subject: Other    Dr. Castellon [facility],  I need to change my appointment with you on July 11, I have an appointment with Madyson Chang at that same time.  Doesn't make any difference what time if you still

## 2020-05-31 ENCOUNTER — PATIENT MESSAGE (OUTPATIENT)
Dept: FAMILY MEDICINE CLINIC | Facility: CLINIC | Age: 65
End: 2020-05-31

## 2020-06-01 RX ORDER — WARFARIN SODIUM 4 MG/1
4 TABLET ORAL DAILY
Qty: 30 TABLET | Refills: 6 | Status: SHIPPED | OUTPATIENT
Start: 2020-06-01 | End: 2020-09-09

## 2020-06-01 NOTE — TELEPHONE ENCOUNTER
From: Farnaz Waldrop  To: Tayler Ramesh MD  Sent: 5/31/2020 1:53 PM CDT  Subject: Other    Dr. Urbano Lamar,  The Dr at [facility] called in scripts for meds that I don't take because I take different doses.  One of them is warfarin which I take one 4mg every night a

## 2020-06-02 ENCOUNTER — TELEPHONE (OUTPATIENT)
Dept: FAMILY MEDICINE CLINIC | Facility: CLINIC | Age: 65
End: 2020-06-02

## 2020-06-02 NOTE — TELEPHONE ENCOUNTER
Natasha from [facility] home health Physical therepy called Pt has been discharged as she was doing good.

## 2020-06-03 ENCOUNTER — PATIENT MESSAGE (OUTPATIENT)
Dept: FAMILY MEDICINE CLINIC | Facility: CLINIC | Age: 65
End: 2020-06-03

## 2020-06-03 ENCOUNTER — MED REC SCAN ONLY (OUTPATIENT)
Dept: FAMILY MEDICINE CLINIC | Facility: CLINIC | Age: 65
End: 2020-06-03

## 2020-06-03 NOTE — TELEPHONE ENCOUNTER
From: Blanca Spurling  To: Gurpreet Nowak MD  Sent: 6/3/2020 12:45 PM CDT  Subject: Non-Urgent Medical Question    Sorry forgot the word shaking.

## 2020-06-03 NOTE — TELEPHONE ENCOUNTER
From: Rachael Saucedo  To: Anju Hernández MD  Sent: 6/3/2020 12:37 PM CDT  Subject: Non-Urgent Medical Question    Just wanted to let you know about an hour ago my whole body started. I took one of the anxiety pills but it's not working.  I checked my blood sug

## 2020-06-05 ENCOUNTER — PATIENT MESSAGE (OUTPATIENT)
Dept: FAMILY MEDICINE CLINIC | Facility: CLINIC | Age: 65
End: 2020-06-05

## 2020-06-05 NOTE — TELEPHONE ENCOUNTER
From: Lashon Lyn  To: Elvira Morel MD  Sent: 6/5/2020 2:20 PM CDT  Subject: Non-Urgent Medical Question    Dr. Jamal Maciel  My chest is still hurting so I'm going to have my sister take me to the ER. I'll let you know what they say.   Luis Joshi

## 2020-06-05 NOTE — TELEPHONE ENCOUNTER
From: Meli Norton  To: Vaughn Marquez MD  Sent: 6/5/2020 11:48 AM CDT  Subject: Non-Urgent Medical Question    Hi Dr. Larry Schuster,    I'm having pain in the middle of my chest but more to the right side it kind of feels like pressure but has some pain also.  I tho

## 2020-06-06 ENCOUNTER — PATIENT MESSAGE (OUTPATIENT)
Dept: FAMILY MEDICINE CLINIC | Facility: CLINIC | Age: 65
End: 2020-06-06

## 2020-06-08 ENCOUNTER — TELEPHONE (OUTPATIENT)
Dept: FAMILY MEDICINE CLINIC | Facility: CLINIC | Age: 65
End: 2020-06-08

## 2020-06-08 ENCOUNTER — PATIENT MESSAGE (OUTPATIENT)
Dept: FAMILY MEDICINE CLINIC | Facility: CLINIC | Age: 65
End: 2020-06-08

## 2020-06-08 NOTE — TELEPHONE ENCOUNTER
From: Fe Harris  To: Gussie Holstein, MD  Sent: 6/8/2020 1:12 PM CDT  Subject: Other    Hi Dr. Pb Angelo,  Well I'm still in the hospital was supposed to have an dobutamine stress echo today but it was canceled until tomorrow.  I have no more pressure or pain so

## 2020-06-08 NOTE — TELEPHONE ENCOUNTER
From: Meli Norton  To: Vaughn Marquez MD  Sent: 6/8/2020 4:04 PM CDT  Subject: Other    Dr. Larry Schuster,  Wanted to let you know that I've threatened to take some medicine that I have in my purse and kill myself. I'm done I can't deal with everything any more.  I

## 2020-06-08 NOTE — TELEPHONE ENCOUNTER
From: Camron Spears  To: Natalie Metcalf MD  Sent: 6/6/2020 2:15 PM CDT  Subject: Other    Hi Dr. Fausto Boothe,  Well I still have the pressure and pain in my chest. There doing a stress test on Monday but the  never said if I get to go home in between or not.  Glory

## 2020-06-09 ENCOUNTER — TELEPHONE (OUTPATIENT)
Dept: FAMILY MEDICINE CLINIC | Facility: CLINIC | Age: 65
End: 2020-06-09

## 2020-06-09 ENCOUNTER — PATIENT MESSAGE (OUTPATIENT)
Dept: FAMILY MEDICINE CLINIC | Facility: CLINIC | Age: 65
End: 2020-06-09

## 2020-06-09 NOTE — TELEPHONE ENCOUNTER
Pt is currently at . She went in on Friday for chest pain. They did a Echo and think they found a blockage. They want to send her kimberly. Pt doesn't want to go she wants to go to [facility].  According to [employer] is unable to get [employer] t

## 2020-06-09 NOTE — TELEPHONE ENCOUNTER
From: Dayna Aguilar  To: Karina Nugent MD  Sent: 6/9/2020 4:30 PM CDT  Subject: Other    Dr. Juan Daniel Gonzalez while at [facility] I was very upset and was going to take a bottle of Hydroxyzine that I had in my purse that nobody knew I had.  I've been transf

## 2020-06-10 ENCOUNTER — PATIENT MESSAGE (OUTPATIENT)
Dept: FAMILY MEDICINE CLINIC | Facility: CLINIC | Age: 65
End: 2020-06-10

## 2020-06-10 NOTE — TELEPHONE ENCOUNTER
From: Sydni Nelson  To: Katia Jiang MD  Sent: 6/9/2020 8:38 PM CDT  Subject: Other    Dr. Kiya Guadarrama,  I'm not sure where I'll be on Thursday so guess I should cancel my appointment then I'll let you know when I can reschedule.   Thank you  Keeley Ceron

## 2020-06-10 NOTE — TELEPHONE ENCOUNTER
From: Reynaldo Tran  To: Cain Pineda MD  Sent: 6/10/2020 4:57 PM CDT  Subject: Other    The nuclear stress test came back good and I spoke to the hospital psychiatrist and he took me off suicide watch and I'm going home tonight.  So was wondering if you st

## 2020-06-10 NOTE — TELEPHONE ENCOUNTER
From: Erasto Starks  To: Que Martinez MD  Sent: 6/10/2020 9:59 AM CDT  Subject: Other    Hi Dr. Colby Wills,  Wanted to let you know that I was admitted to [facility] Friday with chest pressure and pain.  While I was there I got very upset and mad and threatened

## 2020-06-11 ENCOUNTER — PATIENT MESSAGE (OUTPATIENT)
Dept: FAMILY MEDICINE CLINIC | Facility: CLINIC | Age: 65
End: 2020-06-11

## 2020-06-11 ENCOUNTER — TELEMEDICINE (OUTPATIENT)
Dept: FAMILY MEDICINE CLINIC | Facility: CLINIC | Age: 65
End: 2020-06-11
Payer: MEDICARE

## 2020-06-11 VITALS
SYSTOLIC BLOOD PRESSURE: 110 MMHG | WEIGHT: 184 LBS | HEART RATE: 81 BPM | BODY MASS INDEX: 34 KG/M2 | DIASTOLIC BLOOD PRESSURE: 74 MMHG

## 2020-06-11 DIAGNOSIS — R45.851 SUICIDAL THOUGHTS: ICD-10-CM

## 2020-06-11 DIAGNOSIS — Z09 HOSPITAL DISCHARGE FOLLOW-UP: ICD-10-CM

## 2020-06-11 DIAGNOSIS — R94.39 ABNORMAL STRESS TEST: ICD-10-CM

## 2020-06-11 DIAGNOSIS — R26.9 GAIT DISTURBANCE: Primary | ICD-10-CM

## 2020-06-11 DIAGNOSIS — R26.89 POOR BALANCE: ICD-10-CM

## 2020-06-11 DIAGNOSIS — R07.9 CHEST PAIN, UNSPECIFIED TYPE: Primary | ICD-10-CM

## 2020-06-11 PROCEDURE — 99214 OFFICE O/P EST MOD 30 MIN: CPT | Performed by: FAMILY MEDICINE

## 2020-06-11 NOTE — PROGRESS NOTES
Dariusz Ortega is a 59year old female. HPI:   Pt is here for ER/UC/hospital f/u.       Virtual Video/Telephone Check-In    Dariusz Ortega verbally consents to a doximity video visit Check-In visit on 6/11/2020    Patient understands and accepts financial resp lexiscan.     Low level walking was not performed with Lexiscan infusion.     Fairly well tolerated Lexiscan injection without clinical or EKG     evidence of myocardial ischemia.     The patient reported chest pressure at 3 minutes of stress (10/10     andrew Medications   Medication Sig Dispense Refill   • aspirin 81 MG Oral Chew Tab Chew 81 mg by mouth daily. • Warfarin Sodium 4 MG Oral Tab Take 1 tablet (4 mg total) by mouth daily.  30 tablet 6   • CALTRATE 600+D3 600-800 MG-UNIT Oral Tab Take 1 tablet by USE 2 SPRAYS IN EACH NOSTRIL FOUR TIMES DAILY AS NEEDED FOR RHINITIS (Patient not taking:  ) 135 mL 3   • BuPROPion HCl ER, SR, 200 MG Oral Tablet 12 Hr Take 1 tablet (200 mg total) by mouth 2 (two) times daily.  60 tablet 0   • TraZODone HCl 100 MG Oral Ta HEALTH: feels well otherwise  SKIN: denies any unusual skin lesions or rashes  RESPIRATORY: denies shortness of breath   CARDIOVASCULAR: denies chest pain now  PSYCH: no SIs now, seee HPII  GI: denies abdominal pain and denies heartburn  NEURO: denies head

## 2020-06-11 NOTE — TELEPHONE ENCOUNTER
From: Max Arce  To: ARNOLD Martini  Sent: 6/10/2020 5:42 PM CDT  Subject: Other    I am being discharged this evening. Thank you for your help.   Liv bush

## 2020-06-11 NOTE — TELEPHONE ENCOUNTER
From: Hugh Benjamin  To: Holger Gilmore MD  Sent: 6/11/2020 4:31 PM CDT  Subject: Other    Just wanted to let you know that I talked to the nurse at my cardiologist office and she explained why I can take warfarin and aspirin at the same time.  The warfarin a

## 2020-06-12 ENCOUNTER — PATIENT MESSAGE (OUTPATIENT)
Dept: FAMILY MEDICINE CLINIC | Facility: CLINIC | Age: 65
End: 2020-06-12

## 2020-06-12 NOTE — TELEPHONE ENCOUNTER
Noted.    Future Appointments   Date Time Provider Juliana Lea   7/1/2020  4:15 PM Klaudia Dial MD [facility] [place]

## 2020-06-12 NOTE — TELEPHONE ENCOUNTER
From: Lauren Beltran  To: Olga Gilmore MD  Sent: 6/12/2020 1:09 PM CDT  Subject: Other    Hi Dr. Ciera Aguillon to bother you again just wanted to let you know that I have an appointment with my cardiologist next Thursday at 3:00.  Hopefully I can get some ans

## 2020-06-15 NOTE — TELEPHONE ENCOUNTER
Attempted to call at 11:45 am and their office was not available to answer the phone. Attempted to call again now and still no one answered the phone. Website does not have fax #.

## 2020-06-16 NOTE — TELEPHONE ENCOUNTER
Faxed multiple times yesterday to [employer] at [phone]. Failed. Patient advised today of this. Fax # confirmed that this is correct and faxed again.

## 2020-06-17 ENCOUNTER — TELEPHONE (OUTPATIENT)
Dept: FAMILY MEDICINE CLINIC | Facility: CLINIC | Age: 65
End: 2020-06-17

## 2020-06-17 NOTE — TELEPHONE ENCOUNTER
Evelyne Cockayne from [facility] counseling calling about pts stay at [place] [address]. Some of the test results were very alarming to her. Please call back.

## 2020-06-17 NOTE — TELEPHONE ENCOUNTER
Attempted to contact Janteh Carrion at this ext, but the voicemail was to another person. Left voicemail for Shyanne White from directory.

## 2020-06-18 ENCOUNTER — MED REC SCAN ONLY (OUTPATIENT)
Dept: FAMILY MEDICINE CLINIC | Facility: CLINIC | Age: 65
End: 2020-06-18

## 2020-06-18 RX ORDER — ASPIRIN 81 MG/1
81 TABLET, CHEWABLE ORAL DAILY
COMMUNITY
Start: 2020-06-09 | End: 2020-07-01 | Stop reason: ALTCHOICE

## 2020-06-18 NOTE — TELEPHONE ENCOUNTER
Called [facility].  did not pull Chet Hope from a room, sent my call directly to voicemail. Left message for Chet Hope to call me back.

## 2020-06-18 NOTE — TELEPHONE ENCOUNTER
Unknown Gladis called back, she is asking we call back and ask for her. The  will pull her from a room.

## 2020-06-24 ENCOUNTER — TELEPHONE (OUTPATIENT)
Dept: FAMILY MEDICINE CLINIC | Facility: CLINIC | Age: 65
End: 2020-06-24

## 2020-06-24 NOTE — TELEPHONE ENCOUNTER
Pt is coming in 6/29 for INR. Doesn't know if she still has a standing order if needs one to be placed before coming in.

## 2020-06-26 ENCOUNTER — TELEPHONE (OUTPATIENT)
Dept: CARDIOLOGY | Age: 65
End: 2020-06-26

## 2020-06-29 ENCOUNTER — PATIENT MESSAGE (OUTPATIENT)
Dept: FAMILY MEDICINE CLINIC | Facility: CLINIC | Age: 65
End: 2020-06-29

## 2020-06-29 ENCOUNTER — APPOINTMENT (OUTPATIENT)
Dept: LAB | Age: 65
End: 2020-06-29
Attending: FAMILY MEDICINE
Payer: MEDICARE

## 2020-06-29 ENCOUNTER — TELEPHONE (OUTPATIENT)
Dept: FAMILY MEDICINE CLINIC | Facility: CLINIC | Age: 65
End: 2020-06-29

## 2020-06-29 DIAGNOSIS — Z51.81 ENCOUNTER FOR THERAPEUTIC DRUG MONITORING: ICD-10-CM

## 2020-06-29 DIAGNOSIS — Z51.81 ANTICOAGULATION GOAL OF INR 2.5 TO 3.5: ICD-10-CM

## 2020-06-29 DIAGNOSIS — Z79.01 ANTICOAGULATION GOAL OF INR 2.5 TO 3.5: Primary | ICD-10-CM

## 2020-06-29 DIAGNOSIS — Z79.01 ANTICOAGULATION GOAL OF INR 2.5 TO 3.5: ICD-10-CM

## 2020-06-29 DIAGNOSIS — Z51.81 ANTICOAGULATION GOAL OF INR 2.5 TO 3.5: Primary | ICD-10-CM

## 2020-06-29 LAB
INR BLD: 2.18 (ref 0.89–1.11)
PSA SERPL DL<=0.01 NG/ML-MCNC: 24.8 SECONDS (ref 12.4–14.6)

## 2020-06-29 PROCEDURE — 85610 PROTHROMBIN TIME: CPT

## 2020-06-29 PROCEDURE — 36415 COLL VENOUS BLD VENIPUNCTURE: CPT

## 2020-06-29 NOTE — TELEPHONE ENCOUNTER
EMMIE Mendieta called back to discuss case. She wanted to make sure patient was good to go from cardiac standpoint given the abnormal stress test she had at [facility] (6/9/2020).   I told her the most recent note I saw 6/19/2020 states the normal stress test at [place] [address]

## 2020-06-29 NOTE — TELEPHONE ENCOUNTER
From: Sydni Nelson  To: Katia Jiang MD  Sent: 6/29/2020 3:21 PM CDT  Subject: Other    Good afternoon Dr. Kiya Guadarrama,   Maybe I'm just not supposed to send this to you because I've tried 3 times and I keep hitting something and it gets deleated.  They called me

## 2020-06-29 NOTE — TELEPHONE ENCOUNTER
Jarred from [facility] called Dr. Robin Carmen (APN) would like to speak with. Dr. Trejo Dears  In regards to PT.  Please advise and call back at [phone]  X [phone]

## 2020-06-30 ENCOUNTER — TELEPHONE (OUTPATIENT)
Dept: FAMILY MEDICINE CLINIC | Facility: CLINIC | Age: 65
End: 2020-06-30

## 2020-06-30 DIAGNOSIS — S61.219A LACERATION OF FINGER WITHOUT FOREIGN BODY WITHOUT DAMAGE TO NAIL, UNSPECIFIED FINGER, UNSPECIFIED LATERALITY, INITIAL ENCOUNTER: ICD-10-CM

## 2020-06-30 DIAGNOSIS — Z23 NEED FOR VACCINATION: Primary | ICD-10-CM

## 2020-06-30 NOTE — TELEPHONE ENCOUNTER
Pt called she found out what she cut her finger on it was a metal soap espinoza in the shower. Pt is wanting to know if she will need to get a tetanus shot?

## 2020-06-30 NOTE — TELEPHONE ENCOUNTER
Spoke to pt, she states she is still fine. Pt advised to continue to hold pressure with towel around her finger and hold her hand upright for another 20 mins. IF she continues to bleed advised her to call back. Pt agreed with plan.  Forward to Dr. Jerry Jenkins,

## 2020-06-30 NOTE — TELEPHONE ENCOUNTER
Pt states she got out of the shower and went to dry off and noticed she was bleeding. She saw there was a gouge in her right hand, middle finger. She has it in band aid and kleenex but still bleeding through. Denies lightheadedness.  Pt unsure how deep

## 2020-06-30 NOTE — TELEPHONE ENCOUNTER
Pt's last Tdap was 11-. Pt is Medicare and they only pay for plain tetnus. Forward to Dr. Sylvie Reyna, please advise.

## 2020-06-30 NOTE — TELEPHONE ENCOUNTER
PT CALLED BACK AND ADV THAT SHE APPLIED PRESSURE, AND IS STILL BLEEDING. NOT AS BAD BUT STILL GOING THROGH.

## 2020-06-30 NOTE — TELEPHONE ENCOUNTER
Future Appointments   Date Time Provider Juliana Lea   7/1/2020  4:15 PM Newman Aase, MD [facility] [place]   7/7/2020  9:00 AM  [facility] [place]   Forward to Dr. Ambar Ford to place order for tetanus vaccine.

## 2020-06-30 NOTE — TELEPHONE ENCOUNTER
I'll tell you what, her's is technically still good for another 2 years BUT to my knowledge medicare only pays for the Td when there's been an injury (NOT just preventatively) so I say she go ahead and schedule a nurse visit for a Td, no harm in doing it a

## 2020-07-01 ENCOUNTER — TELEMEDICINE (OUTPATIENT)
Dept: FAMILY MEDICINE CLINIC | Facility: CLINIC | Age: 65
End: 2020-07-01
Payer: MEDICARE

## 2020-07-01 DIAGNOSIS — F33.1 MODERATE EPISODE OF RECURRENT MAJOR DEPRESSIVE DISORDER (HCC): ICD-10-CM

## 2020-07-01 DIAGNOSIS — Z65.8: ICD-10-CM

## 2020-07-01 DIAGNOSIS — R94.39 ABNORMAL STRESS TEST: Primary | ICD-10-CM

## 2020-07-01 PROBLEM — T14.91XA SUICIDAL BEHAVIOR WITH ATTEMPTED SELF-INJURY (HCC): Status: ACTIVE | Noted: 2020-06-09

## 2020-07-01 PROCEDURE — 99214 OFFICE O/P EST MOD 30 MIN: CPT | Performed by: FAMILY MEDICINE

## 2020-07-01 NOTE — PROGRESS NOTES
Lashon Lyn is a 59year old female.   HPI:   Virtual Video/Telephone Check-In    Lashon Lyn verbally consents to a doximity video visit on 7/1/2020    Patient understands and accepts financial responsibility for any deductible, co-insurance and/or co-pa Budesonide-Formoterol Fumarate (SYMBICORT) 160-4.5 MCG/ACT Inhalation Aerosol INHALE 1 PUFF INTO THE LUNGS TWICE DAILY 3 Inhaler 0   • Venlafaxine HCl 37.5 MG Oral Tab Take 37.5 mg by mouth daily.  37.5 mg in morning and 150 mg nightly  1 tablet 0   • QUEti differently: Take 100 mg by mouth nightly.  ) 30 tablet 0   • cetirizine (ZYRTEC) 10 MG Oral Tab Take 10 mg by mouth daily. • cholecalciferol (DRISDOL) 1000 UNITS Oral Cap Take 5,000 Units by mouth daily.  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   GENERAL: well developed, well nourished,in no apparent distress  PSYCH: good affect, interactive, A&Ox2    ASSESSMENT AND PLAN:   Diagnoses and

## 2020-07-06 ENCOUNTER — MED REC SCAN ONLY (OUTPATIENT)
Dept: FAMILY MEDICINE CLINIC | Facility: CLINIC | Age: 65
End: 2020-07-06

## 2020-07-07 ENCOUNTER — TELEPHONE (OUTPATIENT)
Dept: CARDIOLOGY | Age: 65
End: 2020-07-07

## 2020-07-08 ENCOUNTER — OFFICE VISIT (OUTPATIENT)
Dept: CARDIOLOGY | Age: 65
End: 2020-07-08

## 2020-07-08 VITALS
BODY MASS INDEX: 31.19 KG/M2 | HEIGHT: 65 IN | HEART RATE: 65 BPM | WEIGHT: 187.2 LBS | SYSTOLIC BLOOD PRESSURE: 110 MMHG | DIASTOLIC BLOOD PRESSURE: 68 MMHG

## 2020-07-08 DIAGNOSIS — R94.39 ABNORMAL STRESS ECHO: ICD-10-CM

## 2020-07-08 DIAGNOSIS — R07.9 CHEST PAIN, UNSPECIFIED TYPE: Primary | ICD-10-CM

## 2020-07-08 RX ORDER — TRAZODONE HYDROCHLORIDE 100 MG/1
TABLET ORAL
COMMUNITY
Start: 2020-05-05

## 2020-07-08 RX ORDER — BUDESONIDE AND FORMOTEROL FUMARATE DIHYDRATE 160; 4.5 UG/1; UG/1
AEROSOL RESPIRATORY (INHALATION)
COMMUNITY
Start: 2020-05-19

## 2020-07-08 RX ORDER — ENALAPRIL MALEATE 10 MG/1
10 TABLET ORAL
COMMUNITY
Start: 2014-12-19

## 2020-07-08 RX ORDER — OMEPRAZOLE 40 MG/1
CAPSULE, DELAYED RELEASE ORAL
COMMUNITY
Start: 2020-04-16

## 2020-07-08 RX ORDER — ATENOLOL 25 MG/1
25 TABLET ORAL
COMMUNITY
Start: 2014-08-25

## 2020-07-08 RX ORDER — GABAPENTIN 300 MG/1
CAPSULE ORAL
COMMUNITY
Start: 2020-06-17

## 2020-07-08 RX ORDER — VENLAFAXINE 75 MG/1
TABLET ORAL
COMMUNITY
Start: 2020-05-05

## 2020-07-08 RX ORDER — HYDROXYZINE HYDROCHLORIDE 25 MG/1
25 TABLET, FILM COATED ORAL
COMMUNITY
Start: 2020-02-12

## 2020-07-08 RX ORDER — MONTELUKAST SODIUM 10 MG/1
TABLET ORAL
COMMUNITY
Start: 2020-05-05

## 2020-07-08 RX ORDER — ACETAMINOPHEN 325 MG/1
325-650 TABLET ORAL
COMMUNITY
Start: 2020-05-05

## 2020-07-08 RX ORDER — BUPROPION HYDROCHLORIDE 200 MG/1
TABLET, EXTENDED RELEASE ORAL
COMMUNITY
Start: 2020-05-18

## 2020-07-08 RX ORDER — FLUTICASONE PROPIONATE 50 MCG
2 SPRAY, SUSPENSION (ML) NASAL
COMMUNITY
Start: 2014-07-01

## 2020-07-08 RX ORDER — QUETIAPINE FUMARATE 25 MG/1
TABLET, FILM COATED ORAL
COMMUNITY
Start: 2020-06-17

## 2020-07-08 RX ORDER — CETIRIZINE HYDROCHLORIDE 10 MG/1
10 TABLET ORAL
COMMUNITY

## 2020-07-08 RX ORDER — ALBUTEROL SULFATE 90 UG/1
2 AEROSOL, METERED RESPIRATORY (INHALATION)
COMMUNITY
Start: 2013-05-10

## 2020-07-08 RX ORDER — ALBUTEROL SULFATE 2.5 MG/3ML
2.5 SOLUTION RESPIRATORY (INHALATION)
COMMUNITY
Start: 2013-05-24

## 2020-07-08 RX ORDER — ONDANSETRON HYDROCHLORIDE 8 MG/1
8 TABLET, FILM COATED ORAL
COMMUNITY
Start: 2020-01-06

## 2020-07-08 RX ORDER — WARFARIN SODIUM 4 MG/1
TABLET ORAL
COMMUNITY
Start: 2020-06-01

## 2020-07-08 RX ORDER — ATORVASTATIN CALCIUM 40 MG/1
TABLET, FILM COATED ORAL
COMMUNITY
Start: 2020-05-05

## 2020-07-08 RX ORDER — METOPROLOL SUCCINATE 25 MG/1
25 TABLET, EXTENDED RELEASE ORAL
COMMUNITY
Start: 2019-10-27

## 2020-07-08 SDOH — HEALTH STABILITY: MENTAL HEALTH: HOW OFTEN DO YOU HAVE A DRINK CONTAINING ALCOHOL?: NEVER

## 2020-07-08 SDOH — HEALTH STABILITY: PHYSICAL HEALTH: ON AVERAGE, HOW MANY DAYS PER WEEK DO YOU ENGAGE IN MODERATE TO STRENUOUS EXERCISE (LIKE A BRISK WALK)?: 1 DAY

## 2020-07-08 SDOH — HEALTH STABILITY: PHYSICAL HEALTH: ON AVERAGE, HOW MANY MINUTES DO YOU ENGAGE IN EXERCISE AT THIS LEVEL?: 10 MIN

## 2020-07-08 ASSESSMENT — ENCOUNTER SYMPTOMS
SLEEP DISTURBANCES DUE TO BREATHING: 1
COUGH: 0
CHILLS: 0
FEVER: 0
ALLERGIC/IMMUNOLOGIC COMMENTS: NO NEW FOOD ALLERGIES
WEIGHT LOSS: 0
HEMOPTYSIS: 0
SUSPICIOUS LESIONS: 0
HEMATOCHEZIA: 0
BRUISES/BLEEDS EASILY: 0
WEIGHT GAIN: 0

## 2020-07-08 ASSESSMENT — PATIENT HEALTH QUESTIONNAIRE - PHQ9
1. LITTLE INTEREST OR PLEASURE IN DOING THINGS: NOT AT ALL
SUM OF ALL RESPONSES TO PHQ9 QUESTIONS 1 AND 2: 0
CLINICAL INTERPRETATION OF PHQ9 SCORE: NO FURTHER SCREENING NEEDED
CLINICAL INTERPRETATION OF PHQ2 SCORE: NO FURTHER SCREENING NEEDED
2. FEELING DOWN, DEPRESSED OR HOPELESS: NOT AT ALL
SUM OF ALL RESPONSES TO PHQ9 QUESTIONS 1 AND 2: 0

## 2020-07-09 ENCOUNTER — PATIENT MESSAGE (OUTPATIENT)
Dept: FAMILY MEDICINE CLINIC | Facility: CLINIC | Age: 65
End: 2020-07-09

## 2020-07-09 NOTE — TELEPHONE ENCOUNTER
From: Dayna Aguilar  To: Karina Nugent MD  Sent: 7/9/2020 11:58 AM CDT  Subject: Other    Dr. Jerry Jenkins,  Dr. Loida Figueroa said he had reviewed the stress echo test that they did at [facility] and he said that there is something that's not working in my heart.  So he ha

## 2020-07-10 ENCOUNTER — PATIENT MESSAGE (OUTPATIENT)
Dept: FAMILY MEDICINE CLINIC | Facility: CLINIC | Age: 65
End: 2020-07-10

## 2020-07-10 NOTE — TELEPHONE ENCOUNTER
From: Bradley Valerio  To: Cady Orozco MD  Sent: 7/10/2020 8:33 AM CDT  Subject: Other    I finally got back in. Thank you !

## 2020-07-10 NOTE — TELEPHONE ENCOUNTER
Called pt, gave information below. She states she locked herself out of the herman. I activated her again and she will try to log back in. FYI to Dr. Michael Morgan.

## 2020-07-10 NOTE — TELEPHONE ENCOUNTER
Her my chart status says inactive, do you know what's up with that?   Please let her know I gto her messages thanks for the update

## 2020-07-14 ENCOUNTER — TELEPHONE (OUTPATIENT)
Dept: FAMILY MEDICINE CLINIC | Facility: CLINIC | Age: 65
End: 2020-07-14

## 2020-07-14 RX ORDER — METOPROLOL SUCCINATE 25 MG/1
25 TABLET, EXTENDED RELEASE ORAL DAILY
Qty: 90 TABLET | Refills: 3 | Status: SHIPPED | OUTPATIENT
Start: 2020-07-14 | End: 2020-09-11

## 2020-07-14 NOTE — TELEPHONE ENCOUNTER
Hypertension Medications Protocol Passed7/13 5:35 PM   CMP or BMP in past 12 months    Last serum creatinine< 2.0    Appointment in past 6 or next 3 months     Refilled per protocol

## 2020-07-15 ENCOUNTER — TELEPHONE (OUTPATIENT)
Dept: FAMILY MEDICINE CLINIC | Facility: CLINIC | Age: 65
End: 2020-07-15

## 2020-07-15 NOTE — TELEPHONE ENCOUNTER
Pt is at [facility] in [place] and she is having an angiogram today at .    She just wanted to give [address] an update

## 2020-07-16 ENCOUNTER — TELEPHONE (OUTPATIENT)
Dept: FAMILY MEDICINE CLINIC | Facility: CLINIC | Age: 65
End: 2020-07-16

## 2020-07-16 NOTE — TELEPHONE ENCOUNTER
Pt called, Pt just had an angiogram done and was told to follow up with Dr. Michael Morgan, but pt was wondering if she should follow up with her cardiologist instead?   Please call pt at [phone]

## 2020-07-17 NOTE — TELEPHONE ENCOUNTER
Both eventually, but I see the results, it looks monserrat her coronary arteries were clear (amazing! Great news!) so no urgency in following up with cards, can start with me.

## 2020-07-17 NOTE — TELEPHONE ENCOUNTER
Pt advised and appt scheduled  Future Appointments   Date Time Provider Juliana Lea   7/21/2020 11:30 AM Arun Urrutia MD [facility] Mackenzie Messer   10/6/2020  8:00 AM Arun Urrutia MD [facility] EMG Mackenzie Messer

## 2020-07-21 ENCOUNTER — OFFICE VISIT (OUTPATIENT)
Dept: FAMILY MEDICINE CLINIC | Facility: CLINIC | Age: 65
End: 2020-07-21
Payer: MEDICARE

## 2020-07-21 ENCOUNTER — PATIENT MESSAGE (OUTPATIENT)
Dept: FAMILY MEDICINE CLINIC | Facility: CLINIC | Age: 65
End: 2020-07-21

## 2020-07-21 VITALS
SYSTOLIC BLOOD PRESSURE: 148 MMHG | WEIGHT: 190.63 LBS | BODY MASS INDEX: 35 KG/M2 | DIASTOLIC BLOOD PRESSURE: 80 MMHG | TEMPERATURE: 97 F | HEART RATE: 72 BPM

## 2020-07-21 DIAGNOSIS — Z98.890 H/O CORONARY ANGIOGRAM: ICD-10-CM

## 2020-07-21 DIAGNOSIS — R07.2 PRECORDIAL PAIN: Primary | ICD-10-CM

## 2020-07-21 PROBLEM — N18.30 CKD (CHRONIC KIDNEY DISEASE) STAGE 3, GFR 30-59 ML/MIN (HCC): Chronic | Status: ACTIVE | Noted: 2020-07-21

## 2020-07-21 PROCEDURE — 3079F DIAST BP 80-89 MM HG: CPT | Performed by: FAMILY MEDICINE

## 2020-07-21 PROCEDURE — 90714 TD VACC NO PRESV 7 YRS+ IM: CPT | Performed by: FAMILY MEDICINE

## 2020-07-21 PROCEDURE — 90471 IMMUNIZATION ADMIN: CPT | Performed by: FAMILY MEDICINE

## 2020-07-21 PROCEDURE — 99213 OFFICE O/P EST LOW 20 MIN: CPT | Performed by: FAMILY MEDICINE

## 2020-07-21 PROCEDURE — 3077F SYST BP >= 140 MM HG: CPT | Performed by: FAMILY MEDICINE

## 2020-07-21 PROCEDURE — 1111F DSCHRG MED/CURRENT MED MERGE: CPT | Performed by: FAMILY MEDICINE

## 2020-07-21 NOTE — PROGRESS NOTES
Liana Aldridge is a 59year old female. HPI:   Patient her eto f/u from recent hospital stay. Had a second occurrence of sever chest pain (first one took her to hospital as well in June). Ended up with clear angiogram, released from [facility].     She can ML VIA NEBULIZER EVERY 4 HOURS AS NEEDED 1620 mL 1   • Glucose Blood In Vitro Strip Use BID as directed to check blood sugar and guide food decisions dx:R73.09 elevated HGBA1C 200 each 3   • Albuterol Sulfate HFA (VENTOLIN HFA) 108 (90 Base) MCG/ACT Inhala SURGICAL HISTORY  03/06/2017    Left knee replacement   • TONSILLECTOMY  age 25   • TUBAL LIGATION  1981      Family History   Problem Relation Age of Onset   • Depression Daughter    • Anxiety Daughter    • Depression Son    • Psychiatric Father         d

## 2020-07-21 NOTE — TELEPHONE ENCOUNTER
From: Meli Norton  To: Vaughn Marquez MD  Sent: 7/21/2020 3:23 PM CDT  Subject: Other    I used to know how to do this but as they say use it or lose it.

## 2020-07-24 NOTE — TELEPHONE ENCOUNTER
I printed out Fang Samson forms she sent via my chart.   Is there a special way to get these into her chart and documented we have it or just send through scanning like normal?

## 2020-07-31 ENCOUNTER — TELEPHONE (OUTPATIENT)
Dept: FAMILY MEDICINE CLINIC | Facility: CLINIC | Age: 65
End: 2020-07-31

## 2020-07-31 NOTE — TELEPHONE ENCOUNTER
Patient states that she has not received what Dr Melonie Lopez was supposed to send to [address], pt states that [facility]. told her it's not ready. Pt does not remeber what Dr Melonie Lopez was sending. Would like a call back.

## 2020-08-01 ENCOUNTER — TELEPHONE (OUTPATIENT)
Dept: FAMILY MEDICINE CLINIC | Facility: CLINIC | Age: 65
End: 2020-08-01

## 2020-08-01 ENCOUNTER — PATIENT MESSAGE (OUTPATIENT)
Dept: FAMILY MEDICINE CLINIC | Facility: CLINIC | Age: 65
End: 2020-08-01

## 2020-08-01 NOTE — TELEPHONE ENCOUNTER
Pt calling to let KM know she is going the [facility] ER.  No call back is needed

## 2020-08-01 NOTE — TELEPHONE ENCOUNTER
From: Tamiko Vincent  To: Eleazar Mims MD  Sent: 8/1/2020 9:53 AM CDT  Subject: Non-Urgent Medical Question    Good morning,  Yesterday for the rest of the day really didn't feel much better.  Today my chest is very tight and really hurts, have a cough but n

## 2020-08-03 ENCOUNTER — TELEPHONE (OUTPATIENT)
Dept: FAMILY MEDICINE CLINIC | Facility: CLINIC | Age: 65
End: 2020-08-03

## 2020-08-03 NOTE — TELEPHONE ENCOUNTER
Future Appointments   Date Time Provider Juliana Leonora   8/7/2020  2:00 PM Lennox Alanis, MD [facility] EM[facility] Fazal Mulligan   10/6/2020  8:00 AM Lennox Alanis, MD [facility] EM[facility] Fazal Mulligan      Patient advised. Verbalized understanding.    Will call from parking lot when

## 2020-08-03 NOTE — TELEPHONE ENCOUNTER
Left a detailed message for the patient regarding Ashley's note below. Advised patient to call back the office at [phone] with questions or concerns.

## 2020-08-03 NOTE — TELEPHONE ENCOUNTER
Spoke with Vivienne Johnson and the RT felt that the patient did not qualify for an ASV Machine. Can we possibly order BIPAP for this patient instead? Please advise.

## 2020-08-03 NOTE — TELEPHONE ENCOUNTER
Suppose to order patient another machine, never heard, please give her a call  Future Appointments   Date Time Provider Juliana Lea   8/7/2020  2:00 PM Padilla Cho MD [facility] Starlene Libman   10/6/2020  8:00 AM Padilla Cho MD [facility] EM[facility] Starlene Libman

## 2020-08-03 NOTE — TELEPHONE ENCOUNTER
Please let patient know that it is a result of her insurance that we are not able to get the ASV machine. Recommend to wait until Dr. Baltazar Flynn is back from vacation in 2 weeks since patient is already on a BiPAP machine.

## 2020-08-03 NOTE — TELEPHONE ENCOUNTER
From: Marija Shah  To: Nessa Ca MD  Sent: 8/1/2020 11:47 AM CDT  Subject: Non-Urgent Medical Question    Dr. Carol Collado,  They have done an EKG and it was normal which I already knew. They gave me a cocktail with mylanta and lidacane and blood work.  I'll l

## 2020-08-03 NOTE — TELEPHONE ENCOUNTER
Pt called she was in the ER for chest pain,. She went to [facility] on 81/. Pt is needing a f/u appt. Pt states if possible to get in this week she could do Wed, or Friday but just let her know when she needs to come in.

## 2020-08-04 ENCOUNTER — PATIENT MESSAGE (OUTPATIENT)
Dept: FAMILY MEDICINE CLINIC | Facility: CLINIC | Age: 65
End: 2020-08-04

## 2020-08-04 NOTE — TELEPHONE ENCOUNTER
From: Renetta Deluca  To: Luis Manuel Pelaez MD  Sent: 8/4/2020 3:03 PM CDT  Subject: Non-Urgent Medical Question    Since I had to do my INR I thought maybe you would like it to. On 8/4/20 it is 3.1.   Sushma Diaz

## 2020-08-05 ENCOUNTER — MED REC SCAN ONLY (OUTPATIENT)
Dept: FAMILY MEDICINE CLINIC | Facility: CLINIC | Age: 65
End: 2020-08-05

## 2020-08-07 ENCOUNTER — OFFICE VISIT (OUTPATIENT)
Dept: FAMILY MEDICINE CLINIC | Facility: CLINIC | Age: 65
End: 2020-08-07
Payer: MEDICARE

## 2020-08-07 VITALS
HEART RATE: 76 BPM | WEIGHT: 190.13 LBS | DIASTOLIC BLOOD PRESSURE: 76 MMHG | RESPIRATION RATE: 20 BRPM | BODY MASS INDEX: 35 KG/M2 | SYSTOLIC BLOOD PRESSURE: 130 MMHG | TEMPERATURE: 100 F

## 2020-08-07 DIAGNOSIS — R07.9 CHEST PAIN, UNSPECIFIED TYPE: Primary | ICD-10-CM

## 2020-08-07 PROCEDURE — 1111F DSCHRG MED/CURRENT MED MERGE: CPT | Performed by: FAMILY MEDICINE

## 2020-08-07 PROCEDURE — 3078F DIAST BP <80 MM HG: CPT | Performed by: FAMILY MEDICINE

## 2020-08-07 PROCEDURE — 3075F SYST BP GE 130 - 139MM HG: CPT | Performed by: FAMILY MEDICINE

## 2020-08-07 PROCEDURE — 99213 OFFICE O/P EST LOW 20 MIN: CPT | Performed by: FAMILY MEDICINE

## 2020-08-07 NOTE — PROGRESS NOTES
Lashon Lyn is a 59year old female. HPI:   Pt is here for ER/UC/hospital f/u.     ER/UC or hospital: [facility]    Date(s): 8/1/2020    Reason for initial ER visit: Recurrence of severe chest pressure, similar to what she had prior to last hospital stay (ended directed to check blood sugar and guide food decisions dx:R73.09 elevated HGBA1C 200 each 3   • IPRATROPIUM BROMIDE 0.06 % Nasal Solution USE 2 SPRAYS IN EACH NOSTRIL FOUR TIMES DAILY AS NEEDED FOR RHINITIS 135 mL 3   • BuPROPion HCl ER, SR, 200 MG Oral Ta OTHER SURGICAL HISTORY  03/06/2017    Left knee replacement   • TONSILLECTOMY  age 25   • TUBAL LIGATION  1981      Family History   Problem Relation Age of Onset   • Depression Daughter    • Anxiety Daughter    • Depression Son    • Psychiatric Father

## 2020-08-09 DIAGNOSIS — F32.A DEPRESSION, UNSPECIFIED DEPRESSION TYPE: ICD-10-CM

## 2020-08-10 RX ORDER — GABAPENTIN 300 MG/1
CAPSULE ORAL
Qty: 180 CAPSULE | Refills: 3 | Status: SHIPPED | OUTPATIENT
Start: 2020-08-10 | End: 2020-09-09

## 2020-08-10 NOTE — TELEPHONE ENCOUNTER
Asthma & COPD Medication Protocol Passed8/9 6:48 AM   Asthma Action Score greater than or equal to 20    Appointment in past 6 or next 3 months     AAP/ACT given in last 12 months     Last refill of Ventolin - 1/3/19 - #1 with 3 refils  Last ACT -  2/19/20

## 2020-08-11 ENCOUNTER — TELEPHONE (OUTPATIENT)
Dept: FAMILY MEDICINE CLINIC | Facility: CLINIC | Age: 65
End: 2020-08-11

## 2020-08-11 RX ORDER — IPRATROPIUM BROMIDE AND ALBUTEROL SULFATE 2.5; .5 MG/3ML; MG/3ML
SOLUTION RESPIRATORY (INHALATION)
Qty: 1620 ML | Refills: 1 | Status: SHIPPED | OUTPATIENT
Start: 2020-08-11

## 2020-08-11 NOTE — TELEPHONE ENCOUNTER
Spoke with the pt and she states that she needs new tubing/medication dispenser for her nebulizer.  I told her that I can put that up front for her    She states that she feels her chest is tight like she can not take a deep breathe- no chest pain or palpit

## 2020-08-11 NOTE — TELEPHONE ENCOUNTER
HAVING TROUBLE BREATHING. USED INHALER 2 TIMES W/IN THE LAST 39 MINS. HAS PRESSURE BACK IN CHEST. HARD TO GET A GOOD BREATHE IN.    PSYCHIATRIST INCREASED ANXIETY MEDS - PT WONDERING THAT THIS CAN BE CAUSING PAIN. HAS 3 VIALS LEFT OF  NEBULIZER.  DO

## 2020-08-11 NOTE — TELEPHONE ENCOUNTER
Per [address] ok to order the duo neb as per previous    Spoke with the pt and advised of the med and to use the neb and let me know how things are going- she v/u

## 2020-08-11 NOTE — TELEPHONE ENCOUNTER
Get the neb equipment and try a neb instead of her inhaler, call with update after using it let us know how she's feeling. Her recent cardiac and lung testing very reasurring for sure.   This certainly could be anxieyt, would love to hear what patient thin

## 2020-08-27 ENCOUNTER — TELEPHONE (OUTPATIENT)
Dept: FAMILY MEDICINE CLINIC | Facility: CLINIC | Age: 65
End: 2020-08-27

## 2020-08-28 ENCOUNTER — TELEPHONE (OUTPATIENT)
Dept: FAMILY MEDICINE CLINIC | Facility: CLINIC | Age: 65
End: 2020-08-28

## 2020-08-28 NOTE — TELEPHONE ENCOUNTER
Have not seen any requests as of today.   Spoke with the pt and advised that I have not received any order requests  She states that she doesn' t need any refills right now  Advised to call us when she needs a refill and we will send the orders to the mail

## 2020-08-28 NOTE — TELEPHONE ENCOUNTER
PT ADV CALLED YESTERDAY AND ADV THAT ALL HER MEDS NEED TO GO TO [facility]  FAX : [phone]  -     WANTED TO KNOW IF WE RECEIVED ANYTHING FROM THEM?     PLEASE CALL AND ADV    THANK YOU

## 2020-08-31 ENCOUNTER — VIRTUAL PHONE E/M (OUTPATIENT)
Dept: FAMILY MEDICINE CLINIC | Facility: CLINIC | Age: 65
End: 2020-08-31
Payer: MEDICARE

## 2020-08-31 ENCOUNTER — ORDER TRANSCRIPTION (OUTPATIENT)
Dept: SLEEP CENTER | Age: 65
End: 2020-08-31

## 2020-08-31 DIAGNOSIS — G47.31 CENTRAL SLEEP APNEA: ICD-10-CM

## 2020-08-31 DIAGNOSIS — G47.33 OSA TREATED WITH BIPAP: Primary | ICD-10-CM

## 2020-08-31 DIAGNOSIS — Z11.59 SCREENING FOR VIRAL DISEASE: ICD-10-CM

## 2020-08-31 DIAGNOSIS — Z01.818 OTHER SPECIFIED PRE-OPERATIVE EXAMINATION: Primary | ICD-10-CM

## 2020-08-31 PROCEDURE — 99442 PHONE E/M BY PHYS 11-20 MIN: CPT | Performed by: NURSE PRACTITIONER

## 2020-08-31 NOTE — PROGRESS NOTES
[facility]  [facility]        HPI:   This is a 59year old female coming in for Patient presents with:  Obstructive Sleep Apnea (CHINTAN)      HPI:     Phone visit done with patient to review her BiPAP compliance.   Informed patient snore loudly (loud enough to be heard through closed doors)? - -   Do you often feel tired, fatigued, or sleepy during the day? - -   Has anyone observed you stop breathing during your sleep?  - -   Do you have or are you being treated for high blood pressu Allergies:    Contrast Dye [Gadol*    HIVES  Macrobid [Nitrofura*    RASH  Penicillins             RASH  Sulfa Antibiotics       UNKNOWN    Comment:iodine             iodine  Current Meds:  Current Outpatient Medications   Medication Sig Dispense Refil elevated HGBA1C 200 each 3   • IPRATROPIUM BROMIDE 0.06 % Nasal Solution USE 2 SPRAYS IN EACH NOSTRIL FOUR TIMES DAILY AS NEEDED FOR RHINITIS 135 mL 3   • BuPROPion HCl ER, SR, 200 MG Oral Tablet 12 Hr Take 1 tablet (200 mg total) by mouth 2 (two) times da Positive for sleep disturbance. EXAM:   There were no vitals taken for this visit. Estimated body mass index is 34.77 kg/m² as calculated from the following:    Height as of 5/7/20: 62\". Weight as of 8/7/20: 190 lb 2 oz (86.2 kg).    Neck in inche if still with sleep apnea and not using CPAP has a  7 fold increase in risk of heart attack, stroke, abnormal heart rhythm  and death,  increased risk of driving accidents. Advised to refrain from driving when sleepy.     COMPLIANCE is required by Broderick Schlatter

## 2020-08-31 NOTE — PATIENT INSTRUCTIONS
Schedule an ASV titration study in [place]. Will need to have COVID-19 testing done before. Continue sleep therapy. Follow-up after starting to use the new ASV machine.     Advised if still with sleep apnea and not using CPAP has a  7 fold increase

## 2020-09-08 ENCOUNTER — MED REC SCAN ONLY (OUTPATIENT)
Dept: FAMILY MEDICINE CLINIC | Facility: CLINIC | Age: 65
End: 2020-09-08

## 2020-09-08 ENCOUNTER — TELEPHONE (OUTPATIENT)
Dept: FAMILY MEDICINE CLINIC | Facility: CLINIC | Age: 65
End: 2020-09-08

## 2020-09-08 NOTE — TELEPHONE ENCOUNTER
She is asking if her mail order pharmacy has questions about using it . She is also asking for help with setting up here medication PLeASE CALL

## 2020-09-08 NOTE — TELEPHONE ENCOUNTER
Spoke with the pt and she needs to set up mail order- she states that she has been having issues with setting up her medications- she doesn't think that she is taking her med correctly  She is looking for medications to be in packs     Advised that [facility]

## 2020-09-09 ENCOUNTER — TELEPHONE (OUTPATIENT)
Dept: FAMILY MEDICINE CLINIC | Facility: CLINIC | Age: 65
End: 2020-09-09

## 2020-09-09 DIAGNOSIS — F32.A DEPRESSION, UNSPECIFIED DEPRESSION TYPE: ICD-10-CM

## 2020-09-09 RX ORDER — GABAPENTIN 300 MG/1
300 CAPSULE ORAL 2 TIMES DAILY
Qty: 180 CAPSULE | Refills: 1 | Status: SHIPPED | OUTPATIENT
Start: 2020-09-09 | End: 2020-10-05

## 2020-09-09 RX ORDER — WARFARIN SODIUM 4 MG/1
4 TABLET ORAL DAILY
Qty: 90 TABLET | Refills: 1 | Status: SHIPPED | OUTPATIENT
Start: 2020-09-09 | End: 2021-02-22

## 2020-09-09 RX ORDER — BUDESONIDE AND FORMOTEROL FUMARATE DIHYDRATE 160; 4.5 UG/1; UG/1
AEROSOL RESPIRATORY (INHALATION)
Qty: 3 INHALER | Refills: 1 | Status: SHIPPED | OUTPATIENT
Start: 2020-09-09 | End: 2021-08-04

## 2020-09-09 RX ORDER — MONTELUKAST SODIUM 10 MG/1
10 TABLET ORAL DAILY
Qty: 90 TABLET | Refills: 1 | Status: SHIPPED | OUTPATIENT
Start: 2020-09-09 | End: 2021-02-22

## 2020-09-09 RX ORDER — ALBUTEROL SULFATE 90 UG/1
2 AEROSOL, METERED RESPIRATORY (INHALATION) EVERY 4 HOURS PRN
Qty: 18 G | Refills: 1 | Status: SHIPPED | OUTPATIENT
Start: 2020-09-09

## 2020-09-09 RX ORDER — ATORVASTATIN CALCIUM 40 MG/1
40 TABLET, FILM COATED ORAL NIGHTLY
Qty: 90 TABLET | Refills: 1 | Status: SHIPPED | OUTPATIENT
Start: 2020-09-09 | End: 2021-02-22

## 2020-09-09 NOTE — TELEPHONE ENCOUNTER
INR received from [facility]  3.6    Per [address] INR OK but high end- recheck 1 week        Sent pt [employer] message    Pt does check her INR every Monday

## 2020-09-09 NOTE — TELEPHONE ENCOUNTER
23  Called the pt to confirm that she is going to be using pill pack for her scripts  She confirmed that yes she is wanting her scripts to go to that pharmacy      LRF  Atorvastatin #90 with 3 10/27/19  Gabapentin 5/5/2020  Metoprolol 7/14/2020 #90 with 3

## 2020-09-09 NOTE — TELEPHONE ENCOUNTER
Georgina Ortiz from [facility] called wantign to request refill of the following medications. she states they can be faxed tot [phone] and the escribe # is [id number].     ATORVASTATIN 40 MG Oral Tab    GABAPENTIN 300 MG Oral Cap    Metoprolol Succinate ER 25 MG O

## 2020-09-10 ENCOUNTER — TELEPHONE (OUTPATIENT)
Dept: FAMILY MEDICINE CLINIC | Facility: CLINIC | Age: 65
End: 2020-09-10

## 2020-09-10 NOTE — TELEPHONE ENCOUNTER
Re: needs to schedule sleep study [facility] cover it - she cant make it to [facility]. What else can she do? ?

## 2020-09-10 NOTE — TELEPHONE ENCOUNTER
Spoke with patient. The sleep study itself was not denied. The insurance states the [place] sleep center is not in network but [facility] is. Informed patient to call [facility] sleep center to try and arrange a date that she could get dropped off.  Informed

## 2020-09-10 NOTE — TELEPHONE ENCOUNTER
Recommend patient have a face to face with Dr. Tanna Khanna to see if there is some way that she can get the ASV approved by the insurance. This is usually a difficult task, but is the best next step for her.

## 2020-09-11 ENCOUNTER — PATIENT MESSAGE (OUTPATIENT)
Dept: FAMILY MEDICINE CLINIC | Facility: CLINIC | Age: 65
End: 2020-09-11

## 2020-09-11 RX ORDER — METOPROLOL SUCCINATE 25 MG/1
25 TABLET, EXTENDED RELEASE ORAL DAILY
Qty: 90 TABLET | Refills: 3 | Status: SHIPPED | OUTPATIENT
Start: 2020-09-11 | End: 2021-08-19

## 2020-09-11 NOTE — TELEPHONE ENCOUNTER
From: Reynaldo Tran  To: Ada Howard MD  Sent: 9/11/2020 2:18 PM CDT  Subject: Non-Urgent Medical Question    Dr. Ginny Mendez,  I cancelled my sleep study test because insurance can't decide if there going to pay for it because it's in [place] and I have no wa

## 2020-09-11 NOTE — TELEPHONE ENCOUNTER
From: Hilario Monsivais  To:  James Richard MD  Sent: 9/11/2020 2:20 PM CDT  Subject: Non-Urgent Medical Question    Dr. Luis Lynn  Wanted to let you know that I cancelled my sleep study test because my insurance can't decide of there going to pay for it because it's

## 2020-09-14 ENCOUNTER — TELEPHONE (OUTPATIENT)
Dept: FAMILY MEDICINE CLINIC | Facility: CLINIC | Age: 65
End: 2020-09-14

## 2020-09-14 DIAGNOSIS — R32 URINARY INCONTINENCE, UNSPECIFIED TYPE: Primary | ICD-10-CM

## 2020-09-14 PROCEDURE — 81003 URINALYSIS AUTO W/O SCOPE: CPT | Performed by: FAMILY MEDICINE

## 2020-09-14 NOTE — TELEPHONE ENCOUNTER
Left message for the pt that [address] is recommending a urine dip and culture  Asked her to call back to schedule a NV

## 2020-09-14 NOTE — TELEPHONE ENCOUNTER
Pt called she is incontinence. She states she is sitting on the toilet for a long time but when she stands up and walks around she can feel it. Pt is wanting to know what the dr recommends to do?

## 2020-09-14 NOTE — TELEPHONE ENCOUNTER
Spoke with the pt and this Just started a couple days ago- No burning or frequency  Leaking most of the time- the pad is dry at night, but first thing in the AM she has to get to the bathroom asap- or she will wet all over.   She can feel the urine leaking

## 2020-09-15 ENCOUNTER — NURSE ONLY (OUTPATIENT)
Dept: FAMILY MEDICINE CLINIC | Facility: CLINIC | Age: 65
End: 2020-09-15
Payer: MEDICARE

## 2020-09-15 VITALS — TEMPERATURE: 99 F

## 2020-09-15 DIAGNOSIS — R32 URINARY INCONTINENCE, UNSPECIFIED TYPE: ICD-10-CM

## 2020-09-15 LAB
APPEARANCE: CLEAR
BILIRUBIN: NEGATIVE
GLUCOSE (URINE DIPSTICK): NEGATIVE MG/DL
KETONES (URINE DIPSTICK): NEGATIVE MG/DL
LEUKOCYTES: NEGATIVE
MULTISTIX LOT#: NORMAL NUMERIC
NITRITE, URINE: NEGATIVE
OCCULT BLOOD: NEGATIVE
PH, URINE: 5.5 (ref 4.5–8)
PROTEIN (URINE DIPSTICK): NEGATIVE MG/DL
SPECIFIC GRAVITY: 1.02 (ref 1–1.03)
URINE-COLOR: YELLOW
UROBILINOGEN,SEMI-QN: 0.2 MG/DL (ref 0–1.9)

## 2020-09-15 PROCEDURE — 87086 URINE CULTURE/COLONY COUNT: CPT | Performed by: FAMILY MEDICINE

## 2020-09-15 NOTE — TELEPHONE ENCOUNTER
Pt called and schedule appt  Future Appointments   Date Time Provider Juliana Lea   9/15/2020  2:00 PM EMG KEVEN CHAVEZ [facility] EMG Constanza Gonzales   10/6/2020  8:00 AM Mackenzie Morris MD [facility] EMG Constanza Gonzales

## 2020-09-16 ENCOUNTER — TELEPHONE (OUTPATIENT)
Dept: FAMILY MEDICINE CLINIC | Facility: CLINIC | Age: 65
End: 2020-09-16

## 2020-09-16 LAB — INR: 2.6 (ref 0.8–1.2)

## 2020-09-16 NOTE — TELEPHONE ENCOUNTER
Received INR results from [facility]    Per [address] INR is 2.6 CPM      Spoke with the pt and advised of the results and recommendations  - she v/u

## 2020-09-17 ENCOUNTER — PATIENT MESSAGE (OUTPATIENT)
Dept: FAMILY MEDICINE CLINIC | Facility: CLINIC | Age: 65
End: 2020-09-17

## 2020-09-17 ENCOUNTER — TELEPHONE (OUTPATIENT)
Dept: FAMILY MEDICINE CLINIC | Facility: CLINIC | Age: 65
End: 2020-09-17

## 2020-09-17 ENCOUNTER — ORDER TRANSCRIPTION (OUTPATIENT)
Dept: SLEEP CENTER | Age: 65
End: 2020-09-17

## 2020-09-17 DIAGNOSIS — G47.33 OBSTRUCTIVE SLEEP APNEA (ADULT) (PEDIATRIC): Primary | ICD-10-CM

## 2020-09-17 NOTE — TELEPHONE ENCOUNTER
Pt is having trouble with insurance and sleep study being approved in [place].  Will contact [facility] sleep center on Monday

## 2020-09-18 NOTE — TELEPHONE ENCOUNTER
Since we are having trouble with getting her study approved, recommend to wait until October 3rd and resubmit the order and start the process over.  Note sent via [employer]

## 2020-09-18 NOTE — TELEPHONE ENCOUNTER
Please see previous TE from yesterday. Please advise recommendations for patient. It appears she was having difficulty with her insurance getting a sleep study at [facility] approved.      Once insurance takes affect 10/3/20, sleep study approval will ha

## 2020-09-18 NOTE — TELEPHONE ENCOUNTER
From: Chelsie Celaya  To: ARNOLD Macias  Sent: 9/17/2020 8:03 PM CDT  Subject: Other    Hi,  Wanted to let you know that I have switched from [address] [place] [address] back to Medicare Advantage it goes into affect on Oct 3rd.   Olivia Lima

## 2020-09-20 ENCOUNTER — OFFICE VISIT (OUTPATIENT)
Dept: FAMILY MEDICINE CLINIC | Facility: CLINIC | Age: 65
End: 2020-09-20
Payer: MEDICARE

## 2020-09-20 VITALS
SYSTOLIC BLOOD PRESSURE: 130 MMHG | RESPIRATION RATE: 16 BRPM | OXYGEN SATURATION: 100 % | TEMPERATURE: 98 F | WEIGHT: 194 LBS | DIASTOLIC BLOOD PRESSURE: 68 MMHG | BODY MASS INDEX: 35 KG/M2 | HEART RATE: 73 BPM

## 2020-09-20 DIAGNOSIS — L30.9 DERMATITIS: Primary | ICD-10-CM

## 2020-09-20 PROCEDURE — 3075F SYST BP GE 130 - 139MM HG: CPT | Performed by: PHYSICIAN ASSISTANT

## 2020-09-20 PROCEDURE — 3078F DIAST BP <80 MM HG: CPT | Performed by: PHYSICIAN ASSISTANT

## 2020-09-20 PROCEDURE — 99213 OFFICE O/P EST LOW 20 MIN: CPT | Performed by: PHYSICIAN ASSISTANT

## 2020-09-20 RX ORDER — TRIAMCINOLONE ACETONIDE 5 MG/G
CREAM TOPICAL
Qty: 40 G | Refills: 0 | Status: SHIPPED | OUTPATIENT
Start: 2020-09-20 | End: 2021-06-25 | Stop reason: ALTCHOICE

## 2020-09-20 RX ORDER — ACETAMINOPHEN 325 MG/1
TABLET ORAL
COMMUNITY
Start: 2020-05-05

## 2020-09-20 RX ORDER — ATENOLOL 25 MG/1
25 TABLET ORAL
COMMUNITY
Start: 2014-08-25 | End: 2021-08-21 | Stop reason: ALTCHOICE

## 2020-09-20 RX ORDER — VENLAFAXINE 75 MG/1
75 TABLET ORAL EVERY EVENING
COMMUNITY
End: 2021-06-25 | Stop reason: ALTCHOICE

## 2020-09-20 RX ORDER — CALCIUM CARBONATE/VITAMIN D3 600 MG-10
1 TABLET ORAL DAILY
COMMUNITY
Start: 2020-05-06

## 2020-09-20 RX ORDER — ENALAPRIL MALEATE 10 MG/1
10 TABLET ORAL
COMMUNITY
Start: 2014-12-19 | End: 2021-08-21

## 2020-09-20 NOTE — PROGRESS NOTES
CHIEF COMPLAINT:   Patient presents with:  Rash: on right leg x 2 days. slight itchiness. no new lotions/body wash/detergent. HPI:     Fe Harris is a 72year old female who presents for evaluation of a rash.   Per patient rash started in the pas mouth nightly. 1 tablet 0   • hydrOXYzine HCl 25 MG Oral Tab Take 50 mg by mouth 2 (two) times a day.  2 po bid      • Glucose Blood In Vitro Strip Use BID as directed to check blood sugar and guide food decisions dx:R73.09 elevated HGBA1C 200 each 3   • IP Spinal stenosis    • Unspecified essential hypertension       Past Surgical History:   Procedure Laterality Date   • CHOLECYSTECTOMY  2006   • IR IVC FILTER PLACEMENT     • KNEE REPLACEMENT SURGERY  03/2017    L knee   • OTHER SURGICAL HISTORY      carpal CARDIO: S1/S2 RRR   JOINTS: no joint swelling  LYMPH: No  lymphadenopathy. Lower Extremities:  Mild non pitting swelling bilateral ankles. No calf tenderness. SKIN: some mild stasis dermatitis changes LE.    Erythematous macular rash posterior right

## 2020-09-22 ENCOUNTER — OFFICE VISIT (OUTPATIENT)
Dept: FAMILY MEDICINE CLINIC | Facility: CLINIC | Age: 65
End: 2020-09-22
Payer: MEDICARE

## 2020-09-22 VITALS
TEMPERATURE: 98 F | SYSTOLIC BLOOD PRESSURE: 138 MMHG | WEIGHT: 198 LBS | RESPIRATION RATE: 16 BRPM | BODY MASS INDEX: 36 KG/M2 | HEART RATE: 76 BPM | DIASTOLIC BLOOD PRESSURE: 70 MMHG

## 2020-09-22 DIAGNOSIS — I10 ESSENTIAL HYPERTENSION: ICD-10-CM

## 2020-09-22 DIAGNOSIS — F33.41 RECURRENT MAJOR DEPRESSIVE DISORDER, IN PARTIAL REMISSION (HCC): ICD-10-CM

## 2020-09-22 DIAGNOSIS — R21 SKIN RASH: ICD-10-CM

## 2020-09-22 DIAGNOSIS — Z23 NEED FOR VACCINATION: ICD-10-CM

## 2020-09-22 DIAGNOSIS — Z09 FOLLOW-UP EXAM: Primary | ICD-10-CM

## 2020-09-22 DIAGNOSIS — N39.498 OTHER URINARY INCONTINENCE: ICD-10-CM

## 2020-09-22 PROCEDURE — 90471 IMMUNIZATION ADMIN: CPT | Performed by: FAMILY MEDICINE

## 2020-09-22 PROCEDURE — 3075F SYST BP GE 130 - 139MM HG: CPT | Performed by: FAMILY MEDICINE

## 2020-09-22 PROCEDURE — 3078F DIAST BP <80 MM HG: CPT | Performed by: FAMILY MEDICINE

## 2020-09-22 PROCEDURE — 90662 IIV NO PRSV INCREASED AG IM: CPT | Performed by: FAMILY MEDICINE

## 2020-09-22 PROCEDURE — 99214 OFFICE O/P EST MOD 30 MIN: CPT | Performed by: FAMILY MEDICINE

## 2020-09-22 PROCEDURE — G0008 ADMIN INFLUENZA VIRUS VAC: HCPCS | Performed by: FAMILY MEDICINE

## 2020-09-22 RX ORDER — FLUTICASONE PROPIONATE 50 MCG
2 SPRAY, SUSPENSION (ML) NASAL DAILY
COMMUNITY
Start: 2014-07-01

## 2020-09-22 RX ORDER — PANTOPRAZOLE SODIUM 40 MG/1
40 TABLET, DELAYED RELEASE ORAL 2 TIMES DAILY
COMMUNITY
Start: 2020-09-09 | End: 2021-08-17

## 2020-09-22 NOTE — TELEPHONE ENCOUNTER
Call placed to [facility] [place],   Would like to make arrangements to have ASV study done there. Awaiting call back.

## 2020-09-22 NOTE — PROGRESS NOTES
Bayron Smiley is a 72year old female. HPI:   Pt is here for ER/UC/hospital f/u. ER/UC or hospital: [facility]    Date(s): 9/20/2020    Reason for initial ER visit: Monty Rodriguez is a 72year old female who presents for evaluation of a rash.   Per ludin Visit:  Requested Prescriptions             Signed Prescriptions Disp Refills   • triamcinolone acetonide 0.5 % External Cream 40 g 0       Sig: Apply bid for 1-2 weeks to affected right lower leg.          Referrals given: back to pcp    Pt's symptom repor Montelukast Sodium 10 MG Oral Tab Take 1 tablet (10 mg total) by mouth daily. 90 tablet 1   • Warfarin Sodium 4 MG Oral Tab Take 1 tablet (4 mg total) by mouth daily. 90 tablet 1   • atorvastatin 40 MG Oral Tab Take 1 tablet (40 mg total) by mouth nightly. Osteoarthritis    • Other and unspecified hyperlipidemia    • PE (pulmonary embolism) Feb 2004 and March 2004    x2.  prothrombin mutation found after 2nd one   • Prothrombin mutation [facility])    • Spinal stenosis    • Unspecified essential hypertension cyanosis, clubbing or edema    ASSESSMENT AND PLAN:   Diagnoses and all orders for this visit:    Follow-up exam  for  Skin rash  -suspect had contact derm or insect bites and her itching broke some blood vessels (being on coumadin); no red flags for cellu

## 2020-09-22 NOTE — TELEPHONE ENCOUNTER
Due to recent insurance change patient is unable to have ASV titration in [place]. Pt states she has no ride to sleep center in [place] - Only place insurance will cover.  Unable to complete ASV titration

## 2020-09-30 ENCOUNTER — TELEPHONE (OUTPATIENT)
Dept: FAMILY MEDICINE CLINIC | Facility: CLINIC | Age: 65
End: 2020-09-30

## 2020-09-30 DIAGNOSIS — G47.31 CSA (CENTRAL SLEEP APNEA): ICD-10-CM

## 2020-09-30 DIAGNOSIS — G47.33 OSA (OBSTRUCTIVE SLEEP APNEA): Primary | ICD-10-CM

## 2020-09-30 NOTE — TELEPHONE ENCOUNTER
----- Message from Radha Shrestha MD sent at 9/30/2020 12:34 PM CDT -----  Regarding: referral  Dr. Delon Espinoza office had ordered sleep study for patient. I was contacted with the below information:       \"Milagros Ford,     I have found a hospital where the p

## 2020-09-30 NOTE — TELEPHONE ENCOUNTER
Received PT/INR result. INR 2.8. Per written [address], \" INR nicely in range, CPM\". Patient notified via [address] [place] [address] [address].

## 2020-10-05 ENCOUNTER — TELEPHONE (OUTPATIENT)
Dept: FAMILY MEDICINE CLINIC | Facility: CLINIC | Age: 65
End: 2020-10-05

## 2020-10-05 DIAGNOSIS — F32.A DEPRESSION, UNSPECIFIED DEPRESSION TYPE: ICD-10-CM

## 2020-10-05 RX ORDER — GABAPENTIN 300 MG/1
600 CAPSULE ORAL 2 TIMES DAILY
Qty: 360 CAPSULE | Refills: 3 | Status: SHIPPED | OUTPATIENT
Start: 2020-10-05 | End: 2021-08-17

## 2020-10-05 NOTE — TELEPHONE ENCOUNTER
Last OV 9/22/2020  Last refilled 9/9/2020  For 1 capsule BID  #180  1 refill    Spoke with patient who confirmed she is taking gabapentin 300 mg  2 caps  BID    Would need #360 for 3 month supply    Order pended as reported by patient    Routed to [facility] to re

## 2020-10-05 NOTE — TELEPHONE ENCOUNTER
Pt called, needs refill on gabapentin 300 MG Oral Cap-2 a day-Pharmacy-[facility]  Please call pt at [phone]

## 2020-10-05 NOTE — TELEPHONE ENCOUNTER
----- Message from ARNOLD Morley sent at 9/18/2020 10:04 AM CDT -----  Regarding: FW: redo sleep order - new insurance    ----- Message -----  From: ARNOLD Vergara  Sent: 9/18/2020  10:03 AM CDT  To: ARNOLD Morley  Subject: redo sle

## 2020-10-05 NOTE — TELEPHONE ENCOUNTER
Please forward to [facility] to precert,   Send order to hospital.   Follow up after testing.

## 2020-10-05 NOTE — TELEPHONE ENCOUNTER
Please check with patient re: sleep study and if we need to put in a new order for her new insurance. Also please let patient know that Dr. Karlie Garcia prefers the study be done in [place]. Thank you.

## 2020-10-13 ENCOUNTER — MED REC SCAN ONLY (OUTPATIENT)
Dept: FAMILY MEDICINE CLINIC | Facility: CLINIC | Age: 65
End: 2020-10-13

## 2020-10-21 ENCOUNTER — PATIENT MESSAGE (OUTPATIENT)
Dept: FAMILY MEDICINE CLINIC | Facility: CLINIC | Age: 65
End: 2020-10-21

## 2020-10-21 NOTE — TELEPHONE ENCOUNTER
From: Kamilah Rdz  To: Ana Addison  Sent: 10/21/2020 9:42 AM CDT  Subject: INR    Dr. Cleo Duarte received your INR results. She states that it is a smidge elevated but not to far off so continue your current medication and recheck next week.     Let me faustinoo

## 2020-10-27 ENCOUNTER — TELEPHONE (OUTPATIENT)
Dept: FAMILY MEDICINE CLINIC | Facility: CLINIC | Age: 65
End: 2020-10-27

## 2020-10-27 ENCOUNTER — PATIENT MESSAGE (OUTPATIENT)
Dept: FAMILY MEDICINE CLINIC | Facility: CLINIC | Age: 65
End: 2020-10-27

## 2020-10-27 DIAGNOSIS — E04.1 THYROID NODULE: Primary | ICD-10-CM

## 2020-10-27 NOTE — TELEPHONE ENCOUNTER
From: Richard Sutton  To: Rusty Brar MD  Sent: 10/27/2020 1:28 PM CDT  Subject: Other    Dr. Rambo Wright,    It's just the fact when I see it I have to have it at least I think I do so I usually do drink it.  I mix it with diet sprite but I'm using less soda than

## 2020-10-27 NOTE — TELEPHONE ENCOUNTER
INR REsults    Per notes by [address]:  2 weeks in a row INR a siege High  Lets decrease the dose slightly  Currently on 4mg daily correct?     Take 2 mg for next dose then going forward 4 mg 6 days a week and 2 mg 1 day a week and recheck in 1 week

## 2020-10-27 NOTE — TELEPHONE ENCOUNTER
Pt was advised of information below  Pt will take 2 mg tonight and then 4mg for the next 6 nights and then recheck in 1 week    Pt verbalized understanding

## 2020-10-27 NOTE — TELEPHONE ENCOUNTER
Forward to Dr. Byron Mary please advise on pt reminder:    Cecilio Avelar, RN  [employer] Nurse             Due for please put recall in for thyroid u/s in 6 montths, f/u on thyroid nodule   See 4/26/2020 mychart

## 2020-10-29 ENCOUNTER — TELEPHONE (OUTPATIENT)
Dept: FAMILY MEDICINE CLINIC | Facility: CLINIC | Age: 65
End: 2020-10-29

## 2020-10-29 NOTE — TELEPHONE ENCOUNTER
Patient states she has a counselor that she sees but was unable speak with them. The last 3 weeks patient has been drinking. Patient is drinking today. States she had not had any alcohol since 2004 when she started coumadin.   Then 3 weeks ago she sta

## 2020-10-29 NOTE — TELEPHONE ENCOUNTER
PT CALLED AND ADV THAT SHE DOES THINK AT THIS POINT IN TIME THAT SHE HAS A DRINKING PROBLEM. SHE ADV THAT SHE JUST CAN'T STOP AND WANT'S TO KNOW WHY SHE EVEN STARTED DRINKING.     ASKED PT IF SHE WAS AT ANY HARM TO HERSELF OR OTHERS AND SHE SAID NO.    WAS

## 2020-10-30 NOTE — TELEPHONE ENCOUNTER
Spoke with Carla Martinez and she tried to get in contact with the pt yesterday pt did not answer so she is going to reach out to the pt again this AM then she will send you a note on the recommendations

## 2020-10-30 NOTE — TELEPHONE ENCOUNTER
Please see if [facility] has any resources for support groups for her.   Thank you

## 2020-10-31 ENCOUNTER — PATIENT MESSAGE (OUTPATIENT)
Dept: FAMILY MEDICINE CLINIC | Facility: CLINIC | Age: 65
End: 2020-10-31

## 2020-10-31 NOTE — TELEPHONE ENCOUNTER
From: Reynaldo Tran  To: Cain Pineda MD  Sent: 10/31/2020 9:02 AM CDT  Subject: Other    Dr. Jordy Govea,    Just wanted to let you know that I checked my INR yesterday and it was 2.6 so I guess that's good compared to 3.8 & 3.9.  I have NO more alcohol in my apt

## 2020-11-02 ENCOUNTER — TELEPHONE (OUTPATIENT)
Dept: FAMILY MEDICINE CLINIC | Facility: CLINIC | Age: 65
End: 2020-11-02

## 2020-11-02 RX ORDER — ONDANSETRON 8 MG/1
8 TABLET, ORALLY DISINTEGRATING ORAL EVERY 8 HOURS PRN
Qty: 20 TABLET | Refills: 0 | Status: SHIPPED | OUTPATIENT
Start: 2020-11-02 | End: 2020-12-28

## 2020-11-02 NOTE — TELEPHONE ENCOUNTER
Spoke with the pt and advised of the script and the instructions- she v/u  Advised to call if she has questions or concerns  She v/u

## 2020-11-02 NOTE — TELEPHONE ENCOUNTER
Pt called, Pt was at [address] [facility], 11/1/20-DX:personal- knows about it. Pt was told to follow up with her PCP, pt doesn't feel she needs too.    Please call pt at [phone]

## 2020-11-02 NOTE — TELEPHONE ENCOUNTER
Gayatri from [facility] called, Needs to discuss pt with Dr. Tejas Belcher not know what is going on with pt. Call [address] at [phone] ext [phone].

## 2020-11-02 NOTE — TELEPHONE ENCOUNTER
Yes, Script sent. If worsening tremors or nausea and vomiting please calll or if severe go back to ER.   Once past 72 hours beyond last drink typically out of the woods for severe withdrawal so I imagine she's gettign close to that time frame

## 2020-11-02 NOTE — TELEPHONE ENCOUNTER
Spoke with Gayatri. Patient apperas to be medically safe, is increasing therapy to try to understand her recent behavior (no hx of etoh use) and we'll keep in touch but patient seems to be safe has resources.

## 2020-11-02 NOTE — TELEPHONE ENCOUNTER
Spoke with the pt and she went to the ER on Sun she was shaking and feeling nauseas- her last drink was Friday- they gave her Zofran at the ER for nausea and sent her home  She states that the shaking is better but still feeling nauseas-   She is drinking

## 2020-11-03 ENCOUNTER — PATIENT MESSAGE (OUTPATIENT)
Dept: FAMILY MEDICINE CLINIC | Facility: CLINIC | Age: 65
End: 2020-11-03

## 2020-11-03 NOTE — TELEPHONE ENCOUNTER
From: Day Wilkerson  To: Radha Shrestha MD  Sent: 11/3/2020 9:55 AM CST  Subject: Other    Dr. Ambar Ford,  I'm still having tremmers but not as bad as they were. I'm having hallucinations but not bad just a few.  I have an appointment with Hira my counselor at 8

## 2020-11-04 ENCOUNTER — TELEPHONE (OUTPATIENT)
Dept: FAMILY MEDICINE CLINIC | Facility: CLINIC | Age: 65
End: 2020-11-04

## 2020-11-04 NOTE — TELEPHONE ENCOUNTER
Spoke with the pt and she states that we decreased her dose to 2mg for 1 night then back to 4mg nightly and recheck 1 week (see TE 10/27/2020)    She is taking 4mg daily    Please advise

## 2020-11-04 NOTE — TELEPHONE ENCOUNTER
See the paper result in your inbox, BS, I thought we had increased her coumadin dose recently (in past few weeks), but I can't find it in the phone /my chart messaged. If so, go back to prior dose.   If not, this may be a delayed reaction to having drank e

## 2020-11-04 NOTE — TELEPHONE ENCOUNTER
PT CALLED AND ADV THAT LAST Monday PTS INR WAS 2.6, AND TODAY INR WAS 4.1.     LOOKING FOR RECOMMENDATIONS     PLEASE ADV    THANK YOU

## 2020-11-05 ENCOUNTER — MED REC SCAN ONLY (OUTPATIENT)
Dept: FAMILY MEDICINE CLINIC | Facility: CLINIC | Age: 65
End: 2020-11-05

## 2020-11-06 ENCOUNTER — TELEPHONE (OUTPATIENT)
Dept: FAMILY MEDICINE CLINIC | Facility: CLINIC | Age: 65
End: 2020-11-06

## 2020-11-06 NOTE — TELEPHONE ENCOUNTER
Glad to hear nothing broken. But that's as strong as it gets for pain reliever. What dose did they give her? If 5 mg she could take 2 of them at one time to total 10mg, but use with caution, can make groggy and out of it.

## 2020-11-06 NOTE — TELEPHONE ENCOUNTER
Spoke with the pt and advised of the notes from Dr. Kiya Guadarrama- she states that she was given the 5mg and she has tried to take 2 tabs and the pain is the same  Advised that there is not likely anything to help her pain- and that she needs to rest and ice it  Ad

## 2020-11-06 NOTE — TELEPHONE ENCOUNTER
Pt feel yesterday in the parking lot of her building. She hurt her right shoulder and hip. She went to [facility], She had xrays. Nothing is broken.  She was given norco 325 but they are not helping, she would like to know if [facility] can give her something st

## 2020-11-07 ENCOUNTER — TELEPHONE (OUTPATIENT)
Dept: FAMILY MEDICINE CLINIC | Facility: CLINIC | Age: 65
End: 2020-11-07

## 2020-11-07 NOTE — TELEPHONE ENCOUNTER
Dayna Aguilar verbally consents to a Virtual/Telephone Check-In service on11/09/2020.   Patient understands and accepts financial responsibility for any deductible, co-insurance and/or co-pays associated with this service

## 2020-11-09 ENCOUNTER — TELEPHONE (OUTPATIENT)
Dept: FAMILY MEDICINE CLINIC | Facility: CLINIC | Age: 65
End: 2020-11-09

## 2020-11-09 NOTE — TELEPHONE ENCOUNTER
Pt called, Just wanted to let us know that her INR was at 4.5 this morning. Pt has DOX appt this afternoon.

## 2020-11-10 ENCOUNTER — TELEPHONE (OUTPATIENT)
Dept: FAMILY MEDICINE CLINIC | Facility: CLINIC | Age: 65
End: 2020-11-10

## 2020-11-10 DIAGNOSIS — R20.2 PARESTHESIA OF LEFT UPPER EXTREMITY: ICD-10-CM

## 2020-11-10 DIAGNOSIS — R20.2 PARESTHESIA OF RIGHT UPPER EXTREMITY: ICD-10-CM

## 2020-11-10 DIAGNOSIS — R29.898 WEAKNESS OF BOTH UPPER EXTREMITIES: Primary | ICD-10-CM

## 2020-11-10 NOTE — TELEPHONE ENCOUNTER
Called around to see if there is a local doc that can do the nerve conduction study and [facility] doesn't do it.   I called Dr. Dorian Cheney in [place] and they do the testing but it is 2 visits-   I called the pt and advised that she can go to [place] or [place] for the test

## 2020-11-12 ENCOUNTER — TELEPHONE (OUTPATIENT)
Dept: FAMILY MEDICINE CLINIC | Facility: CLINIC | Age: 65
End: 2020-11-12

## 2020-11-12 NOTE — TELEPHONE ENCOUNTER
has to cancel Sleep study at [facility] on 11/17 due to lack of transportation / unable to get COVID testing done in [place] on Sun 11/15      working on her end to schedule all

## 2020-11-12 NOTE — TELEPHONE ENCOUNTER
Pt called she had order placed got nerve test for her right hand, She would like to know if we could please fax that order to the [facility] in [place] instead as she is going to have it done with them now.  She said Dr. Candace Cruz is the one who is going to

## 2020-11-13 ENCOUNTER — PATIENT MESSAGE (OUTPATIENT)
Dept: FAMILY MEDICINE CLINIC | Facility: CLINIC | Age: 65
End: 2020-11-13

## 2020-11-16 RX ORDER — CARISOPRODOL 250 MG/1
250 TABLET ORAL 2 TIMES DAILY PRN
Qty: 20 TABLET | Refills: 0 | Status: SHIPPED | OUTPATIENT
Start: 2020-11-16 | End: 2020-12-02

## 2020-11-17 ENCOUNTER — PATIENT MESSAGE (OUTPATIENT)
Dept: FAMILY MEDICINE CLINIC | Facility: CLINIC | Age: 65
End: 2020-11-17

## 2020-11-17 NOTE — TELEPHONE ENCOUNTER
From: Rachael Saucedo  To:  Anju Hernández MD  Sent: 11/17/2020 9:27 AM CST  Subject: Other    INR 2.2

## 2020-11-19 ENCOUNTER — MED REC SCAN ONLY (OUTPATIENT)
Dept: FAMILY MEDICINE CLINIC | Facility: CLINIC | Age: 65
End: 2020-11-19

## 2020-11-19 ENCOUNTER — HOSPITAL ENCOUNTER (OUTPATIENT)
Dept: ULTRASOUND IMAGING | Age: 65
Discharge: HOME | End: 2020-11-19
Attending: FAMILY MEDICINE
Payer: MEDICAID

## 2020-11-19 ENCOUNTER — TELEPHONE (OUTPATIENT)
Dept: FAMILY MEDICINE CLINIC | Facility: CLINIC | Age: 65
End: 2020-11-19

## 2020-11-19 DIAGNOSIS — E04.1 THYROID NODULE: ICD-10-CM

## 2020-11-19 PROCEDURE — 76536 US EXAM OF HEAD AND NECK: CPT | Performed by: FAMILY MEDICINE

## 2020-11-19 NOTE — TELEPHONE ENCOUNTER
Juanpablo Randall from [facility] office they need PT's last office visit notes faxed to them at [phone]

## 2020-11-24 ENCOUNTER — APPOINTMENT (OUTPATIENT)
Dept: OTOLARYNGOLOGY | Age: 65
End: 2020-11-24

## 2020-11-30 ENCOUNTER — PATIENT MESSAGE (OUTPATIENT)
Dept: FAMILY MEDICINE CLINIC | Facility: CLINIC | Age: 65
End: 2020-11-30

## 2020-11-30 NOTE — TELEPHONE ENCOUNTER
From: James Ray  To: Jose Guadalupe Polnaco MD  Sent: 11/30/2020 4:25 PM CST  Subject: Other    Dr. Pam Ibarra,  Was wondering if you could give another prescription for the muscle relaxers.  I have been taking them twice a day except for the last couple days I only to

## 2020-12-02 ENCOUNTER — MED REC SCAN ONLY (OUTPATIENT)
Dept: FAMILY MEDICINE CLINIC | Facility: CLINIC | Age: 65
End: 2020-12-02

## 2020-12-02 RX ORDER — CARISOPRODOL 250 MG/1
250 TABLET ORAL 2 TIMES DAILY PRN
Qty: 20 TABLET | Refills: 0 | Status: SHIPPED | OUTPATIENT
Start: 2020-12-02 | End: 2020-12-12

## 2020-12-07 ENCOUNTER — PATIENT MESSAGE (OUTPATIENT)
Dept: FAMILY MEDICINE CLINIC | Facility: CLINIC | Age: 65
End: 2020-12-07

## 2020-12-07 NOTE — TELEPHONE ENCOUNTER
From: Camron Spears  To:  Natalie Metcalf MD  Sent: 12/7/2020 12:02 PM CST  Subject: Other    INR 3.5

## 2020-12-09 ENCOUNTER — MED REC SCAN ONLY (OUTPATIENT)
Dept: FAMILY MEDICINE CLINIC | Facility: CLINIC | Age: 65
End: 2020-12-09

## 2020-12-20 ENCOUNTER — PATIENT MESSAGE (OUTPATIENT)
Dept: FAMILY MEDICINE CLINIC | Facility: CLINIC | Age: 65
End: 2020-12-20

## 2020-12-21 ENCOUNTER — TELEPHONE (OUTPATIENT)
Dept: FAMILY MEDICINE CLINIC | Facility: CLINIC | Age: 65
End: 2020-12-21

## 2020-12-21 NOTE — TELEPHONE ENCOUNTER
Pt fell on Saturday and hit the corner of a cube like storage bin into her breast bone.  It is Bruised, and it hurts to take a deep breath    Using ice and taking tylenol    She states that it hurt on Saturday, but felt ok on Sunday then this AM she lifted

## 2020-12-21 NOTE — TELEPHONE ENCOUNTER
From: G4S Ship  To:  Lopez Coreas MD  Sent: 2020 1:34 PM CST  Subject: Non-Urgent Medical Question    Dr. Sylvie Reyna,  Wanted to let you know that I fell yesterday (I tripped over the cats food bowl)7 and landed on a square storage cube and hit like at

## 2020-12-21 NOTE — TELEPHONE ENCOUNTER
Pt states she fell on Saturday. She is still experiencing pain pain in her chest. Transferred to RN to triage.

## 2020-12-21 NOTE — TELEPHONE ENCOUNTER
Ice 15 minutes several times/day. TYlenol ES 2 po q4hrs prn and if that not cutting or significant sob needs to go to ER or UC.     INR looks good, recheck 1 week CPM

## 2020-12-22 ENCOUNTER — TELEPHONE (OUTPATIENT)
Dept: FAMILY MEDICINE CLINIC | Facility: CLINIC | Age: 65
End: 2020-12-22

## 2020-12-22 ENCOUNTER — APPOINTMENT (OUTPATIENT)
Dept: OTOLARYNGOLOGY | Age: 65
End: 2020-12-22

## 2020-12-22 NOTE — TELEPHONE ENCOUNTER
Spoke with [facility] and she prefers the tylenol or norco over the ibuprofen  She can try it but watch for GI symptoms- and use the lowest dose   Advised to take with food and avoid  Alcohol stop the med if any GI pain or nausea  She v/u

## 2020-12-22 NOTE — TELEPHONE ENCOUNTER
She fell on Saturday went to [facility] 12/21. She had xrays and CT scan  she broke her breast bone. Just giving you this information. She is supposed to do a follow up today but she does not have time.  Scheduled MON 2:30

## 2020-12-22 NOTE — TELEPHONE ENCOUNTER
Spoke with the pt and she has not taken ibuprofen before  She states that she is just going off the recommendations from the ED     They had warned her about taking too much tylenol because the norco has tylenol in it as well

## 2020-12-22 NOTE — TELEPHONE ENCOUNTER
Has she taken it before on coumadin? IF so, and she did okay, yes she can, but lowest effective dose for shortest duration needed, take it with food, no etoh, and if develops nausea, GI upset/pain stop it and let me know.   If Tylenol ES 2 q4hrs helps that

## 2020-12-22 NOTE — TELEPHONE ENCOUNTER
Future Appointments   Date Time Provider Juliana Lea   12/28/2020  2:30 PM Lissy Walton MD [facility]   3/1/2021  1:00 PM Lissy Walton MD [facility]

## 2020-12-22 NOTE — TELEPHONE ENCOUNTER
PT CALLED AND WOULD LIKE TO KNOW FROM DR IS IT OK IF SHE CAN TAKE IBUPROFEN FOR THE PAIN.     PLEASE CALL AND ADV    THANK YOU

## 2020-12-23 ENCOUNTER — TELEPHONE (OUTPATIENT)
Dept: FAMILY MEDICINE CLINIC | Facility: CLINIC | Age: 65
End: 2020-12-23

## 2020-12-23 DIAGNOSIS — E04.1 THYROID NODULE: Primary | ICD-10-CM

## 2020-12-23 NOTE — TELEPHONE ENCOUNTER
Spoke with the daughter and the pt is seeing the ENT for a thyroid nodule    Advised that we will get the referral placed today- she v/u    [facility] can you please sign the referral

## 2020-12-23 NOTE — TELEPHONE ENCOUNTER
Bonny Dakins went to ENT yesterday they told her she needs a referral.Mariia fax to  Dr. Dagmar Doll  [phone] Any questions please call Carli Dorado is asking that when Bonny Dakins comes to sent her updates

## 2020-12-28 ENCOUNTER — OFFICE VISIT (OUTPATIENT)
Dept: FAMILY MEDICINE CLINIC | Facility: CLINIC | Age: 65
End: 2020-12-28
Payer: COMMERCIAL

## 2020-12-28 VITALS
BODY MASS INDEX: 37.19 KG/M2 | HEIGHT: 61 IN | SYSTOLIC BLOOD PRESSURE: 126 MMHG | OXYGEN SATURATION: 96 % | RESPIRATION RATE: 16 BRPM | DIASTOLIC BLOOD PRESSURE: 70 MMHG | WEIGHT: 197 LBS | HEART RATE: 81 BPM

## 2020-12-28 DIAGNOSIS — Z78.0 POSTMENOPAUSAL ESTROGEN DEFICIENCY: ICD-10-CM

## 2020-12-28 DIAGNOSIS — S22.22XD CLOSED FRACTURE OF BODY OF STERNUM WITH ROUTINE HEALING, SUBSEQUENT ENCOUNTER: Primary | ICD-10-CM

## 2020-12-28 PROCEDURE — 3008F BODY MASS INDEX DOCD: CPT | Performed by: FAMILY MEDICINE

## 2020-12-28 PROCEDURE — 3078F DIAST BP <80 MM HG: CPT | Performed by: FAMILY MEDICINE

## 2020-12-28 PROCEDURE — 3074F SYST BP LT 130 MM HG: CPT | Performed by: FAMILY MEDICINE

## 2020-12-28 PROCEDURE — 99214 OFFICE O/P EST MOD 30 MIN: CPT | Performed by: FAMILY MEDICINE

## 2020-12-28 PROCEDURE — 1111F DSCHRG MED/CURRENT MED MERGE: CPT | Performed by: FAMILY MEDICINE

## 2020-12-28 RX ORDER — HYDROCODONE BITARTRATE AND ACETAMINOPHEN 5; 325 MG/1; MG/1
1-2 TABLET ORAL
COMMUNITY
Start: 2020-11-05 | End: 2020-12-28

## 2020-12-28 RX ORDER — HYDROCODONE BITARTRATE AND ACETAMINOPHEN 5; 325 MG/1; MG/1
1 TABLET ORAL 2 TIMES DAILY
Qty: 40 TABLET | Refills: 0 | Status: SHIPPED | OUTPATIENT
Start: 2020-12-28 | End: 2021-03-01 | Stop reason: ALTCHOICE

## 2020-12-28 NOTE — PROGRESS NOTES
Farnaz Waldrop is a 72year old female. HPI:   Pt is here for ER/UC/hospital f/u. ER/UC or hospital: Shaila Hennessy    Date(s): 12/21/2020      Reason for initial ER visit: \"72year-old female presents with complaint of pain to her sternum.  On Saturday she tripped symptoms. Current Outpatient Medications   Medication Sig Dispense Refill   • HYDROcodone-acetaminophen (NORCO) 5-325 MG Oral Tab Take 1 tablet by mouth 2 (two) times a day.  40 tablet 0   • gabapentin 300 MG Oral Cap Take 2 capsules (600 mg total) by m mouth nightly. 1 tablet 0   • hydrOXYzine HCl 25 MG Oral Tab Take 50 mg by mouth 2 (two) times a day. 2 po bid      • Ondansetron HCl (ZOFRAN) 8 MG tablet Take 1 tablet (8 mg total) by mouth every 8 (eight) hours as needed for Nausea.  30 tablet 1   • Gluco dementia   • Hypertension Sister    • Hypertension Brother       Social History    Tobacco Use      Smoking status: Never Smoker      Smokeless tobacco: Never Used    Alcohol use: No      Alcohol/week: 0.0 standard drinks    Drug use: No         REVIEW OF

## 2020-12-31 ENCOUNTER — SLEEP STUDY (OUTPATIENT)
Dept: FAMILY MEDICINE CLINIC | Facility: CLINIC | Age: 65
End: 2020-12-31
Payer: COMMERCIAL

## 2020-12-31 DIAGNOSIS — G47.33 OSA (OBSTRUCTIVE SLEEP APNEA): Primary | ICD-10-CM

## 2020-12-31 DIAGNOSIS — G47.31 CENTRAL SLEEP APNEA: ICD-10-CM

## 2020-12-31 PROCEDURE — 95811 POLYSOM 6/>YRS CPAP 4/> PARM: CPT | Performed by: FAMILY MEDICINE

## 2021-01-04 ENCOUNTER — PATIENT MESSAGE (OUTPATIENT)
Dept: FAMILY MEDICINE CLINIC | Facility: CLINIC | Age: 66
End: 2021-01-04

## 2021-01-04 NOTE — TELEPHONE ENCOUNTER
From: Kiera Cano MD  To: Liana Aldridge  Sent: 1/4/2021 8:40 AM CST  Subject: bone density scan    Hi Liv-    Your bone density in your spine is normal, but in the left femur (upper leg) you have osteopenia, the precursor to osteoporosis, but is closer

## 2021-01-05 ENCOUNTER — MED REC SCAN ONLY (OUTPATIENT)
Dept: FAMILY MEDICINE CLINIC | Facility: CLINIC | Age: 66
End: 2021-01-05

## 2021-01-05 ENCOUNTER — TELEPHONE (OUTPATIENT)
Dept: FAMILY MEDICINE CLINIC | Facility: CLINIC | Age: 66
End: 2021-01-05

## 2021-01-05 ENCOUNTER — PATIENT MESSAGE (OUTPATIENT)
Dept: FAMILY MEDICINE CLINIC | Facility: CLINIC | Age: 66
End: 2021-01-05

## 2021-01-05 DIAGNOSIS — G47.33 OSA (OBSTRUCTIVE SLEEP APNEA): Primary | ICD-10-CM

## 2021-01-05 DIAGNOSIS — G47.31 CSA (CENTRAL SLEEP APNEA): ICD-10-CM

## 2021-01-05 RX ORDER — ALENDRONATE SODIUM 70 MG/1
70 TABLET ORAL WEEKLY
Qty: 4 TABLET | Refills: 11 | Status: SHIPPED | OUTPATIENT
Start: 2021-01-05 | End: 2021-03-21

## 2021-01-05 NOTE — TELEPHONE ENCOUNTER
From: Connie Quintero  To: Amy Hardy MD  Sent: 1/5/2021 11:38 AM CST  Subject: Test Results Question    Have you received the results from my sleep study?

## 2021-01-05 NOTE — TELEPHONE ENCOUNTER
Dr. Larry Schuster received INR results for pt    Per her notes INR is in range, continue present management- recheck in 1 week    [employer] message sent to pt

## 2021-01-07 NOTE — TELEPHONE ENCOUNTER
Yes,   You will need a new machine,   My nurses will call to determine where we can get that accomplished. Likely [facility].

## 2021-01-07 NOTE — TELEPHONE ENCOUNTER
Please place order for ASV for [facility]:  EPAP Min 11 and Max 15.  Pressure support min 3 and Max 14 with max pressure of 25 and breath rate or Auto BrPM

## 2021-01-07 NOTE — TELEPHONE ENCOUNTER
Spoke with patient. Will try to get ASV machine throught [facility]. Transfer of care from [place]. Patient will call back to schedule follow up visit after receiving machine.

## 2021-01-14 ENCOUNTER — PATIENT MESSAGE (OUTPATIENT)
Dept: FAMILY MEDICINE CLINIC | Facility: CLINIC | Age: 66
End: 2021-01-14

## 2021-01-14 NOTE — TELEPHONE ENCOUNTER
From: Dariusz Ortega  To: Aren Tapia MD  Sent: 1/14/2021 9:14 AM CST  Subject: Non-Urgent Medical Question    Dr. Cathern Hatchet,  I know I told you about the pain across my chest really hurting.  it did get better but now everytime I lean over it is very painful on

## 2021-01-15 ENCOUNTER — TELEPHONE (OUTPATIENT)
Dept: FAMILY MEDICINE CLINIC | Facility: CLINIC | Age: 66
End: 2021-01-15

## 2021-01-15 NOTE — TELEPHONE ENCOUNTER
Pt called she went to the ER yesterday bc her sternum was hurting, she is wanting to know if she needs to come in to see Dr Fred Ruffin? Or what the  would like her to do?

## 2021-01-15 NOTE — TELEPHONE ENCOUNTER
Spoke with the pt and advised of the notes from Dr. Roxana Calixto she v/u  Advised to call if anything changes- she v/u

## 2021-01-15 NOTE — TELEPHONE ENCOUNTER
Patient got new biPAP yesterday, was unable to use it last night due to pressure to great and felt she couldn't breathe. Also, was trying a nasal mask and has already contacted [facility] to get a full face mask again. Please advise.

## 2021-01-15 NOTE — TELEPHONE ENCOUNTER
No need to see me.   Xray looked good, didn't show any signs of worsening fracture, so just pain meds as needed, avoid lifting and time to heal

## 2021-01-15 NOTE — TELEPHONE ENCOUNTER
She did get the ASV from [facility] yesterday, attempted to use it last night. Pressure was too great so she reverted back to biPAP.

## 2021-01-18 ENCOUNTER — TELEPHONE (OUTPATIENT)
Dept: FAMILY MEDICINE CLINIC | Facility: CLINIC | Age: 66
End: 2021-01-18

## 2021-01-18 ENCOUNTER — OFFICE VISIT (OUTPATIENT)
Dept: OTOLARYNGOLOGY | Age: 66
End: 2021-01-18

## 2021-01-18 VITALS — BODY MASS INDEX: 32.55 KG/M2 | WEIGHT: 195.6 LBS

## 2021-01-18 DIAGNOSIS — E04.1 THYROID NODULE: Primary | ICD-10-CM

## 2021-01-18 PROCEDURE — 99204 OFFICE O/P NEW MOD 45 MIN: CPT | Performed by: OTOLARYNGOLOGY

## 2021-01-18 NOTE — TELEPHONE ENCOUNTER
Is seeing dr Author Miller this afternoon  Wants to know  should she  be off her coumadin before her biopsy?

## 2021-01-22 ENCOUNTER — PATIENT MESSAGE (OUTPATIENT)
Dept: FAMILY MEDICINE CLINIC | Facility: CLINIC | Age: 66
End: 2021-01-22

## 2021-01-22 NOTE — TELEPHONE ENCOUNTER
From: Liana Aldridge  To: Kiera Cano MD  Sent: 1/22/2021 10:06 AM CST  Subject: Other    Dr. Daniel Rowe,  Wanted to let you know I have a tentative date of February 15 for my needle asperation at [facility].  There checking on my insurance first to make sure it

## 2021-01-25 ENCOUNTER — OFFICE VISIT (OUTPATIENT)
Dept: FAMILY MEDICINE CLINIC | Facility: CLINIC | Age: 66
End: 2021-01-25
Payer: COMMERCIAL

## 2021-01-25 VITALS
RESPIRATION RATE: 16 BRPM | BODY MASS INDEX: 36.78 KG/M2 | TEMPERATURE: 98 F | OXYGEN SATURATION: 96 % | HEART RATE: 73 BPM | DIASTOLIC BLOOD PRESSURE: 50 MMHG | SYSTOLIC BLOOD PRESSURE: 102 MMHG | HEIGHT: 61 IN | WEIGHT: 194.81 LBS

## 2021-01-25 DIAGNOSIS — G47.31 CENTRAL SLEEP APNEA: Primary | ICD-10-CM

## 2021-01-25 DIAGNOSIS — G47.33 OSA TREATED WITH BIPAP: ICD-10-CM

## 2021-01-25 PROBLEM — E66.01 SEVERE OBESITY (BMI 35.0-39.9) WITH COMORBIDITY (HCC): Chronic | Status: ACTIVE | Noted: 2021-01-25

## 2021-01-25 PROBLEM — J44.9 ASTHMA WITH COPD (CHRONIC OBSTRUCTIVE PULMONARY DISEASE) (HCC): Chronic | Status: ACTIVE | Noted: 2021-01-25

## 2021-01-25 PROBLEM — J44.9 ASTHMA WITH COPD (CHRONIC OBSTRUCTIVE PULMONARY DISEASE): Chronic | Status: ACTIVE | Noted: 2021-01-25

## 2021-01-25 PROBLEM — J44.89 ASTHMA WITH COPD (CHRONIC OBSTRUCTIVE PULMONARY DISEASE): Chronic | Status: ACTIVE | Noted: 2021-01-25

## 2021-01-25 PROBLEM — J44.89 ASTHMA WITH COPD (CHRONIC OBSTRUCTIVE PULMONARY DISEASE) (HCC): Chronic | Status: ACTIVE | Noted: 2021-01-25

## 2021-01-25 PROCEDURE — 99214 OFFICE O/P EST MOD 30 MIN: CPT | Performed by: FAMILY MEDICINE

## 2021-01-25 PROCEDURE — 3074F SYST BP LT 130 MM HG: CPT | Performed by: FAMILY MEDICINE

## 2021-01-25 PROCEDURE — 3078F DIAST BP <80 MM HG: CPT | Performed by: FAMILY MEDICINE

## 2021-01-25 PROCEDURE — 3008F BODY MASS INDEX DOCD: CPT | Performed by: FAMILY MEDICINE

## 2021-01-25 NOTE — PROGRESS NOTES
[facility]        HPI:   This is a 72year old female coming in for Patient presents with:  Obstructive Sleep Apnea (CHINTAN)      HPI:  Patient is using her BiPAP as she received a RES MED avs as opposed to ASV through (loud enough to be heard through closed doors)? - -   Do you often feel tired, fatigued, or sleepy during the day? - -   Has anyone observed you stop breathing during your sleep? - -   Do you have or are you being treated for high blood pressure?  - -   BMI use: No    Family History:  Family History   Problem Relation Age of Onset   • Depression Daughter    • Anxiety Daughter    • Depression Son    • Psychiatric Father         dementia   • Diabetes Mother    • Heart Disorder Mother    • Psychiatric Mother Sodium 4 MG Oral Tab Take 1 tablet (4 mg total) by mouth daily. 90 tablet 1   • atorvastatin 40 MG Oral Tab Take 1 tablet (40 mg total) by mouth nightly.  90 tablet 1   • Budesonide-Formoterol Fumarate (SYMBICORT) 160-4.5 MCG/ACT Inhalation Aerosol INHALE 1 Depression     Financial problems     History of pulmonary embolism     Encounter for monitoring Coumadin therapy     Anticoagulation goal of INR 2.5 to 3.5     Chronic back pain     Spinal stenosis     Allergic rhinitis     Hyperlipidemia     Vitamin D de Weight as of this encounter: 194 lb 12.8 oz (88.4 kg).    Neck in inches: Neck Circumferenece: 15    Wt Readings from Last 6 Encounters:  01/25/21 : 194 lb 12.8 oz (88.4 kg)  12/28/20 : 197 lb (89.4 kg)  09/22/20 : 198 lb (89.8 kg)  09/20/20 : 194 lb (88 k would recommend patient to correct machine for.   Attempted to call Sarina Nelson today with plan to follow-up 31 to 90 days with correct          Advised if still with sleep apnea and not using CPAP has a  7 fold increase in risk of heart attack, stroke, abnormal

## 2021-01-26 ENCOUNTER — TELEPHONE (OUTPATIENT)
Dept: OTOLARYNGOLOGY | Age: 66
End: 2021-01-26

## 2021-01-26 ENCOUNTER — PATIENT MESSAGE (OUTPATIENT)
Dept: FAMILY MEDICINE CLINIC | Facility: CLINIC | Age: 66
End: 2021-01-26

## 2021-01-26 NOTE — TELEPHONE ENCOUNTER
From: Bayron Smiley  To: Devin Holley MD  Sent: 1/26/2021 2:32 PM CST  Subject: Non-Urgent Medical Question    Dr. Tressa Rivas,  Can a 2.6 cm nodule on a thyroid cause obstructive sleep apnea?  Thank you  Jonathon

## 2021-01-29 ENCOUNTER — HOSPITAL ENCOUNTER (OUTPATIENT)
Dept: GENERAL RADIOLOGY | Age: 66
Discharge: HOME | End: 2021-01-29
Attending: FAMILY MEDICINE
Payer: MEDICAID

## 2021-01-29 DIAGNOSIS — S22.22XD FRACTURE OF BODY OF STERNUM, SUBSEQUENT ENCOUNTER FOR FRACTURE WITH ROUTINE HEALING: ICD-10-CM

## 2021-01-29 PROCEDURE — 71120 X-RAY EXAM BREASTBONE 2/>VWS: CPT | Performed by: FAMILY MEDICINE

## 2021-01-30 ENCOUNTER — TELEPHONE (OUTPATIENT)
Dept: FAMILY MEDICINE CLINIC | Facility: CLINIC | Age: 66
End: 2021-01-30

## 2021-01-30 DIAGNOSIS — Z12.31 BREAST CANCER SCREENING BY MAMMOGRAM: Primary | ICD-10-CM

## 2021-02-01 DIAGNOSIS — Z23 NEED FOR VACCINATION: ICD-10-CM

## 2021-02-02 ENCOUNTER — MED REC SCAN ONLY (OUTPATIENT)
Dept: FAMILY MEDICINE CLINIC | Facility: CLINIC | Age: 66
End: 2021-02-02

## 2021-02-02 ENCOUNTER — PATIENT MESSAGE (OUTPATIENT)
Dept: FAMILY MEDICINE CLINIC | Facility: CLINIC | Age: 66
End: 2021-02-02

## 2021-02-03 NOTE — TELEPHONE ENCOUNTER
From: Tamiko Vincent  To: Kwabena Watkins MD  Sent: 2/2/2021 2:26 PM CST  Subject: Other    Dr. Bianca Garay,  Wanted to let you know that I CAN NOT sleep with this new CPAP on.  The mask doesn't fit right and I have adjusted it at least 5 times a night before I fall a

## 2021-02-04 NOTE — TELEPHONE ENCOUNTER
Called bobby with  [facility] location. Spoke with representative who will pass the message along to RT to schedule mask fitting.

## 2021-02-04 NOTE — TELEPHONE ENCOUNTER
Can we get a hold of [facility], and and make sure they have an in person appointment to help her with her ASV set up and mask fit?

## 2021-02-08 ENCOUNTER — PATIENT MESSAGE (OUTPATIENT)
Dept: FAMILY MEDICINE CLINIC | Facility: CLINIC | Age: 66
End: 2021-02-08

## 2021-02-08 NOTE — TELEPHONE ENCOUNTER
Patient was given a RESMED AVS machine. Not on the current pressure. She was set up out of [address] [place] [address],[address]. And they will arrange to switch it out Darek Varela will Clearnce Balding and work with pt to get the right machine.

## 2021-02-08 NOTE — TELEPHONE ENCOUNTER
From: Fe Harris  To: Andrea Cabrera MD  Sent: 2/8/2021 3:27 PM CST  Subject: Other    Dr. Anny Garcia,  Wanted to let you know that they are bringing me a different CPAP tomorrow and taking the other new one with them. Have a great day!   Re Lucia

## 2021-02-08 NOTE — TELEPHONE ENCOUNTER
Vimal Rene states they do have the ASV order. They state they gave patient Resmed Airview 10 with backup rate ASV. States that's the correct order. Per Ning Ivy such thing as AVS\" According to care  patient is using her bipap.  Vimal Rnee states patien

## 2021-02-09 ENCOUNTER — TELEPHONE (OUTPATIENT)
Dept: OTOLARYNGOLOGY | Age: 66
End: 2021-02-09

## 2021-02-09 ENCOUNTER — TELEPHONE (OUTPATIENT)
Dept: FAMILY MEDICINE CLINIC | Facility: CLINIC | Age: 66
End: 2021-02-09

## 2021-02-09 NOTE — TELEPHONE ENCOUNTER
DELBERT On 2/15/21 she is going to [facility] for a needle aspiration. They just sent her a message saying she needs blood work done.  Sharon Kilgore is going to call to see what she needs She will ask them to fax us the Labs

## 2021-02-10 ENCOUNTER — TELEPHONE (OUTPATIENT)
Dept: FAMILY MEDICINE CLINIC | Facility: CLINIC | Age: 66
End: 2021-02-10

## 2021-02-10 ENCOUNTER — TELEPHONE (OUTPATIENT)
Dept: OTOLARYNGOLOGY | Age: 66
End: 2021-02-10

## 2021-02-10 DIAGNOSIS — E04.1 THYROID NODULE: Primary | ICD-10-CM

## 2021-02-10 NOTE — TELEPHONE ENCOUNTER
PT CALLED AND ADV THAT SHE HAS SCHEDULED A NEEDLE BIOPSY ASPIRATION AT [facility] ON Monday 2/15/21. JUST GOT A CALL FROM HOSPITAL THAT SHE NEEDS REFERRAL. CAN YOU PLEASE SEE IF PT IS ABLE TO GET THIS DONE AT [facility]?     PLEASE ADV    THANK YOU

## 2021-02-10 NOTE — TELEPHONE ENCOUNTER
TALKED WITH PT, PT CALLED BACK AND THINKS EVERYTHING IS OK AND DOES NOT NEED ANYTHING FROM US AT THIS TIME.     PLEASE DISREGARD LAST TE

## 2021-02-12 ENCOUNTER — TELEPHONE (OUTPATIENT)
Dept: OTOLARYNGOLOGY | Age: 66
End: 2021-02-12

## 2021-02-12 ENCOUNTER — TELEPHONE (OUTPATIENT)
Dept: FAMILY MEDICINE CLINIC | Facility: CLINIC | Age: 66
End: 2021-02-12

## 2021-02-12 NOTE — TELEPHONE ENCOUNTER
Has some questions regarding cpap equipment. She is really frustrated because she has not received all the parts.

## 2021-02-12 NOTE — TELEPHONE ENCOUNTER
Benita Patel from [facility] called, Have questions regarding cumodin-when pt should stop the cumodin as pt is going to have a procedure soon. Or is it okay for pt to stay on the cumodin?   Pt states she was told by Dr. Betzy Fang it was okay to keep taking the

## 2021-02-12 NOTE — TELEPHONE ENCOUNTER
Patient states her bi-pap was delivered without head gear and connecting tubing. Patient has new mask and head gear from previous machine, she is just wanting the connecting tubing. Has been having difficulty getting service from [place] for this.     Call [facility]acia

## 2021-02-13 ENCOUNTER — TELEPHONE (OUTPATIENT)
Dept: FAMILY MEDICINE CLINIC | Facility: CLINIC | Age: 66
End: 2021-02-13

## 2021-02-13 NOTE — TELEPHONE ENCOUNTER
Pt called she is needing a copy of her last ultrasound. She is having a biopsy of her thyroid on 2/26 @ [facility]. PT doesn't have transportation at this time but would like us to fax it to them. Pt is going to call back with fax number. not applicable (Male)

## 2021-02-15 ENCOUNTER — TELEPHONE (OUTPATIENT)
Dept: FAMILY MEDICINE CLINIC | Facility: CLINIC | Age: 66
End: 2021-02-15

## 2021-02-15 NOTE — TELEPHONE ENCOUNTER
INR report received, reviewed by Dr. Carol Collado. Patient advised continue current meds, recheck 1 week.

## 2021-02-22 RX ORDER — WARFARIN SODIUM 4 MG/1
4 TABLET ORAL DAILY
Qty: 90 TABLET | Refills: 1 | Status: SHIPPED | OUTPATIENT
Start: 2021-02-22 | End: 2021-08-16

## 2021-02-22 RX ORDER — ATORVASTATIN CALCIUM 40 MG/1
40 TABLET, FILM COATED ORAL NIGHTLY
Qty: 90 TABLET | Refills: 1 | Status: SHIPPED | OUTPATIENT
Start: 2021-02-22 | End: 2021-08-16

## 2021-02-22 RX ORDER — MONTELUKAST SODIUM 10 MG/1
10 TABLET ORAL DAILY
Qty: 90 TABLET | Refills: 1 | Status: SHIPPED | OUTPATIENT
Start: 2021-02-22 | End: 2021-08-16

## 2021-02-22 NOTE — TELEPHONE ENCOUNTER
LOV 12/28/20    LAST LAB INRs done, 12/28/20 hospital labs, cannot find lipids    LAST RX Montelukast 9/9/20 x 6 months  Warfarin 9/9/20 x 6 months  Atorvastatin 9/9/20 x 6 months    Next OV 3/1/21    PROTOCOL

## 2021-02-24 ENCOUNTER — TELEPHONE (OUTPATIENT)
Dept: FAMILY MEDICINE CLINIC | Facility: CLINIC | Age: 66
End: 2021-02-24

## 2021-02-24 NOTE — TELEPHONE ENCOUNTER
Home INR result received and reviewed by [address]    Per Dr Roxana Calixto, INR in range. Recheck in 1 week.     Result to scanning

## 2021-02-26 ENCOUNTER — EXTERNAL RECORD (OUTPATIENT)
Dept: HEALTH INFORMATION MANAGEMENT | Facility: OTHER | Age: 66
End: 2021-02-26

## 2021-03-01 ENCOUNTER — HOSPITAL ENCOUNTER (OUTPATIENT)
Dept: GENERAL RADIOLOGY | Age: 66
Discharge: HOME | End: 2021-03-01
Attending: FAMILY MEDICINE
Payer: MEDICARE

## 2021-03-01 ENCOUNTER — MED REC SCAN ONLY (OUTPATIENT)
Dept: FAMILY MEDICINE CLINIC | Facility: CLINIC | Age: 66
End: 2021-03-01

## 2021-03-01 ENCOUNTER — TELEPHONE (OUTPATIENT)
Dept: FAMILY MEDICINE CLINIC | Facility: CLINIC | Age: 66
End: 2021-03-01

## 2021-03-01 ENCOUNTER — OFFICE VISIT (OUTPATIENT)
Dept: FAMILY MEDICINE CLINIC | Facility: CLINIC | Age: 66
End: 2021-03-01
Payer: COMMERCIAL

## 2021-03-01 VITALS
HEART RATE: 60 BPM | RESPIRATION RATE: 20 BRPM | HEIGHT: 61 IN | WEIGHT: 197 LBS | SYSTOLIC BLOOD PRESSURE: 134 MMHG | DIASTOLIC BLOOD PRESSURE: 74 MMHG | TEMPERATURE: 97 F | BODY MASS INDEX: 37.19 KG/M2

## 2021-03-01 DIAGNOSIS — Z23 NEED FOR VACCINATION: ICD-10-CM

## 2021-03-01 DIAGNOSIS — F33.0 MILD EPISODE OF RECURRENT MAJOR DEPRESSIVE DISORDER (HCC): ICD-10-CM

## 2021-03-01 DIAGNOSIS — E53.8 B12 DEFICIENCY: ICD-10-CM

## 2021-03-01 DIAGNOSIS — E55.9 VITAMIN D DEFICIENCY: ICD-10-CM

## 2021-03-01 DIAGNOSIS — S22.22XG CLOSED FRACTURE OF BODY OF STERNUM WITH DELAYED HEALING, SUBSEQUENT ENCOUNTER: ICD-10-CM

## 2021-03-01 DIAGNOSIS — N18.30 STAGE 3 CHRONIC KIDNEY DISEASE, UNSPECIFIED WHETHER STAGE 3A OR 3B CKD (HCC): ICD-10-CM

## 2021-03-01 DIAGNOSIS — D68.52 PROTHROMBIN MUTATION (HCC): ICD-10-CM

## 2021-03-01 DIAGNOSIS — K21.00 GASTROESOPHAGEAL REFLUX DISEASE WITH ESOPHAGITIS WITHOUT HEMORRHAGE: ICD-10-CM

## 2021-03-01 DIAGNOSIS — J44.9 ASTHMA WITH COPD (CHRONIC OBSTRUCTIVE PULMONARY DISEASE) (HCC): ICD-10-CM

## 2021-03-01 DIAGNOSIS — Z00.00 ENCOUNTER FOR ANNUAL HEALTH EXAMINATION: Primary | ICD-10-CM

## 2021-03-01 DIAGNOSIS — R73.9 ELEVATED BLOOD SUGAR: ICD-10-CM

## 2021-03-01 DIAGNOSIS — E78.2 MIXED HYPERLIPIDEMIA: ICD-10-CM

## 2021-03-01 LAB
ALBUMIN SERPL-MCNC: 3.6 G/DL (ref 3.4–5)
ALBUMIN/GLOB SERPL: 1.1 {RATIO} (ref 1–2)
ALP LIVER SERPL-CCNC: 91 U/L
ALT SERPL-CCNC: 26 U/L
ANION GAP SERPL CALC-SCNC: 6 MMOL/L (ref 0–18)
AST SERPL-CCNC: 24 U/L (ref 15–37)
BASOPHILS # BLD AUTO: 0.06 X10(3) UL (ref 0–0.2)
BASOPHILS NFR BLD AUTO: 1.1 %
BILIRUB SERPL-MCNC: 0.4 MG/DL (ref 0.1–2)
BUN BLD-MCNC: 14 MG/DL (ref 7–18)
BUN/CREAT SERPL: 14.6 (ref 10–20)
CALCIUM BLD-MCNC: 10 MG/DL (ref 8.5–10.1)
CHLORIDE SERPL-SCNC: 106 MMOL/L (ref 98–112)
CHOLEST SMN-MCNC: 165 MG/DL (ref ?–200)
CO2 SERPL-SCNC: 28 MMOL/L (ref 21–32)
CREAT BLD-MCNC: 0.96 MG/DL
DEPRECATED RDW RBC AUTO: 49.7 FL (ref 35.1–46.3)
EOSINOPHIL # BLD AUTO: 0.19 X10(3) UL (ref 0–0.7)
EOSINOPHIL NFR BLD AUTO: 3.6 %
ERYTHROCYTE [DISTWIDTH] IN BLOOD BY AUTOMATED COUNT: 14.6 % (ref 11–15)
GLOBULIN PLAS-MCNC: 3.4 G/DL (ref 2.8–4.4)
GLUCOSE BLD-MCNC: 71 MG/DL (ref 70–99)
HCT VFR BLD AUTO: 42.4 %
HDLC SERPL-MCNC: 67 MG/DL (ref 40–59)
HGB BLD-MCNC: 13.5 G/DL
IMM GRANULOCYTES # BLD AUTO: 0.01 X10(3) UL (ref 0–1)
IMM GRANULOCYTES NFR BLD: 0.2 %
LDLC SERPL CALC-MCNC: 82 MG/DL (ref ?–100)
LYMPHOCYTES # BLD AUTO: 1.44 X10(3) UL (ref 1–4)
LYMPHOCYTES NFR BLD AUTO: 27.1 %
M PROTEIN MFR SERPL ELPH: 7 G/DL (ref 6.4–8.2)
MCH RBC QN AUTO: 29.5 PG (ref 26–34)
MCHC RBC AUTO-ENTMCNC: 31.8 G/DL (ref 31–37)
MCV RBC AUTO: 92.6 FL
MONOCYTES # BLD AUTO: 0.4 X10(3) UL (ref 0.1–1)
MONOCYTES NFR BLD AUTO: 7.5 %
NEUTROPHILS # BLD AUTO: 3.21 X10 (3) UL (ref 1.5–7.7)
NEUTROPHILS # BLD AUTO: 3.21 X10(3) UL (ref 1.5–7.7)
NEUTROPHILS NFR BLD AUTO: 60.5 %
NONHDLC SERPL-MCNC: 98 MG/DL (ref ?–130)
OSMOLALITY SERPL CALC.SUM OF ELEC: 289 MOSM/KG (ref 275–295)
PATIENT FASTING Y/N/NP: NO
PATIENT FASTING Y/N/NP: NO
PLATELET # BLD AUTO: 226 10(3)UL (ref 150–450)
POTASSIUM SERPL-SCNC: 4.2 MMOL/L (ref 3.5–5.1)
RBC # BLD AUTO: 4.58 X10(6)UL
SODIUM SERPL-SCNC: 140 MMOL/L (ref 136–145)
TRIGL SERPL-MCNC: 82 MG/DL (ref 30–149)
TSI SER-ACNC: 1.05 MIU/ML (ref 0.36–3.74)
VIT B12 SERPL-MCNC: 345 PG/ML (ref 193–986)
VIT D+METAB SERPL-MCNC: 40.1 NG/ML (ref 30–100)
VLDLC SERPL CALC-MCNC: 16 MG/DL (ref 0–30)
WBC # BLD AUTO: 5.3 X10(3) UL (ref 4–11)

## 2021-03-01 PROCEDURE — 85025 COMPLETE CBC W/AUTO DIFF WBC: CPT | Performed by: FAMILY MEDICINE

## 2021-03-01 PROCEDURE — G0439 PPPS, SUBSEQ VISIT: HCPCS | Performed by: FAMILY MEDICINE

## 2021-03-01 PROCEDURE — 99397 PER PM REEVAL EST PAT 65+ YR: CPT | Performed by: FAMILY MEDICINE

## 2021-03-01 PROCEDURE — 84443 ASSAY THYROID STIM HORMONE: CPT | Performed by: FAMILY MEDICINE

## 2021-03-01 PROCEDURE — 71120 X-RAY EXAM BREASTBONE 2/>VWS: CPT | Performed by: FAMILY MEDICINE

## 2021-03-01 PROCEDURE — 96160 PT-FOCUSED HLTH RISK ASSMT: CPT | Performed by: FAMILY MEDICINE

## 2021-03-01 PROCEDURE — 80061 LIPID PANEL: CPT | Performed by: FAMILY MEDICINE

## 2021-03-01 PROCEDURE — 90732 PPSV23 VACC 2 YRS+ SUBQ/IM: CPT | Performed by: FAMILY MEDICINE

## 2021-03-01 PROCEDURE — 3078F DIAST BP <80 MM HG: CPT | Performed by: FAMILY MEDICINE

## 2021-03-01 PROCEDURE — 3008F BODY MASS INDEX DOCD: CPT | Performed by: FAMILY MEDICINE

## 2021-03-01 PROCEDURE — 83036 HEMOGLOBIN GLYCOSYLATED A1C: CPT | Performed by: FAMILY MEDICINE

## 2021-03-01 PROCEDURE — 82306 VITAMIN D 25 HYDROXY: CPT | Performed by: FAMILY MEDICINE

## 2021-03-01 PROCEDURE — 80053 COMPREHEN METABOLIC PANEL: CPT | Performed by: FAMILY MEDICINE

## 2021-03-01 PROCEDURE — 82607 VITAMIN B-12: CPT | Performed by: FAMILY MEDICINE

## 2021-03-01 PROCEDURE — G0009 ADMIN PNEUMOCOCCAL VACCINE: HCPCS | Performed by: FAMILY MEDICINE

## 2021-03-01 PROCEDURE — 3075F SYST BP GE 130 - 139MM HG: CPT | Performed by: FAMILY MEDICINE

## 2021-03-01 NOTE — PATIENT INSTRUCTIONS
Marcella Wright's SCREENING SCHEDULE   Tests on this list are recommended by your physician but may not be covered, or covered at this frequency, by your insurer. Please check with your insurance carrier before scheduling to verify coverage.    PREVENTATIVE cigarettes in their lifetime   • Anyone with a family history    Colorectal Cancer Screening  Covered up to Age 76     Colonoscopy Screen   Covered every 10 years- more often if abnormal Colonoscopy due on 02/20/2022 [facility] if applicable visit on 09/22/20   • FLU VACC HIGH DOSE PRSV FREE   Orders placed or performed in visit on 11/17/16   • FLU VAC NO PRSV 4 JAMES 3 YRS+     *Note: Due to a large number of results and/or encounters for the requested time period, some results have not been [employer] may be covered with your prescription benefits, but Medicare does not cover unless Medically needed    Zoster (Not covered by Medicare Part B) No results found. However, due to the size of the patient record, not all encounters were searched.  Please check

## 2021-03-01 NOTE — PROGRESS NOTES
HPI:   Connie Quintero is a 72year old female who presents for a Medicare Subsequent Annual Wellness visit (Pt already had Initial Annual Wellness). Nothing new, feeling pretty decent, stable. Sternal pain seems to be slowly getting better.  Still hurts Discussed     She has a Power of  for [employer] on file in [address] She has never smoked tobacco.    CAGE Alcohol screening   Meli Norton was screened for Alcohol abuse and had a score of 0 so is at low risk.     Patient Care Team: Patient Care T 10/28/2019    ALT 18 10/28/2019    CA 8.0 (L) 04/26/2020    ALB 3.5 10/28/2019    TSH 0.990 10/28/2019    CREATSERUM 1.01 04/26/2020    GLU 74 04/26/2020        CBC  (most recent labs)   Lab Results   Component Value Date    WBC 6.6 04/27/2020    HGB 11.2 Inhalation Aerosol, INHALE 1 PUFF INTO THE LUNGS TWICE DAILY    •  ipratropium-albuterol 0.5-2.5 (3) MG/3ML Inhalation Solution, USE 3 ML VIA NEBULIZER EVERY 4H PRN SOB.   Dx: Mild persistent asthma without complication L10.11    •  Venlafaxine HCl 37.5 MG history includes Anxiety in her daughter; Depression in her daughter and son; Diabetes in her mother; Heart Disorder in her mother; Hypertension in her brother and sister; Psychiatric in her father and mother.    SOCIAL HISTORY:   She  reports that she has murmur, rub, or gallop   Abdomen:   Soft, non-tender, bowel sounds active all four quadrants,  no masses, no organomegaly   Pelvic: Deferred   Extremities: Extremities normal, atraumatic, no cyanosis or edema   Pulses: 2+ and symmetric pedal/radial   Skin: moderate intensity exercise/week minimum. Healthy lifestyle also includes not smoking, sleeping 7-9hours/night, limiting alcohol to 0-1drinks/day for women, 0-2/day for men. -     CBC WITH DIFFERENTIAL WITH PLATELET;  Future  -     COMP METABOLIC PANEL PANEL (14); Future  -     HEMOGLOBIN A1C; Future  -     LIPID PANEL;  Future  -     TSH W REFLEX TO FREE T4; Future  -     VITAMIN D, 25-HYDROXY; Future  -     VITAMIN B12; Future    Mixed hyperlipidemia--assess control today   -     CBC WITH DIFFERENTIAL W state?: [place]  How do you maintain positive mental well-being?: Visiting Family      This section provided for quick review of chart, separate sheet to patient  [address] Internal Lab or Procedure External Lab or Proced Influenza  Covered Annually 9/22/2020 Please get every year    Pneumococcal 13 (Prevnar)  Covered Once after 65 02/20/2017 Please get once after your 65th birthday    Pneumococcal 23 (Pneumovax)  Covered Once after 65 03/20/2014 Please get once after your

## 2021-03-01 NOTE — TELEPHONE ENCOUNTER
Overdue result letter mailed to patient   Lab Frequency Next Occurrence   NATALIA SCREENING BILAT (CPT=77067) Once 01/30/2021

## 2021-03-02 LAB
EST. AVERAGE GLUCOSE BLD GHB EST-MCNC: 120 MG/DL (ref 68–126)
HBA1C MFR BLD HPLC: 5.8 % (ref ?–5.7)

## 2021-03-08 ENCOUNTER — TELEPHONE (OUTPATIENT)
Dept: FAMILY MEDICINE CLINIC | Facility: CLINIC | Age: 66
End: 2021-03-08

## 2021-03-08 NOTE — TELEPHONE ENCOUNTER
INR = 4.1, states she has not changed her diet nor has she changed her dosage. Advised keep the same regimen and recheck 1 week.

## 2021-03-10 ENCOUNTER — HOSPITAL ENCOUNTER (OUTPATIENT)
Dept: MAMMOGRAPHY | Age: 66
Discharge: HOME | End: 2021-03-10
Attending: FAMILY MEDICINE
Payer: MEDICARE

## 2021-03-10 DIAGNOSIS — Z23 NEED FOR VACCINATION: ICD-10-CM

## 2021-03-10 DIAGNOSIS — Z12.31 BREAST CANCER SCREENING BY MAMMOGRAM: ICD-10-CM

## 2021-03-10 PROCEDURE — 77067 SCR MAMMO BI INCL CAD: CPT | Performed by: FAMILY MEDICINE

## 2021-03-12 ENCOUNTER — OFFICE VISIT (OUTPATIENT)
Dept: OTOLARYNGOLOGY | Age: 66
End: 2021-03-12

## 2021-03-12 VITALS — BODY MASS INDEX: 32.42 KG/M2 | WEIGHT: 194.8 LBS

## 2021-03-12 DIAGNOSIS — E04.1 THYROID NODULE: Primary | ICD-10-CM

## 2021-03-12 PROCEDURE — 99214 OFFICE O/P EST MOD 30 MIN: CPT | Performed by: OTOLARYNGOLOGY

## 2021-03-22 RX ORDER — ALENDRONATE SODIUM 70 MG/1
70 TABLET ORAL WEEKLY
Qty: 4 TABLET | Refills: 11 | Status: SHIPPED | OUTPATIENT
Start: 2021-03-22

## 2021-03-22 NOTE — TELEPHONE ENCOUNTER
Last refilled 1/5/21 #4 with 11 RF  LOV with [facility] 3/1/21  Future appt with [facility] 9/1/21   Medication passed protocol:  Osteoporosis Medication Protocol [id number]/21/2021 07:01 PM   DEXA scan within past 2 years Protocol Details    CMP within the past 12 months

## 2021-03-23 ENCOUNTER — TELEPHONE (OUTPATIENT)
Dept: FAMILY MEDICINE CLINIC | Facility: CLINIC | Age: 66
End: 2021-03-23

## 2021-03-23 NOTE — TELEPHONE ENCOUNTER
Received fax from [facility] with pt's results. [facility] reviewed and INR was in range and can recheck in 1 week. Fax was sent to scanning and pt notified of results and to recheck in 1 week. Verbally understood with no further questions.

## 2021-03-29 ENCOUNTER — PATIENT MESSAGE (OUTPATIENT)
Dept: FAMILY MEDICINE CLINIC | Facility: CLINIC | Age: 66
End: 2021-03-29

## 2021-03-29 NOTE — TELEPHONE ENCOUNTER
From: Farnaz Waldrop  To: Amy Triana MD  Sent: 3/29/2021 1:26 PM CDT  Subject: Other    Dr. Box Goes,  Wanted to let you know that I finally got my CPAP hooked up and have been using it for about a week now.   [employer]

## 2021-03-31 ENCOUNTER — TELEPHONE (OUTPATIENT)
Dept: FAMILY MEDICINE CLINIC | Facility: CLINIC | Age: 66
End: 2021-03-31

## 2021-03-31 NOTE — TELEPHONE ENCOUNTER
Paperwork signed by [facility] and faxed back to [facility] along with  Most recent clinical notes.

## 2021-04-05 ENCOUNTER — TELEPHONE (OUTPATIENT)
Dept: FAMILY MEDICINE CLINIC | Facility: CLINIC | Age: 66
End: 2021-04-05

## 2021-04-06 ENCOUNTER — MED REC SCAN ONLY (OUTPATIENT)
Dept: FAMILY MEDICINE CLINIC | Facility: CLINIC | Age: 66
End: 2021-04-06

## 2021-04-07 NOTE — TELEPHONE ENCOUNTER
Pt called this morning to check on the INR and the instructions. I advised that it is high and the notes from Dr. Ambar Ford are to hold 2 doses of coumadin, then 1/2 dose for 1 day then resume her regular dose.  Recheck on Sat  Made appt for Sat  Future Appoint Rendering Text In Billing: The biopsy specimens were grossed and processed into slides.

## 2021-04-13 ENCOUNTER — TELEPHONE (OUTPATIENT)
Dept: FAMILY MEDICINE CLINIC | Facility: CLINIC | Age: 66
End: 2021-04-13

## 2021-04-15 ENCOUNTER — PATIENT MESSAGE (OUTPATIENT)
Dept: FAMILY MEDICINE CLINIC | Facility: CLINIC | Age: 66
End: 2021-04-15

## 2021-04-16 NOTE — TELEPHONE ENCOUNTER
From: Bradley Valerio  To: Cady Orozco MD  Sent: 4/15/2021 7:06 PM CDT  Subject: Other    Dr. Berto Farrar,  How do I get my daughter off from [address]  She is absolutely driving me freaking crazy she is questionings everything you respond to because I uncheck her f

## 2021-04-19 ENCOUNTER — TELEPHONE (OUTPATIENT)
Dept: FAMILY MEDICINE CLINIC | Facility: CLINIC | Age: 66
End: 2021-04-19

## 2021-04-20 ENCOUNTER — PATIENT MESSAGE (OUTPATIENT)
Dept: FAMILY MEDICINE CLINIC | Facility: CLINIC | Age: 66
End: 2021-04-20

## 2021-04-21 NOTE — TELEPHONE ENCOUNTER
From: Fe Harris  To: Gussie Holstein, MD  Sent: 4/20/2021 9:01 PM CDT  Subject: Non-Urgent Medical Question    Dr. Pb Angelo,    Is it safe to take tylenol arthritis with warfarin? I just need to take something other that plain tylenol it just doesn't work.   Mark Carmona

## 2021-04-22 ENCOUNTER — TELEPHONE (OUTPATIENT)
Dept: FAMILY MEDICINE CLINIC | Facility: CLINIC | Age: 66
End: 2021-04-22

## 2021-04-22 DIAGNOSIS — R41.3 MEMORY PROBLEM: Primary | ICD-10-CM

## 2021-04-22 NOTE — TELEPHONE ENCOUNTER
Pt reports \"Dr Sonam Spear wants me to see neurologist because of memory problems. \"  Pt advised Dr. Sonam Spear out of office today and will be addressed tomorrow when she is back in office, pt agreeable.     Referral order for Dr. Hernandez Favor placed and pending-  PLEASE

## 2021-04-22 NOTE — TELEPHONE ENCOUNTER
Pt called, needs referral for Dr. Med Jenkins. Pt has an appt with Dr. eMd Jenkins [facility].   Please call pt at [phone]

## 2021-04-23 RX ORDER — PERMETHRIN 50 MG/G
CREAM TOPICAL
Qty: 60 G | Refills: 0 | Status: SHIPPED | OUTPATIENT
Start: 2021-04-23 | End: 2021-06-25 | Stop reason: ALTCHOICE

## 2021-04-26 ENCOUNTER — TELEPHONE (OUTPATIENT)
Dept: FAMILY MEDICINE CLINIC | Facility: CLINIC | Age: 66
End: 2021-04-26

## 2021-04-27 ENCOUNTER — TELEPHONE (OUTPATIENT)
Dept: FAMILY MEDICINE CLINIC | Facility: CLINIC | Age: 66
End: 2021-04-27

## 2021-04-27 DIAGNOSIS — R41.3 MEMORY PROBLEM: Primary | ICD-10-CM

## 2021-04-27 NOTE — TELEPHONE ENCOUNTER
Neurology Cherelle Ace called about referral it in other oreders not referrals, will replace referral.     New referral placed

## 2021-04-27 NOTE — TELEPHONE ENCOUNTER
Pt got a call from her neurologist office and was told they our office is trying to get ahold of her about the referral for her appt tomorrow.    Please return call to [phone]

## 2021-04-30 ENCOUNTER — TELEPHONE (OUTPATIENT)
Dept: FAMILY MEDICINE CLINIC | Facility: CLINIC | Age: 66
End: 2021-04-30

## 2021-04-30 NOTE — TELEPHONE ENCOUNTER
Pt called, her kitten dug her claws into her arm this morning, it was bleeding, pt cleaned it and put bandaides on it. Pt wants to know if there is anything more she needs to do.   Please call pt at [phone]

## 2021-04-30 NOTE — TELEPHONE ENCOUNTER
Nothing else, just wash with soap and water and watch for signs of infection over the next 5 days, increasing redness, swelling, pain, drainge, fevers/chills

## 2021-05-01 ENCOUNTER — TELEMEDICINE (OUTPATIENT)
Dept: FAMILY MEDICINE CLINIC | Facility: CLINIC | Age: 66
End: 2021-05-01
Payer: COMMERCIAL

## 2021-05-01 DIAGNOSIS — G47.31 CENTRAL SLEEP APNEA: ICD-10-CM

## 2021-05-01 DIAGNOSIS — G47.33 OSA TREATED WITH BIPAP: Primary | ICD-10-CM

## 2021-05-01 PROCEDURE — 99442 PHONE E/M BY PHYS 11-20 MIN: CPT | Performed by: FAMILY MEDICINE

## 2021-05-01 NOTE — PROGRESS NOTES
[facility]        HPI:   This is a 72year old female coming in for Patient presents with:  Obstructive Sleep Apnea (CHINTAN)      HPI:  Patient has been using her new machine 2 months after she got it due to troubles wi INDEX (/hr) - -   SS Facility - -   Sleep Study CM (CM H2O) - -   Sleep EFFC (%) - -   Sleep REM (%) - -   TOT Sleep TM (min) - -   Do you snore loudly (loud enough to be heard through closed doors)?  - -   Do you often feel tired, fatigued, or sleepy claritza History    Tobacco Use      Smoking status: Never Smoker      Smokeless tobacco: Never Used    Vaping Use      Vaping Use: Never used    Alcohol use: No      Alcohol/week: 0.0 standard drinks    Drug use: No    Family History:  Family History   Problem Rel atenolol 25 MG Oral Tab Take 25 mg by mouth. • Calcium Carbonate-Vitamin D 600-400 MG-UNIT Oral Tab Take 1 tablet by mouth daily. • Enalapril Maleate 10 MG Oral Tab Take 10 mg by mouth.      • Venlafaxine HCl 75 MG Oral Tab Take 75 mg by mouth every Units by mouth daily.  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 Weight as of 3/1/21: 197 lb (89.4 kg). Neck in inches:       Wt Readings from Last 6 Encounters:  03/01/21 : 197 lb (89.4 kg)  01/25/21 : 194 lb 12.8 oz (88.4 kg)  12/28/20 : 197 lb (89.4 kg)  09/22/20 : 198 lb (89.8 kg)  0 Exercise 30 minutes most days of the week to target heart rate . Advised patient to change filters,masks,hoses  and tubes and equiptment on a  regular schedule.   Filters and seals shall be changed every 1 month,  Hoses every 3 months,   CPAP mask and h

## 2021-05-01 NOTE — PATIENT INSTRUCTIONS
Mail SD card to our office.    Inder Rice, [address]      Advised if still with sleep apnea and not using CPAP has a  7 fold increase in risk of heart attack, stroke, abnormal heart rhythm  and death,  increased risk of driving accide

## 2021-05-03 ENCOUNTER — TELEPHONE (OUTPATIENT)
Dept: FAMILY MEDICINE CLINIC | Facility: CLINIC | Age: 66
End: 2021-05-03

## 2021-05-08 ENCOUNTER — TELEPHONE (OUTPATIENT)
Dept: FAMILY MEDICINE CLINIC | Facility: CLINIC | Age: 66
End: 2021-05-08

## 2021-05-08 NOTE — TELEPHONE ENCOUNTER
Pt lives in [facility]. She talks to a gentlemen in the apartment lobby. The gentleman tested positive for Covid. Pt last had contact with him a few days ago. Neither were wearing a mask. Fuad Latham would like to know what she should do.  She

## 2021-05-08 NOTE — TELEPHONE ENCOUNTER
At this time, I would advise her to follow up VV with Dr Cande Meehan or myself on Monday. That will be Day 5/6 after being exposed and we may consider testing at that time.  For now rest, hydrate, Vitamin D, C and Zinc. ~ MM

## 2021-05-10 ENCOUNTER — TELEPHONE (OUTPATIENT)
Dept: FAMILY MEDICINE CLINIC | Facility: CLINIC | Age: 66
End: 2021-05-10

## 2021-05-10 ENCOUNTER — TELEMEDICINE (OUTPATIENT)
Dept: FAMILY MEDICINE CLINIC | Facility: CLINIC | Age: 66
End: 2021-05-10
Payer: COMMERCIAL

## 2021-05-10 DIAGNOSIS — Z20.822 EXPOSURE TO COVID-19 VIRUS: Primary | ICD-10-CM

## 2021-05-10 DIAGNOSIS — Z87.09 HISTORY OF ASTHMA: ICD-10-CM

## 2021-05-10 PROCEDURE — 99214 OFFICE O/P EST MOD 30 MIN: CPT | Performed by: FAMILY MEDICINE

## 2021-05-10 NOTE — PROGRESS NOTES
This is a telemedicine visit with live, interactive video and audio. Patient understands and accepts financial responsibility for any deductible, co-insurance and/or co-pays associated with this service.     This care is provided during an unprecedented Depression    • Esophageal reflux    • Obesity    • Obstructive sleep apnea (adult) (pediatric) 4/25/2019   • Osteoarthritis    • Other and unspecified hyperlipidemia    • PE (pulmonary embolism) Feb 2004 and March 2004    x2.  prothrombin mutation found af 0   • alendronate (FOSAMAX) 70 MG Oral Tab Take 1 tablet (70 mg total) by mouth once a week. 4 tablet 11   • Montelukast Sodium 10 MG Oral Tab Take 1 tablet (10 mg total) by mouth daily.  90 tablet 1   • atorvastatin 40 MG Oral Tab Take 1 tablet (40 mg tota times a day. 2 po bid      • Ondansetron HCl (ZOFRAN) 8 MG tablet Take 1 tablet (8 mg total) by mouth every 8 (eight) hours as needed for Nausea.  30 tablet 1   • Glucose Blood In Vitro Strip Use BID as directed to check blood sugar and guide food decisions

## 2021-05-11 ENCOUNTER — TELEPHONE (OUTPATIENT)
Dept: FAMILY MEDICINE CLINIC | Facility: CLINIC | Age: 66
End: 2021-05-11

## 2021-05-11 NOTE — TELEPHONE ENCOUNTER
10 days of information downloaded and looks good   Continue present managment   Follow up in 3 months with card in office. Call if new complaints or issues.

## 2021-05-11 NOTE — TELEPHONE ENCOUNTER
Pt has a ventolin inhaler but it  in April of this year. Can she still use it?   Please call pt at [phone]

## 2021-05-12 ENCOUNTER — LAB ENCOUNTER (OUTPATIENT)
Dept: LAB | Age: 66
End: 2021-05-12
Attending: FAMILY MEDICINE
Payer: MEDICARE

## 2021-05-12 DIAGNOSIS — Z20.822 EXPOSURE TO COVID-19 VIRUS: ICD-10-CM

## 2021-05-18 ENCOUNTER — TELEPHONE (OUTPATIENT)
Dept: FAMILY MEDICINE CLINIC | Facility: CLINIC | Age: 66
End: 2021-05-18

## 2021-05-18 ENCOUNTER — MED REC SCAN ONLY (OUTPATIENT)
Dept: FAMILY MEDICINE CLINIC | Facility: CLINIC | Age: 66
End: 2021-05-18

## 2021-05-18 NOTE — TELEPHONE ENCOUNTER
Reached patient for medication adherence consult. Patient just recently refilled atorvastatin. She uses [email or web address] and they send her medications pre-packed every month.  She denies forgetting or missing doses since starting with Pillpack and stat

## 2021-05-25 ENCOUNTER — TELEPHONE (OUTPATIENT)
Dept: FAMILY MEDICINE CLINIC | Facility: CLINIC | Age: 66
End: 2021-05-25

## 2021-06-01 ENCOUNTER — TELEPHONE (OUTPATIENT)
Dept: FAMILY MEDICINE CLINIC | Facility: CLINIC | Age: 66
End: 2021-06-01

## 2021-06-01 ENCOUNTER — MED REC SCAN ONLY (OUTPATIENT)
Dept: FAMILY MEDICINE CLINIC | Facility: CLINIC | Age: 66
End: 2021-06-01

## 2021-06-04 ENCOUNTER — OFFICE VISIT (OUTPATIENT)
Dept: FAMILY MEDICINE CLINIC | Facility: CLINIC | Age: 66
End: 2021-06-04
Payer: COMMERCIAL

## 2021-06-04 VITALS
DIASTOLIC BLOOD PRESSURE: 70 MMHG | TEMPERATURE: 99 F | OXYGEN SATURATION: 98 % | BODY MASS INDEX: 38 KG/M2 | WEIGHT: 199.5 LBS | RESPIRATION RATE: 22 BRPM | SYSTOLIC BLOOD PRESSURE: 128 MMHG | HEART RATE: 75 BPM

## 2021-06-04 DIAGNOSIS — Z12.4 CERVICAL CANCER SCREENING: Primary | ICD-10-CM

## 2021-06-04 DIAGNOSIS — H53.9 VISUAL CHANGES: ICD-10-CM

## 2021-06-04 DIAGNOSIS — H57.89 EYE IRRITATION: ICD-10-CM

## 2021-06-04 PROCEDURE — 88175 CYTOPATH C/V AUTO FLUID REDO: CPT | Performed by: FAMILY MEDICINE

## 2021-06-04 PROCEDURE — 87624 HPV HI-RISK TYP POOLED RSLT: CPT | Performed by: FAMILY MEDICINE

## 2021-06-04 PROCEDURE — 3074F SYST BP LT 130 MM HG: CPT | Performed by: FAMILY MEDICINE

## 2021-06-04 PROCEDURE — 99214 OFFICE O/P EST MOD 30 MIN: CPT | Performed by: FAMILY MEDICINE

## 2021-06-04 PROCEDURE — 3078F DIAST BP <80 MM HG: CPT | Performed by: FAMILY MEDICINE

## 2021-06-08 ENCOUNTER — TELEPHONE (OUTPATIENT)
Dept: FAMILY MEDICINE CLINIC | Facility: CLINIC | Age: 66
End: 2021-06-08

## 2021-06-08 ENCOUNTER — PATIENT MESSAGE (OUTPATIENT)
Dept: FAMILY MEDICINE CLINIC | Facility: CLINIC | Age: 66
End: 2021-06-08

## 2021-06-08 NOTE — TELEPHONE ENCOUNTER
Spoke with patient and patient states is feeling better. Advised if happens again to call 911. Patient v/u.

## 2021-06-08 NOTE — TELEPHONE ENCOUNTER
From: Lauren Beltran  To: Olga Gilmore MD  Sent: 6/8/2021 11:52 AM CDT  Subject: Non-Urgent Medical Question    Dr. Byron Mary,  Wanted to let you know that I had a major anxiety attack. My BP is 140/80 was pulse is 74.  I talked to my counselor and told her about

## 2021-06-08 NOTE — TELEPHONE ENCOUNTER
From: Lashon Lyn  To: Elvira Morel MD  Sent: 6/8/2021 3:50 PM CDT  Subject: Non-Urgent Medical Question    Dr. Jamal Maciel,  Wanted to let you know that I'm feeling better still quite nauseous and have a headache but that's from not eating.  No more shaking BP

## 2021-06-09 ENCOUNTER — TELEPHONE (OUTPATIENT)
Dept: FAMILY MEDICINE CLINIC | Facility: CLINIC | Age: 66
End: 2021-06-09

## 2021-06-09 ENCOUNTER — PATIENT MESSAGE (OUTPATIENT)
Dept: FAMILY MEDICINE CLINIC | Facility: CLINIC | Age: 66
End: 2021-06-09

## 2021-06-09 NOTE — TELEPHONE ENCOUNTER
From: Sydni Nelson  To:  Ktaia Jiang MD  Sent: 6/9/2021 11:34 AM CDT  Subject: Non-Urgent Medical Question    /69 pulse 61

## 2021-06-09 NOTE — TELEPHONE ENCOUNTER
I'm not too clear on what's going on here. Why the zofran? Is she nauseated? Any vomiting? Does she have chest pressure/pain? Any heart palps, sob, BOYLE, diaphoresis? Any injury prior to chest and arm symptoms?

## 2021-06-09 NOTE — TELEPHONE ENCOUNTER
From: Fe Harris  To: Gussie Holstein, MD  Sent: 6/9/2021 12:14 PM CDT  Subject: Other    Never mind about the anxiety attack and everything else I'm fine.  Thank  Re Lucia

## 2021-06-09 NOTE — TELEPHONE ENCOUNTER
Spoke with patient and asked if tried a zofran and patient states forgot about those will look for them. Advised patient sent message back about the coldness in her arm and chest.  Patient states it feels like someone put ice in her chest and arm.

## 2021-06-09 NOTE — TELEPHONE ENCOUNTER
She is nauseated and states that just feels like someone put ice in her arm and chest.  Patient took blood pressure 138/69 and pulse is 61. Patient states feels fine not to worry about it.

## 2021-06-09 NOTE — TELEPHONE ENCOUNTER
From: Day Wilkerson  To: Radha Shrestha MD  Sent: 6/9/2021 10:53 AM CDT  Subject: Other    Dr. Ambar Ford,  Still not feeling to good nausea isn't much better, but no shaking.  But now having cold feeling in left arm and cold and pressure in center of chest.  Liliana Baxter

## 2021-06-11 ENCOUNTER — TELEPHONE (OUTPATIENT)
Dept: FAMILY MEDICINE CLINIC | Facility: CLINIC | Age: 66
End: 2021-06-11

## 2021-06-11 NOTE — TELEPHONE ENCOUNTER
PT states her daughter Aba Packer, will be taking patient to [address]. She states she is not dealing with everything very well. She states she is having thought of harming herself. She is unable to go to [facility] due to insurance.  This is

## 2021-06-12 ENCOUNTER — TELEPHONE (OUTPATIENT)
Dept: FAMILY MEDICINE CLINIC | Facility: CLINIC | Age: 66
End: 2021-06-12

## 2021-06-12 NOTE — TELEPHONE ENCOUNTER
Pt called she was admitted to [facility] yesterday around 3P. M..  She was taken off half of her medication without even seeing the doctor.  She said that she is not thrilled at all, she said that it is not going to be good when she finally sees

## 2021-06-14 ENCOUNTER — TELEPHONE (OUTPATIENT)
Dept: FAMILY MEDICINE CLINIC | Facility: CLINIC | Age: 66
End: 2021-06-14

## 2021-06-14 NOTE — TELEPHONE ENCOUNTER
Patient called stating that she is in [facility] in [place] on the behavioral unit since Friday. Patient is wanting dr Veena Ng to get labs and records from [facility] so dr Veena Ng can monitor care there.   Patient states saw a doctor once and yelled at him and has n

## 2021-06-14 NOTE — TELEPHONE ENCOUNTER
Please notify patient we'll request records and I am available if the doctors there would like to speak to me, but please encourage her to hear them out, let them take care of her, help them help her

## 2021-06-19 ENCOUNTER — TELEPHONE (OUTPATIENT)
Dept: FAMILY MEDICINE CLINIC | Facility: CLINIC | Age: 66
End: 2021-06-19

## 2021-06-19 NOTE — TELEPHONE ENCOUNTER
Pt called to make a HFU. She will be discharged 6/21-[facility]. Pt is requesting to be seen early on Tuesday morning.      Can she be fit in?

## 2021-06-21 ENCOUNTER — TELEPHONE (OUTPATIENT)
Dept: FAMILY MEDICINE CLINIC | Facility: CLINIC | Age: 66
End: 2021-06-21

## 2021-06-21 NOTE — TELEPHONE ENCOUNTER
Has she been taking 4mg every day or has she missed any doses in past 2 weeks? That will change game plan. ..

## 2021-06-21 NOTE — TELEPHONE ENCOUNTER
Patient is on way home and would like to let dr Gini Mendez know INR this am was 1.3, How much coumadin should she take tonight?

## 2021-06-21 NOTE — TELEPHONE ENCOUNTER
Patient can come Friday at 1130. Scheduled for then.   Future Appointments   Date Time Provider Juliana Lea   6/25/2021 11:30 AM Joan Beckford MD [facility]   9/1/2021 11:00 AM Joan Beckford MD [facility]

## 2021-06-24 ENCOUNTER — TELEPHONE (OUTPATIENT)
Dept: FAMILY MEDICINE CLINIC | Facility: CLINIC | Age: 66
End: 2021-06-24

## 2021-06-24 NOTE — TELEPHONE ENCOUNTER
Patient notified and verbalized understanding. Confirmed she was taking warfarin 4 mg prior to hospitalization and has taken 6 mg the last 2 nights. Patient will take 1 more night of 6 mg and f/u with [address] tomorrow at 1130.

## 2021-06-24 NOTE — TELEPHONE ENCOUNTER
Take 6 mg tonight one more time, then follow up with Dr. Luis Lynn as scheduled tomorrow. After that she should decrease her dose back down so she doesn't go too high.

## 2021-06-24 NOTE — TELEPHONE ENCOUNTER
Tamara Delgado, [facility] Hospitaledical AssistantSigned  2:17 PM              Patient wants to know how much of coumadin she needs to take tonite. Please call back # [phone]                    The above is from another tel enc 6/24/21.     Left message on voice

## 2021-06-24 NOTE — TELEPHONE ENCOUNTER
Patient wants to know how much of coumadin she needs to take tonite.  Please call back # [phone]

## 2021-06-24 NOTE — TELEPHONE ENCOUNTER
Patient called back and states her current INR 1.6. States it should be 2.5-3.5    Per epic, regular coumadin dose 4 mg  Patient reports she has taken 6 mg the last 2 days.     Would like to know what she should take today    Routed to  as covering pro

## 2021-06-24 NOTE — TELEPHONE ENCOUNTER
Pt reports she was hospitalized for anxiety/panic attacks, behavioral health. She reports every time she leaves hospital, they \"screw up\" her coumadin dosage because they \"go by their normal ranges and not mine. \"  Pt reports when she got home from hosp

## 2021-06-24 NOTE — TELEPHONE ENCOUNTER
Pt called she took her INR on 6/22 it was 1.2 and was advised to take 6mg coumadin. She took that and it is now 1.4. she took another 6 mg last night. She not able to check her INR now as she has appointments.  She is hopefully going to check in later today

## 2021-06-25 ENCOUNTER — TELEPHONE (OUTPATIENT)
Dept: FAMILY MEDICINE CLINIC | Facility: CLINIC | Age: 66
End: 2021-06-25

## 2021-06-25 ENCOUNTER — OFFICE VISIT (OUTPATIENT)
Dept: FAMILY MEDICINE CLINIC | Facility: CLINIC | Age: 66
End: 2021-06-25
Payer: COMMERCIAL

## 2021-06-25 VITALS
SYSTOLIC BLOOD PRESSURE: 132 MMHG | WEIGHT: 197.13 LBS | HEART RATE: 76 BPM | BODY MASS INDEX: 37 KG/M2 | DIASTOLIC BLOOD PRESSURE: 76 MMHG | TEMPERATURE: 99 F

## 2021-06-25 DIAGNOSIS — Z65.8: ICD-10-CM

## 2021-06-25 DIAGNOSIS — Z09 FOLLOW-UP EXAM: Primary | ICD-10-CM

## 2021-06-25 DIAGNOSIS — F33.42 RECURRENT MAJOR DEPRESSIVE DISORDER, IN FULL REMISSION (HCC): ICD-10-CM

## 2021-06-25 PROCEDURE — 99213 OFFICE O/P EST LOW 20 MIN: CPT | Performed by: FAMILY MEDICINE

## 2021-06-25 PROCEDURE — 1111F DSCHRG MED/CURRENT MED MERGE: CPT | Performed by: FAMILY MEDICINE

## 2021-06-25 PROCEDURE — 3078F DIAST BP <80 MM HG: CPT | Performed by: FAMILY MEDICINE

## 2021-06-25 PROCEDURE — 3075F SYST BP GE 130 - 139MM HG: CPT | Performed by: FAMILY MEDICINE

## 2021-06-25 RX ORDER — DULOXETIN HYDROCHLORIDE 30 MG/1
30 CAPSULE, DELAYED RELEASE ORAL DAILY
COMMUNITY
End: 2021-08-21 | Stop reason: ALTCHOICE

## 2021-06-25 NOTE — PROGRESS NOTES
Wang Longo is a 72year old female. HPI:   Patient following up on her recent 10 day stay inpatient mental health at [facility]. Overall she loved her  psychologist and the groups were helpful.     She feels back to her normal self now, but kno • ipratropium-albuterol 0.5-2.5 (3) MG/3ML Inhalation Solution USE 3 ML VIA NEBULIZER EVERY 4H PRN SOB. Dx: Mild persistent asthma without complication D14.92 1025 mL 1   • hydrOXYzine HCl 25 MG Oral Tab Take 50 mg by mouth 2 (two) times a day.  2 po bid REPLACEMENT SURGERY  03/2017    L knee   • OTHER SURGICAL HISTORY      carpal tunnel right wrist (1981);  Green field filter (PE 2004)   • OTHER SURGICAL HISTORY  03/06/2017    Left knee replacement   • TONSILLECTOMY  age 25   • [address]

## 2021-06-28 ENCOUNTER — TELEPHONE (OUTPATIENT)
Dept: FAMILY MEDICINE CLINIC | Facility: CLINIC | Age: 66
End: 2021-06-28

## 2021-06-28 NOTE — TELEPHONE ENCOUNTER
Spoke with patient about INR 3.7 and to resume/continue 4mg coumadin and recheck INR on Monday. Patient v/u.

## 2021-06-29 NOTE — PROGRESS NOTES
Ana Addison is a 72year old female. HPI:   Patient here for PAP and eye exam, feels like might have a L eye scratcch, vision mildly blurred at times, irritated feeling in eye. No drainage. No redness. No known injruy.   Current Outpatient Medications not taking: Reported on 6/25/2021 ) 1 tablet 0   • hydrOXYzine HCl 25 MG Oral Tab Take 50 mg by mouth 2 (two) times a day. 2 po bid      • Ondansetron HCl (ZOFRAN) 8 MG tablet Take 1 tablet (8 mg total) by mouth every 8 (eight) hours as needed for Nausea. of Onset   • Depression Daughter    • Anxiety Daughter    • Depression Son    • Psychiatric Father         dementia   • Diabetes Mother    • Heart Disorder Mother    • Psychiatric Mother         dementia   • Hypertension Sister    • Hypertension Brother

## 2021-06-30 ENCOUNTER — PATIENT MESSAGE (OUTPATIENT)
Dept: FAMILY MEDICINE CLINIC | Facility: CLINIC | Age: 66
End: 2021-06-30

## 2021-07-01 NOTE — TELEPHONE ENCOUNTER
From: Camron Spears  To: Phillip Herndon MD  Sent: 6/30/2021 4:46 PM CDT  Subject: Other    Hi Dr. Felipe Paredes,  Wanted to let you know that I really don't think that I want to go back on the CPAP.  I was in the hospital from June 11th until the 21st and didn't have

## 2021-07-06 ENCOUNTER — TELEPHONE (OUTPATIENT)
Dept: FAMILY MEDICINE CLINIC | Facility: CLINIC | Age: 66
End: 2021-07-06

## 2021-07-06 NOTE — TELEPHONE ENCOUNTER
Patient resumed her ASV therapy last night. Still isn't enthusiastic about using it, doesn't sleep well. Agreeable to video visit in 2 weeks and will use ASV in the meantime.     Future Appointments   Date Time Provider Juliana Lea   7/20/2021  2:00 P

## 2021-07-07 ENCOUNTER — TELEPHONE (OUTPATIENT)
Dept: FAMILY MEDICINE CLINIC | Facility: CLINIC | Age: 66
End: 2021-07-07

## 2021-07-08 ENCOUNTER — MED REC SCAN ONLY (OUTPATIENT)
Dept: FAMILY MEDICINE CLINIC | Facility: CLINIC | Age: 66
End: 2021-07-08

## 2021-07-14 ENCOUNTER — TELEPHONE (OUTPATIENT)
Dept: FAMILY MEDICINE CLINIC | Facility: CLINIC | Age: 66
End: 2021-07-14

## 2021-07-14 NOTE — TELEPHONE ENCOUNTER
Spoke with patient and she has been taking 4 mg daily of coumadin. Psych had taken her off of some of her medications and started her on different ones if that would make a difference.   Patient stated has sent  my chart messages and got no return messag

## 2021-07-19 ENCOUNTER — HOSPITAL ENCOUNTER (OUTPATIENT)
Dept: GENERAL RADIOLOGY | Age: 66
Discharge: HOME | End: 2021-07-19
Attending: FAMILY MEDICINE
Payer: MEDICARE

## 2021-07-19 ENCOUNTER — OFFICE VISIT (OUTPATIENT)
Dept: FAMILY MEDICINE CLINIC | Facility: CLINIC | Age: 66
End: 2021-07-19
Payer: COMMERCIAL

## 2021-07-19 VITALS
OXYGEN SATURATION: 99 % | SYSTOLIC BLOOD PRESSURE: 130 MMHG | DIASTOLIC BLOOD PRESSURE: 70 MMHG | HEART RATE: 65 BPM | TEMPERATURE: 99 F | WEIGHT: 201.81 LBS | BODY MASS INDEX: 38.1 KG/M2 | HEIGHT: 61 IN

## 2021-07-19 DIAGNOSIS — M54.12 CERVICAL RADICULOPATHY: ICD-10-CM

## 2021-07-19 DIAGNOSIS — M54.2 NECK PAIN: Primary | ICD-10-CM

## 2021-07-19 DIAGNOSIS — M54.2 NECK PAIN: ICD-10-CM

## 2021-07-19 PROCEDURE — 3008F BODY MASS INDEX DOCD: CPT | Performed by: FAMILY MEDICINE

## 2021-07-19 PROCEDURE — 72050 X-RAY EXAM NECK SPINE 4/5VWS: CPT | Performed by: FAMILY MEDICINE

## 2021-07-19 PROCEDURE — 3078F DIAST BP <80 MM HG: CPT | Performed by: FAMILY MEDICINE

## 2021-07-19 PROCEDURE — 99214 OFFICE O/P EST MOD 30 MIN: CPT | Performed by: FAMILY MEDICINE

## 2021-07-19 PROCEDURE — 3075F SYST BP GE 130 - 139MM HG: CPT | Performed by: FAMILY MEDICINE

## 2021-07-19 NOTE — PROGRESS NOTES
Lauren Beltran is a 72year old female. HPI:   Pt is here for ER/UC/hospital f/u. ER/UC or hospital: [facility]    Date(s): 7/15/21    \"Chief complaint: Right neck pain rating down the right upper extremity.     History     Chief Complaint   Patient pr tablet Take 1-2 tablets by mouth every 6 (six) hours as needed for up to 20 doses.  20 tablet 7/15/2021 Zeeshan Hurst., MD Zeeshan Hurst., MD  07/16/21 1050\"  ----------------------------------------------  Pt's symptom report since the ER vi 325-650 mg by mouth. • atenolol 25 MG Oral Tab Take 25 mg by mouth. • Calcium Carbonate-Vitamin D 600-400 MG-UNIT Oral Tab Take 1 tablet by mouth daily. • Enalapril Maleate 10 MG Oral Tab Take 10 mg by mouth.      • Metoprolol Succinate ER 25 • Obesity    • Obstructive sleep apnea (adult) (pediatric) 4/25/2019   • Osteoarthritis    • Other and unspecified hyperlipidemia    • PE (pulmonary embolism) Feb 2004 and March 2004    x2.  prothrombin mutation found after 2nd one   • Prothrombin mutatio XR CERVICAL SPINE (4VIEWS) (CPT=72050); Future    Cervical radiculopathy  -     XR CERVICAL SPINE (4VIEWS) (CPT=72050);  Future      Spent 30 min in review of ER records, current symptoms, discussion of plan   -check xray, may progress to MRI pending resu

## 2021-07-20 ENCOUNTER — TELEMEDICINE (OUTPATIENT)
Dept: FAMILY MEDICINE CLINIC | Facility: CLINIC | Age: 66
End: 2021-07-20

## 2021-07-20 DIAGNOSIS — G47.31 CENTRAL SLEEP APNEA: Primary | ICD-10-CM

## 2021-07-20 DIAGNOSIS — G47.33 OSA TREATED WITH BIPAP: ICD-10-CM

## 2021-07-20 PROCEDURE — 99214 OFFICE O/P EST MOD 30 MIN: CPT | Performed by: FAMILY MEDICINE

## 2021-07-21 ENCOUNTER — PATIENT MESSAGE (OUTPATIENT)
Dept: FAMILY MEDICINE CLINIC | Facility: CLINIC | Age: 66
End: 2021-07-21

## 2021-07-21 NOTE — PATIENT INSTRUCTIONS
The recall for CPAP machines is due to the foam seals in the machine have the possibility of breaking down. Using an ozone  such as Soclean, can increase this possibility and likelihood or particles in the air you are breathing.  Stop using any Ozo

## 2021-07-22 NOTE — TELEPHONE ENCOUNTER
From: Cristhian Olguin  To: Dao Johnson MD  Sent: 7/21/2021 7:26 AM CDT  Subject: Visit Follow-up Question    Dr. Luis Delarosa when I was talking to you you asked me if I had the information to get my replacement CPAP and I said no then you just disappea

## 2021-07-27 ENCOUNTER — TELEPHONE (OUTPATIENT)
Dept: FAMILY MEDICINE CLINIC | Facility: CLINIC | Age: 66
End: 2021-07-27

## 2021-07-27 NOTE — TELEPHONE ENCOUNTER
Spoke with patient about her INR being 4.1. Dr Felicia Herrera wants her to take 3 mg of coumadin the next 2 doses and the resume her 4 mg and re check INR on Monday. Patient v/u. Also let patient know once referral is authorized will let her know.

## 2021-07-29 ENCOUNTER — PATIENT MESSAGE (OUTPATIENT)
Dept: FAMILY MEDICINE CLINIC | Facility: CLINIC | Age: 66
End: 2021-07-29

## 2021-07-29 DIAGNOSIS — M50.30 DDD (DEGENERATIVE DISC DISEASE), CERVICAL: ICD-10-CM

## 2021-07-29 DIAGNOSIS — M54.12 CERVICAL RADICULOPATHY: Primary | ICD-10-CM

## 2021-07-29 NOTE — TELEPHONE ENCOUNTER
From: Dariusz Ortega  To: Aren Tapia MD  Sent: 7/29/2021 11:40 AM CDT  Subject: Non-Urgent Medical Question    Dr. Cathern Hatchet,  I was wondering if you would be willing to give me a nother script for more Norco I'm still having the pain in my arm and neck.  Or so

## 2021-07-30 RX ORDER — HYDROCODONE BITARTRATE AND ACETAMINOPHEN 5; 325 MG/1; MG/1
1-2 TABLET ORAL NIGHTLY PRN
Qty: 20 TABLET | Refills: 0 | Status: SHIPPED | OUTPATIENT
Start: 2021-07-30

## 2021-08-03 ENCOUNTER — TELEPHONE (OUTPATIENT)
Dept: FAMILY MEDICINE CLINIC | Facility: CLINIC | Age: 66
End: 2021-08-03

## 2021-08-03 ENCOUNTER — PATIENT MESSAGE (OUTPATIENT)
Dept: FAMILY MEDICINE CLINIC | Facility: CLINIC | Age: 66
End: 2021-08-03

## 2021-08-03 NOTE — TELEPHONE ENCOUNTER
From: Kenneth Muñiz  Sent: 8/3/2021 4:02 PM CDT  To: Marly Gramajo RN  Subject: RE: Non-Urgent Medical Question    ----- Message from Yvonne Hunter RN sent at 8/3/2021 4:02 PM CDT -----       ----- Message from Ulysess Councilman to Lissy Walton MD sent at

## 2021-08-03 NOTE — TELEPHONE ENCOUNTER
Spoke with patient about referral and still open not approved yet. Will call as soon as authorized. Advised patient INR is within range and continue 4mg coumadin and recheck in one week. Patient v/u.

## 2021-08-03 NOTE — TELEPHONE ENCOUNTER
Patient called requesting a call back from nurse    RE: MRI ORDER    Please call back # [phone]

## 2021-08-03 NOTE — TELEPHONE ENCOUNTER
Spoke with patient and advised does not need prior authorization and may schedule MRI. Gave patient number for scheduling at [facility].  [phone].

## 2021-08-04 ENCOUNTER — MED REC SCAN ONLY (OUTPATIENT)
Dept: FAMILY MEDICINE CLINIC | Facility: CLINIC | Age: 66
End: 2021-08-04

## 2021-08-04 DIAGNOSIS — Z87.09 HISTORY OF ASTHMA: Primary | ICD-10-CM

## 2021-08-04 RX ORDER — BUDESONIDE AND FORMOTEROL FUMARATE DIHYDRATE 160; 4.5 UG/1; UG/1
AEROSOL RESPIRATORY (INHALATION)
Qty: 1 EACH | Refills: 2 | Status: SHIPPED | OUTPATIENT
Start: 2021-08-04 | End: 2021-09-30

## 2021-08-04 NOTE — TELEPHONE ENCOUNTER
PT CALLED AND ADV THAT SHE HAD SWITCH PHARMACY'S AND HAD PROVIDED NEW PHARMACY INFO. PT WILL NEED ALL MEDS SENT TO [facility] [place]    PT ONLY NEEDS SYMBICORT SEND IN RIGHT NOW.     PLEASE ADV IF ANY ISSUES       alendronate (FOSAMAX) 70 MG Or

## 2021-08-10 ENCOUNTER — PATIENT MESSAGE (OUTPATIENT)
Dept: FAMILY MEDICINE CLINIC | Facility: CLINIC | Age: 66
End: 2021-08-10

## 2021-08-14 NOTE — TELEPHONE ENCOUNTER
[address] [place] [address]. [phone], [phone]    Pt says she needs ALL her medication refilled.

## 2021-08-16 RX ORDER — MONTELUKAST SODIUM 10 MG/1
10 TABLET ORAL DAILY
Qty: 90 TABLET | Refills: 1 | Status: SHIPPED | OUTPATIENT
Start: 2021-08-16

## 2021-08-16 RX ORDER — ATORVASTATIN CALCIUM 40 MG/1
40 TABLET, FILM COATED ORAL NIGHTLY
Qty: 90 TABLET | Refills: 1 | Status: SHIPPED | OUTPATIENT
Start: 2021-08-16

## 2021-08-16 RX ORDER — WARFARIN SODIUM 4 MG/1
4 TABLET ORAL DAILY
Qty: 90 TABLET | Refills: 1 | Status: SHIPPED | OUTPATIENT
Start: 2021-08-16 | End: 2021-11-16

## 2021-08-16 NOTE — TELEPHONE ENCOUNTER
These are the medications that need refilling. Last refilled on 02/22/2021 for # 90 with 1 rf. Last seen on 07/19/2021. Thank you.

## 2021-08-17 ENCOUNTER — PATIENT MESSAGE (OUTPATIENT)
Dept: FAMILY MEDICINE CLINIC | Facility: CLINIC | Age: 66
End: 2021-08-17

## 2021-08-17 DIAGNOSIS — F32.A DEPRESSION, UNSPECIFIED DEPRESSION TYPE: ICD-10-CM

## 2021-08-17 RX ORDER — PANTOPRAZOLE SODIUM 40 MG/1
40 TABLET, DELAYED RELEASE ORAL 2 TIMES DAILY
Qty: 180 TABLET | Refills: 1 | Status: SHIPPED | OUTPATIENT
Start: 2021-08-17

## 2021-08-17 RX ORDER — GABAPENTIN 300 MG/1
600 CAPSULE ORAL 2 TIMES DAILY
Qty: 360 CAPSULE | Refills: 1 | Status: SHIPPED | OUTPATIENT
Start: 2021-08-17

## 2021-08-17 NOTE — TELEPHONE ENCOUNTER
Pharmacy called and patient called requesting refills due to patient being out of medications. Last refilled on 09/20/2020 for   with  rf. Protonix. Last refilled on 10/05/2020 for # 360 with 3 rf. For gabapentin. Last seen on 07/19/2021.

## 2021-08-18 NOTE — TELEPHONE ENCOUNTER
From: Rossy Mi  To: Rose Marin MD  Sent: 8/17/2021 10:08 PM CDT  Subject: Prescription Question    Dr. Kimberly Poole,    Sorry it's late but I don't have an blood pressure medication and I don't even remember what I take.  I have 3 different ones listed in my

## 2021-08-19 RX ORDER — METOPROLOL SUCCINATE 25 MG/1
25 TABLET, EXTENDED RELEASE ORAL DAILY
Qty: 90 TABLET | Refills: 3 | Status: SHIPPED | OUTPATIENT
Start: 2021-08-19

## 2021-08-19 NOTE — TELEPHONE ENCOUNTER
LOV 7/19/21 b/p 130/70  Pulse 65    LAST LAB 3/1/21    LAST RX 9/11/20 x 1 year    Next OV   Future Appointments   Date Time Provider Juliana Lea   9/1/2021 11:00 AM Edilberto Avilez MD [address]

## 2021-08-19 NOTE — TELEPHONE ENCOUNTER
Metoprolol Succinate ER 25 MG Oral Tablet 24 Hr    PT IS CURRENTLY OUT OF MEDICATION     PT WOULD LIKE REFILL SENT TO   [facility][id number] [address] [place] [address] ([address]), [phone], [phone]

## 2021-08-20 ENCOUNTER — TELEPHONE (OUTPATIENT)
Dept: FAMILY MEDICINE CLINIC | Facility: CLINIC | Age: 66
End: 2021-08-20

## 2021-08-20 NOTE — TELEPHONE ENCOUNTER
Pt called she was exposed to covid this morning. Her nieces son tested positive this morning. Pt is wanting to know what dr is recommending to do?

## 2021-08-21 RX ORDER — DULOXETIN HYDROCHLORIDE 60 MG/1
CAPSULE, DELAYED RELEASE ORAL
COMMUNITY
Start: 2021-08-11

## 2021-08-23 ENCOUNTER — TELEPHONE (OUTPATIENT)
Dept: FAMILY MEDICINE CLINIC | Facility: CLINIC | Age: 66
End: 2021-08-23

## 2021-08-23 NOTE — TELEPHONE ENCOUNTER
PT called her INR is 1.9    She has been taking warfarin 2mg, she has been taking 2 pills to make it 4mg. She states the bottle says they  3/19/20. She is wondering if that is why her INR is so low?     She did get her new medication in and did start

## 2021-08-24 ENCOUNTER — HOSPITAL ENCOUNTER (OUTPATIENT)
Age: 66
Discharge: HOME | End: 2021-08-24
Payer: MEDICARE

## 2021-08-24 VITALS
OXYGEN SATURATION: 97 % | HEIGHT: 61 IN | WEIGHT: 203 LBS | BODY MASS INDEX: 38.33 KG/M2 | TEMPERATURE: 97 F | HEART RATE: 78 BPM | SYSTOLIC BLOOD PRESSURE: 132 MMHG | RESPIRATION RATE: 18 BRPM | DIASTOLIC BLOOD PRESSURE: 69 MMHG

## 2021-08-24 DIAGNOSIS — Z20.822 EXPOSURE TO COVID-19 VIRUS: Primary | ICD-10-CM

## 2021-08-24 LAB — SARS-COV-2 RNA RESP QL NAA+PROBE: NOT DETECTED

## 2021-08-24 PROCEDURE — U0002 COVID-19 LAB TEST NON-CDC: HCPCS | Performed by: NURSE PRACTITIONER

## 2021-08-24 PROCEDURE — 99212 OFFICE O/P EST SF 10 MIN: CPT | Performed by: NURSE PRACTITIONER

## 2021-08-25 ENCOUNTER — TELEPHONE (OUTPATIENT)
Dept: FAMILY MEDICINE CLINIC | Facility: CLINIC | Age: 66
End: 2021-08-25

## 2021-08-25 NOTE — TELEPHONE ENCOUNTER
Pt is having procedure on 9/13. She needs to be off her    warfarin 4 MG Oral Tab    How long before does she need to stop?     Please advise pt- thank you

## 2021-08-26 NOTE — TELEPHONE ENCOUNTER
She should double check with the doctor doing the procedure, but the guidelines I read say no need to stop coumadin before thyroid FNA, thanks

## 2021-08-27 ENCOUNTER — TELEPHONE (OUTPATIENT)
Dept: FAMILY MEDICINE CLINIC | Facility: CLINIC | Age: 66
End: 2021-08-27

## 2021-08-27 ENCOUNTER — PATIENT MESSAGE (OUTPATIENT)
Dept: FAMILY MEDICINE CLINIC | Facility: CLINIC | Age: 66
End: 2021-08-27

## 2021-08-27 NOTE — TELEPHONE ENCOUNTER
From: Jose G Chapman  To: Mellissa Luna MD  Sent: 8/27/2021 8:44 AM CDT  Subject: Other    Dr. Karrie Del Cid,    Just wanted to let you know that yesterday I tested positive with COVID and can't wear my bipap because of my coughing I feel like I'm suffocating.     Coleman Torres

## 2021-08-27 NOTE — TELEPHONE ENCOUNTER
Pt called she tested covid positive. She was scheduled to come in 9/1 but has to cancel bc of quarantine. She is wanting know if  could fit her in somewhere after her quarantine?

## 2021-08-27 NOTE — TELEPHONE ENCOUNTER
Symptoms started yesterday and went to ER and was tested positive for COVID  She feels like a el truck hit her  But doing ok  Advised to stay hydrated and rest  Oxygen right now is 96%    Advised to go to ER for oxygen less than 90%  She v/u  I reschedul

## 2021-08-30 ENCOUNTER — TELEPHONE (OUTPATIENT)
Dept: OTOLARYNGOLOGY | Age: 66
End: 2021-08-30

## 2021-09-01 ENCOUNTER — TELEPHONE (OUTPATIENT)
Dept: FAMILY MEDICINE CLINIC | Facility: CLINIC | Age: 66
End: 2021-09-01

## 2021-09-01 NOTE — TELEPHONE ENCOUNTER
FABIO FROM DR SANDRA'S OFFICE CALLED AND ADV THAT SHE HAD FAXED OVER PAPERWORK FOR DR Gertrude Barrios TO SIGN AND FAX BACK. PT IS HAVING  BIOPYS AT [facility] ON 9/13 AND NEEDS TO HOLD OFF ON WARFARIN.       PLEASE ADV IF NEEDS TO REFAX OVER PAPERWORK     PHONE [phone]

## 2021-09-01 NOTE — TELEPHONE ENCOUNTER
Faxed form this am to [phone]. Spoke with jammie and advised that I faxed over and Dr Cande Meehan was wondering why the need to stop coumadin due to the guidelines allowing for continuation of the coumadin during FNA.   Esther Mariscal advises it is up to the radiologist

## 2021-09-07 ENCOUNTER — PATIENT MESSAGE (OUTPATIENT)
Dept: FAMILY MEDICINE CLINIC | Facility: CLINIC | Age: 66
End: 2021-09-07

## 2021-09-07 NOTE — TELEPHONE ENCOUNTER
From: Liana Aldridge  To: Kiera Cano MD  Sent: 9/7/2021 12:05 PM CDT  Subject: Test Results Question    Dr. Daniel Rowe,    When I tested my INR this morning it was 5.5 but not sure why.  I haven't eaten much since I was sick, that's the only thing that has pastor

## 2021-09-08 ENCOUNTER — TELEPHONE (OUTPATIENT)
Dept: FAMILY MEDICINE CLINIC | Facility: CLINIC | Age: 66
End: 2021-09-08

## 2021-09-08 ENCOUNTER — OFFICE VISIT (OUTPATIENT)
Dept: FAMILY MEDICINE CLINIC | Facility: CLINIC | Age: 66
End: 2021-09-08
Payer: MEDICARE

## 2021-09-08 VITALS
WEIGHT: 200.81 LBS | OXYGEN SATURATION: 99 % | SYSTOLIC BLOOD PRESSURE: 130 MMHG | TEMPERATURE: 97 F | BODY MASS INDEX: 38 KG/M2 | HEART RATE: 88 BPM | DIASTOLIC BLOOD PRESSURE: 70 MMHG

## 2021-09-08 DIAGNOSIS — M54.2 NECK PAIN: Primary | ICD-10-CM

## 2021-09-08 DIAGNOSIS — M25.78 DISC-OSTEOPHYTE COMPLEX: ICD-10-CM

## 2021-09-08 DIAGNOSIS — M50.30 DDD (DEGENERATIVE DISC DISEASE), CERVICAL: ICD-10-CM

## 2021-09-08 DIAGNOSIS — U07.1 COVID-19 VIRUS INFECTION: ICD-10-CM

## 2021-09-08 DIAGNOSIS — M50.30 DISC-OSTEOPHYTE COMPLEX: ICD-10-CM

## 2021-09-08 PROCEDURE — 3078F DIAST BP <80 MM HG: CPT | Performed by: FAMILY MEDICINE

## 2021-09-08 PROCEDURE — 99214 OFFICE O/P EST MOD 30 MIN: CPT | Performed by: FAMILY MEDICINE

## 2021-09-08 PROCEDURE — 3075F SYST BP GE 130 - 139MM HG: CPT | Performed by: FAMILY MEDICINE

## 2021-09-08 NOTE — TELEPHONE ENCOUNTER
Most likely d/t recent covid infection and meds.   Go ahead and take 1/2 her usual dose x 2 doses, then go back to her usual, recheck on monday

## 2021-09-08 NOTE — PROGRESS NOTES
Marija Shah is a 77year old female.   HPI:   Pt is here for ER/UC/hospital f/u for covid and discuss neck pain    ER/UC or hospital: [facility]    Date(s): 8/26 and 8/28    Reason for initial ER visit: 8/26: amb to er w co chest tightness beginning last noc appr CHEST AP PORTABLE    CLINICAL HISTORY: chest pain, COVID positive    COMPARISON: Chest x-ray 1/14/2021. FINDINGS:    Cardiac silhouette is normal in size. Pulmonary vasculature is unremarkable.  There is no focal consolidation, pleural effusion or pneumo Suspension 2 sprays by Nasal route daily. • acetaminophen 325 MG Oral Tab Take 325-650 mg by mouth. • Calcium Carbonate-Vitamin D 600-400 MG-UNIT Oral Tab Take 1 tablet by mouth daily.      • Albuterol Sulfate HFA (VENTOLIN HFA) 108 (90 Base) MCG/AC Laterality Date   • CHOLECYSTECTOMY  2006   • IR IVC FILTER PLACEMENT     • KNEE REPLACEMENT SURGERY  03/2017    L knee   • OTHER SURGICAL HISTORY      carpal tunnel right wrist (1981);  Green field filter (PE 2004)   • OTHER SURGICAL HISTORY  03/06/2017 understanding of these issues and agrees to the plan.

## 2021-09-08 NOTE — TELEPHONE ENCOUNTER
Ziggy called stating that patient's INR was  5.5.     If any questions please call back #[phone]

## 2021-09-13 ENCOUNTER — TELEPHONE (OUTPATIENT)
Dept: FAMILY MEDICINE CLINIC | Facility: CLINIC | Age: 66
End: 2021-09-13

## 2021-09-13 NOTE — TELEPHONE ENCOUNTER
Question is - when she sends readings of INR - goes to Dr. Larry Schuster    Pt inquiring if she needs to change PCP, who to send INR readings    Pt advised that Dr. Del Toro Mom taking over Dr. [facility] patients and should have access to INR readings.     She verbalized u

## 2021-09-16 ENCOUNTER — TELEPHONE (OUTPATIENT)
Dept: FAMILY MEDICINE CLINIC | Facility: CLINIC | Age: 66
End: 2021-09-16

## 2021-09-17 NOTE — TELEPHONE ENCOUNTER
On 9/7/2021 INR was 5.5   Following this supra-therapeutic value her dose was switched to 2 mg on 2 days and 4 mg ROW   Today 9/16/2021 INR is 3.9   Still mildly supra-therapeutic   Currently on 4 mg once daily, and will have patient take 2 mg on 2 days (F

## 2021-09-22 ENCOUNTER — TELEMEDICINE (OUTPATIENT)
Dept: FAMILY MEDICINE CLINIC | Facility: CLINIC | Age: 66
End: 2021-09-22
Payer: MEDICARE

## 2021-09-22 DIAGNOSIS — J31.0 RHINOSINUSITIS: Primary | ICD-10-CM

## 2021-09-22 DIAGNOSIS — J32.9 RHINOSINUSITIS: Primary | ICD-10-CM

## 2021-09-22 PROCEDURE — 99213 OFFICE O/P EST LOW 20 MIN: CPT | Performed by: NURSE PRACTITIONER

## 2021-09-22 RX ORDER — CEPHALEXIN 500 MG/1
500 CAPSULE ORAL 2 TIMES DAILY
Qty: 20 CAPSULE | Refills: 0 | Status: SHIPPED | OUTPATIENT
Start: 2021-09-22 | End: 2021-10-02

## 2021-09-22 NOTE — PROGRESS NOTES
Subjective:   Patient ID: Chelsie Celaya is a 77year old female. Chelsie Celaya  verbally consents to a Virtual/Telephone Check-In service on 9/22/2021.     Patient understands and accepts financial responsibility for any deductible, co-insurance and/or co- • warfarin 4 MG Oral Tab Take 1 tablet (4 mg total) by mouth daily.  90 tablet 1   • Budesonide-Formoterol Fumarate (SYMBICORT) 160-4.5 MCG/ACT Inhalation Aerosol INHALE 1 PUFF INTO THE LUNGS TWICE DAILY 1 each 2   • HYDROcodone-acetaminophen 5-325 MG Ora [Nitrofura*    RASH  Nitrofurantoin          RASH, OTHER (SEE COMMENTS)    Comment:Other reaction(s): Unknown  Penicillins             RASH, OTHER (SEE COMMENTS)    Comment:rash in childhood  Radiology Contrast *    HIVES    Comment:iodine  Sulfa Antibioti

## 2021-09-23 NOTE — PATIENT INSTRUCTIONS
Take antibiotic as prescribed. Continue daily flonase. Check INR 9/23/2021, contact the office if out of range.    Take acetaminophen or ibuprofen for fever/discomfort  Drink plenty of fluids, warm liquids  Decongestants for congestion, CORICIDIN is grea

## 2021-09-29 ENCOUNTER — TELEPHONE (OUTPATIENT)
Dept: FAMILY MEDICINE CLINIC | Facility: CLINIC | Age: 66
End: 2021-09-29

## 2021-09-29 DIAGNOSIS — M54.2 NECK PAIN: Primary | ICD-10-CM

## 2021-09-29 DIAGNOSIS — M50.30 DISC-OSTEOPHYTE COMPLEX: ICD-10-CM

## 2021-09-29 DIAGNOSIS — M50.30 DDD (DEGENERATIVE DISC DISEASE), CERVICAL: ICD-10-CM

## 2021-09-29 DIAGNOSIS — M25.78 DISC-OSTEOPHYTE COMPLEX: ICD-10-CM

## 2021-09-29 NOTE — TELEPHONE ENCOUNTER
Lena from [facility] pt needs a new referral for physical therapy from      Pt had one from [address] but did not start until now will need new referral     Fax [phone]    Thank you

## 2021-09-30 DIAGNOSIS — Z87.09 HISTORY OF ASTHMA: ICD-10-CM

## 2021-09-30 RX ORDER — BUDESONIDE AND FORMOTEROL FUMARATE DIHYDRATE 160; 4.5 UG/1; UG/1
AEROSOL RESPIRATORY (INHALATION)
Qty: 10.2 G | Refills: 0 | Status: SHIPPED | OUTPATIENT
Start: 2021-09-30 | End: 2021-11-06

## 2021-09-30 NOTE — TELEPHONE ENCOUNTER
Asthma & COPD Medication Protocol Failed 09/30/2021 03:28 PM    Asthma Action Score greater than or equal to 20    AAP/ACT given in last 12 months    Appointment in past 6 or next 3 months      LOV: 9/8/21   Last Refill: 8/4/21 1 2 rf    Future Appointment

## 2021-10-02 ENCOUNTER — TELEPHONE (OUTPATIENT)
Dept: FAMILY MEDICINE CLINIC | Facility: CLINIC | Age: 66
End: 2021-10-02

## 2021-10-02 RX ORDER — ENOXAPARIN SODIUM 100 MG/ML
INJECTION SUBCUTANEOUS
Qty: 15 ML | Refills: 0 | Status: SHIPPED | OUTPATIENT
Start: 2021-10-02 | End: 2021-10-07

## 2021-10-02 NOTE — TELEPHONE ENCOUNTER
SO last dose of coumadin is tonight, she will dose bid Sun Mon, Tuesday,    No Lovenox Wed AM.  Then restart coumadin and lovenox, the eveiong of the procedure, and continue it  until her INR is therapeutic again at which point she can stop the lovenox.

## 2021-10-02 NOTE — TELEPHONE ENCOUNTER
Patient has been notified, verbalized understanding of information. Denies further questions. Pt understood directions. She is to call if there are any questions or issues.

## 2021-10-02 NOTE — TELEPHONE ENCOUNTER
Patient called requesting rx for lovenox. Patient has a EEG and COLONOSCOPY procedure on 10/5/21.     [facility] [address] ([address]), [phone], [phone]    Please advise # [phone]

## 2021-10-02 NOTE — TELEPHONE ENCOUNTER
PT had a lovenox bridge in 10/16/2019 with Dr. Quinten Mathew. PT is having colonoscopy and EGD on 10/06/2021. Last INR was done on 9/30/2021: 2.9   Pt is on antibiotics right now until 10/02/2021. She checks her INR at home every Monday.    Coumadin dose no

## 2021-10-06 ENCOUNTER — PATIENT MESSAGE (OUTPATIENT)
Dept: FAMILY MEDICINE CLINIC | Facility: CLINIC | Age: 66
End: 2021-10-06

## 2021-10-07 ENCOUNTER — PATIENT MESSAGE (OUTPATIENT)
Dept: FAMILY MEDICINE CLINIC | Facility: CLINIC | Age: 66
End: 2021-10-07

## 2021-10-07 ENCOUNTER — EXTERNAL RECORD (OUTPATIENT)
Dept: HEALTH INFORMATION MANAGEMENT | Facility: OTHER | Age: 66
End: 2021-10-07

## 2021-10-07 ENCOUNTER — TELEPHONE (OUTPATIENT)
Dept: FAMILY MEDICINE CLINIC | Facility: CLINIC | Age: 66
End: 2021-10-07

## 2021-10-07 RX ORDER — ENOXAPARIN SODIUM 100 MG/ML
INJECTION SUBCUTANEOUS
Qty: 15 ML | Refills: 0 | Status: SHIPPED | OUTPATIENT
Start: 2021-10-07 | End: 2021-10-13

## 2021-10-07 RX ORDER — ENOXAPARIN SODIUM 100 MG/ML
INJECTION SUBCUTANEOUS
Qty: 15 ML | Refills: 0 | Status: SHIPPED | OUTPATIENT
Start: 2021-10-07 | End: 2021-10-07

## 2021-10-07 NOTE — TELEPHONE ENCOUNTER
Dr. Shelia Simmons spoke with pt over phone regarding lab results and plan of care for lovenox and coumadin dosing. Pt v/u.

## 2021-10-07 NOTE — TELEPHONE ENCOUNTER
From: Dayna Aguilar  To: Yeni Nettles MD  Sent: 10/6/2021 8:55 PM CDT  Subject: INR    Dr. Jazmyn Darden,  I had an EDG and Colonoscopy done today so I stopped my warfarin and did lovenox shots twice a day since last Monday and now I have a small n Sugars are worse (9 2%), need to increase Jardiance to 25 mg  Will send new prescription, to mail order? Can give samples also    Kidney function about the same, not normal but has not worsened  Would like to do kidney ultrasound  Ordered      Rest of labs ok

## 2021-10-07 NOTE — TELEPHONE ENCOUNTER
From: Vianey JOHNSON  To: Wang Handing  Sent: 10/7/2021 11:02 AM CDT  Subject: lovenox Rx    Hi Helena Epps 66 - your lovenox prescription has been resent to [facility] in [place]. I called the other pharmacy to cancel the previous order.

## 2021-10-07 NOTE — TELEPHONE ENCOUNTER
Patient called stating that her rx for Lovenox was sent to the wrong pharmacy.     Please resent to:    Ananth  [address] [place] Recio [address] [place] [address] ([address]), [phone], [phone]

## 2021-10-11 ENCOUNTER — MED REC SCAN ONLY (OUTPATIENT)
Dept: FAMILY MEDICINE CLINIC | Facility: CLINIC | Age: 66
End: 2021-10-11

## 2021-10-11 NOTE — TELEPHONE ENCOUNTER
Sergio Wild has continued with the Coumadin and Lovenox daily until today. Her last procedure - aspiration of thyroid nodule was on 10/8/2021 - no concerns with this. She had an INR scheduled this morning but has not gotten to it as yet.  Last INR [rior to this,

## 2021-10-14 ENCOUNTER — TELEPHONE (OUTPATIENT)
Dept: OTOLARYNGOLOGY | Age: 66
End: 2021-10-14

## 2021-10-14 DIAGNOSIS — E04.1 THYROID NODULE: Primary | ICD-10-CM

## 2021-11-01 ENCOUNTER — MED REC SCAN ONLY (OUTPATIENT)
Dept: FAMILY MEDICINE CLINIC | Facility: CLINIC | Age: 66
End: 2021-11-01

## 2021-11-04 ENCOUNTER — HOSPITAL ENCOUNTER (OUTPATIENT)
Age: 66
Discharge: ANOTHER HOSPITAL | End: 2021-11-04
Payer: MEDICARE

## 2021-11-04 VITALS
OXYGEN SATURATION: 97 % | TEMPERATURE: 98 F | HEART RATE: 75 BPM | RESPIRATION RATE: 16 BRPM | DIASTOLIC BLOOD PRESSURE: 62 MMHG | SYSTOLIC BLOOD PRESSURE: 132 MMHG

## 2021-11-04 DIAGNOSIS — R53.83 FATIGUE, UNSPECIFIED TYPE: Primary | ICD-10-CM

## 2021-11-04 PROCEDURE — 99214 OFFICE O/P EST MOD 30 MIN: CPT | Performed by: NURSE PRACTITIONER

## 2021-11-04 NOTE — ED PROVIDER NOTES
Patient Seen in: [facility] [address] [place] [address]      History   Patient presents with:  Fatigue  Nausea    Stated Complaint: body aches,nausea    Subjective:   70-year-old female presents today with complaints of ongoing body aches, weakness and general fatigue. [facility])    • Spinal stenosis    • Thyroid nodule    • Unspecified essential hypertension               Past Surgical History:   Procedure Laterality Date   • CHOLECYSTECTOMY  2006   • IR IVC FILTER PLACEMENT     • KNEE REPLACEMENT SURGERY  03/2017    L knee person, place, and time. ED Course   Labs Reviewed - No data to display                MDM     Patient resents today with complaints of general fatigue, body aches and increased feelings of depression.   Patient denied having any URI symptoms

## 2021-11-04 NOTE — ED INITIAL ASSESSMENT (HPI)
Pt vaccinated for covid last June, had covid in August. Past 3-4 days fatigue, achy, and today nauseated. Took zofran she had at home with no relief.

## 2021-11-05 DIAGNOSIS — Z87.09 HISTORY OF ASTHMA: ICD-10-CM

## 2021-11-05 NOTE — TELEPHONE ENCOUNTER
Pt failed refill protocol for the following reasons:   Asthma & COPD Medication Protocol Failed 11/05/2021 09:45 AM    Asthma Action Score greater than or equal to 20    AAP/ACT given in last 12 months              Last refill: 9/30/2021 #10.2g with 0 refi

## 2021-11-06 RX ORDER — BUDESONIDE AND FORMOTEROL FUMARATE DIHYDRATE 160; 4.5 UG/1; UG/1
AEROSOL RESPIRATORY (INHALATION)
Qty: 10.2 G | Refills: 0 | Status: SHIPPED | OUTPATIENT
Start: 2021-11-06 | End: 2021-12-02

## 2021-11-12 ENCOUNTER — TELEPHONE (OUTPATIENT)
Dept: FAMILY MEDICINE CLINIC | Facility: CLINIC | Age: 66
End: 2021-11-12

## 2021-11-12 NOTE — TELEPHONE ENCOUNTER
Pt calling to speak to Dr. Karrie Del Cid or her nurse regarding a CPAP machine that her old insurance [facility]) is trying to charge her for. Pt has not been using this and was told she needs proof of that or a letter from Dr. Karrie Del Cid.     Please advise as pt does not even

## 2021-11-12 NOTE — TELEPHONE ENCOUNTER
Patient has decided to stop using her PAP machine. Patient has been encouraged to set up follow up appointment to discuss, but she does not want to do this. She wants the charges on the machine to stop.  Directed patient to her DME, Radha Dobson, as the machine is

## 2021-11-15 ENCOUNTER — OFFICE VISIT (OUTPATIENT)
Dept: FAMILY MEDICINE CLINIC | Facility: CLINIC | Age: 66
End: 2021-11-15
Payer: MEDICARE

## 2021-11-15 VITALS
SYSTOLIC BLOOD PRESSURE: 128 MMHG | WEIGHT: 207.5 LBS | OXYGEN SATURATION: 99 % | TEMPERATURE: 99 F | DIASTOLIC BLOOD PRESSURE: 78 MMHG | BODY MASS INDEX: 39 KG/M2 | HEART RATE: 89 BPM | RESPIRATION RATE: 18 BRPM

## 2021-11-15 DIAGNOSIS — Z79.01 ANTICOAGULATION ADEQUATE WITH ANTICOAGULANT THERAPY: ICD-10-CM

## 2021-11-15 DIAGNOSIS — Z23 NEED FOR INFLUENZA VACCINATION: ICD-10-CM

## 2021-11-15 DIAGNOSIS — G47.8 POOR SLEEP PATTERN: ICD-10-CM

## 2021-11-15 DIAGNOSIS — E63.9 VERY POOR NUTRITION: ICD-10-CM

## 2021-11-15 DIAGNOSIS — Z09 HOSPITAL DISCHARGE FOLLOW-UP: Primary | ICD-10-CM

## 2021-11-15 DIAGNOSIS — F33.1 MODERATE EPISODE OF RECURRENT MAJOR DEPRESSIVE DISORDER (HCC): ICD-10-CM

## 2021-11-15 PROCEDURE — 99214 OFFICE O/P EST MOD 30 MIN: CPT | Performed by: FAMILY MEDICINE

## 2021-11-15 PROCEDURE — 3078F DIAST BP <80 MM HG: CPT | Performed by: FAMILY MEDICINE

## 2021-11-15 PROCEDURE — G0008 ADMIN INFLUENZA VIRUS VAC: HCPCS | Performed by: FAMILY MEDICINE

## 2021-11-15 PROCEDURE — 3074F SYST BP LT 130 MM HG: CPT | Performed by: FAMILY MEDICINE

## 2021-11-15 PROCEDURE — 90662 IIV NO PRSV INCREASED AG IM: CPT | Performed by: FAMILY MEDICINE

## 2021-11-15 RX ORDER — DULOXETIN HYDROCHLORIDE 30 MG/1
CAPSULE, DELAYED RELEASE ORAL
COMMUNITY
Start: 2021-11-09

## 2021-11-15 RX ORDER — BUPROPION HYDROCHLORIDE 100 MG/1
100 TABLET, EXTENDED RELEASE ORAL
COMMUNITY
Start: 2021-10-07

## 2021-11-15 RX ORDER — BUPROPION HYDROCHLORIDE 150 MG/1
150 TABLET, EXTENDED RELEASE ORAL
COMMUNITY
Start: 2021-11-08

## 2021-11-15 NOTE — PROGRESS NOTES
[facility] Office Note  Chief Complaint:   Patient presents with: Follow - Up: ER on 11/4/21 due to SI. Patient states she is not feeling any better.        HPI:   This is a 77year old female coming in for Follow up today s/p hos eat at regular intervals, skips meals and sometimes eats only one meal a day, she has the INR home monitor and checks it every Monday).  Most recent INR is 3.4         Past Medical History:   Diagnosis Date   • Allergic rhinitis    • Asthma    • Asthma with childhood  Radiology Contrast *    HIVES    Comment:iodine  Sulfa Antibiotics       UNKNOWN, OTHER (SEE COMMENTS)    Comment:iodine             iodine             iodine             iodine             iodine             iodine             iodine ipratropium-albuterol 0.5-2.5 (3) MG/3ML Inhalation Solution USE 3 ML VIA NEBULIZER EVERY 4H PRN SOB. Dx: Mild persistent asthma without complication N69.58 0976 mL 1   • hydrOXYzine HCl 25 MG Oral Tab Take 50 mg by mouth 2 (two) times a day.  2 po bid days       ASSESSMENT AND PLAN:   1.  Hospital discharge follow-up  Stable, no significant improvement in the way she feels but not actively suicidal.  She lives in senior housing and does not leave to out anywhere much   Has 2 cats that do not let her slee 07/15/2017  Influenza Vaccine(1) due on 10/01/2021    Patient/Caregiver Education: Patient/Caregiver Education: There are no barriers to learning. Medical education done. Outcome: Patient verbalizes understanding.  Patient is notified to call with any que

## 2021-11-16 ENCOUNTER — TELEPHONE (OUTPATIENT)
Dept: FAMILY MEDICINE CLINIC | Facility: CLINIC | Age: 66
End: 2021-11-16

## 2021-11-16 DIAGNOSIS — Z51.81 ANTICOAGULATION MANAGEMENT ENCOUNTER: Primary | ICD-10-CM

## 2021-11-16 DIAGNOSIS — Z79.01 ANTICOAGULATION MANAGEMENT ENCOUNTER: Primary | ICD-10-CM

## 2021-11-16 RX ORDER — WARFARIN SODIUM 4 MG/1
2 TABLET ORAL DAILY
Qty: 90 TABLET | Refills: 1 | COMMUNITY
Start: 2021-11-16

## 2021-11-16 NOTE — TELEPHONE ENCOUNTER
SYL FROM [facility] CALLED WITH A CRITICAL LAB RESULT ON INR     5.4    PLEASE ADV     THANK YOU

## 2021-11-16 NOTE — TELEPHONE ENCOUNTER
Patient notified and verbalized understanding. Patient will recheck Friday with lab draw if that is ok.     Patient state she will let office know later in the week if she wants to recheck at our office or at [facility].     gonzalez REMY    Copy of result sent

## 2021-11-16 NOTE — TELEPHONE ENCOUNTER
Forward to Dr. Lamar Amador, please advise on INR results from [facility]. Pt had to go there due to critical INR from her home monitor.

## 2021-11-16 NOTE — TELEPHONE ENCOUNTER
Her INR was 5,  She needs to hold her coumadin tonight and then restart it at 2 mg at hs and recheck in a week and call with results.

## 2021-11-17 ENCOUNTER — TELEPHONE (OUTPATIENT)
Dept: FAMILY MEDICINE CLINIC | Facility: CLINIC | Age: 66
End: 2021-11-17

## 2021-11-17 NOTE — TELEPHONE ENCOUNTER
Dr. Jonatan Kuhn called and spoke with pt over telephone  Pt was advised to take 2mg coumadin every night, then recheck INR on Saturday - pt to call office back and provide condition update Saturday 11/20/21 morning - pt verbalized understanding.

## 2021-11-18 ENCOUNTER — TELEPHONE (OUTPATIENT)
Dept: FAMILY MEDICINE CLINIC | Facility: CLINIC | Age: 66
End: 2021-11-18

## 2021-11-18 DIAGNOSIS — Z79.01 ANTICOAGULATION ADEQUATE WITH ANTICOAGULANT THERAPY: ICD-10-CM

## 2021-11-18 DIAGNOSIS — Z79.01 ANTICOAGULATION MANAGEMENT ENCOUNTER: Primary | ICD-10-CM

## 2021-11-18 DIAGNOSIS — D68.52 PROTHROMBIN MUTATION (HCC): ICD-10-CM

## 2021-11-18 DIAGNOSIS — Z51.81 ANTICOAGULATION MANAGEMENT ENCOUNTER: Primary | ICD-10-CM

## 2021-11-18 NOTE — TELEPHONE ENCOUNTER
PT IS IN A LOT OF PAIN DUE TO ARTHRITIS. NEEDS MEDICATION ADJUSTED OR T3'S?  OR ANYTHING ELSE YOU CAN RECOMMEND    PLEASE ADVISE-THANK YOU

## 2021-11-18 NOTE — TELEPHONE ENCOUNTER
Attempted to call back Gayatri at #[phone]  Answering message stated \"You have reached Tonie Grimaldo" - Did not leave VM      Left message to patient's voicemail (per verbal release form consent, confirmed with identifying message.) Patient

## 2021-11-19 NOTE — TELEPHONE ENCOUNTER
Please place referral for patient to be referred to Coumadin Clinic. I would like them to manage her INR / Cardiology to manage her INRs at this time.

## 2021-11-19 NOTE — TELEPHONE ENCOUNTER
Left message to voicemail (per verbal release form consent, confirmed with identifying message.) Patient advised to call office back [phone] - need to discuss note below.

## 2021-11-19 NOTE — TELEPHONE ENCOUNTER
Discussed with Dr. Dina Albarran regarding note below - please follow-up with patient - inquire about current pain/ symptoms  If patient requesting pain medications - please advise that pain meds affect INR levels - pt already has issue w/ INR fluctuating a lot

## 2021-11-19 NOTE — TELEPHONE ENCOUNTER
Called [facility] - inquiring how to place referral for coumadin clinic to manage pt's coumadin and INR    Coumadin clinic RN staff stated that pt would need to establish care with cardiologist - once established care - would be able to manage coumadin / INR - pt

## 2021-11-20 ENCOUNTER — TELEPHONE (OUTPATIENT)
Dept: FAMILY MEDICINE CLINIC | Facility: CLINIC | Age: 66
End: 2021-11-20

## 2021-11-20 NOTE — TELEPHONE ENCOUNTER
PT is in patient [facility] in [place].  The lab performed her INR    2.9 on 11/19    [phone] to the nurses station if you need to speak with Bonny Dakins

## 2021-11-20 NOTE — TELEPHONE ENCOUNTER
Called and spoke with pt - inquired about how she is doing (please refer to telephone encounter 11/18/2021)    Pt reports she is currently admitted.   She reports she was at [facility] for outpatient therapy on Thursday 11/18/2021   Pt reports when she was asked

## 2021-11-20 NOTE — TELEPHONE ENCOUNTER
Left detailed message to voicemail (per verbal release form consent with confirmed identifying message) regarding note below - follow-up pain, pain med, coumadin clinic to manage pt's coumadin and INR. Patient advised to call office back to discuss.

## 2021-11-23 ENCOUNTER — TELEPHONE (OUTPATIENT)
Dept: FAMILY MEDICINE CLINIC | Facility: CLINIC | Age: 66
End: 2021-11-23

## 2021-11-24 ENCOUNTER — TELEPHONE (OUTPATIENT)
Dept: FAMILY MEDICINE CLINIC | Facility: CLINIC | Age: 66
End: 2021-11-24

## 2021-11-24 NOTE — TELEPHONE ENCOUNTER
Good to know. She will need very close follow up with Psychiatry and will need Coumadin clinic to follow her INRs at this time.  With multiple medication changes and considering her INR fluctuates significantly it is safer for them to do this and management

## 2021-11-24 NOTE — TELEPHONE ENCOUNTER
Current patient medication dose ? Where is she at with her Coumadin dose ? We will make recommendations to increase her dose to bring to her goal FOR NOW, however will need to establish care with cardiology / coumadin clinic for the future.

## 2021-11-24 NOTE — TELEPHONE ENCOUNTER
Left message to voicemail (per verbal release form consent, confirmed with identifying message.) Patient advised to call office back [phone] - need to discuss note below.

## 2021-11-24 NOTE — TELEPHONE ENCOUNTER
[facility] sent to pt regarding doctor's note below  Routing to nurse pool for follow-up message read by pt     Placed - Notify me if not read by: 11/30/2021

## 2021-11-24 NOTE — TELEPHONE ENCOUNTER
Pt called with her INR.  Pt is inpatient [facility]    Nurse station [phone]    INR is 1.5

## 2021-11-26 ENCOUNTER — PATIENT MESSAGE (OUTPATIENT)
Dept: FAMILY MEDICINE CLINIC | Facility: CLINIC | Age: 66
End: 2021-11-26

## 2021-11-27 NOTE — TELEPHONE ENCOUNTER
From: Vianey JOHNSON  To: Vivian Campbell  Sent: 11/24/2021 10:06 AM CST  Subject: Medical Advice    Hi Bonny Dakins,     This message is from Dr. Sterling Triplett:  Thank you for the update. Good to know.  She will need very close follow up with Psychiatry and will need Coumadin

## 2021-12-02 DIAGNOSIS — Z87.09 HISTORY OF ASTHMA: ICD-10-CM

## 2021-12-02 RX ORDER — BUDESONIDE AND FORMOTEROL FUMARATE DIHYDRATE 160; 4.5 UG/1; UG/1
AEROSOL RESPIRATORY (INHALATION)
Qty: 10.2 G | Refills: 0 | Status: SHIPPED | OUTPATIENT
Start: 2021-12-02

## 2021-12-02 NOTE — TELEPHONE ENCOUNTER
Last refilled 11/6/21 for 10.2g with 0 RF  LOV with MM 11/15/21  Future appt with MM 12/13/21  Asthma & COPD Medication Protocol Failed 12/02/2021 01:07 PM    Asthma Action Score greater than or equal to 20    AAP/ACT given in last 12 months    Appointment

## 2021-12-03 ENCOUNTER — TELEPHONE (OUTPATIENT)
Dept: FAMILY MEDICINE CLINIC | Facility: CLINIC | Age: 66
End: 2021-12-03

## 2021-12-03 NOTE — TELEPHONE ENCOUNTER
Patient has Dr Kamla Lopez as her pcp. She said Dr Kamla Lopez will not manage her coumadin. Her coumadin keeps going up & down. She is self managing it herself.   She takes the readings herself at home & has to put a dr name & phone number in the machine to g

## 2021-12-03 NOTE — TELEPHONE ENCOUNTER
Please advise patient that appears that she was referred to the Coumadin clinic to help manage her Coumadin however I would be happy to assist her if this is not convenient

## 2021-12-04 NOTE — TELEPHONE ENCOUNTER
Patient advised and would like to switch to Dr. Erendira Vance. Patient transferred up front to schedule.

## 2021-12-06 ENCOUNTER — TELEPHONE (OUTPATIENT)
Dept: FAMILY MEDICINE CLINIC | Facility: CLINIC | Age: 66
End: 2021-12-06

## 2021-12-06 ENCOUNTER — MED REC SCAN ONLY (OUTPATIENT)
Dept: FAMILY MEDICINE CLINIC | Facility: CLINIC | Age: 66
End: 2021-12-06

## 2021-12-06 NOTE — TELEPHONE ENCOUNTER
PT. INR TODAY IS 3.5. SHE IS CURRENTLY TAKING 4MG. OF COUMADIN. PT. SAID HER FIRST APPT. WITH DR. Reji Diaz WILL BE TOMORROW BUT SHE DIDN'T WANT TO FORGET HER NUMBER.

## 2021-12-09 ENCOUNTER — TELEPHONE (OUTPATIENT)
Dept: FAMILY MEDICINE CLINIC | Facility: CLINIC | Age: 66
End: 2021-12-09

## 2021-12-09 NOTE — TELEPHONE ENCOUNTER
TALKED WITH PT AND TALKED WITH PTS INSURANCE COMPANYS/    FOR THE MONTH OF December PT HAS   \"[employer]\". PT HAS BEEN ADV THAT WE ARE NOT IN NETWORK WITH THIS INSURANCE COMPANY. PT STATES THAT SHE NEVER HAD THIS AND DOESN'T KNOW HOW SHE GOT IT.

## 2021-12-22 ENCOUNTER — TELEPHONE (OUTPATIENT)
Dept: FAMILY MEDICINE CLINIC | Facility: CLINIC | Age: 66
End: 2021-12-22

## 2021-12-22 NOTE — TELEPHONE ENCOUNTER
SPOKE WITH PT AND PT ADV THAT SHE WILL BE LEAVING THE [facility]. DUE TO HER INSURANCE CHANGING FOR THE MONTH OF December, PT HAD A INSURANCE PLAN THAT WE DID NOT TAKE.      PT HAD TO SEE A DR FOR A F/U AND HAS CHOSEN A DR THAT IS BEHIND [facility]

## 2022-01-04 ENCOUNTER — TELEPHONE (OUTPATIENT)
Dept: FAMILY MEDICINE CLINIC | Facility: CLINIC | Age: 67
End: 2022-01-04

## 2022-01-04 NOTE — TELEPHONE ENCOUNTER
Patient states she needs a statement sent to Concetta Damon that patient has discontinued her CPAP of her own volition and this office is aware of her decision. Patient is aware that such statement will not indicate her CPAP was discontinued by this provider.

## 2022-01-04 NOTE — TELEPHONE ENCOUNTER
Left message for patient to call back. Need to know which Chan Hamilton the letter is to go to.   Also want to make sure that patient is aware by that discontinuing the CPAP she is when yourself at that 7 fold increased risk of heart attack, stroke, heart arrhythm

## 2022-01-07 NOTE — TELEPHONE ENCOUNTER
Patient notified as written below regarding risks associated with discontinuing PAP therapy against medical advice. Patient remains adamant that she wants this discontinued. Hi Amaya at [place]. States letter should be faxed to [phone].

## 2022-01-14 ENCOUNTER — HOSPITAL ENCOUNTER (OUTPATIENT)
Age: 67
Discharge: HOME | End: 2022-01-14
Attending: NURSE PRACTITIONER
Payer: MEDICARE

## 2022-01-14 VITALS
SYSTOLIC BLOOD PRESSURE: 128 MMHG | TEMPERATURE: 99 F | HEART RATE: 65 BPM | DIASTOLIC BLOOD PRESSURE: 93 MMHG | OXYGEN SATURATION: 96 % | RESPIRATION RATE: 16 BRPM

## 2022-01-14 DIAGNOSIS — R51.9 HEADACHE: ICD-10-CM

## 2022-01-14 DIAGNOSIS — Z20.822 EXPOSURE TO COVID-19 VIRUS: Primary | ICD-10-CM

## 2022-01-14 LAB — SARS-COV-2 RNA RESP QL NAA+PROBE: NOT DETECTED

## 2022-01-14 PROCEDURE — 99213 OFFICE O/P EST LOW 20 MIN: CPT | Performed by: NURSE PRACTITIONER

## 2022-01-14 PROCEDURE — U0002 COVID-19 LAB TEST NON-CDC: HCPCS | Performed by: NURSE PRACTITIONER

## 2022-01-14 NOTE — ED INITIAL ASSESSMENT (HPI)
Pt sts exposed to covid 6 days ago and began with HA, body aches, fever yesterday. Pt is vaccinated.

## 2022-01-14 NOTE — ED PROVIDER NOTES
CHIEF COMPLAINT:   Patient presents with:  Headache  Fever      HPI:   Rodrigo Hall is a 77year old female who presents for upper respiratory symptoms for  1 days after having a +COVID exposure 6 days ago.   Patient reports congestion, clear colored nasal Inhalation Solution USE 3 ML VIA NEBULIZER EVERY 4H PRN SOB. Dx: Mild persistent asthma without complication Q79.91 5578 mL 1   • hydrOXYzine HCl 25 MG Oral Tab Take 50 mg by mouth 2 (two) times a day.  2 po bid      • Ondansetron HCl (ZOFRAN) 8 MG tablet Social History    Tobacco Use      Smoking status: Never Smoker      Smokeless tobacco: Never Used    Vaping Use      Vaping Use: Never used    Alcohol use: No      Alcohol/week: 0.0 standard drinks    Drug use: No        REVIEW OF SYSTEMS:   GENERAL: patient is asked to f/u with PCP if sx's persist or worsen.

## 2022-02-14 ENCOUNTER — TELEPHONE (OUTPATIENT)
Dept: FAMILY MEDICINE CLINIC | Facility: CLINIC | Age: 67
End: 2022-02-14

## 2022-02-14 NOTE — TELEPHONE ENCOUNTER
Rec'd orders for incontinence supplies through [facility]. Pt has not established with Dr. Grzegorz Nagy. Spoke with pt. She states her insurance won't allow her to see an [address] [place] [address]. Pt is now seeing Dr. Silvio Ray through [address] PCP removal form filled out.

## 2022-02-16 ENCOUNTER — TELEPHONE (OUTPATIENT)
Dept: FAMILY MEDICINE CLINIC | Facility: CLINIC | Age: 67
End: 2022-02-16

## 2022-02-16 NOTE — TELEPHONE ENCOUNTER
[facility] sent a request to have a copy of pt's records. This was sent to [employer].

## 2022-04-04 RX ORDER — ALENDRONATE SODIUM 70 MG/1
70 TABLET ORAL WEEKLY
Qty: 4 TABLET | Refills: 11 | OUTPATIENT
Start: 2022-04-04

## 2022-04-06 NOTE — TELEPHONE ENCOUNTER
From: Hilario Monsivais  To: Padilla Cho MD  Sent: 1/2/2017 9:29 AM CST  Subject: Prescription Question    Hi was wondering what I'm suppose to do about the protonex I only have enough for today and three more days and I don't see the gastro Dr until the 12t Cardiology

## 2022-04-13 ENCOUNTER — OFFICE VISIT (OUTPATIENT)
Dept: FAMILY MEDICINE CLINIC | Facility: CLINIC | Age: 67
End: 2022-04-13
Payer: MEDICARE

## 2022-04-13 VITALS
HEART RATE: 98 BPM | BODY MASS INDEX: 39.98 KG/M2 | SYSTOLIC BLOOD PRESSURE: 132 MMHG | WEIGHT: 214.5 LBS | DIASTOLIC BLOOD PRESSURE: 84 MMHG | TEMPERATURE: 99 F | RESPIRATION RATE: 12 BRPM | HEIGHT: 61.5 IN | OXYGEN SATURATION: 98 %

## 2022-04-13 DIAGNOSIS — Z79.01 ANTICOAGULATION GOAL OF INR 2.5 TO 3.5: Primary | ICD-10-CM

## 2022-04-13 DIAGNOSIS — Z86.711 HISTORY OF PULMONARY EMBOLISM: ICD-10-CM

## 2022-04-13 DIAGNOSIS — Z51.81 ANTICOAGULATION GOAL OF INR 2.5 TO 3.5: Primary | ICD-10-CM

## 2022-04-13 DIAGNOSIS — J45.30 MILD PERSISTENT ASTHMA WITHOUT COMPLICATION: ICD-10-CM

## 2022-04-13 DIAGNOSIS — Z51.81 ANTICOAGULATION MANAGEMENT ENCOUNTER: ICD-10-CM

## 2022-04-13 DIAGNOSIS — D68.59 HYPERCOAGULABLE STATE (HCC): ICD-10-CM

## 2022-04-13 DIAGNOSIS — Z79.01 ANTICOAGULATION MANAGEMENT ENCOUNTER: ICD-10-CM

## 2022-04-13 DIAGNOSIS — Z79.899 ENCOUNTER FOR LONG-TERM (CURRENT) USE OF MEDICATIONS: ICD-10-CM

## 2022-04-13 DIAGNOSIS — D68.52 PROTHROMBIN MUTATION (HCC): ICD-10-CM

## 2022-04-13 PROCEDURE — 3075F SYST BP GE 130 - 139MM HG: CPT | Performed by: FAMILY MEDICINE

## 2022-04-13 PROCEDURE — 99214 OFFICE O/P EST MOD 30 MIN: CPT | Performed by: FAMILY MEDICINE

## 2022-04-13 PROCEDURE — 3008F BODY MASS INDEX DOCD: CPT | Performed by: FAMILY MEDICINE

## 2022-04-13 PROCEDURE — 3079F DIAST BP 80-89 MM HG: CPT | Performed by: FAMILY MEDICINE

## 2022-04-13 RX ORDER — GABAPENTIN 400 MG/1
400 CAPSULE ORAL 3 TIMES DAILY
COMMUNITY
Start: 2022-03-18 | End: 2022-04-18

## 2022-04-13 RX ORDER — WARFARIN SODIUM 4 MG/1
TABLET ORAL
Refills: 1 | COMMUNITY
Start: 2022-04-13

## 2022-04-15 ENCOUNTER — TELEPHONE (OUTPATIENT)
Dept: FAMILY MEDICINE CLINIC | Facility: CLINIC | Age: 67
End: 2022-04-15

## 2022-04-15 NOTE — TELEPHONE ENCOUNTER
Patient Review of Clinical Information    Problems   The patient or proxy has not reviewed this information. Medications   The patient or proxy has not reviewed this information, and there are updates pending:   Requested Medication Removals Start Date Reported By  Comments   alendronate 70 MG Tabs 3/22/2021 Chantelle Barnes me of from it due to making my psudogout worse.    buPROPion  MG Tb12 10/7/2021 Chantelle Pedlar No longer taking   buPROPion  MG Tb12 11/8/2021 Chantelle Pedlar No longer taking     Allergies   The patient or proxy has not reviewed this information, and there are updates pending:   Requested Allergy Removals Date Noted Reactions Reported By  Comments   Sulfa Antibiotics 3/11/2003 UNKNOWN, OTHER (SEE COMMENTS) Chantelle Pedlar Not allergic   DID NOT REMOVE ALLERGIES

## 2022-04-16 ENCOUNTER — TELEPHONE (OUTPATIENT)
Dept: FAMILY MEDICINE CLINIC | Facility: CLINIC | Age: 67
End: 2022-04-16

## 2022-04-16 NOTE — TELEPHONE ENCOUNTER
Patient was on 300 mg in November and January then 600 in February and begininning of march. Then back down to 400 hundred mg at the end of March. On April 4th Dr. Addy Burton ordered #270 tablets that patient picked up. She is now requesting a refill of the 400 mg stating that she doesn't take the 600 mg.  Please advise    LOV 04/13/2022    LAST LAB 03/14/2022    LAST RX  Gabapentin     Next OV   Future Appointments   Date Time Provider Juliana Lea   7/13/2022 10:15 AM Hal Jacobs MD [facility] [place]     PROTOCOL

## 2022-04-18 RX ORDER — GABAPENTIN 400 MG/1
400 CAPSULE ORAL 3 TIMES DAILY
Qty: 270 CAPSULE | Refills: 0 | Status: SHIPPED | OUTPATIENT
Start: 2022-04-18 | End: 2022-07-17

## 2022-04-26 ENCOUNTER — TELEPHONE (OUTPATIENT)
Dept: FAMILY MEDICINE CLINIC | Facility: CLINIC | Age: 67
End: 2022-04-26

## 2022-04-26 NOTE — TELEPHONE ENCOUNTER
Home MDINR  Patient states she take 4 mg daily  INR 3.0 on 4/21/22    Per Dr. Luz Marina Luna  No change recheck 4 weeks. Home machine needs to check weekly.

## 2022-05-04 ENCOUNTER — LABORATORY ENCOUNTER (OUTPATIENT)
Dept: LAB | Age: 67
End: 2022-05-04
Attending: FAMILY MEDICINE
Payer: MEDICARE

## 2022-05-04 ENCOUNTER — OFFICE VISIT (OUTPATIENT)
Dept: FAMILY MEDICINE CLINIC | Facility: CLINIC | Age: 67
End: 2022-05-04
Payer: MEDICARE

## 2022-05-04 ENCOUNTER — TELEPHONE (OUTPATIENT)
Dept: FAMILY MEDICINE CLINIC | Facility: CLINIC | Age: 67
End: 2022-05-04

## 2022-05-04 VITALS
HEART RATE: 88 BPM | DIASTOLIC BLOOD PRESSURE: 80 MMHG | TEMPERATURE: 98 F | OXYGEN SATURATION: 97 % | SYSTOLIC BLOOD PRESSURE: 128 MMHG | WEIGHT: 209 LBS | BODY MASS INDEX: 38.95 KG/M2 | RESPIRATION RATE: 18 BRPM | HEIGHT: 61.5 IN

## 2022-05-04 DIAGNOSIS — Z79.01 ENCOUNTER FOR MONITORING COUMADIN THERAPY: ICD-10-CM

## 2022-05-04 DIAGNOSIS — R79.1 ELEVATED INR: ICD-10-CM

## 2022-05-04 DIAGNOSIS — Z51.81 ENCOUNTER FOR MONITORING COUMADIN THERAPY: Primary | ICD-10-CM

## 2022-05-04 DIAGNOSIS — Z79.01 ENCOUNTER FOR MONITORING COUMADIN THERAPY: Primary | ICD-10-CM

## 2022-05-04 DIAGNOSIS — T14.8XXA BRUISING: ICD-10-CM

## 2022-05-04 DIAGNOSIS — Z51.81 ENCOUNTER FOR MONITORING COUMADIN THERAPY: ICD-10-CM

## 2022-05-04 LAB
BASOPHILS # BLD AUTO: 0.04 X10(3) UL (ref 0–0.2)
BASOPHILS NFR BLD AUTO: 0.6 %
EOSINOPHIL # BLD AUTO: 0.14 X10(3) UL (ref 0–0.7)
EOSINOPHIL NFR BLD AUTO: 2.2 %
ERYTHROCYTE [DISTWIDTH] IN BLOOD BY AUTOMATED COUNT: 15.4 %
HCT VFR BLD AUTO: 39.8 %
HGB BLD-MCNC: 12.8 G/DL
IMM GRANULOCYTES # BLD AUTO: 0.02 X10(3) UL (ref 0–1)
IMM GRANULOCYTES NFR BLD: 0.3 %
INR BLD: 7.44 (ref 0.8–1.2)
LYMPHOCYTES # BLD AUTO: 1.31 X10(3) UL (ref 1–4)
LYMPHOCYTES NFR BLD AUTO: 20.2 %
MCH RBC QN AUTO: 28.2 PG (ref 26–34)
MCHC RBC AUTO-ENTMCNC: 32.2 G/DL (ref 31–37)
MCV RBC AUTO: 87.7 FL
MONOCYTES # BLD AUTO: 0.43 X10(3) UL (ref 0.1–1)
MONOCYTES NFR BLD AUTO: 6.6 %
NEUTROPHILS # BLD AUTO: 4.53 X10 (3) UL (ref 1.5–7.7)
NEUTROPHILS # BLD AUTO: 4.53 X10(3) UL (ref 1.5–7.7)
NEUTROPHILS NFR BLD AUTO: 70.1 %
PLATELET # BLD AUTO: 329 10(3)UL (ref 150–450)
PROTHROMBIN TIME: 64.1 SECONDS (ref 11.6–14.8)
RBC # BLD AUTO: 4.54 X10(6)UL
WBC # BLD AUTO: 6.5 X10(3) UL (ref 4–11)

## 2022-05-04 PROCEDURE — 99214 OFFICE O/P EST MOD 30 MIN: CPT | Performed by: NURSE PRACTITIONER

## 2022-05-04 PROCEDURE — 3008F BODY MASS INDEX DOCD: CPT | Performed by: NURSE PRACTITIONER

## 2022-05-04 PROCEDURE — 3079F DIAST BP 80-89 MM HG: CPT | Performed by: NURSE PRACTITIONER

## 2022-05-04 PROCEDURE — 85025 COMPLETE CBC W/AUTO DIFF WBC: CPT

## 2022-05-04 PROCEDURE — 85610 PROTHROMBIN TIME: CPT

## 2022-05-04 PROCEDURE — 3074F SYST BP LT 130 MM HG: CPT | Performed by: NURSE PRACTITIONER

## 2022-05-04 PROCEDURE — 36415 COLL VENOUS BLD VENIPUNCTURE: CPT

## 2022-05-04 NOTE — TELEPHONE ENCOUNTER
Future Appointments   Date Time Provider Juliana Leonora   7/13/2022 10:15 AM Odilia Raza MD [facility] [place]

## 2022-05-04 NOTE — PATIENT INSTRUCTIONS
Hold tonight's Coumadin dose  Recheck INR tomorrow at home and call office with result.  Dr. Yelena Mac is in the office until 12pm

## 2022-05-05 ENCOUNTER — PATIENT MESSAGE (OUTPATIENT)
Dept: FAMILY MEDICINE CLINIC | Facility: CLINIC | Age: 67
End: 2022-05-05

## 2022-05-05 NOTE — TELEPHONE ENCOUNTER
From: Rodrigo Hall  To: Samaria Barron MD  Sent: 5/5/2022 8:15 AM CDT  Subject: INR    My INR was still greater than 8 according to my machine. I didn't take my warfarin last night so I expect it to continue to go down today. I have a salad I can eat for lunch today and recheck tomorrow. Thank you.   Migue Ware

## 2022-05-07 RX ORDER — WARFARIN SODIUM 3 MG/1
3 TABLET ORAL DAILY
Qty: 30 TABLET | Refills: 0 | Status: SHIPPED | OUTPATIENT
Start: 2022-05-07

## 2022-05-07 NOTE — TELEPHONE ENCOUNTER
Patient's INR was above 8 last week. She saw Cathy Erik on 5/4/22. Patient was at [facility] ER yesterday. She was given oral Vitamin K 5 mg in the ER. She also has a UTI and was given Cephalexin 500 mg BID x 5 days at the ER yesterday. Patient was also diagnosed with Hypomagnesemia and was give IV magnesium Sulfate 2 g in the ER as well. Her INR in the ER was 6.1 and had been holding Warfarin x 2 days. Her previous dose was Warfarin 4 mg PO daily. Her current INR today is 1.6. Please advise.

## 2022-05-09 ENCOUNTER — TELEPHONE (OUTPATIENT)
Dept: FAMILY MEDICINE CLINIC | Facility: CLINIC | Age: 67
End: 2022-05-09

## 2022-05-09 RX ORDER — SACCHAROMYCES BOULARDII 250 MG
250 CAPSULE ORAL 3 TIMES DAILY
COMMUNITY
Start: 2022-05-02

## 2022-05-09 RX ORDER — ONDANSETRON 4 MG/1
TABLET, ORALLY DISINTEGRATING ORAL
COMMUNITY
Start: 2022-05-02

## 2022-05-09 RX ORDER — CEFUROXIME AXETIL 500 MG/1
500 TABLET ORAL EVERY 12 HOURS
COMMUNITY
Start: 2022-04-18

## 2022-05-09 RX ORDER — CEPHALEXIN 500 MG/1
500 CAPSULE ORAL 2 TIMES DAILY
COMMUNITY
Start: 2022-05-06

## 2022-05-09 RX ORDER — METRONIDAZOLE 500 MG/1
500 TABLET ORAL EVERY 8 HOURS
COMMUNITY
Start: 2022-04-18

## 2022-05-09 NOTE — TELEPHONE ENCOUNTER
Patient was 1.6 on 5/5/22 and placed on 3 mg. She was instructed to start 3 mg nightly and recheck in 5 days. She is 1.8 today. Please advise.

## 2022-05-09 NOTE — TELEPHONE ENCOUNTER
Patient advised and verbalized understanding. Inquired if patient had the medication needed and she stated yes.

## 2022-05-12 ENCOUNTER — PATIENT MESSAGE (OUTPATIENT)
Dept: FAMILY MEDICINE CLINIC | Facility: CLINIC | Age: 67
End: 2022-05-12

## 2022-05-12 NOTE — TELEPHONE ENCOUNTER
This what Dr. Karuna Nathan recommended on Saturday. Please advise.     Resume warfarin at 3 mg nightly and recheck INR after 5 doses

## 2022-05-12 NOTE — TELEPHONE ENCOUNTER
From: Kristy Vitale  Sent: 5/12/2022 12:18 PM CDT  To: Krys Zaragoza [facility] Clinical Staff  Subject: INR    Hi   1.9 and I finished my antibiotics on Monday.   Kamryn Odell

## 2022-05-12 NOTE — TELEPHONE ENCOUNTER
Spoke with Dr. Saumya Whyte who states it is ok to wait until Friday for review by Dr. Sina Ambrocio as Dr. Alison Jauregui is out of office. Please advise.

## 2022-05-13 RX ORDER — WARFARIN SODIUM 4 MG/1
4 TABLET ORAL DAILY
Qty: 1 TABLET | Refills: 0 | COMMUNITY
Start: 2022-05-13

## 2022-05-23 ENCOUNTER — TELEPHONE (OUTPATIENT)
Dept: FAMILY MEDICINE CLINIC | Facility: CLINIC | Age: 67
End: 2022-05-23

## 2022-05-23 DIAGNOSIS — Z87.09 HISTORY OF ASTHMA: ICD-10-CM

## 2022-05-23 RX ORDER — WARFARIN SODIUM 4 MG/1
4 TABLET ORAL DAILY
Qty: 30 TABLET | Refills: 0 | Status: SHIPPED | OUTPATIENT
Start: 2022-05-23

## 2022-05-23 NOTE — TELEPHONE ENCOUNTER
LOV 04/13/2022    LAST LAB 05/04/2022    LAST RX  Warfarin 4 mg     Next OV   Future Appointments   Date Time Provider Juliana Lea   7/13/2022 10:15 AM Edin Briseno MD [facility] [place]       PROTOCOL

## 2022-05-23 NOTE — TELEPHONE ENCOUNTER
LOV: 5-4-22    LAST LAB:5-4-22    LAST RX:    warfarin 4 MG Oral Tab 30 tablet 0 5/23/2022     Sig - Route: Take 1 tablet (4 mg total) by mouth daily. - Oral    Sent to pharmacy as:  Warfarin Sodium 4 MG Oral Tablet (Coumadin)    Notes to Pharmacy: New dose as of 5/13/22    E-Prescribing Status: Receipt confirmed by pharmacy (5/23/2022 12:14 PM CDT)        Next OV:   Future Appointments   Date Time Provider Juliana Lea   7/13/2022 10:15 AM Flaca Cabrera MD [facility] [place]         PROTOCOL: .none

## 2022-05-23 NOTE — TELEPHONE ENCOUNTER
Patient Review of Clinical Information    Problems   The patient or proxy has not reviewed this information.      Medications   The patient or proxy has not reviewed this information, and there are updates pending:   Requested Medication Removals Start Date Reported By  Comments   cephalexin 500 MG Caps 5/6/2022 [facility]    metRONIDAZOLE 500 MG Tabs 4/18/2022 [facility]    cefuroxime 500 MG Tabs 4/18/2022 [facility]      Allergies   The patient or proxy has not reviewed this information, and there are updates pending:   Requested Allergy Removals Date Noted Reactions Reported By  Comments   Sulfa Antibiotics 3/11/2003 UNKNOWN, OTHER (SEE COMMENTS) [facility] Not allergic     Epic updated

## 2022-05-24 ENCOUNTER — PATIENT MESSAGE (OUTPATIENT)
Dept: FAMILY MEDICINE CLINIC | Facility: CLINIC | Age: 67
End: 2022-05-24

## 2022-05-24 RX ORDER — BUDESONIDE AND FORMOTEROL FUMARATE DIHYDRATE 160; 4.5 UG/1; UG/1
AEROSOL RESPIRATORY (INHALATION)
Qty: 10.2 G | Refills: 0 | Status: SHIPPED | OUTPATIENT
Start: 2022-05-24

## 2022-05-24 RX ORDER — WARFARIN SODIUM 4 MG/1
TABLET ORAL
Qty: 90 TABLET | Refills: 0 | Status: SHIPPED | OUTPATIENT
Start: 2022-05-24

## 2022-05-25 RX ORDER — ALBUTEROL SULFATE 90 UG/1
2 AEROSOL, METERED RESPIRATORY (INHALATION) EVERY 4 HOURS PRN
Qty: 18 G | Refills: 2 | Status: SHIPPED | OUTPATIENT
Start: 2022-05-25

## 2022-05-25 NOTE — TELEPHONE ENCOUNTER
From: Terence Jc  To: Tyree Triana MD  Sent: 2022 8:57 PM CDT  Subject: Clarise Keron inhaler    I need a refill on this prescription as I have two of them both  last year and it said I can't get refills on line.    Thank you  Pino Jacinto

## 2022-05-25 NOTE — TELEPHONE ENCOUNTER
LOV 04/13/2022    LAST LAB    LAST RX  Ventolin #18g R2 08/10/2020    Next OV   Future Appointments   Date Time Provider Juliana Lea   7/13/2022 10:15 AM Katelyn Ramos MD [facility] [place]     PROTOCOL

## 2022-05-31 ENCOUNTER — PATIENT MESSAGE (OUTPATIENT)
Dept: FAMILY MEDICINE CLINIC | Facility: CLINIC | Age: 67
End: 2022-05-31

## 2022-05-31 RX ORDER — WARFARIN SODIUM 4 MG/1
4 TABLET ORAL DAILY
Qty: 90 TABLET | Refills: 0 | COMMUNITY
Start: 2022-05-31

## 2022-05-31 RX ORDER — WARFARIN SODIUM 1 MG/1
0.5 TABLET ORAL AS DIRECTED
Qty: 30 TABLET | Refills: 0 | Status: SHIPPED | OUTPATIENT
Start: 2022-05-31

## 2022-05-31 NOTE — TELEPHONE ENCOUNTER
Increase to 4.0 mg m-with-f- and 4.5 mg on tu th [place]  Check inr 2 weeks    Not sure if stress causes issues  With the inr  But change in vit k foods  Will  But less vit k usually means the inr is hi  Not low

## 2022-05-31 NOTE — TELEPHONE ENCOUNTER
From: Ricarda Apple  To: Sohail Finn MD  Sent: 5/31/2022 8:41 AM CDT  Subject: INR    My INR is 1.4 today and on the 16th it was 2.7 I'm taking 4 mg each night. I have been under a LOT of stress this last week so don't know if that has anything to do with it or not. I'm also not eating either.   Thank you  Will Radha

## 2022-06-01 ENCOUNTER — MED REC SCAN ONLY (OUTPATIENT)
Dept: FAMILY MEDICINE CLINIC | Facility: CLINIC | Age: 67
End: 2022-06-01

## 2022-06-04 ENCOUNTER — TELEPHONE (OUTPATIENT)
Dept: FAMILY MEDICINE CLINIC | Facility: CLINIC | Age: 67
End: 2022-06-04

## 2022-06-04 ENCOUNTER — PATIENT MESSAGE (OUTPATIENT)
Dept: FAMILY MEDICINE CLINIC | Facility: CLINIC | Age: 67
End: 2022-06-04

## 2022-06-04 ENCOUNTER — ANTI-COAG VISIT (OUTPATIENT)
Dept: FAMILY MEDICINE CLINIC | Facility: CLINIC | Age: 67
End: 2022-06-04
Payer: MEDICARE

## 2022-06-04 DIAGNOSIS — Z51.81 ANTICOAGULATION MANAGEMENT ENCOUNTER: ICD-10-CM

## 2022-06-04 DIAGNOSIS — R79.1 ELEVATED INR: Primary | ICD-10-CM

## 2022-06-04 DIAGNOSIS — Z79.01 ANTICOAGULATION MANAGEMENT ENCOUNTER: ICD-10-CM

## 2022-06-04 LAB
INR BLD: 3.83 (ref 0.8–1.2)
INR: 5.1 (ref 0.8–1.2)
PROTHROMBIN TIME: 38 SECONDS (ref 11.6–14.8)

## 2022-06-04 PROCEDURE — 85610 PROTHROMBIN TIME: CPT | Performed by: FAMILY MEDICINE

## 2022-06-04 NOTE — TELEPHONE ENCOUNTER
From: Malissa Cerna  To: Sheila Thomson MD  Sent: 6/4/2022 11:41 AM CDT  Subject: Question regarding PROTHROMBIN TIME    That is not my normal range, mine is 2.5-3.5!!

## 2022-06-04 NOTE — TELEPHONE ENCOUNTER
PER PT HER INR RANGE IS INCORRECT- THAT SHE JUST READ ON HER PAPERWORK-    PLEASE CALL PT BACK-THANK YOU

## 2022-06-04 NOTE — TELEPHONE ENCOUNTER
FYI  Patient called back and just wanted Dr. Junie Espinoza to be aware that he INR range is between 2.5-3.5 because she has a blood clotting disorder.

## 2022-06-06 ENCOUNTER — ANTI-COAG VISIT (OUTPATIENT)
Dept: FAMILY MEDICINE CLINIC | Facility: CLINIC | Age: 67
End: 2022-06-06
Payer: MEDICARE

## 2022-06-06 DIAGNOSIS — Z51.81 ANTICOAGULATION MANAGEMENT ENCOUNTER: Primary | ICD-10-CM

## 2022-06-06 DIAGNOSIS — Z79.01 ANTICOAGULATION MANAGEMENT ENCOUNTER: Primary | ICD-10-CM

## 2022-06-06 LAB — INR: 4.1 (ref 0.8–1.2)

## 2022-06-06 NOTE — PROGRESS NOTES
Patient arrived for you follow up INR. Today's result was 4.1. She states she has been taking 4 tablets and did not skip this weekend d/t the fear of being too low.

## 2022-06-07 RX ORDER — WARFARIN SODIUM 3 MG/1
3 TABLET ORAL DAILY
Qty: 30 TABLET | Refills: 1 | Status: SHIPPED | OUTPATIENT
Start: 2022-06-07

## 2022-06-07 NOTE — TELEPHONE ENCOUNTER
Last OV: 4/13/22  Last labs: 5/4/22  Last INR: 6/6/22    Future Appointments   Date Time Provider Juliana Leonora   6/10/2022 11:00 AM [facility] NURSE [facility] [place]   7/13/2022 10:15 AM Tabby Raza MD [facility] [place]

## 2022-06-08 ENCOUNTER — OFFICE VISIT (OUTPATIENT)
Dept: FAMILY MEDICINE CLINIC | Facility: CLINIC | Age: 67
End: 2022-06-08
Payer: MEDICARE

## 2022-06-08 VITALS
WEIGHT: 203 LBS | HEART RATE: 102 BPM | OXYGEN SATURATION: 96 % | TEMPERATURE: 97 F | DIASTOLIC BLOOD PRESSURE: 80 MMHG | SYSTOLIC BLOOD PRESSURE: 130 MMHG | RESPIRATION RATE: 16 BRPM | HEIGHT: 61.5 IN | BODY MASS INDEX: 37.84 KG/M2

## 2022-06-08 DIAGNOSIS — R10.30 LOWER ABDOMINAL PAIN: Primary | ICD-10-CM

## 2022-06-08 PROCEDURE — 3008F BODY MASS INDEX DOCD: CPT | Performed by: FAMILY MEDICINE

## 2022-06-08 PROCEDURE — 3075F SYST BP GE 130 - 139MM HG: CPT | Performed by: FAMILY MEDICINE

## 2022-06-08 PROCEDURE — 3079F DIAST BP 80-89 MM HG: CPT | Performed by: FAMILY MEDICINE

## 2022-06-08 PROCEDURE — 99214 OFFICE O/P EST MOD 30 MIN: CPT | Performed by: FAMILY MEDICINE

## 2022-06-08 RX ORDER — DICYCLOMINE HYDROCHLORIDE 10 MG/1
10 CAPSULE ORAL
COMMUNITY
Start: 2022-06-08 | End: 2022-06-17

## 2022-06-10 ENCOUNTER — ANTI-COAG VISIT (OUTPATIENT)
Dept: FAMILY MEDICINE CLINIC | Facility: CLINIC | Age: 67
End: 2022-06-10
Payer: MEDICARE

## 2022-06-10 LAB — INR: 4.1 (ref 0.8–1.2)

## 2022-06-10 NOTE — PROGRESS NOTES
Patient arrived for INR. Patient states she has not changed her diet and has been taking 3.5 mg of warfarin daily. Patient's result was 4.1. Patient would like to have dr. Travis Auguste address this issue. She will return on Monday for another INR.  She is holding tonight and continue tomorrow and Sunday with 3.5 mg.

## 2022-06-13 ENCOUNTER — NURSE ONLY (OUTPATIENT)
Dept: FAMILY MEDICINE CLINIC | Facility: CLINIC | Age: 67
End: 2022-06-13
Payer: MEDICARE

## 2022-06-13 DIAGNOSIS — Z79.01 ANTICOAGULATION GOAL OF INR 2.5 TO 3.5: ICD-10-CM

## 2022-06-13 DIAGNOSIS — Z51.81 ANTICOAGULATION GOAL OF INR 2.5 TO 3.5: ICD-10-CM

## 2022-06-13 LAB — INR: 2.6 (ref 0.8–1.2)

## 2022-06-13 NOTE — PROGRESS NOTES
Patient arrived for her INR. She states she took 3.5mg daily and ate 2 egg rolls. Today result is 2.6. Patient states she is suppose to be between 2.5 and 3.5.

## 2022-06-17 ENCOUNTER — PATIENT MESSAGE (OUTPATIENT)
Dept: FAMILY MEDICINE CLINIC | Facility: CLINIC | Age: 67
End: 2022-06-17

## 2022-06-17 NOTE — TELEPHONE ENCOUNTER
From: Jeffery Rodríguez  To: Arleen Cheney MD  Sent: 6/17/2022 8:59 AM CDT  Subject: COVID    I have a nasty cough, body aches, nausea and a low grade fever of 100.6 but I tested twice here at home in the last couple of days for COVID and they both came back negative.    Thank you  Jalyn Mcintyre

## 2022-06-20 ENCOUNTER — PATIENT MESSAGE (OUTPATIENT)
Dept: FAMILY MEDICINE CLINIC | Facility: CLINIC | Age: 67
End: 2022-06-20

## 2022-06-20 NOTE — TELEPHONE ENCOUNTER
From: Bethanie Bonilla  To: Jenna Latham MD  Sent: 6/20/2022 7:57 AM CDT  Subject: ER    Good morning,  I went to [facility] in [place] yesterday because I have a nasty cough and a fever since Thursday. As you can see in my records from yesterday. I did start both prescriptions this morning. Not sure what you want to do with my blood stick for today which I cancelled. I can try it at home again and see what it shows and let you know.   Thank you  Karyn Blank

## 2022-06-21 ENCOUNTER — TELEPHONE (OUTPATIENT)
Dept: FAMILY MEDICINE CLINIC | Facility: CLINIC | Age: 67
End: 2022-06-21

## 2022-06-21 ENCOUNTER — PATIENT MESSAGE (OUTPATIENT)
Dept: FAMILY MEDICINE CLINIC | Facility: CLINIC | Age: 67
End: 2022-06-21

## 2022-06-21 ENCOUNTER — LABORATORY ENCOUNTER (OUTPATIENT)
Dept: LAB | Age: 67
End: 2022-06-21
Attending: FAMILY MEDICINE
Payer: MEDICARE

## 2022-06-21 DIAGNOSIS — Z79.01 ANTICOAGULATION GOAL OF INR 2.5 TO 3.5: Primary | ICD-10-CM

## 2022-06-21 DIAGNOSIS — Z51.81 ANTICOAGULATION GOAL OF INR 2.5 TO 3.5: ICD-10-CM

## 2022-06-21 DIAGNOSIS — Z79.01 ANTICOAGULATION GOAL OF INR 2.5 TO 3.5: ICD-10-CM

## 2022-06-21 DIAGNOSIS — Z51.81 ANTICOAGULATION GOAL OF INR 2.5 TO 3.5: Primary | ICD-10-CM

## 2022-06-21 LAB
INR BLD: 4.25 (ref 0.8–1.2)
PROTHROMBIN TIME: 41.3 SECONDS (ref 11.6–14.8)

## 2022-06-21 PROCEDURE — 36415 COLL VENOUS BLD VENIPUNCTURE: CPT

## 2022-06-21 PROCEDURE — 85610 PROTHROMBIN TIME: CPT

## 2022-06-21 NOTE — TELEPHONE ENCOUNTER
From: Mireya Quiles  To: Jaziel Ricketts MD  Sent: 6/21/2022 3:54 PM CDT  Subject: INR    Not sure when I'm suppose to get my blood draw results back for my INR I wanted to make sure what I'm suppose to do tonight. The fingerstick I did before the blood draw today was 6.6. I'm currently taking 4mg.   Thank you  Jessica Bergman

## 2022-06-21 NOTE — TELEPHONE ENCOUNTER
Patient's machine has been messing up. Patient is awaiting results from the INR blood draw today. Her home machine states 6.6. Do you want her to continue to take 4mg?

## 2022-06-21 NOTE — TELEPHONE ENCOUNTER
PT. IS NEEDING BLOOD DRAW FOR INR TO MAKE SURE HER HOME MACHINE IS WORKING RIGHT. SCHEDULED IT TODAY. Susu Watkins SAID SHE WILL PUT THE ORDERS IN.

## 2022-06-21 NOTE — TELEPHONE ENCOUNTER
Pt stopped in office & signed med release form to fax to [facility] for records from er visit on 6/19/22.   faxed to [phone]

## 2022-06-22 ENCOUNTER — OFFICE VISIT (OUTPATIENT)
Dept: FAMILY MEDICINE CLINIC | Facility: CLINIC | Age: 67
End: 2022-06-22
Payer: MEDICARE

## 2022-06-22 ENCOUNTER — MED REC SCAN ONLY (OUTPATIENT)
Dept: FAMILY MEDICINE CLINIC | Facility: CLINIC | Age: 67
End: 2022-06-22

## 2022-06-22 ENCOUNTER — TELEPHONE (OUTPATIENT)
Dept: FAMILY MEDICINE CLINIC | Facility: CLINIC | Age: 67
End: 2022-06-22

## 2022-06-22 VITALS
DIASTOLIC BLOOD PRESSURE: 60 MMHG | WEIGHT: 198 LBS | RESPIRATION RATE: 16 BRPM | OXYGEN SATURATION: 100 % | BODY MASS INDEX: 36.9 KG/M2 | SYSTOLIC BLOOD PRESSURE: 158 MMHG | HEIGHT: 61.5 IN | HEART RATE: 90 BPM | TEMPERATURE: 98 F

## 2022-06-22 DIAGNOSIS — Z79.01 ENCOUNTER FOR MONITORING COUMADIN THERAPY: ICD-10-CM

## 2022-06-22 DIAGNOSIS — Z79.01 ANTICOAGULATION GOAL OF INR 2.5 TO 3.5: ICD-10-CM

## 2022-06-22 DIAGNOSIS — H61.21 HEARING LOSS OF RIGHT EAR DUE TO CERUMEN IMPACTION: ICD-10-CM

## 2022-06-22 DIAGNOSIS — J40 BRONCHITIS: Primary | ICD-10-CM

## 2022-06-22 DIAGNOSIS — R05.9 COUGH: ICD-10-CM

## 2022-06-22 DIAGNOSIS — Z51.81 ANTICOAGULATION GOAL OF INR 2.5 TO 3.5: ICD-10-CM

## 2022-06-22 DIAGNOSIS — D68.9 COAGULOPATHY (HCC): ICD-10-CM

## 2022-06-22 DIAGNOSIS — Z51.81 ENCOUNTER FOR MONITORING COUMADIN THERAPY: ICD-10-CM

## 2022-06-22 PROCEDURE — 99214 OFFICE O/P EST MOD 30 MIN: CPT | Performed by: FAMILY MEDICINE

## 2022-06-22 PROCEDURE — 1126F AMNT PAIN NOTED NONE PRSNT: CPT | Performed by: FAMILY MEDICINE

## 2022-06-22 PROCEDURE — 3077F SYST BP >= 140 MM HG: CPT | Performed by: FAMILY MEDICINE

## 2022-06-22 PROCEDURE — 69209 REMOVE IMPACTED EAR WAX UNI: CPT | Performed by: FAMILY MEDICINE

## 2022-06-22 PROCEDURE — 3078F DIAST BP <80 MM HG: CPT | Performed by: FAMILY MEDICINE

## 2022-06-22 PROCEDURE — 3008F BODY MASS INDEX DOCD: CPT | Performed by: FAMILY MEDICINE

## 2022-06-22 RX ORDER — METHYLPREDNISOLONE 4 MG/1
4 TABLET ORAL AS DIRECTED
COMMUNITY
Start: 2022-06-19 | End: 2022-06-27 | Stop reason: ALTCHOICE

## 2022-06-22 RX ORDER — GUAIFENESIN 600 MG/1
TABLET, EXTENDED RELEASE ORAL
COMMUNITY
Start: 2022-06-19 | End: 2022-06-27 | Stop reason: ALTCHOICE

## 2022-06-22 NOTE — TELEPHONE ENCOUNTER
Patient requested lab result of INR be send to [facility] to compare her machine with a blood draw.  Faxed to provided number at [phone]

## 2022-06-24 ENCOUNTER — ANTI-COAG VISIT (OUTPATIENT)
Dept: FAMILY MEDICINE CLINIC | Facility: CLINIC | Age: 67
End: 2022-06-24
Payer: MEDICARE

## 2022-06-24 ENCOUNTER — LABORATORY ENCOUNTER (OUTPATIENT)
Dept: LAB | Age: 67
End: 2022-06-24
Attending: FAMILY MEDICINE
Payer: MEDICARE

## 2022-06-24 ENCOUNTER — TELEPHONE (OUTPATIENT)
Dept: FAMILY MEDICINE CLINIC | Facility: CLINIC | Age: 67
End: 2022-06-24

## 2022-06-24 DIAGNOSIS — Z79.01 ANTICOAGULATION GOAL OF INR 2.5 TO 3.5: ICD-10-CM

## 2022-06-24 DIAGNOSIS — Z79.01 ANTICOAGULATION GOAL OF INR 2.5 TO 3.5: Primary | ICD-10-CM

## 2022-06-24 DIAGNOSIS — Z51.81 ANTICOAGULATION GOAL OF INR 2.5 TO 3.5: Primary | ICD-10-CM

## 2022-06-24 DIAGNOSIS — Z51.81 ANTICOAGULATION GOAL OF INR 2.5 TO 3.5: ICD-10-CM

## 2022-06-24 LAB
INR BLD: 6.48 (ref 0.8–1.2)
INR: >8 (ref 0.8–1.2)
PROTHROMBIN TIME: 57.5 SECONDS (ref 11.6–14.8)

## 2022-06-24 PROCEDURE — 85610 PROTHROMBIN TIME: CPT

## 2022-06-24 PROCEDURE — 36415 COLL VENOUS BLD VENIPUNCTURE: CPT

## 2022-06-24 PROCEDURE — 85610 PROTHROMBIN TIME: CPT | Performed by: FAMILY MEDICINE

## 2022-06-24 PROCEDURE — 93793 ANTICOAG MGMT PT WARFARIN: CPT

## 2022-06-24 NOTE — TELEPHONE ENCOUNTER
PT CALLED AND WAS HOPING TO GET A CALL BACK SOME TIME SOON DUE TO AN ABNORMAL PTINR RESULT.    ** DID ADV STILL ONE TEST IN PROGRESS**    PLEASE CALL AND ADV    THANK YOU

## 2022-06-24 NOTE — PROGRESS NOTES
Patient arrived for INR. Result today of >8. Patient is being drawn by lab. Patient advised per Dr. Mariam Aviles to hold dose and results will be given tomorrow.

## 2022-06-25 ENCOUNTER — PATIENT MESSAGE (OUTPATIENT)
Dept: FAMILY MEDICINE CLINIC | Facility: CLINIC | Age: 67
End: 2022-06-25

## 2022-06-25 NOTE — TELEPHONE ENCOUNTER
From: Kera Blakely  To: Jorge Dolan MD  Sent: 6/25/2022 8:58 AM CDT  Subject: INR    Good morning,  I can't check it at home. My machine still isn't working and I can't get anybody from [facility] to answer my phone calls. So I'll need to come in and have it done.   Thank you  Michele Boateng

## 2022-06-27 ENCOUNTER — LAB ENCOUNTER (OUTPATIENT)
Dept: LAB | Age: 67
End: 2022-06-27
Attending: FAMILY MEDICINE
Payer: MEDICARE

## 2022-06-27 ENCOUNTER — ANTI-COAG VISIT (OUTPATIENT)
Dept: FAMILY MEDICINE CLINIC | Facility: CLINIC | Age: 67
End: 2022-06-27
Payer: MEDICARE

## 2022-06-27 ENCOUNTER — TELEPHONE (OUTPATIENT)
Dept: FAMILY MEDICINE CLINIC | Facility: CLINIC | Age: 67
End: 2022-06-27

## 2022-06-27 DIAGNOSIS — Z79.01 ANTICOAGULATION GOAL OF INR 2.5 TO 3.5: ICD-10-CM

## 2022-06-27 DIAGNOSIS — Z79.01 ANTICOAGULATION GOAL OF INR 2.5 TO 3.5: Primary | ICD-10-CM

## 2022-06-27 DIAGNOSIS — Z51.81 ANTICOAGULATION GOAL OF INR 2.5 TO 3.5: Primary | ICD-10-CM

## 2022-06-27 DIAGNOSIS — Z51.81 ANTICOAGULATION GOAL OF INR 2.5 TO 3.5: ICD-10-CM

## 2022-06-27 LAB
INR BLD: 3.96 (ref 0.8–1.2)
INR: 4.9 (ref 0.8–1.2)
PROTHROMBIN TIME: 39 SECONDS (ref 11.6–14.8)

## 2022-06-27 PROCEDURE — 93793 ANTICOAG MGMT PT WARFARIN: CPT

## 2022-06-27 PROCEDURE — 85610 PROTHROMBIN TIME: CPT

## 2022-06-27 PROCEDURE — 36415 COLL VENOUS BLD VENIPUNCTURE: CPT

## 2022-06-27 PROCEDURE — 85610 PROTHROMBIN TIME: CPT | Performed by: FAMILY MEDICINE

## 2022-06-27 RX ORDER — GABAPENTIN 400 MG/1
400 CAPSULE ORAL 3 TIMES DAILY
Qty: 270 CAPSULE | Refills: 0 | Status: SHIPPED | OUTPATIENT
Start: 2022-06-27 | End: 2022-09-25

## 2022-06-27 NOTE — PROGRESS NOTES
Patient here for INR - 4.9  Patient states she was instructed to hold Coumadin until next lab draw. Took 4 mg last night. Order placed for INR - venipuncture. Scheduled.    Future Appointments   Date Time Provider Juliana Leonora   6/27/2022  9:30 AM REF [facility] SW [facility] REF [facility] Ref Lab [place]   7/13/2022 10:15 AM Catherine Raza MD [facility] [place]

## 2022-06-28 ENCOUNTER — PATIENT MESSAGE (OUTPATIENT)
Dept: FAMILY MEDICINE CLINIC | Facility: CLINIC | Age: 67
End: 2022-06-28

## 2022-06-28 NOTE — TELEPHONE ENCOUNTER
Spoke to patient. Needs INR result of 6/21/22 faxed to [address] with note that patient's home INR was 6.6. Faxed. Patient aware.

## 2022-06-28 NOTE — TELEPHONE ENCOUNTER
From: Edin Welch  To: Case Osuna MD  Sent: 6/28/2022 10:18 AM CDT  Subject: INR    The person from [address] finally called me and she needs you to fax her the results from the 15 minute fingerstick and blood draw to [phone]. If you have any questions please let me know.     Thank you  Radha Harper

## 2022-06-29 ENCOUNTER — TELEPHONE (OUTPATIENT)
Dept: FAMILY MEDICINE CLINIC | Facility: CLINIC | Age: 67
End: 2022-06-29

## 2022-06-29 ENCOUNTER — PATIENT MESSAGE (OUTPATIENT)
Dept: FAMILY MEDICINE CLINIC | Facility: CLINIC | Age: 67
End: 2022-06-29

## 2022-06-29 RX ORDER — GABAPENTIN 400 MG/1
400 CAPSULE ORAL 3 TIMES DAILY
Qty: 270 CAPSULE | Refills: 0 | OUTPATIENT
Start: 2022-06-29 | End: 2022-09-27

## 2022-06-29 NOTE — TELEPHONE ENCOUNTER
From: Peggy Ames  To: Lino Diaz MD  Sent: 6/29/2022 1:49 PM CDT  Subject: Phone call    I need somebody to call me so I can explain what is going on. I now have a hemotolgist that is going to monitor my INR.   Thank you  Lindy Sheriff

## 2022-06-29 NOTE — TELEPHONE ENCOUNTER
Called and spoke with pt. States she has a hematologist monitoring her INR now, saw Dr. Srinivasa Woodall today. Pt states Dr. Srinivasa Wodoall is putting her on Eliquis after blood work. Pt cancelled her f/u INR for 7/5, told that if she hasn't started Eliquis by next week, she should call the office and have INR checked. Pt understands.

## 2022-06-30 ENCOUNTER — PATIENT MESSAGE (OUTPATIENT)
Dept: FAMILY MEDICINE CLINIC | Facility: CLINIC | Age: 67
End: 2022-06-30

## 2022-06-30 RX ORDER — MONTELUKAST SODIUM 10 MG/1
10 TABLET ORAL DAILY
Qty: 90 TABLET | Refills: 1 | Status: SHIPPED | OUTPATIENT
Start: 2022-06-30

## 2022-06-30 RX ORDER — ATORVASTATIN CALCIUM 40 MG/1
40 TABLET, FILM COATED ORAL NIGHTLY
Qty: 90 TABLET | Refills: 1 | Status: SHIPPED | OUTPATIENT
Start: 2022-06-30

## 2022-06-30 RX ORDER — PANTOPRAZOLE SODIUM 40 MG/1
40 TABLET, DELAYED RELEASE ORAL 2 TIMES DAILY
Qty: 180 TABLET | Refills: 1 | Status: SHIPPED | OUTPATIENT
Start: 2022-06-30

## 2022-06-30 NOTE — TELEPHONE ENCOUNTER
From: Fox Perez  To: Joss Friend MD  Sent: 6/30/2022 10:04 AM CDT  Subject: Med refills     I am in need of 3 prescriptions refills. I need Montelukast 10mg, Atorvasdtatin, 40mg, Pantoprazole, 40 mg. [facility] in [place].   Thank you  Jesus Alberto Gayle

## 2022-06-30 NOTE — TELEPHONE ENCOUNTER
Last office visit: 6/22/22  Last refill: Pantoprazole: 8/17/22, Montelukast, Atorvastatin: 8/16/22  Future Appointments   Date Time Provider Juliana Lea   7/13/2022 10:15 AM Conrad Ward MD [facility] [place]

## 2022-07-09 NOTE — PROGRESS NOTES
Vivian Campbell is a 59year old female.   HPI:   Virtual Video/Telephone Check-In    Vivian Campbell verbally consents to a Virtual/Telephone Check-In visit on 5/7/2020    Patient understands and accepts financial responsibility for any deductible, co-insurance tried walking without her walker once at [facility] and stumbled a bit but she caught herself. Also walked with a gait belt there. Mostly walking with her walker now.     Patient reminds me she was so upset someone was spreading rumors that she had the vi • MONTELUKAST SODIUM 10 MG Oral Tab TAKE 1 TABLET(10 MG) BY MOUTH DAILY 90 tablet 3   • ATORVASTATIN 40 MG Oral Tab TAKE 1 TABLET BY MOUTH NIGHTLY.  90 tablet 3   • METOPROLOL SUCCINATE ER 25 MG Oral Tablet 24 Hr TAKE 1 TABLET(25 MG) BY MOUTH DAILY 90 tab • Obesity    • Obstructive sleep apnea (adult) (pediatric) 4/25/2019   • Osteoarthritis    • Other and unspecified hyperlipidemia    • PE (pulmonary embolism) Feb 2004 and March 2004    x2.  prothrombin mutation found after 2nd one   • Prothrombin mutati apartment to show it to me without a walker, is stable and steady appearing  NEURO: A&Ox3, no focal defecits    ASSESSMENT AND PLAN:   Diagnoses and all orders for this visit:  This visit is primarily for counseling, spent 43 min with pt >50% of time in co (0) independent Cephal[facility]

## 2022-07-13 ENCOUNTER — OFFICE VISIT (OUTPATIENT)
Dept: FAMILY MEDICINE CLINIC | Facility: CLINIC | Age: 67
End: 2022-07-13
Payer: MEDICARE

## 2022-07-13 VITALS
SYSTOLIC BLOOD PRESSURE: 118 MMHG | BODY MASS INDEX: 37.46 KG/M2 | WEIGHT: 201 LBS | DIASTOLIC BLOOD PRESSURE: 70 MMHG | TEMPERATURE: 97 F | HEIGHT: 61.5 IN | RESPIRATION RATE: 12 BRPM | HEART RATE: 59 BPM | OXYGEN SATURATION: 97 %

## 2022-07-13 DIAGNOSIS — M48.05 SPINAL STENOSIS OF THORACOLUMBAR REGION: ICD-10-CM

## 2022-07-13 DIAGNOSIS — E66.9 OBESITY, CLASS II, BMI 35-39.9: ICD-10-CM

## 2022-07-13 DIAGNOSIS — G47.33 OSA TREATED WITH BIPAP: ICD-10-CM

## 2022-07-13 DIAGNOSIS — F33.41 RECURRENT MAJOR DEPRESSIVE DISORDER, IN PARTIAL REMISSION (HCC): ICD-10-CM

## 2022-07-13 DIAGNOSIS — F41.0 PANIC ATTACKS: ICD-10-CM

## 2022-07-13 DIAGNOSIS — E55.9 VITAMIN D DEFICIENCY: ICD-10-CM

## 2022-07-13 DIAGNOSIS — M15.9 PRIMARY OSTEOARTHRITIS INVOLVING MULTIPLE JOINTS: ICD-10-CM

## 2022-07-13 DIAGNOSIS — I10 ESSENTIAL HYPERTENSION: ICD-10-CM

## 2022-07-13 DIAGNOSIS — D68.52 PROTHROMBIN MUTATION (HCC): ICD-10-CM

## 2022-07-13 DIAGNOSIS — K57.30 DIVERTICULOSIS OF LARGE INTESTINE WITHOUT HEMORRHAGE: ICD-10-CM

## 2022-07-13 DIAGNOSIS — E78.2 MIXED HYPERLIPIDEMIA: Primary | ICD-10-CM

## 2022-07-13 DIAGNOSIS — J30.89 NON-SEASONAL ALLERGIC RHINITIS, UNSPECIFIED TRIGGER: ICD-10-CM

## 2022-07-13 DIAGNOSIS — M54.50 CHRONIC BILATERAL LOW BACK PAIN, UNSPECIFIED WHETHER SCIATICA PRESENT: ICD-10-CM

## 2022-07-13 DIAGNOSIS — J44.9 ASTHMA WITH COPD (CHRONIC OBSTRUCTIVE PULMONARY DISEASE) (HCC): ICD-10-CM

## 2022-07-13 DIAGNOSIS — E66.01 SEVERE OBESITY (BMI 35.0-39.9) WITH COMORBIDITY (HCC): ICD-10-CM

## 2022-07-13 DIAGNOSIS — G47.31 CENTRAL SLEEP APNEA: ICD-10-CM

## 2022-07-13 DIAGNOSIS — Z95.828 PRESENCE OF IVC FILTER: ICD-10-CM

## 2022-07-13 DIAGNOSIS — N18.31 STAGE 3A CHRONIC KIDNEY DISEASE (HCC): ICD-10-CM

## 2022-07-13 DIAGNOSIS — K21.00 GASTROESOPHAGEAL REFLUX DISEASE WITH ESOPHAGITIS WITHOUT HEMORRHAGE: ICD-10-CM

## 2022-07-13 DIAGNOSIS — G89.29 CHRONIC BILATERAL LOW BACK PAIN, UNSPECIFIED WHETHER SCIATICA PRESENT: ICD-10-CM

## 2022-07-13 DIAGNOSIS — D68.59 HYPERCOAGULABLE STATE (HCC): ICD-10-CM

## 2022-07-13 DIAGNOSIS — K44.9 HIATAL HERNIA: ICD-10-CM

## 2022-07-13 PROCEDURE — G0439 PPPS, SUBSEQ VISIT: HCPCS | Performed by: FAMILY MEDICINE

## 2022-07-13 PROCEDURE — 99397 PER PM REEVAL EST PAT 65+ YR: CPT | Performed by: FAMILY MEDICINE

## 2022-07-13 PROCEDURE — 3078F DIAST BP <80 MM HG: CPT | Performed by: FAMILY MEDICINE

## 2022-07-13 PROCEDURE — 3074F SYST BP LT 130 MM HG: CPT | Performed by: FAMILY MEDICINE

## 2022-07-13 PROCEDURE — 96160 PT-FOCUSED HLTH RISK ASSMT: CPT | Performed by: FAMILY MEDICINE

## 2022-07-13 PROCEDURE — 3008F BODY MASS INDEX DOCD: CPT | Performed by: FAMILY MEDICINE

## 2022-07-13 PROCEDURE — 1125F AMNT PAIN NOTED PAIN PRSNT: CPT | Performed by: FAMILY MEDICINE

## 2022-07-13 RX ORDER — GABAPENTIN 600 MG/1
TABLET ORAL
COMMUNITY
Start: 2022-07-09 | End: 2022-07-13 | Stop reason: ALTCHOICE

## 2022-07-17 PROBLEM — N18.2 CKD (CHRONIC KIDNEY DISEASE) STAGE 2, GFR 60-89 ML/MIN: Status: ACTIVE | Noted: 2020-07-21

## 2022-07-17 PROBLEM — Z78.9 IMPAIRED MOBILITY AND ACTIVITIES OF DAILY LIVING: Status: RESOLVED | Noted: 2020-04-29 | Resolved: 2022-07-17

## 2022-07-17 PROBLEM — Z74.09 IMPAIRED MOBILITY AND ACTIVITIES OF DAILY LIVING: Status: RESOLVED | Noted: 2020-04-29 | Resolved: 2022-07-17

## 2022-07-17 PROBLEM — E66.812 OBESITY, CLASS II, BMI 35-39.9: Status: ACTIVE | Noted: 2021-01-25

## 2022-07-17 PROBLEM — R27.0 ATAXIA: Status: RESOLVED | Noted: 2020-04-25 | Resolved: 2022-07-17

## 2022-07-17 PROBLEM — E66.9 OBESITY, CLASS II, BMI 35-39.9: Status: ACTIVE | Noted: 2021-01-25

## 2022-07-17 PROBLEM — T14.91XA SUICIDAL BEHAVIOR WITH ATTEMPTED SELF-INJURY (HCC): Status: RESOLVED | Noted: 2020-06-09 | Resolved: 2022-07-17

## 2022-07-17 PROBLEM — G47.31 CENTRAL SLEEP APNEA: Status: RESOLVED | Noted: 2020-08-31 | Resolved: 2022-07-17

## 2022-07-17 PROBLEM — N20.0 KIDNEY STONES: Status: RESOLVED | Noted: 2018-07-14 | Resolved: 2022-07-17

## 2022-07-17 PROBLEM — F44.9 CONVERSION DISORDER: Status: RESOLVED | Noted: 2020-04-29 | Resolved: 2022-07-17

## 2022-07-26 RX ORDER — GABAPENTIN 400 MG/1
400 CAPSULE ORAL 3 TIMES DAILY
Qty: 270 CAPSULE | Refills: 0 | Status: SHIPPED | OUTPATIENT
Start: 2022-07-26 | End: 2022-10-24

## 2022-07-31 DIAGNOSIS — Z87.09 HISTORY OF ASTHMA: ICD-10-CM

## 2022-08-02 ENCOUNTER — MED REC SCAN ONLY (OUTPATIENT)
Dept: FAMILY MEDICINE CLINIC | Facility: CLINIC | Age: 67
End: 2022-08-02

## 2022-08-02 RX ORDER — BUDESONIDE AND FORMOTEROL FUMARATE DIHYDRATE 160; 4.5 UG/1; UG/1
AEROSOL RESPIRATORY (INHALATION)
Qty: 10.2 G | Refills: 5 | Status: SHIPPED | OUTPATIENT
Start: 2022-08-02

## 2022-08-02 NOTE — TELEPHONE ENCOUNTER
Asthma & COPD Medication Protocol Passed 07/31/2022 08:17 AM    Asthma Action Score greater than or equal to 20    Appointment in past 6 or next 3 months     AAP/ACT given in last 12 months     Last refill - 5/24/22 - 10.4 g  Last office visit - 7/13/22   Refilled per protocol

## 2022-08-26 ENCOUNTER — OFFICE VISIT (OUTPATIENT)
Dept: FAMILY MEDICINE CLINIC | Facility: CLINIC | Age: 67
End: 2022-08-26
Payer: MEDICARE

## 2022-08-26 VITALS
SYSTOLIC BLOOD PRESSURE: 136 MMHG | BODY MASS INDEX: 37.65 KG/M2 | DIASTOLIC BLOOD PRESSURE: 76 MMHG | OXYGEN SATURATION: 99 % | HEART RATE: 81 BPM | RESPIRATION RATE: 16 BRPM | HEIGHT: 61.5 IN | TEMPERATURE: 98 F | WEIGHT: 202 LBS

## 2022-08-26 DIAGNOSIS — M47.22 RADICULOPATHY DUE TO CERVICAL SPONDYLOSIS: ICD-10-CM

## 2022-08-26 DIAGNOSIS — M54.13 RADICULOPATHY OF CERVICOTHORACIC REGION: Primary | ICD-10-CM

## 2022-08-26 PROBLEM — F51.01 PRIMARY INSOMNIA: Status: ACTIVE | Noted: 2022-08-25

## 2022-08-26 PROCEDURE — 3008F BODY MASS INDEX DOCD: CPT | Performed by: FAMILY MEDICINE

## 2022-08-26 PROCEDURE — 99214 OFFICE O/P EST MOD 30 MIN: CPT | Performed by: FAMILY MEDICINE

## 2022-08-26 PROCEDURE — 3075F SYST BP GE 130 - 139MM HG: CPT | Performed by: FAMILY MEDICINE

## 2022-08-26 PROCEDURE — 3078F DIAST BP <80 MM HG: CPT | Performed by: FAMILY MEDICINE

## 2022-08-26 RX ORDER — METHYLPREDNISOLONE 4 MG/1
TABLET ORAL
COMMUNITY
Start: 2022-08-23 | End: 2022-08-29

## 2022-08-26 NOTE — PATIENT INSTRUCTIONS
I reviewed emergency room records including imaging and recommendations. I reviewed results with the patient. She may discontinue the use of the sling  Finish Medrol Dosepak  Discussed postural changes. Would recommend physical therapy going forward to prevent future episodes  Reviewed scapular stabilization exercises.   The benefits of soft tissue massage etc.  Discussed proper walker adjustment

## 2022-08-31 ENCOUNTER — TELEPHONE (OUTPATIENT)
Dept: PHYSICAL THERAPY | Facility: HOSPITAL | Age: 67
End: 2022-08-31

## 2022-09-02 ENCOUNTER — OFFICE VISIT (OUTPATIENT)
Dept: PHYSICAL THERAPY | Age: 67
End: 2022-09-02
Attending: FAMILY MEDICINE
Payer: MEDICARE

## 2022-09-02 DIAGNOSIS — M54.13 RADICULOPATHY OF CERVICOTHORACIC REGION: ICD-10-CM

## 2022-09-02 DIAGNOSIS — M47.22 RADICULOPATHY DUE TO CERVICAL SPONDYLOSIS: ICD-10-CM

## 2022-09-02 PROCEDURE — 97110 THERAPEUTIC EXERCISES: CPT

## 2022-09-02 PROCEDURE — 97162 PT EVAL MOD COMPLEX 30 MIN: CPT

## 2022-09-06 ENCOUNTER — TELEPHONE (OUTPATIENT)
Dept: PHYSICAL THERAPY | Facility: HOSPITAL | Age: 67
End: 2022-09-06

## 2022-09-09 ENCOUNTER — APPOINTMENT (OUTPATIENT)
Dept: PHYSICAL THERAPY | Age: 67
End: 2022-09-09
Attending: FAMILY MEDICINE
Payer: MEDICARE

## 2022-09-10 DIAGNOSIS — Z87.09 HISTORY OF ASTHMA: ICD-10-CM

## 2022-09-12 RX ORDER — BUDESONIDE AND FORMOTEROL FUMARATE DIHYDRATE 160; 4.5 UG/1; UG/1
AEROSOL RESPIRATORY (INHALATION)
Qty: 10.2 G | Refills: 5 | Status: SHIPPED | OUTPATIENT
Start: 2022-09-12

## 2022-09-13 ENCOUNTER — OFFICE VISIT (OUTPATIENT)
Dept: PHYSICAL THERAPY | Age: 67
End: 2022-09-13
Attending: FAMILY MEDICINE
Payer: MEDICARE

## 2022-09-13 PROCEDURE — 97035 APP MDLTY 1+ULTRASOUND EA 15: CPT

## 2022-09-13 PROCEDURE — 97140 MANUAL THERAPY 1/> REGIONS: CPT

## 2022-09-13 PROCEDURE — 97110 THERAPEUTIC EXERCISES: CPT

## 2022-09-14 ENCOUNTER — TELEPHONE (OUTPATIENT)
Dept: FAMILY MEDICINE CLINIC | Facility: CLINIC | Age: 67
End: 2022-09-14

## 2022-09-14 ENCOUNTER — OFFICE VISIT (OUTPATIENT)
Dept: FAMILY MEDICINE CLINIC | Facility: CLINIC | Age: 67
End: 2022-09-14

## 2022-09-14 VITALS
DIASTOLIC BLOOD PRESSURE: 72 MMHG | SYSTOLIC BLOOD PRESSURE: 136 MMHG | HEIGHT: 61.5 IN | BODY MASS INDEX: 37.14 KG/M2 | WEIGHT: 199.25 LBS | HEART RATE: 63 BPM | RESPIRATION RATE: 12 BRPM | TEMPERATURE: 98 F | OXYGEN SATURATION: 98 %

## 2022-09-14 DIAGNOSIS — S32.040D COMPRESSION FRACTURE OF L4 VERTEBRA WITH ROUTINE HEALING, SUBSEQUENT ENCOUNTER: Primary | ICD-10-CM

## 2022-09-14 PROCEDURE — 1125F AMNT PAIN NOTED PAIN PRSNT: CPT | Performed by: FAMILY MEDICINE

## 2022-09-14 PROCEDURE — 3078F DIAST BP <80 MM HG: CPT | Performed by: FAMILY MEDICINE

## 2022-09-14 PROCEDURE — 99213 OFFICE O/P EST LOW 20 MIN: CPT | Performed by: FAMILY MEDICINE

## 2022-09-14 PROCEDURE — 3008F BODY MASS INDEX DOCD: CPT | Performed by: FAMILY MEDICINE

## 2022-09-14 PROCEDURE — 3075F SYST BP GE 130 - 139MM HG: CPT | Performed by: FAMILY MEDICINE

## 2022-09-14 PROCEDURE — 1111F DSCHRG MED/CURRENT MED MERGE: CPT | Performed by: FAMILY MEDICINE

## 2022-09-14 RX ORDER — PSEUDOEPHEDRINE HCL 30 MG
100 TABLET ORAL 2 TIMES DAILY
COMMUNITY
Start: 2022-09-13

## 2022-09-14 RX ORDER — HYDROCODONE BITARTRATE AND ACETAMINOPHEN 5; 325 MG/1; MG/1
TABLET ORAL
COMMUNITY
Start: 2022-09-13 | End: 2022-09-16

## 2022-09-14 NOTE — TELEPHONE ENCOUNTER
Future Appointments   Date Time Provider Juliana Leonora   9/14/2022  9:40 AM Gleason, Rhenda Essex, MD [facility] [place]   9/16/2022 10:00 AM Jenelle Pay, PT [facility] [place]   9/26/2022  9:45 AM Jenelle Pay, PT [facility] [place]   9/30/2022 10:00 AM Jenelle Pay, PT [facility] [place]   10/3/2022  9:45 AM Jenelle Pay, PT [facility] [place]   10/6/2022  9:45 AM Jenelle Pay, PT [facility] [place]

## 2022-09-14 NOTE — TELEPHONE ENCOUNTER
She fell and went to the ER and found out that she webb a compressed fracture in L4. She was told to follow up with Dr Fahad Yan.     Nothing til later this month with Dr Elizabeth Landers

## 2022-09-16 ENCOUNTER — OFFICE VISIT (OUTPATIENT)
Dept: PHYSICAL THERAPY | Age: 67
End: 2022-09-16
Attending: FAMILY MEDICINE
Payer: MEDICARE

## 2022-09-16 ENCOUNTER — TELEPHONE (OUTPATIENT)
Dept: FAMILY MEDICINE CLINIC | Facility: CLINIC | Age: 67
End: 2022-09-16

## 2022-09-16 PROCEDURE — 97140 MANUAL THERAPY 1/> REGIONS: CPT

## 2022-09-16 PROCEDURE — 97110 THERAPEUTIC EXERCISES: CPT

## 2022-09-16 PROCEDURE — 97035 APP MDLTY 1+ULTRASOUND EA 15: CPT

## 2022-09-16 RX ORDER — HYDROCODONE BITARTRATE AND ACETAMINOPHEN 5; 325 MG/1; MG/1
1 TABLET ORAL EVERY 6 HOURS PRN
Qty: 28 TABLET | Refills: 0 | Status: SHIPPED | OUTPATIENT
Start: 2022-09-16 | End: 2022-09-23

## 2022-09-20 ENCOUNTER — TELEPHONE (OUTPATIENT)
Dept: FAMILY MEDICINE CLINIC | Facility: CLINIC | Age: 67
End: 2022-09-20

## 2022-09-20 NOTE — TELEPHONE ENCOUNTER
PT IS HAVING MRI ON 9/22/22 AND IS ALLERGIC TO CONTRAST-NEEDS PREMED-  DOES SHE GET THAT FROM [facility]?  OR ORDERING ?    Alfreda Byrd YOU

## 2022-09-26 ENCOUNTER — OFFICE VISIT (OUTPATIENT)
Dept: PHYSICAL THERAPY | Age: 67
End: 2022-09-26
Attending: FAMILY MEDICINE
Payer: MEDICARE

## 2022-09-26 PROCEDURE — 97140 MANUAL THERAPY 1/> REGIONS: CPT

## 2022-09-26 PROCEDURE — 97035 APP MDLTY 1+ULTRASOUND EA 15: CPT

## 2022-09-26 PROCEDURE — 97110 THERAPEUTIC EXERCISES: CPT

## 2022-09-27 ENCOUNTER — PATIENT MESSAGE (OUTPATIENT)
Dept: FAMILY MEDICINE CLINIC | Facility: CLINIC | Age: 67
End: 2022-09-27

## 2022-09-27 RX ORDER — GABAPENTIN 400 MG/1
400 CAPSULE ORAL 3 TIMES DAILY
Qty: 270 CAPSULE | Refills: 0 | Status: SHIPPED | OUTPATIENT
Start: 2022-09-27 | End: 2022-12-26

## 2022-09-27 NOTE — TELEPHONE ENCOUNTER
From: Sally Christina  To: Stephanie Espinoza MD  Sent: 9/27/2022 4:41 PM CDT  Subject: Metoprolol ER SUCCINATE 35 MG Tablets. I need a refill please.   Thank you  Michelet De La Cruz

## 2022-09-28 RX ORDER — METOPROLOL SUCCINATE 25 MG/1
25 TABLET, EXTENDED RELEASE ORAL DAILY
Qty: 90 TABLET | Refills: 1 | Status: SHIPPED | OUTPATIENT
Start: 2022-09-28 | End: 2023-03-27

## 2022-09-28 NOTE — TELEPHONE ENCOUNTER
Hypertension Medications Protocol Failed 09/27/2022 04:46 PM   Protocol Details  CMP or BMP in past 12 months    Last serum creatinine< 2.0    Appointment in past 6 or next 3 months        Last office visit:  09/14/22  Last refill:  08/19/21  #90, 3 refills  Last cmp:  08/23/22  BP Readings from Last 3 Encounters:  09/14/22 : 136/72  08/26/22 : 136/76  07/13/22 : 118/70    Future Appointments   No future office visits

## 2022-09-30 ENCOUNTER — OFFICE VISIT (OUTPATIENT)
Dept: PHYSICAL THERAPY | Age: 67
End: 2022-09-30
Attending: FAMILY MEDICINE
Payer: MEDICARE

## 2022-09-30 PROCEDURE — 97140 MANUAL THERAPY 1/> REGIONS: CPT

## 2022-09-30 PROCEDURE — 97035 APP MDLTY 1+ULTRASOUND EA 15: CPT

## 2022-09-30 PROCEDURE — 97110 THERAPEUTIC EXERCISES: CPT

## 2022-10-03 ENCOUNTER — OFFICE VISIT (OUTPATIENT)
Dept: PHYSICAL THERAPY | Age: 67
End: 2022-10-03
Attending: FAMILY MEDICINE
Payer: MEDICARE

## 2022-10-03 PROCEDURE — 97140 MANUAL THERAPY 1/> REGIONS: CPT

## 2022-10-03 PROCEDURE — 97035 APP MDLTY 1+ULTRASOUND EA 15: CPT

## 2022-10-03 PROCEDURE — 97110 THERAPEUTIC EXERCISES: CPT

## 2022-10-06 ENCOUNTER — OFFICE VISIT (OUTPATIENT)
Dept: PHYSICAL THERAPY | Age: 67
End: 2022-10-06
Attending: FAMILY MEDICINE
Payer: MEDICARE

## 2022-10-06 PROCEDURE — 97110 THERAPEUTIC EXERCISES: CPT

## 2022-10-06 PROCEDURE — 97035 APP MDLTY 1+ULTRASOUND EA 15: CPT

## 2022-10-06 PROCEDURE — 97140 MANUAL THERAPY 1/> REGIONS: CPT

## 2022-10-11 RX ORDER — PANTOPRAZOLE SODIUM 40 MG/1
TABLET, DELAYED RELEASE ORAL
Qty: 180 TABLET | Refills: 1 | Status: SHIPPED | OUTPATIENT
Start: 2022-10-11

## 2022-10-11 RX ORDER — MONTELUKAST SODIUM 10 MG/1
TABLET ORAL
Qty: 90 TABLET | Refills: 1 | Status: SHIPPED | OUTPATIENT
Start: 2022-10-11

## 2022-11-01 ENCOUNTER — PATIENT MESSAGE (OUTPATIENT)
Dept: FAMILY MEDICINE CLINIC | Facility: CLINIC | Age: 67
End: 2022-11-01

## 2022-11-01 NOTE — TELEPHONE ENCOUNTER
From: Andres Montiel  To: Sudheer Goss MD  Sent: 11/1/2022 3:21 PM CDT  Subject: Reoccurring forgetfulness     Dr. Kristy Abebe,  For about the last couple weeks I have been experiencing reoccurring forgetfulness like going into a room and not remembering what I went in there for about 5 times. The other day I was driving to a location that I have traveled to quite frequently and did not know where I was going. 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  Not sure if this is something that you can address or do I need to contact my psychiatrist?   Thank you  Nixon Parr

## 2022-11-03 ENCOUNTER — PATIENT MESSAGE (OUTPATIENT)
Dept: FAMILY MEDICINE CLINIC | Facility: CLINIC | Age: 67
End: 2022-11-03

## 2022-11-03 NOTE — TELEPHONE ENCOUNTER
From: Mireya Quiles  To: Neri Hager MD  Sent: 11/3/2022 8:36 AM CDT  Subject: Reaccurring forgetfulness     I have been experiencing reaccurring forgetfulness for about 2-3 weeks now and was wondering if this could be from my sleep apnea and my tendency to stop breathing throughout the night. I am not currently using a CPAP or BIPAP which I returned after the recall because I was still experiencing this while using the machine.   Thank you  Jessica Bergman

## 2022-11-15 ENCOUNTER — PATIENT MESSAGE (OUTPATIENT)
Dept: FAMILY MEDICINE CLINIC | Facility: CLINIC | Age: 67
End: 2022-11-15

## 2022-11-15 NOTE — TELEPHONE ENCOUNTER
From: El Pleitez  To: Hermes Cervantes MD  Sent: 11/15/2022 7:11 AM CST  Subject: Flu shot    Can I get a flu shot there?   Madelaine Ordaz

## 2022-11-16 ENCOUNTER — IMMUNIZATION (OUTPATIENT)
Dept: FAMILY MEDICINE CLINIC | Facility: CLINIC | Age: 67
End: 2022-11-16
Payer: MEDICARE

## 2022-11-16 DIAGNOSIS — Z23 NEED FOR VACCINATION: Primary | ICD-10-CM

## 2022-11-16 PROCEDURE — 90662 IIV NO PRSV INCREASED AG IM: CPT | Performed by: FAMILY MEDICINE

## 2022-11-16 PROCEDURE — G0008 ADMIN INFLUENZA VIRUS VAC: HCPCS | Performed by: FAMILY MEDICINE

## 2022-11-21 ENCOUNTER — OFFICE VISIT (OUTPATIENT)
Dept: FAMILY MEDICINE CLINIC | Facility: CLINIC | Age: 67
End: 2022-11-21
Payer: MEDICARE

## 2022-11-21 VITALS
RESPIRATION RATE: 16 BRPM | WEIGHT: 197.19 LBS | HEIGHT: 61.5 IN | OXYGEN SATURATION: 97 % | HEART RATE: 51 BPM | DIASTOLIC BLOOD PRESSURE: 74 MMHG | SYSTOLIC BLOOD PRESSURE: 142 MMHG | TEMPERATURE: 98 F | BODY MASS INDEX: 36.75 KG/M2

## 2022-11-21 DIAGNOSIS — G47.31 CENTRAL SLEEP APNEA: ICD-10-CM

## 2022-11-21 DIAGNOSIS — G47.33 OSA (OBSTRUCTIVE SLEEP APNEA): Primary | ICD-10-CM

## 2022-11-21 DIAGNOSIS — R41.3 MEMORY LOSS: ICD-10-CM

## 2022-11-21 PROCEDURE — 3078F DIAST BP <80 MM HG: CPT | Performed by: FAMILY MEDICINE

## 2022-11-21 PROCEDURE — 99214 OFFICE O/P EST MOD 30 MIN: CPT | Performed by: FAMILY MEDICINE

## 2022-11-21 PROCEDURE — 3077F SYST BP >= 140 MM HG: CPT | Performed by: FAMILY MEDICINE

## 2022-11-21 PROCEDURE — 3008F BODY MASS INDEX DOCD: CPT | Performed by: FAMILY MEDICINE

## 2022-11-21 NOTE — PATIENT INSTRUCTIONS
Multiple medications can cause brain fog: forgetfulness,   Please check with your psychiatrist and pcp regarding these medications especially gabapentin, trazodone, .hydroxyzine, dicylomine, and zyrtec     Start using BIPAP,   New order sent to [facility]. Call [facility] [phone] to update machine, mask and fit. Consider consult with neurology to further evaluate memory loss.

## 2022-12-05 ENCOUNTER — PATIENT MESSAGE (OUTPATIENT)
Dept: FAMILY MEDICINE CLINIC | Facility: CLINIC | Age: 67
End: 2022-12-05

## 2022-12-05 ENCOUNTER — TELEPHONE (OUTPATIENT)
Dept: FAMILY MEDICINE CLINIC | Facility: CLINIC | Age: 67
End: 2022-12-05

## 2022-12-05 RX ORDER — INDOMETHACIN 25 MG/1
25 CAPSULE ORAL
Qty: 21 CAPSULE | Refills: 0 | Status: SHIPPED | OUTPATIENT
Start: 2022-12-05 | End: 2022-12-12

## 2022-12-05 NOTE — TELEPHONE ENCOUNTER
Patient called with complaints of left foot swollen on the top, no redness. Denies injury, feels this is her arthritis as she was out in the cold yesterday.  Pain is 10/10 when walking, please advise

## 2022-12-05 NOTE — TELEPHONE ENCOUNTER
From: Chantelle Jackson  To: Yarely Christianson MD  Sent: 12/5/2022 8:31 AM CST  Subject: Swollen left foot    When I got up this morning I could hardly walk because the top of my left foot is swollen.   Thank you  Ida Maher

## 2022-12-05 NOTE — TELEPHONE ENCOUNTER
May be similar to gout attack  I sent lo dose indomethacin to the pharmacy for 7 days    Tid prn  No more than 7 days    Call if no resolution

## 2022-12-07 ENCOUNTER — PATIENT MESSAGE (OUTPATIENT)
Dept: FAMILY MEDICINE CLINIC | Facility: CLINIC | Age: 67
End: 2022-12-07

## 2022-12-07 NOTE — TELEPHONE ENCOUNTER
From: Curtis Flores  To: Karen Sage MD  Sent: 12/7/2022 8:12 AM CST  Subject: Bi-pap    Dr. Jann Logan,  I still haven't heard anything about about the bi-pap or a sleep study was wondering if you might have. I have another appointment with you in January. Thank you.   Demian Meek

## 2022-12-20 ENCOUNTER — TELEPHONE (OUTPATIENT)
Dept: FAMILY MEDICINE CLINIC | Facility: CLINIC | Age: 67
End: 2022-12-20

## 2022-12-20 NOTE — TELEPHONE ENCOUNTER
Shaan Ziegler at [facility] is asking for a set pressure support for patient's ResVENT, as it cannot be set to auto. Please advise.

## 2022-12-21 ENCOUNTER — PATIENT MESSAGE (OUTPATIENT)
Dept: FAMILY MEDICINE CLINIC | Facility: CLINIC | Age: 67
End: 2022-12-21

## 2022-12-21 NOTE — TELEPHONE ENCOUNTER
From: Edin Welch  To: Gladis Zhu MD  Sent: 12/21/2022 12:32 PM CST  Subject: Bi-pap    I just got my machine today so do I still come in on January 3rd?   Thank you  Dustin Ordaz

## 2022-12-22 ENCOUNTER — PATIENT MESSAGE (OUTPATIENT)
Dept: FAMILY MEDICINE CLINIC | Facility: CLINIC | Age: 67
End: 2022-12-22

## 2022-12-28 ENCOUNTER — OFFICE VISIT (OUTPATIENT)
Dept: FAMILY MEDICINE CLINIC | Facility: CLINIC | Age: 67
End: 2022-12-28
Payer: MEDICARE

## 2022-12-28 VITALS
DIASTOLIC BLOOD PRESSURE: 86 MMHG | SYSTOLIC BLOOD PRESSURE: 152 MMHG | RESPIRATION RATE: 18 BRPM | WEIGHT: 195.19 LBS | TEMPERATURE: 97 F | HEART RATE: 96 BPM | BODY MASS INDEX: 36 KG/M2 | OXYGEN SATURATION: 96 %

## 2022-12-28 DIAGNOSIS — E86.0 DEHYDRATION: ICD-10-CM

## 2022-12-28 DIAGNOSIS — K52.9 ACUTE GASTROENTERITIS: Primary | ICD-10-CM

## 2022-12-28 PROCEDURE — 3079F DIAST BP 80-89 MM HG: CPT | Performed by: FAMILY MEDICINE

## 2022-12-28 PROCEDURE — 99214 OFFICE O/P EST MOD 30 MIN: CPT | Performed by: FAMILY MEDICINE

## 2022-12-28 PROCEDURE — 3077F SYST BP >= 140 MM HG: CPT | Performed by: FAMILY MEDICINE

## 2022-12-28 RX ORDER — DIPHENOXYLATE HYDROCHLORIDE AND ATROPINE SULFATE 2.5; .025 MG/1; MG/1
1 TABLET ORAL 4 TIMES DAILY PRN
COMMUNITY
Start: 2022-12-27

## 2023-01-17 ENCOUNTER — TELEMEDICINE (OUTPATIENT)
Dept: FAMILY MEDICINE CLINIC | Facility: CLINIC | Age: 68
End: 2023-01-17
Payer: MEDICARE

## 2023-01-17 DIAGNOSIS — G47.33 OSA (OBSTRUCTIVE SLEEP APNEA): Primary | ICD-10-CM

## 2023-01-17 DIAGNOSIS — G47.33 OSA TREATED WITH BIPAP: ICD-10-CM

## 2023-01-17 PROCEDURE — 99214 OFFICE O/P EST MOD 30 MIN: CPT | Performed by: FAMILY MEDICINE

## 2023-01-17 RX ORDER — GABAPENTIN 400 MG/1
1 CAPSULE ORAL 3 TIMES DAILY
COMMUNITY
Start: 2022-12-27

## 2023-01-17 NOTE — PATIENT INSTRUCTIONS
Advised if still with sleep apnea and not using CPAP has a  7 fold increase in risk of heart attack, stroke, abnormal heart rhythm  and death,  increased risk of driving accidents. Advised to refrain from driving when sleepy. COMPLIANCE is required by insurance for 4 hours a night most nights of the week. Recommend weight loss, and maintain and optimal BMI with Exercise 30 minutes most days of the week to target heart rate . Call [facility] [phone] to update mask and fit.

## 2023-01-18 ENCOUNTER — PATIENT MESSAGE (OUTPATIENT)
Dept: FAMILY MEDICINE CLINIC | Facility: CLINIC | Age: 68
End: 2023-01-18

## 2023-01-18 DIAGNOSIS — J40 SINOBRONCHITIS: Primary | ICD-10-CM

## 2023-01-18 DIAGNOSIS — J32.9 SINOBRONCHITIS: Primary | ICD-10-CM

## 2023-01-18 RX ORDER — AZITHROMYCIN 250 MG/1
TABLET, FILM COATED ORAL
Qty: 6 TABLET | Refills: 0 | Status: SHIPPED | OUTPATIENT
Start: 2023-01-18 | End: 2023-01-23

## 2023-01-18 NOTE — TELEPHONE ENCOUNTER
From: Leslee Flores  To: Shasta Buck MD  Sent: 1/18/2023 1:14 PM CST  Subject: Cough    I have had a mild cough for about a week but I'm starting to cough up light yellow mucus. I tested negative for Covid at home. I was just wondering if I should get something for the cough or just let it work itself out. Thank you.    Liv bush

## 2023-02-01 NOTE — TELEPHONE ENCOUNTER
This was faxed on Wednesday 2/22/17 [facility] Counseling: I discussed with the patient the risks of valacyclovir including but not limited to kidney damage, nausea, vomiting and severe allergy.  The patient understands that if the infection seems to be worsening or is not improving, they are to call.

## 2023-02-07 ENCOUNTER — OFFICE VISIT (OUTPATIENT)
Dept: FAMILY MEDICINE CLINIC | Facility: CLINIC | Age: 68
End: 2023-02-07
Payer: MEDICARE

## 2023-02-07 VITALS
BODY MASS INDEX: 35.56 KG/M2 | DIASTOLIC BLOOD PRESSURE: 86 MMHG | TEMPERATURE: 99 F | OXYGEN SATURATION: 96 % | SYSTOLIC BLOOD PRESSURE: 142 MMHG | HEART RATE: 95 BPM | HEIGHT: 61.5 IN | WEIGHT: 190.81 LBS | RESPIRATION RATE: 16 BRPM

## 2023-02-07 DIAGNOSIS — G47.33 OSA TREATED WITH BIPAP: Primary | ICD-10-CM

## 2023-02-07 PROCEDURE — 3008F BODY MASS INDEX DOCD: CPT | Performed by: FAMILY MEDICINE

## 2023-02-07 PROCEDURE — 3077F SYST BP >= 140 MM HG: CPT | Performed by: FAMILY MEDICINE

## 2023-02-07 PROCEDURE — 99214 OFFICE O/P EST MOD 30 MIN: CPT | Performed by: FAMILY MEDICINE

## 2023-02-07 PROCEDURE — 3079F DIAST BP 80-89 MM HG: CPT | Performed by: FAMILY MEDICINE

## 2023-02-09 ENCOUNTER — OFFICE VISIT (OUTPATIENT)
Dept: FAMILY MEDICINE CLINIC | Facility: CLINIC | Age: 68
End: 2023-02-09
Payer: MEDICARE

## 2023-02-09 VITALS
SYSTOLIC BLOOD PRESSURE: 128 MMHG | OXYGEN SATURATION: 94 % | RESPIRATION RATE: 12 BRPM | BODY MASS INDEX: 35.97 KG/M2 | HEIGHT: 61.5 IN | TEMPERATURE: 99 F | WEIGHT: 193 LBS | DIASTOLIC BLOOD PRESSURE: 70 MMHG | HEART RATE: 51 BPM

## 2023-02-09 DIAGNOSIS — J32.9 SINOBRONCHITIS: ICD-10-CM

## 2023-02-09 DIAGNOSIS — R05.2 SUBACUTE COUGH: Primary | ICD-10-CM

## 2023-02-09 DIAGNOSIS — Z12.31 VISIT FOR SCREENING MAMMOGRAM: ICD-10-CM

## 2023-02-09 DIAGNOSIS — J40 SINOBRONCHITIS: ICD-10-CM

## 2023-02-09 PROCEDURE — 3074F SYST BP LT 130 MM HG: CPT | Performed by: FAMILY MEDICINE

## 2023-02-09 PROCEDURE — 3078F DIAST BP <80 MM HG: CPT | Performed by: FAMILY MEDICINE

## 2023-02-09 PROCEDURE — 3008F BODY MASS INDEX DOCD: CPT | Performed by: FAMILY MEDICINE

## 2023-02-09 PROCEDURE — 1126F AMNT PAIN NOTED NONE PRSNT: CPT | Performed by: FAMILY MEDICINE

## 2023-02-09 PROCEDURE — 99213 OFFICE O/P EST LOW 20 MIN: CPT | Performed by: FAMILY MEDICINE

## 2023-02-09 RX ORDER — HYDROXYZINE PAMOATE 50 MG/1
CAPSULE ORAL
COMMUNITY
Start: 2023-02-04

## 2023-02-09 RX ORDER — PREDNISONE 20 MG/1
TABLET ORAL
Qty: 15 TABLET | Refills: 0 | Status: SHIPPED | OUTPATIENT
Start: 2023-02-09 | End: 2023-02-19

## 2023-02-21 ENCOUNTER — PATIENT MESSAGE (OUTPATIENT)
Dept: FAMILY MEDICINE CLINIC | Facility: CLINIC | Age: 68
End: 2023-02-21

## 2023-02-22 NOTE — TELEPHONE ENCOUNTER
From: Carolyn Ocampo  To: Gladis Foster MD  Sent: 2/21/2023 8:18 PM CST  Subject: CPAP     Im done with the bi-pap I'm taking it back to [place] and Don't EVER want it back again.   Francisco Ramos

## 2023-02-23 NOTE — TELEPHONE ENCOUNTER
Patient states it is more of the same she's struggled with over time. The mask started leaking again as before, machine is keeping her up all night. Feels like she's exhausted all her options to get it to work for her.

## 2023-02-25 ENCOUNTER — PATIENT MESSAGE (OUTPATIENT)
Dept: FAMILY MEDICINE CLINIC | Facility: CLINIC | Age: 68
End: 2023-02-25

## 2023-02-27 NOTE — TELEPHONE ENCOUNTER
From: Bethanie Bonilla  To: King Richard MD  Sent: 2/25/2023 10:02 AM CST  Subject: Inspire implant    I saw an ad on tv for this device and was wondering what you thought about it. Just want your thoughts.    Thank you  Omi Allen

## 2023-03-06 RX ORDER — METOPROLOL SUCCINATE 25 MG/1
TABLET, EXTENDED RELEASE ORAL
Qty: 90 TABLET | Refills: 1 | Status: SHIPPED | OUTPATIENT
Start: 2023-03-06

## 2023-03-07 ENCOUNTER — PATIENT MESSAGE (OUTPATIENT)
Dept: FAMILY MEDICINE CLINIC | Facility: CLINIC | Age: 68
End: 2023-03-07

## 2023-03-07 NOTE — TELEPHONE ENCOUNTER
From: Deny Manuel  To: Wellington Cruz MD  Sent: 3/7/2023 12:24 PM CST  Subject: Bi-pap     I talked Trygve Seip earlier and I have agreed to try the machine again while they look into some other remedy to get a mask to fit.   Juliann Avila

## 2023-03-08 ENCOUNTER — OFFICE VISIT (OUTPATIENT)
Dept: FAMILY MEDICINE CLINIC | Facility: CLINIC | Age: 68
End: 2023-03-08
Payer: MEDICARE

## 2023-03-08 VITALS
BODY MASS INDEX: 37.76 KG/M2 | HEART RATE: 64 BPM | DIASTOLIC BLOOD PRESSURE: 70 MMHG | TEMPERATURE: 98 F | RESPIRATION RATE: 16 BRPM | SYSTOLIC BLOOD PRESSURE: 130 MMHG | OXYGEN SATURATION: 97 % | WEIGHT: 200 LBS | HEIGHT: 61 IN

## 2023-03-08 DIAGNOSIS — R05.2 SUBACUTE COUGH: ICD-10-CM

## 2023-03-08 DIAGNOSIS — N20.0 KIDNEY STONES: ICD-10-CM

## 2023-03-08 DIAGNOSIS — R39.15 URINARY URGENCY: ICD-10-CM

## 2023-03-08 DIAGNOSIS — R35.0 URINARY FREQUENCY: Primary | ICD-10-CM

## 2023-03-08 LAB
APPEARANCE: CLEAR
BILIRUBIN: NEGATIVE
GLUCOSE (URINE DIPSTICK): NEGATIVE MG/DL
KETONES (URINE DIPSTICK): NEGATIVE MG/DL
LEUKOCYTES: NEGATIVE
MULTISTIX LOT#: NORMAL NUMERIC
NITRITE, URINE: NEGATIVE
OCCULT BLOOD: NEGATIVE
PH, URINE: 7 (ref 4.5–8)
PROTEIN (URINE DIPSTICK): NEGATIVE MG/DL
SPECIFIC GRAVITY: 1.02 (ref 1–1.03)
URINE-COLOR: YELLOW
UROBILINOGEN,SEMI-QN: 0.2 MG/DL (ref 0–1.9)

## 2023-03-08 PROCEDURE — 81003 URINALYSIS AUTO W/O SCOPE: CPT

## 2023-03-08 PROCEDURE — 3078F DIAST BP <80 MM HG: CPT

## 2023-03-08 PROCEDURE — 3008F BODY MASS INDEX DOCD: CPT

## 2023-03-08 PROCEDURE — 99213 OFFICE O/P EST LOW 20 MIN: CPT

## 2023-03-08 PROCEDURE — 3075F SYST BP GE 130 - 139MM HG: CPT

## 2023-03-22 RX ORDER — GABAPENTIN 400 MG/1
CAPSULE ORAL
Qty: 270 CAPSULE | Refills: 0 | Status: SHIPPED | OUTPATIENT
Start: 2023-03-22

## 2023-03-24 ENCOUNTER — TELEPHONE (OUTPATIENT)
Dept: OTOLARYNGOLOGY | Age: 68
End: 2023-03-24

## 2023-03-30 ENCOUNTER — IMAGING SERVICES (OUTPATIENT)
Dept: ULTRASOUND IMAGING | Age: 68
End: 2023-03-30
Attending: OTOLARYNGOLOGY

## 2023-03-30 ENCOUNTER — PATIENT MESSAGE (OUTPATIENT)
Dept: FAMILY MEDICINE CLINIC | Facility: CLINIC | Age: 68
End: 2023-03-30

## 2023-03-30 DIAGNOSIS — E04.1 THYROID NODULE: ICD-10-CM

## 2023-03-30 PROCEDURE — 76536 US EXAM OF HEAD AND NECK: CPT | Performed by: RADIOLOGY

## 2023-03-30 NOTE — TELEPHONE ENCOUNTER
From: Curtis Flores  To: Gabby Spicer MD  Sent: 3/30/2023 11:23 AM CDT  Subject: Constantly in pain    Dr. Anderson Gillespie,  I was wondering if you would be willing to send in a prescription for a pain medication. I am in constant pain and can hardly move at times. It more than likely is my arthritis but Dr. Bouchra Kelley won't give me any because he is only treating my osteoporosis.   Thank you Pt. reports she lives in a house with her family, but they are in an out during the day and she is by herself for part of the day. Pt. has 15 DAVID with one rail and no stairs inside. Pt. was modified independent with a RW and SAC prior to admission. Pt. has home PT 2x/week. Pt. reports she lives in a house with her family, but they are in an out during the day and she is by herself for part of the day. Family may be able to set up assistance? Pt. has 15 DAVID with one rail and no stairs inside. Pt. was modified independent with a RW and SAC prior to admission. Pt. has home PT 2x/week.

## 2023-03-31 RX ORDER — LAMOTRIGINE 25 MG/1
TABLET ORAL
COMMUNITY
Start: 2023-03-16

## 2023-03-31 NOTE — TELEPHONE ENCOUNTER
I cannot give any pain medications    I see that you are on gabapentin 400 mg tid  If correct,  I would recommend taking an extra 1 perhaps in the morning. So 800 mg morning 400 noon 400 evening. Also, I see duloxetine 30 mg and 60 mg in the chart, but it is not clear to me if these are current medications. Is patient still on duloxetine? ?  If not, was the reason it was stopped? Duloxetine sometimes can help with some of the constant pain. If not on a may give direction on using that. Also, I would make appointment with Dr. Ghazala Garcia for to discuss the chronic pain. I know he is only currently managing osteoporosis, but I believe some of your chronic pain may actually fall into the category of something he does manage, fibromyalgia. There may be some discussion you could or should have with Dr. Kerri ames.

## 2023-04-07 DIAGNOSIS — E04.1 THYROID NODULE: Primary | ICD-10-CM

## 2023-04-26 RX ORDER — METOPROLOL SUCCINATE 25 MG/1
25 TABLET, EXTENDED RELEASE ORAL DAILY
Qty: 90 TABLET | Refills: 1 | Status: CANCELLED | OUTPATIENT
Start: 2023-04-26

## 2023-04-26 NOTE — TELEPHONE ENCOUNTER
Pt should have approx 40 tablets of her requested Metoprolol refill   Called pt for clarification. She stated she cannot find the bottle or rest of meds. Lost    Last office visit: 12/28/22    Last Lab: 2/19/23  Creat.  1.1    Last Med Refill: 3/6/23  [facility] in [place]  Future Appointments   Date Time Provider Juliana Leonora   5/4/2023 11:20 AM Roula Raza MD [facility] [place]

## 2023-05-04 ENCOUNTER — OFFICE VISIT (OUTPATIENT)
Dept: FAMILY MEDICINE CLINIC | Facility: CLINIC | Age: 68
End: 2023-05-04
Payer: MEDICARE

## 2023-05-04 VITALS
BODY MASS INDEX: 38.83 KG/M2 | SYSTOLIC BLOOD PRESSURE: 130 MMHG | WEIGHT: 200.38 LBS | DIASTOLIC BLOOD PRESSURE: 72 MMHG | OXYGEN SATURATION: 95 % | HEIGHT: 60.25 IN | TEMPERATURE: 99 F | HEART RATE: 75 BPM

## 2023-05-04 DIAGNOSIS — K57.30 DIVERTICULOSIS OF LARGE INTESTINE WITHOUT HEMORRHAGE: ICD-10-CM

## 2023-05-04 DIAGNOSIS — D68.52 PROTHROMBIN MUTATION (HCC): ICD-10-CM

## 2023-05-04 DIAGNOSIS — Z95.828 PRESENCE OF IVC FILTER: ICD-10-CM

## 2023-05-04 DIAGNOSIS — N18.2 CKD (CHRONIC KIDNEY DISEASE) STAGE 2, GFR 60-89 ML/MIN: ICD-10-CM

## 2023-05-04 DIAGNOSIS — D68.59 HYPERCOAGULABLE STATE (HCC): ICD-10-CM

## 2023-05-04 DIAGNOSIS — K44.9 HIATAL HERNIA: ICD-10-CM

## 2023-05-04 DIAGNOSIS — M15.9 PRIMARY OSTEOARTHRITIS INVOLVING MULTIPLE JOINTS: ICD-10-CM

## 2023-05-04 DIAGNOSIS — E66.01 SEVERE OBESITY (BMI 35.0-39.9) WITH COMORBIDITY (HCC): ICD-10-CM

## 2023-05-04 DIAGNOSIS — F51.01 PRIMARY INSOMNIA: ICD-10-CM

## 2023-05-04 DIAGNOSIS — E66.9 OBESITY, CLASS II, BMI 35-39.9: ICD-10-CM

## 2023-05-04 DIAGNOSIS — G47.33 OSA TREATED WITH BIPAP: ICD-10-CM

## 2023-05-04 DIAGNOSIS — F41.0 PANIC ATTACKS: ICD-10-CM

## 2023-05-04 DIAGNOSIS — J44.9 ASTHMA WITH COPD (CHRONIC OBSTRUCTIVE PULMONARY DISEASE) (HCC): ICD-10-CM

## 2023-05-04 DIAGNOSIS — Z00.00 ENCOUNTER FOR MEDICARE ANNUAL WELLNESS EXAM: Primary | ICD-10-CM

## 2023-05-04 DIAGNOSIS — M48.05 SPINAL STENOSIS OF THORACOLUMBAR REGION: ICD-10-CM

## 2023-05-04 DIAGNOSIS — F33.41 RECURRENT MAJOR DEPRESSIVE DISORDER, IN PARTIAL REMISSION (HCC): ICD-10-CM

## 2023-05-04 DIAGNOSIS — N18.31 STAGE 3A CHRONIC KIDNEY DISEASE (HCC): ICD-10-CM

## 2023-05-04 DIAGNOSIS — K21.00 GASTROESOPHAGEAL REFLUX DISEASE WITH ESOPHAGITIS WITHOUT HEMORRHAGE: ICD-10-CM

## 2023-05-04 DIAGNOSIS — Z86.711 HISTORY OF PULMONARY EMBOLISM: ICD-10-CM

## 2023-05-04 DIAGNOSIS — F33.0 MILD EPISODE OF RECURRENT MAJOR DEPRESSIVE DISORDER (HCC): ICD-10-CM

## 2023-05-05 ENCOUNTER — LABORATORY ENCOUNTER (OUTPATIENT)
Dept: LAB | Age: 68
End: 2023-05-05
Attending: FAMILY MEDICINE
Payer: MEDICARE

## 2023-05-05 DIAGNOSIS — M25.50 ARTHRALGIA, UNSPECIFIED JOINT: ICD-10-CM

## 2023-05-05 DIAGNOSIS — Z51.81 ENCOUNTER FOR THERAPEUTIC DRUG MONITORING: ICD-10-CM

## 2023-05-05 LAB
ERYTHROCYTE [SEDIMENTATION RATE] IN BLOOD: 26 MM/HR
URATE SERPL-MCNC: 5.8 MG/DL

## 2023-05-05 PROCEDURE — 80175 DRUG SCREEN QUAN LAMOTRIGINE: CPT

## 2023-05-05 PROCEDURE — 85652 RBC SED RATE AUTOMATED: CPT

## 2023-05-05 PROCEDURE — 36415 COLL VENOUS BLD VENIPUNCTURE: CPT

## 2023-05-05 PROCEDURE — 84550 ASSAY OF BLOOD/URIC ACID: CPT

## 2023-05-08 LAB — LAMOTRIGINE LVL: <1 UG/ML

## 2023-05-08 NOTE — ASSESSMENT & PLAN NOTE
Stable    [x] chronic stable condition, noted historically, continues present status    Sees pain specialist

## 2023-05-13 ENCOUNTER — PATIENT MESSAGE (OUTPATIENT)
Dept: FAMILY MEDICINE CLINIC | Facility: CLINIC | Age: 68
End: 2023-05-13

## 2023-05-15 NOTE — TELEPHONE ENCOUNTER
From: Fox Perez  To: Joss Friend MD  Sent: 5/13/2023 4:29 PM CDT  Subject: Lamotrigine     Yes is still take this medicine for anxiety.   Emeli Jung

## 2023-05-24 ENCOUNTER — MED REC SCAN ONLY (OUTPATIENT)
Dept: FAMILY MEDICINE CLINIC | Facility: CLINIC | Age: 68
End: 2023-05-24

## 2023-05-24 NOTE — PROGRESS NOTES
1 blue tube was collected out of the L AC on 1 attempt. Pt tolerated and was sent home in stable condition. Bed in lowest position, wheels locked, appropriate side rails in place/Call bell, personal items and telephone in reach/Instruct patient to call for assistance before getting out of bed or chair/Non-slip footwear when patient is out of bed/[place] to call system/Physically safe environment - no spills, clutter or unnecessary equipment/Purposeful Proactive Rounding/Room/bathroom lighting operational, light cord in reach

## 2023-07-07 NOTE — TELEPHONE ENCOUNTER
Last OV 05/04/23  Last refill 06/30/22 #90 +1  LABS 02/01/23 (Care everywhere)  Requested Prescriptions     Pending Prescriptions Disp Refills    ATORVASTATIN 40 MG Oral Tab [Pharmacy Med Name: Atorvastatin Calcium 40 MG Oral Tablet] 39 tablet 0     Sig: TAKE 1 TABLET BY MOUTH AT NIGHT     No future appointments.

## 2023-07-10 ENCOUNTER — TELEPHONE (OUTPATIENT)
Dept: FAMILY MEDICINE CLINIC | Facility: CLINIC | Age: 68
End: 2023-07-10

## 2023-07-10 DIAGNOSIS — Z87.09 HISTORY OF ASTHMA: ICD-10-CM

## 2023-07-10 RX ORDER — BUDESONIDE AND FORMOTEROL FUMARATE DIHYDRATE 160; 4.5 UG/1; UG/1
AEROSOL RESPIRATORY (INHALATION)
Qty: 10.2 G | Refills: 5 | Status: SHIPPED | OUTPATIENT
Start: 2023-07-10

## 2023-07-10 NOTE — TELEPHONE ENCOUNTER
PLEASE CALL PT- HER INS WILL NO LONGER COVER Budesonide-Formoterol Fumarate (SYMBICORT) 160-4.5 MCG/ACT Inhalation Aerosol     PLEASE ADVISE-THANK YOU

## 2023-07-10 NOTE — TELEPHONE ENCOUNTER
Per pt requested refill at [facility] on Symbicort and told due to change in insurance no longer covered. Spoke with pharmacy technician and states no refills on file but as long as refill is sent for generic form should be covered.

## 2023-07-12 RX ORDER — ATORVASTATIN CALCIUM 40 MG/1
TABLET, FILM COATED ORAL
Qty: 39 TABLET | Refills: 0 | OUTPATIENT
Start: 2023-07-12

## 2023-07-18 ENCOUNTER — TELEPHONE (OUTPATIENT)
Dept: FAMILY MEDICINE CLINIC | Facility: CLINIC | Age: 68
End: 2023-07-18

## 2023-07-18 NOTE — TELEPHONE ENCOUNTER
Physician signed orders for incontinence supplies faxed to [facility] - [phone]

## 2023-07-25 RX ORDER — MONTELUKAST SODIUM 10 MG/1
10 TABLET ORAL DAILY
Qty: 90 TABLET | Refills: 2 | Status: SHIPPED | OUTPATIENT
Start: 2023-07-25

## 2023-07-25 NOTE — TELEPHONE ENCOUNTER
Asthma & COPD Medication Protocol [id number]/25/2023 11:59 AM    Asthma Action Score greater than or equal to 20    Appointment in past 6 or next 3 months    AAP/ACT given in last 12 months     Last refill - 10/11/22 - #90 with 1 refill  Last office visit - 5/4/23  Refilled per protocol

## 2023-08-07 RX ORDER — PANTOPRAZOLE SODIUM 40 MG/1
40 TABLET, DELAYED RELEASE ORAL 2 TIMES DAILY
Qty: 180 TABLET | Refills: 1 | Status: SHIPPED | OUTPATIENT
Start: 2023-08-07

## 2023-08-07 NOTE — TELEPHONE ENCOUNTER
PANTOPRAZOLE 40 MG Oral Tab EC- [facility]- [facility]        LOV  5-4-23    LAST RX  10-11-22  #180  RF 1    Next OV  No future appointments.

## 2023-08-30 ENCOUNTER — PATIENT MESSAGE (OUTPATIENT)
Dept: FAMILY MEDICINE CLINIC | Facility: CLINIC | Age: 68
End: 2023-08-30

## 2023-08-30 RX ORDER — GABAPENTIN 400 MG/1
400 CAPSULE ORAL 3 TIMES DAILY
Qty: 270 CAPSULE | Refills: 0 | Status: SHIPPED | OUTPATIENT
Start: 2023-08-30

## 2023-09-20 RX ORDER — ATORVASTATIN CALCIUM 40 MG/1
40 TABLET, FILM COATED ORAL NIGHTLY
Qty: 90 TABLET | Refills: 0 | Status: SHIPPED | OUTPATIENT
Start: 2023-09-20

## 2023-09-20 NOTE — TELEPHONE ENCOUNTER
LOV:5-4-23  LAST LAB last lipid was done 2/1/23  Component  Ref Range & Units 2/1/23  6:55 AM   CHOLESTEROL  0 - 200 mg/dL 136   TRIGLYCERIDE  0 - 150 mg/dL 89   HDL CHOLESTEROL  >40 mg/dL 47     LDL CHOL CALC  0 - 100 mg/dL 75   CHOL/HDL RATIO 2.9   LDL/HDL RATIO 2     LAST RX:     Disp Refills Start End    atorvastatin 40 MG Oral Tab 90 tablet 1 6/30/2022     Sig - Route: Take 1 tablet (40 mg total) by mouth nightly. - Oral      Next OV: No future appointments.    PROTOCOL    Cholesterol Medication Protocol [id number]/19/2023 06:02 PM   Protocol Details ALT < 80    ALT resulted within past year    Lipid panel within past 12 months    Appointment within past 12 or next 3 month

## 2023-09-27 ENCOUNTER — OFFICE VISIT (OUTPATIENT)
Dept: FAMILY MEDICINE CLINIC | Facility: CLINIC | Age: 68
End: 2023-09-27
Payer: MEDICARE

## 2023-09-27 VITALS
BODY MASS INDEX: 39 KG/M2 | TEMPERATURE: 98 F | OXYGEN SATURATION: 98 % | RESPIRATION RATE: 18 BRPM | SYSTOLIC BLOOD PRESSURE: 134 MMHG | HEART RATE: 94 BPM | DIASTOLIC BLOOD PRESSURE: 88 MMHG | WEIGHT: 200 LBS

## 2023-09-27 DIAGNOSIS — S16.1XXA STRAIN OF NECK MUSCLE, INITIAL ENCOUNTER: ICD-10-CM

## 2023-09-27 DIAGNOSIS — M54.2 CERVICAL PAIN (NECK): Primary | ICD-10-CM

## 2023-09-27 PROCEDURE — 99213 OFFICE O/P EST LOW 20 MIN: CPT

## 2023-09-27 PROCEDURE — 3075F SYST BP GE 130 - 139MM HG: CPT

## 2023-09-27 PROCEDURE — 1160F RVW MEDS BY RX/DR IN RCRD: CPT

## 2023-09-27 PROCEDURE — 1159F MED LIST DOCD IN RCRD: CPT

## 2023-09-27 PROCEDURE — 3079F DIAST BP 80-89 MM HG: CPT

## 2023-09-27 RX ORDER — CYCLOBENZAPRINE HCL 5 MG
5 TABLET ORAL NIGHTLY
Qty: 5 TABLET | Refills: 0 | Status: SHIPPED | OUTPATIENT
Start: 2023-09-27 | End: 2023-10-02

## 2023-09-27 NOTE — PATIENT INSTRUCTIONS
Posture and sleep position modifications - Simple posture modifications to correct muscle tension are an essential part of the treatment regimen for neck pain; such modifications include sitting straight with the crown of the head tall, chin minimally tilted down, shoulders down, and the scapula retracted and depressed. While doing desk work, the natural tendency is to shrug and round the shoulders forward, crane the neck, and intermittently breath-hold. Learning to recognize and adopt a neutral posture and engage in a natural, regular breathing pattern may allow the patient to correct contributors to neck muscle tension. However, pain is greatly influenced by associations and context; while proper posture and ergonomic setup is encouraged, data are sparse to suggest that appropriate ergonomic setup improves pain. Overemphasis on a single, optimal posture may be misguided. Frequent position adjustments to allow changes in joint position, muscle length, blood flow to spinal structures, and body posture may provide more benefit . For these reasons, avoidance of a sustained seated position is beneficial. Patients should limit time spent sitting in front of a computer monitor, prolonged telephone (handset) use, and excessive fine motor handwork. In addition to maintaining a neutral posture, patients with sedentary jobs should make an effort to stand and walk frequently during the day and perform cervical range of motion exercises while at work. For those who carry heavy purses or backpacks (particularly ones that are worn over one shoulder), minimization of weight or discontinuation of use may help alleviate acute neck pain. Mechanical adjustment to sleep position is another important postural correction that can decrease neck pain. While sleeping, the head and neck should be aligned with the body.  A preferred sleep position is to have the patient lie flat on their back with thighs elevated on pillows with a relatively small pillow under the head and neck, thereby flattening the long spinal muscles. For side sleepers, use of a slightly larger pillow may help maintain proper cervical spine alignment. For patients with cervical foraminal stenosis (due to uncovertebral hypertrophy or foraminal disc herniation), avoidance of cervical extension is particularly helpful.       -Volaren cream 4 grams 3-4 times daily  -Tylenol as directed  -Warm moist compresses 20 minutes at a time

## 2023-09-29 ENCOUNTER — E-ADVICE (OUTPATIENT)
Dept: OTOLARYNGOLOGY | Age: 68
End: 2023-09-29

## 2023-10-06 ENCOUNTER — OFFICE VISIT (OUTPATIENT)
Dept: FAMILY MEDICINE CLINIC | Facility: CLINIC | Age: 68
End: 2023-10-06
Payer: MEDICARE

## 2023-10-06 VITALS
RESPIRATION RATE: 18 BRPM | OXYGEN SATURATION: 97 % | WEIGHT: 200 LBS | DIASTOLIC BLOOD PRESSURE: 84 MMHG | HEART RATE: 70 BPM | BODY MASS INDEX: 39 KG/M2 | SYSTOLIC BLOOD PRESSURE: 136 MMHG

## 2023-10-06 DIAGNOSIS — M54.2 NECK PAIN: Primary | ICD-10-CM

## 2023-10-06 DIAGNOSIS — Z23 NEED FOR VACCINATION: ICD-10-CM

## 2023-10-06 PROCEDURE — 3075F SYST BP GE 130 - 139MM HG: CPT | Performed by: FAMILY MEDICINE

## 2023-10-06 PROCEDURE — 1159F MED LIST DOCD IN RCRD: CPT | Performed by: FAMILY MEDICINE

## 2023-10-06 PROCEDURE — 1160F RVW MEDS BY RX/DR IN RCRD: CPT | Performed by: FAMILY MEDICINE

## 2023-10-06 PROCEDURE — 90662 IIV NO PRSV INCREASED AG IM: CPT | Performed by: FAMILY MEDICINE

## 2023-10-06 PROCEDURE — 99213 OFFICE O/P EST LOW 20 MIN: CPT

## 2023-10-06 PROCEDURE — 1170F FXNL STATUS ASSESSED: CPT | Performed by: FAMILY MEDICINE

## 2023-10-06 PROCEDURE — 3079F DIAST BP 80-89 MM HG: CPT | Performed by: FAMILY MEDICINE

## 2023-10-06 PROCEDURE — G0008 ADMIN INFLUENZA VIRUS VAC: HCPCS | Performed by: FAMILY MEDICINE

## 2023-10-06 RX ORDER — CYCLOBENZAPRINE HCL 5 MG
5 TABLET ORAL NIGHTLY
Qty: 15 TABLET | Refills: 0 | Status: SHIPPED | OUTPATIENT
Start: 2023-10-06 | End: 2023-10-21

## 2023-10-11 RX ORDER — METOPROLOL SUCCINATE 25 MG/1
25 TABLET, EXTENDED RELEASE ORAL DAILY
Qty: 90 TABLET | Refills: 0 | Status: SHIPPED | OUTPATIENT
Start: 2023-10-11

## 2023-10-11 NOTE — TELEPHONE ENCOUNTER
OV 05/04  Refill 03/06 #90 +1RF  LABS 08/21     Requested Prescriptions     Pending Prescriptions Disp Refills    METOPROLOL SUCCINATE ER 25 MG Oral Tablet 24 Hr [Pharmacy Med Name: Metoprolol Succinate ER 25 MG Oral Tablet Extended Release 24 Hour] 90 tablet 0     Sig: Take 1 tablet by mouth once daily     No future appointments.

## 2023-10-23 ENCOUNTER — PATIENT MESSAGE (OUTPATIENT)
Dept: FAMILY MEDICINE CLINIC | Facility: CLINIC | Age: 68
End: 2023-10-23

## 2023-10-23 NOTE — TELEPHONE ENCOUNTER
Left message for patient to call back on established voicemail to call back to verify when patients last mammogram was and where it was done.

## 2023-10-23 NOTE — TELEPHONE ENCOUNTER
Patient returned call and verified has been over a year since last mammogram. Order that was placed on 2-9-23 was faxed to [facility] at [phone]

## 2023-10-23 NOTE — TELEPHONE ENCOUNTER
From: Audnrea Randle  To: Laverne Raza  Sent: 10/23/2023 11:09 AM CDT  Subject: Mammogram     Sorry I need another order to have a mammogram at [facility].  Thank you  Joy New

## 2023-11-15 RX ORDER — GABAPENTIN 400 MG/1
400 CAPSULE ORAL 3 TIMES DAILY
Qty: 270 CAPSULE | Refills: 1 | Status: SHIPPED | OUTPATIENT
Start: 2023-11-15

## 2023-11-15 NOTE — TELEPHONE ENCOUNTER
OV 05/04  REFILL 08/30 #270    Requested Prescriptions     Pending Prescriptions Disp Refills    gabapentin 400 MG Oral Cap 270 capsule 0     Sig: Take 1 capsule (400 mg total) by mouth 3 (three) times daily. No future appointments.

## 2023-11-16 ENCOUNTER — TELEPHONE (OUTPATIENT)
Dept: FAMILY MEDICINE CLINIC | Facility: CLINIC | Age: 68
End: 2023-11-16

## 2023-11-16 PROCEDURE — 93010 ELECTROCARDIOGRAM REPORT: CPT | Performed by: INTERNAL MEDICINE

## 2023-11-28 ENCOUNTER — TELEPHONE (OUTPATIENT)
Dept: CARDIOLOGY | Age: 68
End: 2023-11-28

## 2023-11-29 ENCOUNTER — TELEPHONE (OUTPATIENT)
Dept: FAMILY MEDICINE CLINIC | Facility: CLINIC | Age: 68
End: 2023-11-29

## 2023-11-29 NOTE — TELEPHONE ENCOUNTER
Phone call to [facility],  spoke to Leona pharmacist.    She will contact the pharmacist that sent the fax and call back with what the request is for.

## 2023-11-29 NOTE — TELEPHONE ENCOUNTER
Received fax from [facility] - Medication Therapy Recommendation.  No recommendation on fax.    Attempted to call [facility] at [phone] - clhy

## 2023-12-03 ENCOUNTER — PATIENT MESSAGE (OUTPATIENT)
Dept: FAMILY MEDICINE CLINIC | Facility: CLINIC | Age: 68
End: 2023-12-03

## 2023-12-05 ENCOUNTER — OFFICE VISIT (OUTPATIENT)
Dept: FAMILY MEDICINE CLINIC | Facility: CLINIC | Age: 68
End: 2023-12-05
Payer: MEDICARE

## 2023-12-05 VITALS
BODY MASS INDEX: 39 KG/M2 | OXYGEN SATURATION: 95 % | HEART RATE: 64 BPM | RESPIRATION RATE: 12 BRPM | TEMPERATURE: 98 F | SYSTOLIC BLOOD PRESSURE: 130 MMHG | WEIGHT: 200.25 LBS | DIASTOLIC BLOOD PRESSURE: 80 MMHG

## 2023-12-05 DIAGNOSIS — F41.0 PANIC ATTACKS: ICD-10-CM

## 2023-12-05 DIAGNOSIS — R07.89 ATYPICAL CHEST PAIN: Primary | ICD-10-CM

## 2023-12-05 DIAGNOSIS — K21.00 GASTROESOPHAGEAL REFLUX DISEASE WITH ESOPHAGITIS WITHOUT HEMORRHAGE: ICD-10-CM

## 2023-12-05 DIAGNOSIS — K44.9 HIATAL HERNIA: ICD-10-CM

## 2023-12-05 PROCEDURE — 3075F SYST BP GE 130 - 139MM HG: CPT | Performed by: FAMILY MEDICINE

## 2023-12-05 PROCEDURE — 3079F DIAST BP 80-89 MM HG: CPT | Performed by: FAMILY MEDICINE

## 2023-12-05 PROCEDURE — 1159F MED LIST DOCD IN RCRD: CPT | Performed by: FAMILY MEDICINE

## 2023-12-05 PROCEDURE — 1160F RVW MEDS BY RX/DR IN RCRD: CPT | Performed by: FAMILY MEDICINE

## 2023-12-05 PROCEDURE — 1170F FXNL STATUS ASSESSED: CPT | Performed by: FAMILY MEDICINE

## 2023-12-05 PROCEDURE — 99214 OFFICE O/P EST MOD 30 MIN: CPT | Performed by: FAMILY MEDICINE

## 2023-12-05 NOTE — TELEPHONE ENCOUNTER
From: Ricarda Apple  To: Guerline Hagan  Sent: 12/3/2023 5:05 PM CST  Subject: YANA Wilhelm,  Just wanted to let you know that I was at the ER in [facility] and once again they didn't find anything wrong with my heart or blood but this time it was diagnosed as acid reflux. So I'm cancelling my Cardiologist appointment because my heart is fine. Thanks for all you patience and time.   Jaquelin Chin

## 2023-12-12 ENCOUNTER — PATIENT MESSAGE (OUTPATIENT)
Dept: FAMILY MEDICINE CLINIC | Facility: CLINIC | Age: 68
End: 2023-12-12

## 2023-12-12 DIAGNOSIS — F41.1 GAD (GENERALIZED ANXIETY DISORDER): ICD-10-CM

## 2023-12-12 DIAGNOSIS — Z51.81 ENCOUNTER FOR THERAPEUTIC DRUG MONITORING: Primary | ICD-10-CM

## 2023-12-12 RX ORDER — LAMOTRIGINE 25 MG/1
TABLET ORAL
Qty: 1 TABLET | COMMUNITY
Start: 2023-12-12

## 2023-12-12 NOTE — TELEPHONE ENCOUNTER
From: Liv Wright  To: Miguel Raza  Sent: 12/12/2023 11:27 AM CST  Subject: Lamotringine     Dr. Raza,  Just wondering about my lamotringine level since it was doubled in October. Is this something that needs to be checked?    Thank you  Liv Wright

## 2023-12-12 NOTE — TELEPHONE ENCOUNTER
Last level 5-5-23  <1.0 (detection limit = 1.0)    Reviewed lamotrigine medication with patient that is prescribed by psychiatrist. Per patient taking 25 mg  Two tablets three times a day. Medication list updated.    Per patient has not have this level checked anywhere else.

## 2023-12-19 ENCOUNTER — PATIENT MESSAGE (OUTPATIENT)
Dept: FAMILY MEDICINE CLINIC | Facility: CLINIC | Age: 68
End: 2023-12-19

## 2023-12-19 NOTE — TELEPHONE ENCOUNTER
From: Rodrigo Hall  To: Cheyanne Cordoba  Sent: 12/19/2023 9:13 AM CST  Subject: Chest pain    I just realized that today being my great nephews birthday that I fell 3 years ago and fractured my sternum. Could that still be causing my chest pain and discomfort?   Qing Rosales

## 2023-12-20 NOTE — TELEPHONE ENCOUNTER
I have recently seen patient for anxiety, not for chest pain. Last office visit for chest pain 12/5/23 with Dr. Eusebio Covarrubias.

## 2023-12-21 ENCOUNTER — APPOINTMENT (OUTPATIENT)
Dept: CARDIOLOGY | Age: 68
End: 2023-12-21

## 2024-01-03 ENCOUNTER — OFFICE VISIT (OUTPATIENT)
Dept: FAMILY MEDICINE CLINIC | Facility: CLINIC | Age: 69
End: 2024-01-03
Payer: MEDICARE

## 2024-01-03 VITALS
TEMPERATURE: 98 F | OXYGEN SATURATION: 100 % | BODY MASS INDEX: 39 KG/M2 | SYSTOLIC BLOOD PRESSURE: 132 MMHG | DIASTOLIC BLOOD PRESSURE: 80 MMHG | HEART RATE: 101 BPM | RESPIRATION RATE: 26 BRPM | WEIGHT: 200 LBS

## 2024-01-03 DIAGNOSIS — J44.89 ASTHMA WITH COPD (CHRONIC OBSTRUCTIVE PULMONARY DISEASE): Chronic | ICD-10-CM

## 2024-01-03 DIAGNOSIS — J40 SINOBRONCHITIS: Primary | ICD-10-CM

## 2024-01-03 DIAGNOSIS — J32.9 SINOBRONCHITIS: Primary | ICD-10-CM

## 2024-01-03 PROCEDURE — 1170F FXNL STATUS ASSESSED: CPT

## 2024-01-03 PROCEDURE — 3075F SYST BP GE 130 - 139MM HG: CPT

## 2024-01-03 PROCEDURE — 1160F RVW MEDS BY RX/DR IN RCRD: CPT

## 2024-01-03 PROCEDURE — 99213 OFFICE O/P EST LOW 20 MIN: CPT

## 2024-01-03 PROCEDURE — 1159F MED LIST DOCD IN RCRD: CPT

## 2024-01-03 PROCEDURE — 3079F DIAST BP 80-89 MM HG: CPT

## 2024-01-03 RX ORDER — AZITHROMYCIN 250 MG/1
TABLET, FILM COATED ORAL
Qty: 6 TABLET | Refills: 0 | Status: SHIPPED | OUTPATIENT
Start: 2024-01-03 | End: 2024-01-07

## 2024-01-03 RX ORDER — PREDNISONE 20 MG/1
40 TABLET ORAL DAILY
Qty: 10 TABLET | Refills: 0 | Status: SHIPPED | OUTPATIENT
Start: 2024-01-03 | End: 2024-01-08

## 2024-01-03 RX ORDER — ALBUTEROL SULFATE 90 UG/1
2 AEROSOL, METERED RESPIRATORY (INHALATION) EVERY 4 HOURS PRN
Qty: 18 G | Refills: 1 | Status: SHIPPED | OUTPATIENT
Start: 2024-01-03

## 2024-01-03 NOTE — PROGRESS NOTES
HPI:   Liv is a 68 yr. Old female here today with cough, fatigue, decreased appetite, body aches, and chills x 5 days. Patient denies fever, congestion, sore throat, chest tightness, shortness of breath. Taking Tylenol last dose 7am. Body aches are better, cough and fatigue are he most bothersome symptoms.  Today is first day she has put clothes on.          Current Outpatient Medications   Medication Sig Dispense Refill    azithromycin 250 MG Oral Tab Take 2 tablets (500 mg total) by mouth daily for 1 day, THEN 1 tablet (250 mg total) daily for 4 days. 6 tablet 0    albuterol (VENTOLIN HFA) 108 (90 Base) MCG/ACT Inhalation Aero Soln Inhale 2 puffs into the lungs every 4 (four) hours as needed for Wheezing or Shortness of Breath. 18 g 1    predniSONE 20 MG Oral Tab Take 2 tablets (40 mg total) by mouth daily for 5 days. 10 tablet 0    lamoTRIgine 25 MG Oral Tab Take two tablets (50 mg) three times a day 1 tablet     gabapentin 400 MG Oral Cap Take 1 capsule (400 mg total) by mouth 3 (three) times daily. 270 capsule 1    METOPROLOL SUCCINATE ER 25 MG Oral Tablet 24 Hr Take 1 tablet by mouth once daily 90 tablet 0    atorvastatin 40 MG Oral Tab Take 1 tablet (40 mg total) by mouth nightly. 90 tablet 0    pantoprazole 40 MG Oral Tab EC Take 1 tablet (40 mg total) by mouth 2 (two) times daily. 180 tablet 1    montelukast 10 MG Oral Tab Take 1 tablet (10 mg total) by mouth daily. 90 tablet 2    Budesonide-Formoterol Fumarate (SYMBICORT) 160-4.5 MCG/ACT Inhalation Aerosol INHALE 1 PUFF BY MOUTH TWICE A DAY *RINSE MOUTH OUT AFTER EACH USE* 10.2 g 5    apixaban 2.5 MG Oral Tab Take 1 tablet (2.5 mg total) by mouth 2 (two) times daily.      ondansetron 4 MG Oral Tablet Dispersible       DULoxetine 30 MG Oral Cap DR Particles       DULoxetine 60 MG Oral Cap DR Particles       Fluticasone Propionate 50 MCG/ACT Nasal Suspension 2 sprays by Nasal route daily.      acetaminophen 325 MG Oral Tab Take 1-2 tablets (325-650 mg  total) by mouth.      Calcium Carbonate-Vitamin D 600-400 MG-UNIT Oral Tab Take 1 tablet by mouth daily.      ipratropium-albuterol 0.5-2.5 (3) MG/3ML Inhalation Solution USE 3 ML VIA NEBULIZER EVERY 4H PRN SOB.  Dx: Mild persistent asthma without complication J45.30 1620 mL 1    TraZODone HCl 100 MG Oral Tab Take 1 tablet (100 mg total) by mouth nightly as needed for Sleep. 30 tablet 0    cetirizine (ZYRTEC) 10 MG Oral Tab Take 1 tablet (10 mg total) by mouth daily.      cholecalciferol (DRISDOL) 1000 UNITS Oral Cap Take 5 capsules (5,000 Units total) by mouth daily.      dicyclomine 10 MG Oral Cap Take 1 capsule (10 mg total) by mouth 3 (three) times daily before meals.        Past Medical History:   Diagnosis Date    Allergic rhinitis     Asthma     Asthma with COPD (chronic obstructive pulmonary disease) 1/25/2021    Back pain     Depression     Esophageal reflux     Obesity     Obstructive sleep apnea (adult) (pediatric) 4/25/2019    Osteoarthritis     Other and unspecified hyperlipidemia     PE (pulmonary embolism) Feb 2004 and March 2004    x2. prothrombin mutation found after 2nd one    Prothrombin mutation (HCC)     Pulmonary embolism (HCC)     Spinal stenosis     Thyroid nodule     Unspecified essential hypertension       Past Surgical History:   Procedure Laterality Date    CHOLECYSTECTOMY  2006    IR IVC FILTER PLACEMENT      KNEE REPLACEMENT SURGERY  03/2017    L knee    OTHER SURGICAL HISTORY      carpal tunnel right wrist (1981); Green field filter (PE 2004)    OTHER SURGICAL HISTORY  03/06/2017    Left knee replacement    REMOVAL GALLBLADDER      TONSILLECTOMY  age 18    TUBAL LIGATION  1981      Family History   Problem Relation Age of Onset    Depression Daughter     Anxiety Daughter     Depression Son     Psychiatric Father         dementia    Diabetes Mother     Heart Disorder Mother     Psychiatric Mother         dementia    Hypertension Sister     Hypertension Brother       Social History      Socioeconomic History    Marital status:    Tobacco Use    Smoking status: Never    Smokeless tobacco: Never   Vaping Use    Vaping Use: Never used   Substance and Sexual Activity    Alcohol use: No     Alcohol/week: 0.0 standard drinks of alcohol    Drug use: No   Other Topics Concern    Caffeine Concern Yes     Comment: 2-4 cups of coffee x day    Exercise No    Seat Belt Yes         REVIEW OF SYSTEMS:   Review of Systems   Constitutional:  Positive for activity change, appetite change and fatigue. Negative for fever.   HENT: Negative.     Respiratory:  Positive for cough and chest tightness. Negative for shortness of breath.    Cardiovascular:  Negative for chest pain and palpitations.   Gastrointestinal:  Positive for diarrhea (on first couple of days of illness, has since resolved). Negative for abdominal pain, nausea and vomiting.   Neurological:  Negative for dizziness, light-headedness and headaches.       EXAM:   /80 (BP Location: Left arm, Patient Position: Sitting, Cuff Size: adult)   Pulse 101   Temp 97.7 °F (36.5 °C) (Temporal)   Resp 26   Wt 200 lb (90.7 kg)   SpO2 100%   BMI 38.74 kg/m²   Physical Exam  Vitals and nursing note reviewed.   Constitutional:       General: She is not in acute distress.     Appearance: Normal appearance. She is ill-appearing.   HENT:      Head: Atraumatic.      Right Ear: Tympanic membrane, ear canal and external ear normal.      Left Ear: Tympanic membrane, ear canal and external ear normal.      Nose: Nose normal.      Mouth/Throat:      Mouth: Mucous membranes are moist.      Pharynx: Oropharynx is clear.   Eyes:      General:         Right eye: No discharge.         Left eye: No discharge.      Conjunctiva/sclera: Conjunctivae normal.   Cardiovascular:      Rate and Rhythm: Normal rate and regular rhythm.      Pulses: Normal pulses.      Heart sounds: Normal heart sounds.   Pulmonary:      Effort: Pulmonary effort is normal.      Breath sounds:  Normal breath sounds. No wheezing, rhonchi or rales.   Abdominal:      General: Abdomen is flat. Bowel sounds are normal.      Palpations: Abdomen is soft.   Musculoskeletal:      Cervical back: Neck supple.   Lymphadenopathy:      Cervical: No cervical adenopathy.   Skin:     General: Skin is warm and dry.   Neurological:      General: No focal deficit present.      Mental Status: She is alert.         ASSESSMENT AND PLAN:   Diagnoses and all orders for this visit:    Sinobronchitis  -     azithromycin 250 MG Oral Tab; Take 2 tablets (500 mg total) by mouth daily for 1 day, THEN 1 tablet (250 mg total) daily for 4 days.  -     predniSONE 20 MG Oral Tab; Take 2 tablets (40 mg total) by mouth daily for 5 days.    Asthma with COPD (chronic obstructive pulmonary disease)  -     albuterol (VENTOLIN HFA) 108 (90 Base) MCG/ACT Inhalation Aero Soln; Inhale 2 puffs into the lungs every 4 (four) hours as needed for Wheezing or Shortness of Breath.     -Medications as prescribed. Albuterol inhaler refilled per request.  Encouraged use of respiratory inhalers as directed .Recommend over the counter treatment of symptoms cool mist humidifier, push electrolyte dense fluids, get plenty of rest.   -Return for worsening or incomplete symptom resolution.  -Patient verbalized understanding and in agreement with treatment plan.    ARNOLD Fraser

## 2024-01-05 ENCOUNTER — PATIENT MESSAGE (OUTPATIENT)
Dept: FAMILY MEDICINE CLINIC | Facility: CLINIC | Age: 69
End: 2024-01-05

## 2024-01-05 NOTE — TELEPHONE ENCOUNTER
From: Liv Wright  To: Melonie Whitney  Sent: 1/5/2024 8:25 AM CST  Subject: Visit    Good morning,  Just wanted to thank you for your time and to let you know that I am feeling much better but still get tired pretty quick and still have the nasty cough.  Thank you again!!  Liv Wright

## 2024-01-22 ENCOUNTER — APPOINTMENT (OUTPATIENT)
Dept: CARDIOLOGY | Age: 69
End: 2024-01-22

## 2024-01-24 RX ORDER — ATORVASTATIN CALCIUM 40 MG/1
40 TABLET, FILM COATED ORAL NIGHTLY
Qty: 90 TABLET | Refills: 0 | Status: SHIPPED | OUTPATIENT
Start: 2024-01-24

## 2024-01-24 NOTE — TELEPHONE ENCOUNTER
OV 12/2023  LABS 12/17/23 external COMP     REFILL 09/20/23 #90    Future Appointments   Date Time Provider Department Center   2/9/2024  8:40 AM Miguel Raza MD [facility]         Cholesterol Medication Protocol [id number]/24/2024 07:20 AM   Protocol Details ALT < 80    ALT resulted within past year    Lipid panel within past 12 months    Appointment within past 12 or next 3 months

## 2024-01-27 ENCOUNTER — OFFICE VISIT (OUTPATIENT)
Dept: FAMILY MEDICINE CLINIC | Facility: CLINIC | Age: 69
End: 2024-01-27
Payer: MEDICARE

## 2024-01-27 VITALS
TEMPERATURE: 98 F | HEART RATE: 100 BPM | OXYGEN SATURATION: 98 % | RESPIRATION RATE: 16 BRPM | WEIGHT: 201 LBS | SYSTOLIC BLOOD PRESSURE: 130 MMHG | BODY MASS INDEX: 39 KG/M2 | DIASTOLIC BLOOD PRESSURE: 70 MMHG

## 2024-01-27 DIAGNOSIS — S40.021D ARM CONTUSION, RIGHT, SUBSEQUENT ENCOUNTER: ICD-10-CM
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
## 2024-01-27 DIAGNOSIS — M12.511 ARTHROPATHY, TRAUMATIC, SHOULDER, RIGHT: Primary | ICD-10-CM

## 2024-01-27 PROCEDURE — 99214 OFFICE O/P EST MOD 30 MIN: CPT | Performed by: FAMILY MEDICINE

## 2024-01-27 PROCEDURE — 1170F FXNL STATUS ASSESSED: CPT | Performed by: FAMILY MEDICINE

## 2024-01-27 PROCEDURE — 1159F MED LIST DOCD IN RCRD: CPT | Performed by: FAMILY MEDICINE

## 2024-01-27 PROCEDURE — 3078F DIAST BP <80 MM HG: CPT | Performed by: FAMILY MEDICINE

## 2024-01-27 PROCEDURE — 1160F RVW MEDS BY RX/DR IN RCRD: CPT | Performed by: FAMILY MEDICINE

## 2024-01-27 PROCEDURE — 3075F SYST BP GE 130 - 139MM HG: CPT | Performed by: FAMILY MEDICINE

## 2024-01-27 RX ORDER — TRAMADOL HYDROCHLORIDE 50 MG/1
50 TABLET ORAL EVERY 8 HOURS PRN
Qty: 21 TABLET | Refills: 0 | Status: SHIPPED | OUTPATIENT
Start: 2024-01-27 | End: 2024-02-03

## 2024-01-29 NOTE — PROGRESS NOTES
Subjective:   Liv Wright is a 68 year old female who presents for ER F/U     Here for evaluation  Follow up Emergency Room  She fell on the ice and landed hard on the right arm upper    X-ray no fracture    Very painful    To see ortho soon  In splint  Lot of pain    Cannot take non steroidal antiinflammatories on DOAC    No loss of consciousness  no other injuries    History/Other:   has a current medication list which includes the following prescription(s): tramadol, atorvastatin, albuterol, lamotrigine, gabapentin, metoprolol succinate er, pantoprazole, montelukast, budesonide-formoterol fumarate, apixaban, ondansetron, duloxetine, duloxetine, fluticasone propionate, acetaminophen, calcium carbonate-vitamin d, ipratropium-albuterol, trazodone, cetirizine, cholecalciferol, and dicyclomine.     is allergic to colchicine, contrast dye [gadolinium derivatives], nitrofurantoin, penicillins, and radiology contrast iodinated dyes.     Review of Systems:  GENERAL HEALTH: feels well  see HPI   SKIN: denies any unusual skin lesions or rashes  No bruise   EXTREM see HPI   RESULTS REVIEWED FROM PRIOR VISITS BY   DR LLOYD GLASER     progress and imaging  reviewed in care everywhere    Objective:   /70 (BP Location: Left arm, Patient Position: Sitting, Cuff Size: adult)   Pulse 100   Temp 97.7 °F (36.5 °C) (Temporal)   Resp 16   Wt 201 lb (91.2 kg)   SpO2 98%   BMI 38.93 kg/m²  Estimated body mass index is 38.93 kg/m² as calculated from the following:    Height as of 5/4/23: 5' 0.25\" (1.53 m).    Weight as of this encounter: 201 lb (91.2 kg).  GENERAL: well developed, well nourished,in no apparent distress  SKIN: no  ecchymosis  EXTREMITIES: tender in the right upper arm lateral and at the deltoid and shoulder      Adjusted the sling for patient       Assessment & Plan:   1. Arthropathy, traumatic, shoulder, right (Primary)  -     traMADol HCl; Take 1 tablet (50 mg total) by mouth every 8 (eight) hours as needed  for Pain.  Dispense: 21 tablet; Refill: 0  2. Arm contusion, right, subsequent encounter  -     traMADol HCl; Take 1 tablet (50 mg total) by mouth every 8 (eight) hours as needed for Pain.  Dispense: 21 tablet; Refill: 0  3. Fall due to slipping on ice or snow, subsequent encounter  -     traMADol HCl; Take 1 tablet (50 mg total) by mouth every 8 (eight) hours as needed for Pain.  Dispense: 21 tablet; Refill: 0          Miguel Raza MD, 1/28/2024, 9:39 PM

## 2024-02-09 ENCOUNTER — OFFICE VISIT (OUTPATIENT)
Dept: FAMILY MEDICINE CLINIC | Facility: CLINIC | Age: 69
End: 2024-02-09
Payer: MEDICARE

## 2024-02-09 ENCOUNTER — LABORATORY ENCOUNTER (OUTPATIENT)
Dept: LAB | Age: 69
End: 2024-02-09
Attending: FAMILY MEDICINE
Payer: MEDICARE

## 2024-02-09 VITALS
WEIGHT: 205 LBS | DIASTOLIC BLOOD PRESSURE: 76 MMHG | RESPIRATION RATE: 16 BRPM | OXYGEN SATURATION: 96 % | HEART RATE: 78 BPM | SYSTOLIC BLOOD PRESSURE: 112 MMHG | TEMPERATURE: 99 F | HEIGHT: 61 IN | BODY MASS INDEX: 38.71 KG/M2

## 2024-02-09 DIAGNOSIS — G47.33 OSA TREATED WITH BIPAP: ICD-10-CM

## 2024-02-09 DIAGNOSIS — E66.01 SEVERE OBESITY (BMI 35.0-39.9) WITH COMORBIDITY (HCC): Chronic | ICD-10-CM

## 2024-02-09 DIAGNOSIS — E55.9 VITAMIN D DEFICIENCY: ICD-10-CM

## 2024-02-09 DIAGNOSIS — J44.89 ASTHMA WITH COPD (CHRONIC OBSTRUCTIVE PULMONARY DISEASE): Chronic | ICD-10-CM

## 2024-02-09 DIAGNOSIS — M15.9 PRIMARY OSTEOARTHRITIS INVOLVING MULTIPLE JOINTS: ICD-10-CM

## 2024-02-09 DIAGNOSIS — I10 ESSENTIAL HYPERTENSION: ICD-10-CM

## 2024-02-09 DIAGNOSIS — M81.0 AGE-RELATED OSTEOPOROSIS WITHOUT CURRENT PATHOLOGICAL FRACTURE: ICD-10-CM

## 2024-02-09 DIAGNOSIS — E78.2 MIXED HYPERLIPIDEMIA: ICD-10-CM

## 2024-02-09 DIAGNOSIS — R73.09 ELEVATED HEMOGLOBIN A1C: ICD-10-CM

## 2024-02-09 DIAGNOSIS — M48.05 SPINAL STENOSIS OF THORACOLUMBAR REGION: ICD-10-CM

## 2024-02-09 DIAGNOSIS — D68.52 PROTHROMBIN MUTATION (HCC): ICD-10-CM

## 2024-02-09 DIAGNOSIS — N18.2 CKD (CHRONIC KIDNEY DISEASE) STAGE 2, GFR 60-89 ML/MIN: ICD-10-CM

## 2024-02-09 DIAGNOSIS — Z00.00 ENCOUNTER FOR MEDICARE ANNUAL WELLNESS EXAM: Primary | ICD-10-CM

## 2024-02-09 DIAGNOSIS — F33.0 MILD EPISODE OF RECURRENT MAJOR DEPRESSIVE DISORDER (HCC): ICD-10-CM

## 2024-02-09 DIAGNOSIS — Z12.31 VISIT FOR SCREENING MAMMOGRAM: ICD-10-CM

## 2024-02-09 DIAGNOSIS — Z79.899 ENCOUNTER FOR LONG-TERM (CURRENT) USE OF MEDICATIONS: ICD-10-CM

## 2024-02-09 DIAGNOSIS — F41.1 GAD (GENERALIZED ANXIETY DISORDER): ICD-10-CM

## 2024-02-09 DIAGNOSIS — K21.00 GASTROESOPHAGEAL REFLUX DISEASE WITH ESOPHAGITIS WITHOUT HEMORRHAGE: ICD-10-CM

## 2024-02-09 DIAGNOSIS — D68.59 HYPERCOAGULABLE STATE (HCC): ICD-10-CM

## 2024-02-09 DIAGNOSIS — N18.31 STAGE 3A CHRONIC KIDNEY DISEASE (HCC): ICD-10-CM

## 2024-02-09 LAB
ALBUMIN SERPL-MCNC: 3.5 G/DL (ref 3.4–5)
ALBUMIN/GLOB SERPL: 1.2 {RATIO} (ref 1–2)
ALP LIVER SERPL-CCNC: 80 U/L
ALT SERPL-CCNC: 19 U/L
ANION GAP SERPL CALC-SCNC: 3 MMOL/L (ref 0–18)
AST SERPL-CCNC: 13 U/L (ref 15–37)
BILIRUB SERPL-MCNC: 0.3 MG/DL (ref 0.1–2)
BUN BLD-MCNC: 20 MG/DL (ref 9–23)
CALCIUM BLD-MCNC: 10.4 MG/DL (ref 8.5–10.1)
CHLORIDE SERPL-SCNC: 109 MMOL/L (ref 98–112)
CHOLEST SERPL-MCNC: 161 MG/DL (ref ?–200)
CO2 SERPL-SCNC: 30 MMOL/L (ref 21–32)
CREAT BLD-MCNC: 1.24 MG/DL
EGFRCR SERPLBLD CKD-EPI 2021: 47 ML/MIN/1.73M2 (ref 60–?)
EST. AVERAGE GLUCOSE BLD GHB EST-MCNC: 131 MG/DL (ref 68–126)
FASTING PATIENT LIPID ANSWER: YES
FASTING STATUS PATIENT QL REPORTED: YES
GLOBULIN PLAS-MCNC: 3 G/DL (ref 2.8–4.4)
GLUCOSE BLD-MCNC: 134 MG/DL (ref 70–99)
HBA1C MFR BLD: 6.2 % (ref ?–5.7)
HDLC SERPL-MCNC: 64 MG/DL (ref 40–59)
LDLC SERPL CALC-MCNC: 77 MG/DL (ref ?–100)
NONHDLC SERPL-MCNC: 97 MG/DL (ref ?–130)
OSMOLALITY SERPL CALC.SUM OF ELEC: 299 MOSM/KG (ref 275–295)
POTASSIUM SERPL-SCNC: 4.7 MMOL/L (ref 3.5–5.1)
PROT SERPL-MCNC: 6.5 G/DL (ref 6.4–8.2)
SODIUM SERPL-SCNC: 142 MMOL/L (ref 136–145)
TRIGL SERPL-MCNC: 110 MG/DL (ref 30–149)
VLDLC SERPL CALC-MCNC: 17 MG/DL (ref 0–30)

## 2024-02-09 PROCEDURE — 80053 COMPREHEN METABOLIC PANEL: CPT

## 2024-02-09 PROCEDURE — 80061 LIPID PANEL: CPT

## 2024-02-09 PROCEDURE — 83036 HEMOGLOBIN GLYCOSYLATED A1C: CPT

## 2024-02-09 PROCEDURE — 36415 COLL VENOUS BLD VENIPUNCTURE: CPT

## 2024-02-09 RX ORDER — METOPROLOL SUCCINATE 25 MG/1
25 TABLET, EXTENDED RELEASE ORAL DAILY
Qty: 90 TABLET | Refills: 3 | Status: SHIPPED | OUTPATIENT
Start: 2024-02-09

## 2024-02-09 NOTE — PROGRESS NOTES
Subjective:   Liv Wright is a 68 year old female who presents for a MA (Medicare Advantage) Supervisit (Once per calendar year)   and scheduled follow up of multiple significant but stable problems.     Cholesterol shows Good control. Long term heart-healthy diet and lifestyle discussed and encouraged to reduce risk of cardiovascular disease.    Cholesterol: 161, done on 2/9/2024.  HDL Cholesterol: 64, done on 2/9/2024.  TriGlycerides 110, done on 2/9/2024.  LDL Cholesterol: 77, done on 2/9/2024.   Cholesterol medications include ATORVASTATIN 40 MG Oral Tab [[id number]].    BP shows good control with last BP of 112/76. Continue lifestyle changes, diet, exercise and weight loss.   Last K was 4.7 done on 2/9/2024.  Last Cr was 1.24 done on 2/9/2024.  Last eGFR was 47 on 2/9/2024.      History/Other:   Fall Risk Assessment:   She has been screened for Falls and is High Risk. Fall Prevention information provided to patient in After Visit Summary.          Cognitive Assessment:   She had a completely normal cognitive assessment - see flowsheet entries     Functional Ability/Status:   Liv Wright has some abnormal functions as listed below:  She has Vision problems based on screening of functional status. She has Walking problems based on screening of functional status.       Depression Screening (PHQ-2/PHQ-9): PHQ-2 SCORE: 0  , done 2/3/2024             Advanced Directives:   She does NOT have a Living Will. [ ]  She has a Power of  for Health Care on file in [facility].  Discussed Advance Care Planning with patient (and family/surrogate if present). Standard forms made available to patient in After Visit Summary.      Patient Active Problem List   Diagnosis    Spinal stenosis    Mixed hyperlipidemia    Vitamin D deficiency    Gastroesophageal reflux disease with esophagitis    Prothrombin mutation (HCC)    Osteoarthritis    Essential hypertension    CHINTAN treated with BiPAP    Hypercoagulable state (HCC)    Stage 3a  chronic kidney disease (HCC)    Asthma with COPD (chronic obstructive pulmonary disease)    Severe obesity (BMI 35.0-39.9) with comorbidity (HCC)    Mild episode of recurrent major depressive disorder (HCC)    JARRETT (generalized anxiety disorder)    Age-related osteoporosis without current pathological fracture     Allergies:  She is allergic to colchicine, contrast dye [gadolinium derivatives], nitrofurantoin, penicillins, and radiology contrast iodinated dyes.    Current Medications:  Outpatient Medications Marked as Taking for the 2/9/24 encounter (Office Visit) with Miguel Raza MD   Medication Sig    metoprolol succinate ER 25 MG Oral Tablet 24 Hr Take 1 tablet (25 mg total) by mouth daily.    ATORVASTATIN 40 MG Oral Tab Take 1 tablet by mouth nightly    albuterol (VENTOLIN HFA) 108 (90 Base) MCG/ACT Inhalation Aero Soln Inhale 2 puffs into the lungs every 4 (four) hours as needed for Wheezing or Shortness of Breath.    lamoTRIgine 25 MG Oral Tab Take two tablets (50 mg) three times a day    gabapentin 400 MG Oral Cap Take 1 capsule (400 mg total) by mouth 3 (three) times daily.    pantoprazole 40 MG Oral Tab EC Take 1 tablet (40 mg total) by mouth 2 (two) times daily.    montelukast 10 MG Oral Tab Take 1 tablet (10 mg total) by mouth daily.    Budesonide-Formoterol Fumarate (SYMBICORT) 160-4.5 MCG/ACT Inhalation Aerosol INHALE 1 PUFF BY MOUTH TWICE A DAY *RINSE MOUTH OUT AFTER EACH USE*    apixaban 2.5 MG Oral Tab Take 1 tablet (2.5 mg total) by mouth 2 (two) times daily.    ondansetron 4 MG Oral Tablet Dispersible     DULoxetine 30 MG Oral Cap DR Particles     DULoxetine 60 MG Oral Cap DR Particles     Fluticasone Propionate 50 MCG/ACT Nasal Suspension 2 sprays by Nasal route daily.    acetaminophen 325 MG Oral Tab Take 1-2 tablets (325-650 mg total) by mouth.    Calcium Carbonate-Vitamin D 600-400 MG-UNIT Oral Tab Take 1 tablet by mouth daily.    ipratropium-albuterol 0.5-2.5 (3) MG/3ML Inhalation Solution  USE 3 ML VIA NEBULIZER EVERY 4H PRN SOB.  Dx: Mild persistent asthma without complication J45.30    TraZODone HCl 100 MG Oral Tab Take 1 tablet (100 mg total) by mouth nightly as needed for Sleep.    cetirizine (ZYRTEC) 10 MG Oral Tab Take 1 tablet (10 mg total) by mouth daily.    cholecalciferol (DRISDOL) 1000 UNITS Oral Cap Take 5 capsules (5,000 Units total) by mouth daily.       Medical History:  She  has a past medical history of Allergic rhinitis, Asthma, Asthma with COPD (chronic obstructive pulmonary disease) (10/30/2015), Back pain, Depression, Diverticulosis of large intestine without hemorrhage (07/14/2018), Esophageal reflux, Hiatal hernia (01/25/2017), History of pulmonary embolism (12/05/2014), Obesity, Obstructive sleep apnea (adult) (pediatric) (04/25/2019), Osteoarthritis, Other and unspecified hyperlipidemia, PE (pulmonary embolism) (Feb 2004 and March 2004), Presence of IVC filter (03/19/2018), Primary insomnia (08/25/2022), Prothrombin mutation (HCC), Pulmonary embolism (HCC), Spinal stenosis, Thyroid nodule, and Unspecified essential hypertension.  Surgical History:  She  has a past surgical history that includes tubal ligation (1981); cholecystectomy (2006); ir ivc filter placement; tonsillectomy (age 18); knee replacement surgery (03/2017); other surgical history; other surgical history (03/06/2017); and removal gallbladder.   Family History:  Her family history includes Anxiety in her daughter; Depression in her daughter and son; Diabetes in her mother; Heart Disorder in her mother; Hypertension in her brother and sister; Psychiatric in her father and mother.  Social History:  She  reports that she has never smoked. She has never used smokeless tobacco. She reports that she does not drink alcohol and does not use drugs.    Tobacco:  She has never smoked tobacco.    CAGE Alcohol Screen:   CAGE screening score of 0 on 2/3/2024, showing low risk of alcohol abuse.      Patient Care  Team:  Miguel Raza MD as PCP - General (Family Medicine)  Aniyah Mcdaniel MD (OBSTETRICS & GYNECOLOGY)  Gayatri Thakkar APN as Psychiatrist/APN (Nurse Practitioner)  Sergio Hampton PT as Physical Therapist    Review of Systems  GENERAL: feels well otherwise  SKIN: denies any unusual skin lesions  EYES: denies blurred vision or double vision  HEENT: denies nasal congestion, sinus pain or ST  LUNGS: denies shortness of breath with exertion  CARDIOVASCULAR: denies chest pain on exertion  GI: denies abdominal pain, denies heartburn  : denies dysuria, vaginal discharge or itching, no complaint of urinary incontinence   MUSCULOSKELETAL: denies back pain  NEURO: denies headaches  PSYCHE: denies depression or anxiety  HEMATOLOGIC: denies hx of anemia  ENDOCRINE: denies thyroid history  ALL/ASTHMA: denies hx of allergy or asthma    Objective:   Physical Exam  General Appearance:  Alert, cooperative, no distress, appears stated age   Head:  Normocephalic, without obvious abnormality, atraumatic   Eyes:  PERRL, conjunctiva/corneas clear, EOM's intact both eyes   Ears:  Normal TM's and external ear canals, both ears   Nose: Nares normal, septum midline,mucosa normal, no drainage or sinus tenderness   Throat: Lips, mucosa, and tongue normal; teeth and gums normal   Neck: Supple, symmetrical, trachea midline, no adenopathy;  thyroid: not enlarged, symmetric, no tenderness/mass/nodules; no carotid bruit or JVD   Back:   Symmetric, no curvature, ROM normal, no CVA tenderness   Lungs:   Clear to auscultation bilaterally, respirations unlabored   Heart:  Regular rate and rhythm, S1 and S2 normal, no murmur, rub, or gallop   Abdomen:   Soft, non-tender, bowel sounds active all four quadrants,  no masses, no organomegaly   Pelvic: Deferred   Extremities: Extremities normal, atraumatic, no cyanosis or edema   Pulses: 2+ and symmetric   Skin: Skin color, texture, turgor normal, no rashes or lesions   Lymph nodes: Cervical,  supraclavicular, and axillary nodes normal   Neurologic: Normal       /76   Pulse 78   Temp 98.7 °F (37.1 °C) (Temporal)   Resp 16   Ht 5' 1\" (1.549 m)   Wt 205 lb (93 kg)   SpO2 96%   BMI 38.73 kg/m²  Estimated body mass index is 38.73 kg/m² as calculated from the following:    Height as of this encounter: 5' 1\" (1.549 m).    Weight as of this encounter: 205 lb (93 kg).    Medicare Hearing Assessment:   Hearing Screening    Time taken: 2/9/2024 10:17 AM  Screening Method: Whisper Test  Whisper Test Result: Pass               Visual Acuity:   Right Eye Visual Acuity: Corrected Right Eye Chart Acuity: 20/30   Left Eye Visual Acuity: Corrected Left Eye Chart Acuity: 20/30   Both Eyes Visual Acuity: Corrected Both Eyes Chart Acuity: 20/30   Able To Tolerate Visual Acuity: Yes        Assessment & Plan:   Liv Wright is a 68 year old female who presents for a Medicare Assessment.     1. Encounter for Medicare annual wellness exam (Primary)  2. Visit for screening mammogram  -     3D Mammogram Digital Screen, Bilateral (CPT=77067/04572); Future; Expected date: 02/09/2024  3. Elevated hemoglobin A1c  -     Hemoglobin A1C; Future; Expected date: 02/09/2024  4. Essential hypertension  Overview:  Blood Pressure and Cardiac Medications            metoprolol succinate ER 25 MG Oral Tablet 24 Hr            Assessment & Plan:  As for her hypertension, Blood Pressure is well controlled, no significant medication side effects noted.   PLAN: will continue present medications, reviewed diet, exercise and weight control, and labs as ordered    Orders:  -     Comp Metabolic Panel (14); Future; Expected date: 02/09/2024  5. Mixed hyperlipidemia  Overview:  Cholesterol Lowering Medications            atorvastatin 40 MG Oral Tab            Assessment & Plan:  As for her cholesterol, Lipids are well controlled, no significant medication side effects noted.   PLAN: will continue present medications, reviewed diet, exercise and  weight control, and labs as ordered        Orders:  -     Lipid Panel; Future; Expected date: 02/09/2024  -     Comp Metabolic Panel (14); Future; Expected date: 02/09/2024  6. Encounter for long-term (current) use of medications  7. Asthma with COPD (chronic obstructive pulmonary disease)  Overview:  Medications       Leukotriene Modulators Instructions     montelukast 10 MG Oral Tab Take 1 tablet (10 mg total) by mouth daily.       Sympathomimetics Instructions     albuterol (VENTOLIN HFA) 108 (90 Base) MCG/ACT Inhalation Aero Soln Inhale 2 puffs into the lungs every 4 (four) hours as needed for Wheezing or Shortness of Breath.     Budesonide-Formoterol Fumarate (SYMBICORT) 160-4.5 MCG/ACT Inhalation Aerosol INHALE 1 PUFF BY MOUTH TWICE A DAY *RINSE MOUTH OUT AFTER EACH USE*     ipratropium-albuterol 0.5-2.5 (3) MG/3ML Inhalation Solution USE 3 ML VIA NEBULIZER EVERY 4H PRN SOB.  Dx: Mild persistent asthma without complication J45.30            Assessment & Plan:     Stable    [x] chronic stable condition, noted historically, continues present status        8. Hypercoagulable state (HCC)  Overview:  Off of warfarin as very difficult t control  Sees Dr Feliciano   On [facility]    Assessment & Plan:   Stable    [x] chronic stable condition, noted historically, continues present status      9. Mild episode of recurrent major depressive disorder (HCC)  Overview:  Mood and Thought Medications            DULoxetine 30 MG Oral Cap DR Particles    DULoxetine 60 MG Oral Cap DR Particles    TraZODone HCl 100 MG Oral Tab            Assessment & Plan:   Stable    [x] chronic stable condition, noted historically, continues present status      10. Prothrombin mutation (HCC)  Assessment & Plan:   Stable    [x] chronic stable condition, noted historically, continues present status      11. Severe obesity (BMI 35.0-39.9) with comorbidity ([facility])  Overview:  Estimated body mass index is 38.73 kg/m² as calculated from the following:     Height as of this encounter: 5' 1\" (1.549 m).    Weight as of this encounter: 205 lb (93 kg).    Assessment & Plan:  Discussed weight loss through caloric reduction and aerobic exercise      12. Age-related osteoporosis without current pathological fracture  Assessment & Plan:   Stable    [x] chronic stable condition, noted historically, continues present status    13. Vitamin D deficiency  Assessment & Plan:   Stable    [x] chronic stable condition, noted historically, continues present status    14. Spinal stenosis of thoracolumbar region  Assessment & Plan:   Stable    [x] chronic stable condition, noted historically, continues present status    15. JARRETT (generalized anxiety disorder)  Assessment & Plan:   Stable    [x] chronic stable condition, noted historically, continues present status    16. Gastroesophageal reflux disease with esophagitis without hemorrhage  Overview:  GI Medications            pantoprazole 40 MG Oral Tab EC    dicyclomine 10 MG Oral Cap    ondansetron 4 MG Oral Tablet Dispersible            Assessment & Plan:   Stable    [x] chronic stable condition, noted historically, continues present status      17. Primary osteoarthritis involving multiple joints  Overview:  Non-Narcotic Pain Medications            acetaminophen 325 MG Oral Tab                Assessment & Plan:   Stable    [x] chronic stable condition, noted historically, continues present status      18. CHINTAN treated with BiPAP  Assessment & Plan:   Stable    [x] chronic stable condition, noted historically, continues present status      19. Stage 3a chronic kidney disease (HCC)  Overview:  Lab Results   Component Value Date    EGFRCR 47 (L) 02/09/2024    GFRNAA 62 03/01/2021    GFRNAA 59 (L) 04/26/2020    GFRNAA 56 (L) 02/28/2020             Assessment & Plan:   Stable    [x] chronic stable condition, noted historically, continues present status      Other orders  -     Metoprolol Succinate ER; Take 1 tablet (25 mg total) by mouth  daily.  Dispense: 90 tablet; Refill: 3    The patient indicates understanding of these issues and agrees to the plan.  Continue with current treatment plan.  Reinforced healthy diet, lifestyle, and exercise.      No follow-ups on file.     Miguel Raza MD, 2/9/2024     Supplementary Documentation:   General Health:          Liv Wright's SCREENING SCHEDULE   Tests on this list are recommended by your physician but may not be covered, or covered at this frequency, by your insurer.   Please check with your insurance carrier before scheduling to verify coverage.   PREVENTATIVE SERVICES FREQUENCY &  COVERAGE DETAILS LAST COMPLETION DATE   Diabetes Screening    Fasting Blood Sugar /  Glucose    One screening every 12 months if never tested or if previously tested but not diagnosed with pre-diabetes   One screening every 6 months if diagnosed with pre-diabetes Lab Results   Component Value Date     (H) 02/09/2024        Cardiovascular Disease Screening    Lipid Panel  Cholesterol  Lipoprotein (HDL)  Triglycerides Covered every 5 years for all Medicare beneficiaries without apparent signs or symptoms of cardiovascular disease Lab Results   Component Value Date    CHOLEST 161 02/09/2024    HDL 64 (H) 02/09/2024    LDL 77 02/09/2024    TRIG 110 02/09/2024         Electrocardiogram (EKG)   Covered if needed at Welcome to Medicare, and non-screening if indicated for medical reasons 03/19/2018      Ultrasound Screening for Abdominal Aortic Aneurysm (AAA) Covered once in a lifetime for one of the following risk factors    Men who are 65-75 years old and have ever smoked    Anyone with a family history -     Colorectal Cancer Screening  Covered for ages 50-85; only need ONE of the following:    Colonoscopy   Covered every 10 years    Covered every 2 years if patient is at high risk or previous colonoscopy was abnormal 10/06/2021    Health Maintenance   Topic Date Due    Colorectal Cancer Screening  10/06/2031        Flexible Sigmoidoscopy   Covered every 4 years -    Fecal Occult Blood Test Covered annually -   Bone Density Screening    Bone density screening    Covered every 2 years after age 65 if diagnosed with risk of osteoporosis or estrogen deficiency.    Covered yearly for long-term glucocorticoid medication use (Steroids) No results found for this or any previous visit.      No recommendations at this time   Pap and Pelvic    Pap   Covered every 2 years for women at normal risk; Annually if at high risk 06/04/2021  No recommendations at this time    Chlamydia Annually if high risk -  No recommendations at this time   Screening Mammogram    Mammogram     Recommend annually for all female patients aged 40 and older    One baseline mammogram covered for patients aged 35-39 09/29/2022    Health Maintenance   Topic Date Due    Mammogram  03/01/2023       Immunizations    Influenza Covered once per flu season  Please get every year 10/06/2023  No recommendations at this time    Pneumococcal Each vaccine ([id number] & [id number]) covered once after 65 Prevnar 13: 02/20/2017    Orhiklboh25: 03/01/2021     No recommendations at this time    Hepatitis B One screening covered for patients with certain risk factors   02/09/2006  No recommendations at this time    Tetanus Toxoid Not covered by Medicare Part B unless medically necessary (cut with metal); may be covered with your pharmacy prescription benefits 07/21/2020    Tetanus, Diptheria and Pertusis TD and TDaP Not covered by Medicare Part B -  No recommendations at this time    Zoster Not covered by Medicare Part B; may be covered with your pharmacy  prescription benefits 05/20/2017  Zoster Vaccines(3 of 3) due on 07/10/2023     Annual Monitoring of Persistent Medications (ACE/ARB, digoxin diuretics, anticonvulsants)    Potassium Annually Lab Results   Component Value Date    K 4.7 02/09/2024         Creatinine   Annually Lab Results   Component Value Date    CREATSERUM 1.24  (H) 02/09/2024         BUN Annually Lab Results   Component Value Date    BUN 20 02/09/2024       Drug Serum Conc Annually No results found for: \"DIGOXIN\", \"DIG\", \"VALP\"

## 2024-02-11 PROBLEM — N18.31 STAGE 3A CHRONIC KIDNEY DISEASE (HCC): Status: ACTIVE | Noted: 2020-07-21

## 2024-02-11 PROBLEM — M81.0 AGE-RELATED OSTEOPOROSIS WITHOUT CURRENT PATHOLOGICAL FRACTURE: Status: ACTIVE | Noted: 2023-08-09

## 2024-02-11 PROBLEM — Z95.828 PRESENCE OF IVC FILTER: Status: RESOLVED | Noted: 2018-03-19 | Resolved: 2024-02-11

## 2024-02-11 PROBLEM — K44.9 HIATAL HERNIA: Status: RESOLVED | Noted: 2017-01-25 | Resolved: 2024-02-11

## 2024-02-11 PROBLEM — K57.30 DIVERTICULOSIS OF LARGE INTESTINE WITHOUT HEMORRHAGE: Status: RESOLVED | Noted: 2018-07-14 | Resolved: 2024-02-11

## 2024-02-11 PROBLEM — F51.01 PRIMARY INSOMNIA: Status: RESOLVED | Noted: 2022-08-25 | Resolved: 2024-02-11

## 2024-02-14 ENCOUNTER — TELEPHONE (OUTPATIENT)
Dept: CARDIOLOGY | Age: 69
End: 2024-02-14

## 2024-02-20 ENCOUNTER — APPOINTMENT (OUTPATIENT)
Dept: CARDIOLOGY | Age: 69
End: 2024-02-20

## 2024-02-21 DIAGNOSIS — N18.31 STAGE 3A CHRONIC KIDNEY DISEASE (HCC): ICD-10-CM

## 2024-02-21 DIAGNOSIS — I10 ESSENTIAL HYPERTENSION: ICD-10-CM

## 2024-02-21 DIAGNOSIS — E78.2 MIXED HYPERLIPIDEMIA: Primary | ICD-10-CM

## 2024-02-21 RX ORDER — PANTOPRAZOLE SODIUM 40 MG/1
40 TABLET, DELAYED RELEASE ORAL 2 TIMES DAILY
Qty: 180 TABLET | Refills: 1 | Status: SHIPPED | OUTPATIENT
Start: 2024-02-21

## 2024-02-21 NOTE — TELEPHONE ENCOUNTER
LOV:1/3/24  LAST LAB:2/9/24  LAST RX:  Medication Quantity Refills Start End   pantoprazole 40 MG Oral Tab  tablet 1 8/7/2023 --   Sig:   Take 1 tablet (40 mg total) by mouth 2 (two) times daily.     Next OV: No future appointments.   PROTOCOL    Gastrointestional Medication Protocol [id number]/21/2024 03:56 PM   Protocol Details In person appointment or virtual visit in the past 12 mos or appointment in next 3 mos

## 2024-02-22 ENCOUNTER — PATIENT MESSAGE (OUTPATIENT)
Dept: FAMILY MEDICINE CLINIC | Facility: CLINIC | Age: 69
End: 2024-02-22

## 2024-02-22 DIAGNOSIS — M12.511 ARTHROPATHY, TRAUMATIC, SHOULDER, RIGHT: ICD-10-CM

## 2024-02-22 DIAGNOSIS — S40.021D ARM CONTUSION, RIGHT, SUBSEQUENT ENCOUNTER: ICD-10-CM
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
## 2024-02-22 NOTE — TELEPHONE ENCOUNTER
From: Liv Wright  To: Miguel Raza  Sent: 2/22/2024 1:13 PM CST  Subject: Tramadol     I asked Dr. Behle for a prescription for more tramadol and he said to contact you.  Thank you  Liv Wright

## 2024-02-22 NOTE — TELEPHONE ENCOUNTER
Patient was last prescribed Tramadol 50 mg on 1/27/24, qty 21 tablets by Dr. Raza. Please advise.

## 2024-02-24 RX ORDER — TRAMADOL HYDROCHLORIDE 50 MG/1
50 TABLET ORAL EVERY 8 HOURS PRN
Qty: 21 TABLET | Refills: 0 | Status: SHIPPED | OUTPATIENT
Start: 2024-02-24 | End: 2024-03-02

## 2024-02-28 ENCOUNTER — OFFICE VISIT (OUTPATIENT)
Dept: FAMILY MEDICINE CLINIC | Facility: CLINIC | Age: 69
End: 2024-02-28
Payer: MEDICARE

## 2024-02-28 VITALS
BODY MASS INDEX: 39 KG/M2 | DIASTOLIC BLOOD PRESSURE: 68 MMHG | TEMPERATURE: 99 F | WEIGHT: 205 LBS | HEART RATE: 107 BPM | OXYGEN SATURATION: 97 % | SYSTOLIC BLOOD PRESSURE: 132 MMHG | RESPIRATION RATE: 22 BRPM

## 2024-02-28 DIAGNOSIS — R05.1 ACUTE COUGH: Primary | ICD-10-CM

## 2024-02-28 PROCEDURE — 1159F MED LIST DOCD IN RCRD: CPT

## 2024-02-28 PROCEDURE — 3078F DIAST BP <80 MM HG: CPT

## 2024-02-28 PROCEDURE — 99213 OFFICE O/P EST LOW 20 MIN: CPT

## 2024-02-28 PROCEDURE — 87637 SARSCOV2&INF A&B&RSV AMP PRB: CPT

## 2024-02-28 PROCEDURE — 1160F RVW MEDS BY RX/DR IN RCRD: CPT

## 2024-02-28 PROCEDURE — 1170F FXNL STATUS ASSESSED: CPT

## 2024-02-28 PROCEDURE — 3077F SYST BP >= 140 MM HG: CPT

## 2024-02-28 NOTE — PROGRESS NOTES
HPI:   Liv is a 68 yr. Old female here today for a cough, congestion, body aches, and fever. Tmax 100.1F since yesterday.  Has been taking her rescue inhaler every 4 hours along with her symbicort inhaler as directed.. Tylenol this morning.  Denies chest tightness, shortness of breath, chest pain, n/v/d. Communal living situation. At home covid test this morning was negative.         Current Outpatient Medications   Medication Sig Dispense Refill    traMADol 50 MG Oral Tab Take 1 tablet (50 mg total) by mouth every 8 (eight) hours as needed for Pain. 21 tablet 0    pantoprazole 40 MG Oral Tab EC Take 1 tablet (40 mg total) by mouth 2 (two) times daily. 180 tablet 1    metoprolol succinate ER 25 MG Oral Tablet 24 Hr Take 1 tablet (25 mg total) by mouth daily. 90 tablet 3    ATORVASTATIN 40 MG Oral Tab Take 1 tablet by mouth nightly 90 tablet 0    albuterol (VENTOLIN HFA) 108 (90 Base) MCG/ACT Inhalation Aero Soln Inhale 2 puffs into the lungs every 4 (four) hours as needed for Wheezing or Shortness of Breath. 18 g 1    lamoTRIgine 25 MG Oral Tab Take two tablets (50 mg) three times a day 1 tablet     gabapentin 400 MG Oral Cap Take 1 capsule (400 mg total) by mouth 3 (three) times daily. 270 capsule 1    montelukast 10 MG Oral Tab Take 1 tablet (10 mg total) by mouth daily. 90 tablet 2    Budesonide-Formoterol Fumarate (SYMBICORT) 160-4.5 MCG/ACT Inhalation Aerosol INHALE 1 PUFF BY MOUTH TWICE A DAY *RINSE MOUTH OUT AFTER EACH USE* 10.2 g 5    apixaban 2.5 MG Oral Tab Take 1 tablet (2.5 mg total) by mouth 2 (two) times daily.      ondansetron 4 MG Oral Tablet Dispersible       DULoxetine 30 MG Oral Cap DR Particles       DULoxetine 60 MG Oral Cap DR Particles       Fluticasone Propionate 50 MCG/ACT Nasal Suspension 2 sprays by Nasal route daily.      acetaminophen 325 MG Oral Tab Take 1-2 tablets (325-650 mg total) by mouth.      Calcium Carbonate-Vitamin D 600-400 MG-UNIT Oral Tab Take 1 tablet by mouth daily.       ipratropium-albuterol 0.5-2.5 (3) MG/3ML Inhalation Solution USE 3 ML VIA NEBULIZER EVERY 4H PRN SOB.  Dx: Mild persistent asthma without complication J45.30 1620 mL 1    TraZODone HCl 100 MG Oral Tab Take 1 tablet (100 mg total) by mouth nightly as needed for Sleep. 30 tablet 0    cetirizine (ZYRTEC) 10 MG Oral Tab Take 1 tablet (10 mg total) by mouth daily.      cholecalciferol (DRISDOL) 1000 UNITS Oral Cap Take 5 capsules (5,000 Units total) by mouth daily.      dicyclomine 10 MG Oral Cap Take 1 capsule (10 mg total) by mouth 3 (three) times daily before meals.        Past Medical History:   Diagnosis Date    Allergic rhinitis     Asthma (HCC)     Asthma with COPD (chronic obstructive pulmonary disease) 10/30/2015    Back pain     Depression     Diverticulosis of large intestine without hemorrhage 07/14/2018    Esophageal reflux     Hiatal hernia 01/25/2017    History of pulmonary embolism 12/05/2014    Obesity     Obstructive sleep apnea (adult) (pediatric) 04/25/2019    Osteoarthritis     Other and unspecified hyperlipidemia     PE (pulmonary embolism) Feb 2004 and March 2004    x2. prothrombin mutation found after 2nd one    Presence of IVC filter 03/19/2018    Primary insomnia 08/25/2022    Uses trazodone     Prothrombin mutation (HCC)     Pulmonary embolism (HCC)     Spinal stenosis     Thyroid nodule     Unspecified essential hypertension       Past Surgical History:   Procedure Laterality Date    CHOLECYSTECTOMY  2006    IR IVC FILTER PLACEMENT      KNEE REPLACEMENT SURGERY  03/2017    L knee    OTHER SURGICAL HISTORY      carpal tunnel right wrist (1981); Green field filter (PE 2004)    OTHER SURGICAL HISTORY  03/06/2017    Left knee replacement    REMOVAL GALLBLADDER      TONSILLECTOMY  age 18    TUBAL LIGATION  1981      Family History   Problem Relation Age of Onset    Depression Daughter     Anxiety Daughter     Depression Son     Psychiatric Father         dementia    Diabetes Mother     Heart  Disorder Mother     Psychiatric Mother         dementia    Hypertension Sister     Hypertension Brother       Social History     Socioeconomic History    Marital status:    Tobacco Use    Smoking status: Never    Smokeless tobacco: Never   Vaping Use    Vaping Use: Never used   Substance and Sexual Activity    Alcohol use: No     Alcohol/week: 0.0 standard drinks of alcohol    Drug use: No   Other Topics Concern    Caffeine Concern Yes     Comment: 2-4 cups of coffee x day    Exercise No    Seat Belt Yes         REVIEW OF SYSTEMS:   Review of Systems   Constitutional:  Positive for chills, fatigue and fever.   HENT:  Positive for congestion and postnasal drip. Negative for ear discharge, ear pain, rhinorrhea, sinus pressure, sinus pain, sore throat and trouble swallowing.    Eyes: Negative.    Respiratory:  Positive for cough. Negative for chest tightness and shortness of breath.    Gastrointestinal:  Negative for diarrhea, nausea and vomiting.   Neurological:  Negative for dizziness, light-headedness and headaches.       EXAM:   /70 (BP Location: Left arm, Patient Position: Sitting, Cuff Size: adult)   Pulse 107   Temp 98.6 °F (37 °C) (Temporal)   Resp 22   Wt 205 lb (93 kg)   SpO2 97%   BMI 38.73 kg/m²   Physical Exam  Vitals and nursing note reviewed.   Constitutional:       General: She is not in acute distress.     Appearance: She is not ill-appearing.   HENT:      Head: Atraumatic.      Right Ear: Tympanic membrane, ear canal and external ear normal.      Left Ear: Tympanic membrane, ear canal and external ear normal.      Nose: Rhinorrhea present.      Mouth/Throat:      Mouth: Mucous membranes are moist.      Pharynx: No oropharyngeal exudate or posterior oropharyngeal erythema.   Eyes:      General:         Right eye: No discharge.         Left eye: No discharge.      Conjunctiva/sclera: Conjunctivae normal.   Cardiovascular:      Rate and Rhythm: Normal rate and regular rhythm.       Pulses: Normal pulses.      Heart sounds: Normal heart sounds.   Pulmonary:      Effort: Pulmonary effort is normal.      Breath sounds: Normal breath sounds. No wheezing, rhonchi or rales.   Musculoskeletal:      Cervical back: Neck supple.   Skin:     General: Skin is warm and dry.   Neurological:      Mental Status: She is alert and oriented to person, place, and time.         ASSESSMENT AND PLAN:   Diagnoses and all orders for this visit:    Acute cough  -     SARS-CoV-2/Flu A and B/RSV by PCR (Ethan) [E]; Future     -Discussed Viral respiratory panel today.  Recommend continue current medications, over the counter treatment of symptoms cool or warm mist humidification, intranasal saline and flonase, 1 tsp honey or delsym for cough, push fluids, get plenty of rest, infection control discussed.  -Return in 3-5 days for worsening, call with questions or problems.  -Patient verbalized understanding and in agreement with treatment plan.    20 minutes were spent with this patient on assessment, education, and discussion of treatment plan.    ARNOLD Fraser

## 2024-02-29 LAB
FLUAV + FLUBV RNA SPEC NAA+PROBE: DETECTED
FLUAV + FLUBV RNA SPEC NAA+PROBE: NOT DETECTED
RSV RNA SPEC NAA+PROBE: NOT DETECTED
SARS-COV-2 RNA RESP QL NAA+PROBE: NOT DETECTED

## 2024-04-08 ENCOUNTER — APPOINTMENT (OUTPATIENT)
Dept: ULTRASOUND IMAGING | Age: 69
End: 2024-04-08
Attending: OTOLARYNGOLOGY

## 2024-04-08 DIAGNOSIS — E04.1 THYROID NODULE: ICD-10-CM

## 2024-04-08 PROCEDURE — 76536 US EXAM OF HEAD AND NECK: CPT | Performed by: STUDENT IN AN ORGANIZED HEALTH CARE EDUCATION/TRAINING PROGRAM

## 2024-04-08 NOTE — TELEPHONE ENCOUNTER
From: Cristhian Show  To: Helder Barker MD  Sent: 9/10/2019 3:09 PM CDT  Subject: Non-Urgent Medical Question    Dr. Trejo Dears,    Just wanted to let you know that I saw a Dr. Azucena Parr out of [facility] on Monday the 9th of Sept. He had had an MRI done and [Follow-Up] : a follow-up evaluation of

## 2024-04-11 DIAGNOSIS — E04.1 THYROID NODULE: Primary | ICD-10-CM

## 2024-04-17 ENCOUNTER — TELEPHONE (OUTPATIENT)
Dept: FAMILY MEDICINE CLINIC | Facility: CLINIC | Age: 69
End: 2024-04-17

## 2024-04-17 NOTE — TELEPHONE ENCOUNTER
Received fax from [employer] regarding PA for Breyna 160-4.5 inhaler    Call placed to [employer] to and spoke with pharmacy dept and was told patient went to [facility] pharmacy 3-2024 and received label name for symbicort inhaler which was Breyna. If patient continues with symbicort does not need prior authorization.    Called pharmacy and was told by pharmacist patient was given Breyna as above due to symbicort not being available.  Verified that they do have symbicort in stock at this time.      Left message for patient to call back.

## 2024-04-18 NOTE — TELEPHONE ENCOUNTER
Spoke with Liv who states [employer] informed her that Symbicort would not be covered. She states she received a letter that Symbicort is not covered and another letter that Breyna is not covered. Advised this nurse will speak with Gillian in the morning to further clarify. She verbalized understanding.

## 2024-04-19 NOTE — TELEPHONE ENCOUNTER
Spoke with patient and reviewed previous note regarding inhaler. Verbalized understanding.  Does not need a refill a this time.

## 2024-04-22 RX ORDER — ATORVASTATIN CALCIUM 40 MG/1
40 TABLET, FILM COATED ORAL NIGHTLY
Qty: 90 TABLET | Refills: 1 | Status: SHIPPED | OUTPATIENT
Start: 2024-04-22

## 2024-04-29 ENCOUNTER — TELEPHONE (OUTPATIENT)
Dept: FAMILY MEDICINE CLINIC | Facility: CLINIC | Age: 69
End: 2024-04-29

## 2024-04-29 NOTE — TELEPHONE ENCOUNTER
Received fax from [facility] regarding mammogram results done on 4-26-24  Dr Raza carefully reviewed results and documented:    Normal repeat one year    Patient notified and verbalized understanding    Copy to scan

## 2024-05-01 DIAGNOSIS — Z87.09 HISTORY OF ASTHMA: ICD-10-CM

## 2024-05-01 RX ORDER — BUDESONIDE AND FORMOTEROL FUMARATE DIHYDRATE 160; 4.5 UG/1; UG/1
AEROSOL RESPIRATORY (INHALATION)
Qty: 11 G | Refills: 0 | OUTPATIENT
Start: 2024-05-01

## 2024-05-01 RX ORDER — BUDESONIDE AND FORMOTEROL FUMARATE DIHYDRATE 160; 4.5 UG/1; UG/1
AEROSOL RESPIRATORY (INHALATION)
Qty: 10.2 G | Refills: 5 | Status: SHIPPED | OUTPATIENT
Start: 2024-05-01

## 2024-05-01 NOTE — TELEPHONE ENCOUNTER
Patient requesting refill on symbicort inhaler    Reviewed at pharmacy needs to be symbicort (not Breyna as insurance will not cover).     LOV   2-28-24 APRN    2-9-24 Dr Raza    LAST LAB  2-9-24    LAST RX  3-14-24 (Breyna)    Next OV  No future appointments.      PROTOCOL  Asthma & COPD Medication Protocol [id number]/01/2024 04:22 PM   Protocol Details Asthma Action Score greater than or equal to 20    AAP/ACT given in last 12 months    Appointment in past 6 or next 3 months

## 2024-05-06 NOTE — PROGRESS NOTES
Received ER records from [facility] Isabel Rdz, sent to scanning.
If you are a smoker, it is important for your health to stop smoking. Please be aware that second hand smoke is also harmful.

## 2024-05-18 NOTE — TELEPHONE ENCOUNTER
Patient Outreach : left message on identified voicemail , stating to call our office today  if she needs refills today .if we don't hear from her , refills will be addressed on Monday         LOV: 2/28/24    LAST LAB:2/9/24      LAST RX:  montelukast 10 MG Oral Tab 90 tablet 2 7/25/2023 --   Sig:   Take 1 tablet (10 mg total) by mouth daily.         gabapentin 400 MG Oral Cap 270 capsule 1 11/15/2023 --   Sig:   Take 1 capsule (400 mg total) by mouth 3 (three) times daily.       Next OV; No future appointments.       PROTOCOL    Neurology Medications Adutym1905/18/2024 10:20 AM   Protocol Details In person appointment or virtual visit in the past 6 mos or appointment in next 3 mos          Asthma & COPD Medication Protocol [id number]/18/2024 10:20 AM   Protocol Details Asthma Action Score greater than or equal to 20    AAP/ACT given in last 12 months    Appointment in past 6 or next 3 months

## 2024-05-20 RX ORDER — GABAPENTIN 400 MG/1
400 CAPSULE ORAL 3 TIMES DAILY
Qty: 270 CAPSULE | Refills: 1 | Status: SHIPPED | OUTPATIENT
Start: 2024-05-20

## 2024-05-20 RX ORDER — MONTELUKAST SODIUM 10 MG/1
10 TABLET ORAL DAILY
Qty: 90 TABLET | Refills: 1 | Status: SHIPPED | OUTPATIENT
Start: 2024-05-20

## 2024-05-21 ENCOUNTER — OFFICE VISIT (OUTPATIENT)
Dept: FAMILY MEDICINE CLINIC | Facility: CLINIC | Age: 69
End: 2024-05-21

## 2024-05-21 VITALS
BODY MASS INDEX: 37 KG/M2 | TEMPERATURE: 99 F | HEART RATE: 91 BPM | SYSTOLIC BLOOD PRESSURE: 118 MMHG | WEIGHT: 196 LBS | RESPIRATION RATE: 20 BRPM | OXYGEN SATURATION: 99 % | DIASTOLIC BLOOD PRESSURE: 70 MMHG

## 2024-05-21 DIAGNOSIS — R26.9 GAIT ABNORMALITY: ICD-10-CM

## 2024-05-21 DIAGNOSIS — K21.9 GASTROESOPHAGEAL REFLUX DISEASE, UNSPECIFIED WHETHER ESOPHAGITIS PRESENT: Primary | ICD-10-CM

## 2024-05-21 PROBLEM — F41.9 ANXIETY: Status: ACTIVE | Noted: 2024-04-12

## 2024-05-21 PROBLEM — M67.911 DISORDER OF RIGHT ROTATOR CUFF: Status: ACTIVE | Noted: 2024-03-08

## 2024-05-21 PROBLEM — N18.2 CKD (CHRONIC KIDNEY DISEASE) STAGE 2, GFR 60-89 ML/MIN: Status: ACTIVE | Noted: 2020-07-21

## 2024-05-21 PROBLEM — D68.9 COAGULOPATHY (HCC): Status: ACTIVE | Noted: 2024-04-12

## 2024-05-21 PROCEDURE — 3078F DIAST BP <80 MM HG: CPT

## 2024-05-21 PROCEDURE — 1159F MED LIST DOCD IN RCRD: CPT

## 2024-05-21 PROCEDURE — 3074F SYST BP LT 130 MM HG: CPT

## 2024-05-21 PROCEDURE — 1160F RVW MEDS BY RX/DR IN RCRD: CPT

## 2024-05-21 PROCEDURE — 1170F FXNL STATUS ASSESSED: CPT

## 2024-05-21 PROCEDURE — 99214 OFFICE O/P EST MOD 30 MIN: CPT

## 2024-05-21 RX ORDER — PANTOPRAZOLE SODIUM 40 MG/1
40 TABLET, DELAYED RELEASE ORAL
Qty: 60 TABLET | Refills: 0 | Status: SHIPPED | OUTPATIENT
Start: 2024-05-21 | End: 2024-06-20

## 2024-05-21 NOTE — PROGRESS NOTES
Liv Wright is a 68 year old female.  HPI:     Patient in office for nausea and heartburn that she said is due to her lamotrigine.  Her dose was increased 6 days ago to 75 mg in the am and 50 mg afternoon and at night.  2 days after the medication change she started having horrible GERD and nausea.  She reports waking up with acid reflux and a feeling like something is stuck in her throat.  She called the office and was recommended to try an over the counter antacid.  She takes 40 mg of pantoprazole daily in the am.  She has not had any releif of symptoms.  She also reports feeling like her feet stop underneath her and she will stumble but not fall.  This started the same time as the GERD. (6 days ago).    Patient is part of an IOP program at [facility] that she goes to daily.      Current Outpatient Medications   Medication Sig Dispense Refill    GABAPENTIN 400 MG Oral Cap TAKE 1 CAPSULE BY MOUTH THREE TIMES DAILY 270 capsule 1    MONTELUKAST 10 MG Oral Tab Take 1 tablet by mouth once daily 90 tablet 1    Budesonide-Formoterol Fumarate (SYMBICORT) 160-4.5 MCG/ACT Inhalation Aerosol INHALE 1 PUFF BY MOUTH TWICE A DAY *RINSE MOUTH OUT AFTER EACH USE* 10.2 g 5    atorvastatin 40 MG Oral Tab Take 1 tablet by mouth nightly 90 tablet 1    pantoprazole 40 MG Oral Tab EC Take 1 tablet (40 mg total) by mouth 2 (two) times daily. 180 tablet 1    metoprolol succinate ER 25 MG Oral Tablet 24 Hr Take 1 tablet (25 mg total) by mouth daily. 90 tablet 3    albuterol (VENTOLIN HFA) 108 (90 Base) MCG/ACT Inhalation Aero Soln Inhale 2 puffs into the lungs every 4 (four) hours as needed for Wheezing or Shortness of Breath. 18 g 1    lamoTRIgine 25 MG Oral Tab Take two tablets (50 mg) three times a day 1 tablet     apixaban 2.5 MG Oral Tab Take 1 tablet (2.5 mg total) by mouth 2 (two) times daily.      dicyclomine 10 MG Oral Cap Take 1 capsule (10 mg total) by mouth 3 (three) times daily before meals.      ondansetron 4 MG Oral Tablet  Dispersible       DULoxetine 30 MG Oral Cap DR Particles       DULoxetine 60 MG Oral Cap DR Particles       Fluticasone Propionate 50 MCG/ACT Nasal Suspension 2 sprays by Nasal route daily.      acetaminophen 325 MG Oral Tab Take 1-2 tablets (325-650 mg total) by mouth.      Calcium Carbonate-Vitamin D 600-400 MG-UNIT Oral Tab Take 1 tablet by mouth daily.      ipratropium-albuterol 0.5-2.5 (3) MG/3ML Inhalation Solution USE 3 ML VIA NEBULIZER EVERY 4H PRN SOB.  Dx: Mild persistent asthma without complication J45.30 1620 mL 1    TraZODone HCl 100 MG Oral Tab Take 1 tablet (100 mg total) by mouth nightly as needed for Sleep. 30 tablet 0    cetirizine (ZYRTEC) 10 MG Oral Tab Take 1 tablet (10 mg total) by mouth daily.      cholecalciferol (DRISDOL) 1000 UNITS Oral Cap Take 5 capsules (5,000 Units total) by mouth daily.        Past Medical History:    Allergic rhinitis    Asthma (HCC)    Asthma with COPD (chronic obstructive pulmonary disease)    Back pain    Depression    Diverticulosis of large intestine without hemorrhage    Esophageal reflux    Hiatal hernia    History of pulmonary embolism    Obesity    Obstructive sleep apnea (adult) (pediatric)    Osteoarthritis    Other and unspecified hyperlipidemia    PE (pulmonary embolism)    x2. prothrombin mutation found after 2nd one    Presence of IVC filter    Primary insomnia    Uses trazodone     Prothrombin mutation (HCC)    Pulmonary embolism (HCC)    Spinal stenosis    Thyroid nodule    Unspecified essential hypertension      Social History:  Social History     Socioeconomic History    Marital status:    Tobacco Use    Smoking status: Never    Smokeless tobacco: Never   Vaping Use    Vaping status: Never Used   Substance and Sexual Activity    Alcohol use: No     Alcohol/week: 0.0 standard drinks of alcohol    Drug use: No   Other Topics Concern    Caffeine Concern Yes     Comment: 2-4 cups of coffee x day    Exercise No    Seat Belt Yes     Social  Determinants of Health     Financial Resource Strain: Unknown (1/31/2023)    [facility]    Financial Resource Strain     How hard is it for you to pay for the very basics like food, housing, medical care, and heating?: Did not ask   Food Insecurity: No Food Insecurity (12/3/2023)    [facility] from [facility], [facility]    Food Insecurity     Currently or in the past 3 months, have you worried your food would run out before you had money to buy more?: No     In the past 12 months, have you run out of food or been unable to get more?: No    Received from [facility], [facility]    Transportation Needs   Physical Activity: Insufficiently Active (7/8/2020)    [facility] from [facility]    Exercise Vital Sign     Days of Exercise per Week: 1 day     Minutes of Exercise per Session: 10 min    Received from [facility], [facility]    Social Connections    Received from [facility]    Housing Stability          REVIEW OF SYSTEMS:     Review of Systems   Constitutional:  Negative for activity change, appetite change and fatigue.   HENT:  Negative for congestion, hearing loss and sore throat.    Eyes:  Negative for discharge and redness.   Respiratory:  Negative for shortness of breath and wheezing.    Cardiovascular:  Negative for chest pain.   Gastrointestinal:  Positive for nausea. Negative for abdominal distention and abdominal pain.   Endocrine: Negative for cold intolerance and heat intolerance.   Genitourinary:  Negative for difficulty urinating and urgency.   Musculoskeletal:  Positive for gait problem. Negative for arthralgias and back pain.   Skin:  Negative for color change.   Allergic/Immunologic: Negative for environmental allergies.   Neurological:  Negative for dizziness,  syncope and headaches.   Hematological:  Negative for adenopathy. Does not bruise/bleed easily.   Psychiatric/Behavioral:  Negative for agitation, behavioral problems and confusion.        EXAM:   /70 (BP Location: Left arm, Patient Position: Sitting, Cuff Size: adult)   Pulse 91   Temp 99.2 °F (37.3 °C) (Temporal)   Resp 20   Wt 196 lb (88.9 kg)   SpO2 99%   BMI 37.03 kg/m²     Physical Exam  Constitutional:       Appearance: Normal appearance.   HENT:      Head: Normocephalic and atraumatic.      Right Ear: Tympanic membrane normal.      Left Ear: Tympanic membrane normal.      Nose: Nose normal.      Mouth/Throat:      Mouth: Mucous membranes are moist.      Pharynx: Oropharynx is clear.   Eyes:      Extraocular Movements: Extraocular movements intact.      Conjunctiva/sclera: Conjunctivae normal.      Pupils: Pupils are equal, round, and reactive to light.   Cardiovascular:      Rate and Rhythm: Normal rate and regular rhythm.      Pulses: Normal pulses.      Heart sounds: Normal heart sounds.   Pulmonary:      Effort: Pulmonary effort is normal.      Breath sounds: Normal breath sounds.   Abdominal:      General: Bowel sounds are normal.      Palpations: Abdomen is soft.   Musculoskeletal:         General: Normal range of motion.      Cervical back: Normal range of motion.   Skin:     General: Skin is warm and dry.      Capillary Refill: Capillary refill takes less than 2 seconds.   Neurological:      Mental Status: She is alert and oriented to person, place, and time.      Cranial Nerves: Cranial nerves 2-12 are intact.      Coordination: Romberg sign positive. Coordination normal. Finger-Nose-Finger Test normal. Rapid alternating movements normal.      Gait: Gait is intact.   Psychiatric:         Mood and Affect: Mood normal.         Behavior: Behavior normal.          ASSESSMENT AND PLAN:     1. Gastroesophageal reflux disease, unspecified whether esophagitis present  Pantoprazole changed from  daily to BID.  - pantoprazole 40 MG Oral Tab EC; Take 1 tablet (40 mg total) by mouth 2 (two) times daily before meals.  Dispense: 60 tablet; Refill: 0    2. Gait abnormality  Neuro exam normal.  Recommended patient talk with psychiatrist about medication change and side effects.    The patient indicates understanding of these issues and agrees to the plan.      Lyric Pinto, APRN  5/21/2024

## 2024-06-03 ENCOUNTER — MED REC SCAN ONLY (OUTPATIENT)
Dept: FAMILY MEDICINE CLINIC | Facility: CLINIC | Age: 69
End: 2024-06-03

## 2024-06-03 NOTE — ASSESSMENT & PLAN NOTE
As for her cholesterol, Lipids are well controlled, no significant medication side effects noted.    PLAN: will continue present medications, reviewed diet, exercise and weight control, and labs as ordered Tania, intake liaison with [facility] calling with a few questions about the referral Dr Golden sent over today for home care.    Would like to confirm that the pt is not driving.    2.   Wants to know how often wound care is to be performed.    3.   Would like to know if the pt is teachable or if he has     someone who will be taking care of him that can learn his care.     Will route message to provider to review and advise. Caller kindly requests that a new referral be faxed to [phone] with this information on it.     Ivette Bonilla RN, BSN  [facility]

## 2024-06-10 ENCOUNTER — OFFICE VISIT (OUTPATIENT)
Dept: FAMILY MEDICINE CLINIC | Facility: CLINIC | Age: 69
End: 2024-06-10
Payer: MEDICARE

## 2024-06-10 VITALS
WEIGHT: 196 LBS | RESPIRATION RATE: 18 BRPM | SYSTOLIC BLOOD PRESSURE: 120 MMHG | DIASTOLIC BLOOD PRESSURE: 78 MMHG | OXYGEN SATURATION: 98 % | TEMPERATURE: 99 F | HEART RATE: 71 BPM | BODY MASS INDEX: 37 KG/M2

## 2024-06-10 DIAGNOSIS — R26.9 GAIT ABNORMALITY: Primary | ICD-10-CM

## 2024-06-10 PROCEDURE — 1160F RVW MEDS BY RX/DR IN RCRD: CPT

## 2024-06-10 PROCEDURE — 3074F SYST BP LT 130 MM HG: CPT

## 2024-06-10 PROCEDURE — 1159F MED LIST DOCD IN RCRD: CPT

## 2024-06-10 PROCEDURE — 1170F FXNL STATUS ASSESSED: CPT

## 2024-06-10 PROCEDURE — 99213 OFFICE O/P EST LOW 20 MIN: CPT

## 2024-06-10 PROCEDURE — 3078F DIAST BP <80 MM HG: CPT

## 2024-06-10 RX ORDER — LAMOTRIGINE 100 MG/1
TABLET ORAL
COMMUNITY
Start: 2024-06-03

## 2024-06-10 NOTE — PROGRESS NOTES
HPI:   Liv is a 68 yr. Old female here today for right leg dragging x 1 week.  Per patient she was seen in emergency department 6/7/24 and evaluated for this condition.  Per patient blood clot and stroke were ruled out, was referred to neurology.  Patient presents today reports she has spent the past 4 weeks in an inpatient psych unit in [place], discharged today.  Per patient she has been using a walker which was recommended a few months ago by another health care provider.  Reports while she was on the inpatient psych unit she was doing a lot of walking and noticed that her right foot would drag.  The longer she walked, the more it would drag.  Reports symptoms are not bad today as she has not been doing a lot of walking.           Current Outpatient Medications   Medication Sig Dispense Refill    lamoTRIgine 100 MG Oral Tab       pantoprazole 40 MG Oral Tab EC Take 1 tablet (40 mg total) by mouth 2 (two) times daily before meals. 60 tablet 0    GABAPENTIN 400 MG Oral Cap TAKE 1 CAPSULE BY MOUTH THREE TIMES DAILY 270 capsule 1    MONTELUKAST 10 MG Oral Tab Take 1 tablet by mouth once daily 90 tablet 1    Budesonide-Formoterol Fumarate (SYMBICORT) 160-4.5 MCG/ACT Inhalation Aerosol INHALE 1 PUFF BY MOUTH TWICE A DAY *RINSE MOUTH OUT AFTER EACH USE* 10.2 g 5    atorvastatin 40 MG Oral Tab Take 1 tablet by mouth nightly 90 tablet 1    metoprolol succinate ER 25 MG Oral Tablet 24 Hr Take 1 tablet (25 mg total) by mouth daily. 90 tablet 3    apixaban 2.5 MG Oral Tab Take 1 tablet (2.5 mg total) by mouth 2 (two) times daily.      ondansetron 4 MG Oral Tablet Dispersible       DULoxetine 60 MG Oral Cap DR Particles       Fluticasone Propionate 50 MCG/ACT Nasal Suspension 2 sprays by Nasal route daily.      Calcium Carbonate-Vitamin D 600-400 MG-UNIT Oral Tab Take 1 tablet by mouth daily.      TraZODone HCl 100 MG Oral Tab Take 1 tablet (100 mg total) by mouth nightly as needed for Sleep. 30 tablet 0    cetirizine  (ZYRTEC) 10 MG Oral Tab Take 1 tablet (10 mg total) by mouth daily.      cholecalciferol (DRISDOL) 1000 UNITS Oral Cap Take 5 capsules (5,000 Units total) by mouth daily.      albuterol (VENTOLIN HFA) 108 (90 Base) MCG/ACT Inhalation Aero Soln Inhale 2 puffs into the lungs every 4 (four) hours as needed for Wheezing or Shortness of Breath. 18 g 1    dicyclomine 10 MG Oral Cap Take 1 capsule (10 mg total) by mouth 3 (three) times daily before meals.      acetaminophen 325 MG Oral Tab Take 1-2 tablets (325-650 mg total) by mouth.        Past Medical History:    Allergic rhinitis    Asthma (HCC)    Asthma with COPD (chronic obstructive pulmonary disease) ([facility])    Back pain    Depression    Diverticulosis of large intestine without hemorrhage    Esophageal reflux    Hiatal hernia    History of pulmonary embolism    Obesity    Obstructive sleep apnea (adult) (pediatric)    Osteoarthritis    Other and unspecified hyperlipidemia    PE (pulmonary embolism)    x2. prothrombin mutation found after 2nd one    Presence of IVC filter    Primary insomnia    Uses trazodone     Prothrombin mutation (HCC)    Pulmonary embolism (HCC)    Spinal stenosis    Thyroid nodule    Unspecified essential hypertension      Past Surgical History:   Procedure Laterality Date    Cholecystectomy  2006    Ir ivc filter placement      Knee replacement surgery  03/2017    L knee    Other surgical history      carpal tunnel right wrist (1981); Green field filter (PE 2004)    Other surgical history  03/06/2017    Left knee replacement    Removal gallbladder      Tonsillectomy  age 18    Tubal ligation  1981      Family History   Problem Relation Age of Onset    Depression Daughter     Anxiety Daughter     Depression Son     Psychiatric Father         dementia    Diabetes Mother     Heart Disorder Mother     Psychiatric Mother         dementia    Hypertension Sister     Hypertension Brother       Social History     Socioeconomic History    Marital  status:    Tobacco Use    Smoking status: Never    Smokeless tobacco: Never   Vaping Use    Vaping status: Never Used   Substance and Sexual Activity    Alcohol use: No     Alcohol/week: 0.0 standard drinks of alcohol    Drug use: No   Other Topics Concern    Caffeine Concern Yes     Comment: 2-4 cups of coffee x day    Exercise No    Seat Belt Yes     Social Determinants of Health     Financial Resource Strain: Unknown (1/31/2023)    [facility], [facility]    Financial Resource Strain     How hard is it for you to pay for the very basics like food, housing, medical care, and heating?: Did not ask   Food Insecurity: No Food Insecurity (12/3/2023)    Received from [facility], [facility]    Food Insecurity     Currently or in the past 3 months, have you worried your food would run out before you had money to buy more?: No     In the past 12 months, have you run out of food or been unable to get more?: No    Received from [facility], [facility]    Transportation Needs   Physical Activity: Insufficiently Active (7/8/2020)    Received from [facility]    Exercise Vital Sign     Days of Exercise per Week: 1 day     Minutes of Exercise per Session: 10 min    Received from [facility], [facility]    Social Connections    Received from [facility]    Housing Stability         REVIEW OF SYSTEMS:   Review of Systems   Constitutional:  Negative for chills, fever and unexpected weight change.   Cardiovascular:  Negative for leg swelling.   Musculoskeletal:  Positive for gait problem (see hpi).   Skin:  Negative for rash.   Neurological:  Negative for weakness and numbness.   Psychiatric/Behavioral:  Negative for self-injury and suicidal ideas.        EXAM:   /78 (BP Location: Left arm,  Patient Position: Sitting, Cuff Size: adult)   Pulse 71   Temp 99.1 °F (37.3 °C) (Temporal)   Resp 18   Wt 196 lb (88.9 kg)   SpO2 98%   BMI 37.03 kg/m²   Physical Exam  Vitals and nursing note reviewed.   Constitutional:       General: She is not in acute distress.     Appearance: Normal appearance.   HENT:      Head: Atraumatic.   Cardiovascular:      Rate and Rhythm: Normal rate.   Pulmonary:      Effort: Pulmonary effort is normal.   Musculoskeletal:         General: No swelling, tenderness or signs of injury. Normal range of motion.      Right lower leg: No edema.      Left lower leg: No edema.   Skin:     General: Skin is warm and dry.      Findings: No rash.   Neurological:      Mental Status: She is alert and oriented to person, place, and time.   Psychiatric:         Behavior: Behavior normal.         ASSESSMENT AND PLAN:   Diagnoses and all orders for this visit:    Gait abnormality  -     PHYSICAL THERAPY EXTERNAL     -Discussed unable to access recent hospitalization and emergency department evaluation.  Discussed per patient she was informed neurologic cause for her symptoms was ruled out and she prefers to avoid neuro referral at this time if possible.  Recommend refer to physical therapy for further evaluation and management.  -Return for worsening, call with questions or problems.  -Patient verbalized understanding and in agreement with treatment plan.    20 minutes were spent with this patient on assessment, education, and discussion of treatment plan.    ARNOLD Fraser

## 2024-06-11 ENCOUNTER — MED REC SCAN ONLY (OUTPATIENT)
Dept: FAMILY MEDICINE CLINIC | Facility: CLINIC | Age: 69
End: 2024-06-11

## 2024-06-13 ENCOUNTER — TELEPHONE (OUTPATIENT)
Dept: FAMILY MEDICINE CLINIC | Facility: CLINIC | Age: 69
End: 2024-06-13

## 2024-06-13 ENCOUNTER — OFFICE VISIT (OUTPATIENT)
Dept: FAMILY MEDICINE CLINIC | Facility: CLINIC | Age: 69
End: 2024-06-13
Payer: MEDICARE

## 2024-06-13 VITALS
DIASTOLIC BLOOD PRESSURE: 82 MMHG | HEART RATE: 107 BPM | BODY MASS INDEX: 37 KG/M2 | TEMPERATURE: 99 F | RESPIRATION RATE: 22 BRPM | SYSTOLIC BLOOD PRESSURE: 130 MMHG | WEIGHT: 196 LBS | OXYGEN SATURATION: 95 %

## 2024-06-13 DIAGNOSIS — R30.0 DYSURIA: Primary | ICD-10-CM

## 2024-06-13 DIAGNOSIS — F41.1 GAD (GENERALIZED ANXIETY DISORDER): ICD-10-CM

## 2024-06-13 LAB
BILIRUBIN: NEGATIVE
GLUCOSE (URINE DIPSTICK): NEGATIVE MG/DL
KETONES (URINE DIPSTICK): NEGATIVE MG/DL
MULTISTIX LOT#: ABNORMAL NUMERIC
NITRITE, URINE: NEGATIVE
OCCULT BLOOD: NEGATIVE
PH, URINE: 7 (ref 4.5–8)
PROTEIN (URINE DIPSTICK): NEGATIVE MG/DL
SPECIFIC GRAVITY: 1.01 (ref 1–1.03)
URINE-COLOR: YELLOW
UROBILINOGEN,SEMI-QN: 0.2 MG/DL (ref 0–1.9)

## 2024-06-13 PROCEDURE — 3075F SYST BP GE 130 - 139MM HG: CPT

## 2024-06-13 PROCEDURE — 3079F DIAST BP 80-89 MM HG: CPT

## 2024-06-13 PROCEDURE — 87086 URINE CULTURE/COLONY COUNT: CPT

## 2024-06-13 PROCEDURE — 99214 OFFICE O/P EST MOD 30 MIN: CPT

## 2024-06-13 PROCEDURE — 81003 URINALYSIS AUTO W/O SCOPE: CPT

## 2024-06-13 PROCEDURE — 1170F FXNL STATUS ASSESSED: CPT

## 2024-06-13 NOTE — PROGRESS NOTES
HPI:   Liv is a 68 yr. Old female here today for urinary frequency and burning with urination that has started within the past couple of days.  Mid low back pain. Patient reports she was not changing her pad midday while she was hospitalized for her mental health.      Anxiety is bad today, too much commotion at the [facility]. Patient reports recently discharged from 4 week inpatient mental health stay at hospital in [place].  Reports medications were rearranged, unsure as med changes, does regularly see her mental health provider every two weeks.          Current Outpatient Medications   Medication Sig Dispense Refill    lamoTRIgine 100 MG Oral Tab       pantoprazole 40 MG Oral Tab EC Take 1 tablet (40 mg total) by mouth 2 (two) times daily before meals. 60 tablet 0    GABAPENTIN 400 MG Oral Cap TAKE 1 CAPSULE BY MOUTH THREE TIMES DAILY 270 capsule 1    MONTELUKAST 10 MG Oral Tab Take 1 tablet by mouth once daily 90 tablet 1    Budesonide-Formoterol Fumarate (SYMBICORT) 160-4.5 MCG/ACT Inhalation Aerosol INHALE 1 PUFF BY MOUTH TWICE A DAY *RINSE MOUTH OUT AFTER EACH USE* 10.2 g 5    atorvastatin 40 MG Oral Tab Take 1 tablet by mouth nightly 90 tablet 1    metoprolol succinate ER 25 MG Oral Tablet 24 Hr Take 1 tablet (25 mg total) by mouth daily. 90 tablet 3    albuterol (VENTOLIN HFA) 108 (90 Base) MCG/ACT Inhalation Aero Soln Inhale 2 puffs into the lungs every 4 (four) hours as needed for Wheezing or Shortness of Breath. 18 g 1    apixaban 2.5 MG Oral Tab Take 1 tablet (2.5 mg total) by mouth 2 (two) times daily.      ondansetron 4 MG Oral Tablet Dispersible       DULoxetine 60 MG Oral Cap DR Particles       Fluticasone Propionate 50 MCG/ACT Nasal Suspension 2 sprays by Nasal route daily.      Calcium Carbonate-Vitamin D 600-400 MG-UNIT Oral Tab Take 1 tablet by mouth daily.      TraZODone HCl 100 MG Oral Tab Take 1 tablet (100 mg total) by mouth nightly as needed for Sleep. 30 tablet 0    cetirizine  (ZYRTEC) 10 MG Oral Tab Take 1 tablet (10 mg total) by mouth daily.      cholecalciferol (DRISDOL) 1000 UNITS Oral Cap Take 5 capsules (5,000 Units total) by mouth daily.      busPIRone 10 MG Oral Tab Take 1 tablet (10 mg total) by mouth 3 (three) times daily as needed. 90 tablet 0    dicyclomine 10 MG Oral Cap Take 1 capsule (10 mg total) by mouth 3 (three) times daily before meals.      acetaminophen 325 MG Oral Tab Take 1-2 tablets (325-650 mg total) by mouth.        Past Medical History:    Allergic rhinitis    Asthma (HCC)    Asthma with COPD (chronic obstructive pulmonary disease) ([facility])    Back pain    Depression    Diverticulosis of large intestine without hemorrhage    Esophageal reflux    Hiatal hernia    History of pulmonary embolism    Obesity    Obstructive sleep apnea (adult) (pediatric)    Osteoarthritis    Other and unspecified hyperlipidemia    PE (pulmonary embolism)    x2. prothrombin mutation found after 2nd one    Presence of IVC filter    Primary insomnia    Uses trazodone     Prothrombin mutation (HCC)    Pulmonary embolism (HCC)    Spinal stenosis    Thyroid nodule    Unspecified essential hypertension      Past Surgical History:   Procedure Laterality Date    Cholecystectomy  2006    Ir ivc filter placement      Knee replacement surgery  03/2017    L knee    Other surgical history      carpal tunnel right wrist (1981); Green field filter (PE 2004)    Other surgical history  03/06/2017    Left knee replacement    Removal gallbladder      Tonsillectomy  age 18    Tubal ligation  1981      Family History   Problem Relation Age of Onset    Depression Daughter     Anxiety Daughter     Depression Son     Psychiatric Father         dementia    Diabetes Mother     Heart Disorder Mother     Psychiatric Mother         dementia    Hypertension Sister     Hypertension Brother       Social History     Socioeconomic History    Marital status:    Tobacco Use    Smoking status: Never    Smokeless  tobacco: Never   Vaping Use    Vaping status: Never Used   Substance and Sexual Activity    Alcohol use: No     Alcohol/week: 0.0 standard drinks of alcohol    Drug use: No   Other Topics Concern    Caffeine Concern Yes     Comment: 2-4 cups of coffee x day    Exercise No    Seat Belt Yes     Social Determinants of Health     Financial Resource Strain: Unknown (1/31/2023)    [facility], [facility]    Financial Resource Strain     How hard is it for you to pay for the very basics like food, housing, medical care, and heating?: Did not ask   Food Insecurity: No Food Insecurity (12/3/2023)    Received from [facility], [facility]    Food Insecurity     Currently or in the past 3 months, have you worried your food would run out before you had money to buy more?: No     In the past 12 months, have you run out of food or been unable to get more?: No    Received from [facility], [facility]    Transportation Needs   Physical Activity: Insufficiently Active (7/8/2020)    Received from [facility], [facility]    Exercise Vital Sign     Days of Exercise per Week: 1 day     Minutes of Exercise per Session: 10 min    Received from [facility], [facility]    Social Connections    Received from [facility]    Housing Stability         REVIEW OF SYSTEMS:   Review of Systems   Constitutional:  Negative for chills, fatigue and fever.   Respiratory: Negative.     Cardiovascular: Negative.    Gastrointestinal:  Negative for abdominal pain, constipation, diarrhea, nausea and vomiting.   Genitourinary:  Positive for dysuria, frequency and urgency. Negative for difficulty urinating, flank pain, hematuria, pelvic pain and vaginal discharge.   Neurological:  Negative for headaches.   Psychiatric/Behavioral:  The patient is  nervous/anxious.        EXAM:   /82 (BP Location: Right arm, Patient Position: Sitting, Cuff Size: adult)   Pulse 107   Temp 98.8 °F (37.1 °C) (Temporal)   Resp 22   Wt 196 lb (88.9 kg)   SpO2 95%   BMI 37.03 kg/m²   Physical Exam  Vitals and nursing note reviewed.   Constitutional:       General: She is not in acute distress.     Appearance: She is not ill-appearing.   HENT:      Head: Atraumatic.      Mouth/Throat:      Mouth: Mucous membranes are moist.   Eyes:      Pupils: Pupils are equal, round, and reactive to light.   Cardiovascular:      Rate and Rhythm: Normal rate and regular rhythm.      Pulses: Normal pulses.      Heart sounds: Normal heart sounds.   Pulmonary:      Effort: Pulmonary effort is normal.      Breath sounds: Normal breath sounds.   Abdominal:      General: Abdomen is flat. Bowel sounds are normal.      Palpations: Abdomen is soft.   Musculoskeletal:      Cervical back: Neck supple.   Skin:     General: Skin is warm and dry.   Neurological:      General: No focal deficit present.      Mental Status: She is alert.   Psychiatric:      Comments: fidgeting         ASSESSMENT AND PLAN:   Diagnoses and all orders for this visit:    Dysuria  -     Urine Dip, auto without Micro  -     Urine Culture, Routine [E]; Future    JARRETT (generalized anxiety disorder)     -Discussed in office u/a with low suspicion for infection. Will await culture.  Recommend patient push fluids, check/change pad more frequently.  -Encouraged patient to call and discuss anxiety and this provider medication recommendation with mental health provider.  Recommend start buspirone 10mg tid prn.  -Return in 1 month for recheck, sooner as needed, call with questions or problems.  -Patient verbalized understanding and in agreement with treatment plan.    30 minutes were spent with this patient on assessment, education, and discussion of treatment plan.    ARNOLD Fraser

## 2024-06-13 NOTE — TELEPHONE ENCOUNTER
Spoke with patient who states she has started with frequent urination and burning yesterday. She has been gone the last 4 weeks, and wears a pad, but hasn't been changing it as often as she should. She is concerned she has a UTI. Patient scheduled today with Melonie Whitney. Advised we will want a urine sample from her at time of visit as well. She verbalized understanding. Will forward to Melonie as an FYI.     Future Appointments   Date Time Provider Department Center   6/13/2024  1:00 PM Melonie Whitney, [facility], please note allergies, just an FYI.

## 2024-06-17 ENCOUNTER — TELEPHONE (OUTPATIENT)
Dept: FAMILY MEDICINE CLINIC | Facility: CLINIC | Age: 69
End: 2024-06-17

## 2024-06-17 ENCOUNTER — PATIENT MESSAGE (OUTPATIENT)
Dept: FAMILY MEDICINE CLINIC | Facility: CLINIC | Age: 69
End: 2024-06-17

## 2024-06-17 PROCEDURE — 93010 ELECTROCARDIOGRAM REPORT: CPT | Performed by: INTERNAL MEDICINE

## 2024-06-17 NOTE — TELEPHONE ENCOUNTER
From: Liv Wright  To: Melonie Whitney  Sent: 6/17/2024 8:03 AM CDT  Subject: Possiblenallergy reaction     I was at [facility] in [place] and they started me on tramadol on Saturday for left lower back pain. Is it possible to have an allergic reaction 12 hours after taking it? When I got up yesterday morning and this morning I was very itchy but no rash so I took a Benadryl and it went away.  Thank you  Liv Wright  
See telephone encounter from today  
yes...

## 2024-06-17 NOTE — TELEPHONE ENCOUNTER
Reviewed with ARNOLD Fraser and she advised does not feel can advise on a medication she did not prescribe.    Patient notified of above and verbalized understanding. Agrees to come in for evaluation if needed.

## 2024-06-17 NOTE — TELEPHONE ENCOUNTER
PATIENT WAS SEEN AT [facility] EMERGENCY DEPARTMENT SATURDAY, HAVING POSSIBLE ALLERGIC REACTION TO TRAMADOL THAT WAS PRESCRIBED, 12 HOURS AFTER TAKING IT SHE STARTED ITCHING, BUT NO HIVES, TOOK BENADRYL & ITCHING STOPPED, CALL PATIENT BACK

## 2024-06-19 ENCOUNTER — OFFICE VISIT (OUTPATIENT)
Dept: FAMILY MEDICINE CLINIC | Facility: CLINIC | Age: 69
End: 2024-06-19

## 2024-06-19 VITALS
OXYGEN SATURATION: 96 % | RESPIRATION RATE: 20 BRPM | HEART RATE: 94 BPM | SYSTOLIC BLOOD PRESSURE: 136 MMHG | DIASTOLIC BLOOD PRESSURE: 78 MMHG | TEMPERATURE: 98 F | WEIGHT: 196 LBS | BODY MASS INDEX: 37 KG/M2

## 2024-06-19 DIAGNOSIS — F41.9 ANXIETY: ICD-10-CM

## 2024-06-19 DIAGNOSIS — M54.50 ACUTE LEFT-SIDED LOW BACK PAIN WITHOUT SCIATICA: Primary | ICD-10-CM

## 2024-06-19 PROCEDURE — 1160F RVW MEDS BY RX/DR IN RCRD: CPT

## 2024-06-19 PROCEDURE — 1159F MED LIST DOCD IN RCRD: CPT

## 2024-06-19 PROCEDURE — 99213 OFFICE O/P EST LOW 20 MIN: CPT

## 2024-06-19 PROCEDURE — 1170F FXNL STATUS ASSESSED: CPT

## 2024-06-19 PROCEDURE — 3078F DIAST BP <80 MM HG: CPT

## 2024-06-19 PROCEDURE — 3075F SYST BP GE 130 - 139MM HG: CPT

## 2024-06-19 RX ORDER — LIDOCAINE 50 MG/G
1 PATCH TOPICAL EVERY 24 HOURS
Qty: 7 PATCH | Refills: 0 | Status: SHIPPED | OUTPATIENT
Start: 2024-06-19 | End: 2024-06-26

## 2024-06-19 RX ORDER — TRAMADOL HYDROCHLORIDE 50 MG/1
50 TABLET ORAL 3 TIMES DAILY PRN
COMMUNITY
Start: 2024-06-15

## 2024-06-19 NOTE — PROGRESS NOTES
HPI:   Liv is a 68 yr. Old female here today for back pain per patient was seen in emergency room on 6/15/24 for acute left lower back pain.  Patient denies injury. Per patient was prescribed Tramadol, took a total of 2 doses and experienced extreme itchiness after both doses. Did not see a rash.  Did not experience face or mouth symptoms. Back pain has improved per patient.  Worse with sitting or standing in one spot for prolonged periods of time. Denies radiating pain, numbness, tingling.          Current Outpatient Medications   Medication Sig Dispense Refill    lidocaine 5 % External Patch Place 1 patch onto the skin daily for 7 days. Left lower back 7 patch 0    busPIRone 10 MG Oral Tab Take 1 tablet (10 mg total) by mouth 3 (three) times daily as needed. 90 tablet 0    lamoTRIgine 100 MG Oral Tab       pantoprazole 40 MG Oral Tab EC Take 1 tablet (40 mg total) by mouth 2 (two) times daily before meals. 60 tablet 0    GABAPENTIN 400 MG Oral Cap TAKE 1 CAPSULE BY MOUTH THREE TIMES DAILY 270 capsule 1    MONTELUKAST 10 MG Oral Tab Take 1 tablet by mouth once daily 90 tablet 1    Budesonide-Formoterol Fumarate (SYMBICORT) 160-4.5 MCG/ACT Inhalation Aerosol INHALE 1 PUFF BY MOUTH TWICE A DAY *RINSE MOUTH OUT AFTER EACH USE* 10.2 g 5    atorvastatin 40 MG Oral Tab Take 1 tablet by mouth nightly 90 tablet 1    metoprolol succinate ER 25 MG Oral Tablet 24 Hr Take 1 tablet (25 mg total) by mouth daily. 90 tablet 3    albuterol (VENTOLIN HFA) 108 (90 Base) MCG/ACT Inhalation Aero Soln Inhale 2 puffs into the lungs every 4 (four) hours as needed for Wheezing or Shortness of Breath. 18 g 1    apixaban 2.5 MG Oral Tab Take 1 tablet (2.5 mg total) by mouth 2 (two) times daily.      ondansetron 4 MG Oral Tablet Dispersible       DULoxetine 60 MG Oral Cap DR Particles       Fluticasone Propionate 50 MCG/ACT Nasal Suspension 2 sprays by Nasal route daily.      Calcium Carbonate-Vitamin D 600-400 MG-UNIT Oral Tab Take 1  tablet by mouth daily.      TraZODone HCl 100 MG Oral Tab Take 1 tablet (100 mg total) by mouth nightly as needed for Sleep. 30 tablet 0    cetirizine (ZYRTEC) 10 MG Oral Tab Take 1 tablet (10 mg total) by mouth daily.      cholecalciferol (DRISDOL) 1000 UNITS Oral Cap Take 5 capsules (5,000 Units total) by mouth daily.      traMADol 50 MG Oral Tab Take 1 tablet (50 mg total) by mouth 3 (three) times daily as needed. (Patient not taking: Reported on 6/19/2024)      dicyclomine 10 MG Oral Cap Take 1 capsule (10 mg total) by mouth 3 (three) times daily before meals.      acetaminophen 325 MG Oral Tab Take 1-2 tablets (325-650 mg total) by mouth.        Past Medical History:    Allergic rhinitis    Asthma ([facility])    Asthma with COPD (chronic obstructive pulmonary disease) ([facility])    Back pain    Depression    Diverticulosis of large intestine without hemorrhage    Esophageal reflux    Hiatal hernia    History of pulmonary embolism    Obesity    Obstructive sleep apnea (adult) (pediatric)    Osteoarthritis    Other and unspecified hyperlipidemia    PE (pulmonary embolism)    x2. prothrombin mutation found after 2nd one    Presence of IVC filter    Primary insomnia    Uses trazodone     Prothrombin mutation (HCC)    Pulmonary embolism (HCC)    Spinal stenosis    Thyroid nodule    Unspecified essential hypertension      Past Surgical History:   Procedure Laterality Date    Cholecystectomy  2006    Ir ivc filter placement      Knee replacement surgery  03/2017    L knee    Other surgical history      carpal tunnel right wrist (1981); Green field filter (PE 2004)    Other surgical history  03/06/2017    Left knee replacement    Removal gallbladder      Tonsillectomy  age 18    Tubal ligation  1981      Family History   Problem Relation Age of Onset    Depression Daughter     Anxiety Daughter     Depression Son     Psychiatric Father         dementia    Diabetes Mother     Heart Disorder Mother     Psychiatric Mother          dementia    Hypertension Sister     Hypertension Brother       Social History     Socioeconomic History    Marital status:    Tobacco Use    Smoking status: Never    Smokeless tobacco: Never   Vaping Use    Vaping status: Never Used   Substance and Sexual Activity    Alcohol use: No     Alcohol/week: 0.0 standard drinks of alcohol    Drug use: No   Other Topics Concern    Caffeine Concern Yes     Comment: 2-4 cups of coffee x day    Exercise No    Seat Belt Yes     Social Determinants of Health     Financial Resource Strain: Unknown (1/31/2023)    Received from [facility], [facility]    Financial Resource Strain     How hard is it for you to pay for the very basics like food, housing, medical care, and heating?: Did not ask   Food Insecurity: No Food Insecurity (12/3/2023)    Received from [facility], [facility]    Food Insecurity     Currently or in the past 3 months, have you worried your food would run out before you had money to buy more?: No     In the past 12 months, have you run out of food or been unable to get more?: No    Received from [facility], [facility]    Transportation Needs   Physical Activity: Insufficiently Active (7/8/2020)    Received from [facility], [facility]    Exercise Vital Sign     Days of Exercise per Week: 1 day     Minutes of Exercise per Session: 10 min    Received from [facility], [facility]    Social Connections    Received from [facility]    Housing Stability         REVIEW OF SYSTEMS:   Review of Systems   Constitutional: Negative.    Respiratory: Negative.     Cardiovascular: Negative.    Gastrointestinal: Negative.    Musculoskeletal:  Positive for back pain. Negative for gait problem and neck pain.   Skin:         Itchiness after medication see hpi   Neurological:   Negative for weakness and numbness.       EXAM:   /78 (BP Location: Left arm, Patient Position: Sitting, Cuff Size: adult)   Pulse 94   Temp 98.1 °F (36.7 °C) (Temporal)   Resp 20   Wt 196 lb (88.9 kg)   SpO2 96%   BMI 37.03 kg/m²   Physical Exam  Vitals and nursing note reviewed.   Constitutional:       General: She is not in acute distress.     Appearance: Normal appearance.   HENT:      Head: Atraumatic.      Mouth/Throat:      Mouth: Mucous membranes are moist.   Cardiovascular:      Rate and Rhythm: Normal rate and regular rhythm.      Pulses: Normal pulses.      Heart sounds: Normal heart sounds.   Pulmonary:      Effort: Pulmonary effort is normal.      Breath sounds: Normal breath sounds.   Musculoskeletal:         General: Tenderness (to deep palpation left sacroiliac joint.) present. No signs of injury. Normal range of motion.      Cervical back: Neck supple.   Skin:     General: Skin is warm and dry.      Findings: No rash.   Neurological:      Mental Status: She is alert.         ASSESSMENT AND PLAN:   Diagnoses and all orders for this visit:    Acute left-sided low back pain without sciatica  -     lidocaine 5 % External Patch; Place 1 patch onto the skin daily for 7 days. Left lower back    Anxiety     -Discussed recommend lidocaine patch, tylenol as needed, can alternate ice and heat, discussed light stretching. Printed instructions provided.  -Buspar working well per patient  -Return for worsening, call with questions or problems.  -Patient verbalized understanding and in agreement with treatment plan.    20 minutes were spent on assessment, education, and discussion of treatment plan.    ARNOLD Fraser

## 2024-06-28 ENCOUNTER — OFFICE VISIT (OUTPATIENT)
Dept: FAMILY MEDICINE CLINIC | Facility: CLINIC | Age: 69
End: 2024-06-28

## 2024-06-28 VITALS
OXYGEN SATURATION: 98 % | HEART RATE: 103 BPM | SYSTOLIC BLOOD PRESSURE: 138 MMHG | BODY MASS INDEX: 37 KG/M2 | WEIGHT: 196 LBS | TEMPERATURE: 98 F | DIASTOLIC BLOOD PRESSURE: 80 MMHG | RESPIRATION RATE: 18 BRPM

## 2024-06-28 DIAGNOSIS — F41.9 ANXIETY: ICD-10-CM

## 2024-06-28 DIAGNOSIS — H92.02 LEFT EAR PAIN: Primary | ICD-10-CM

## 2024-06-28 PROCEDURE — 3075F SYST BP GE 130 - 139MM HG: CPT

## 2024-06-28 PROCEDURE — 3079F DIAST BP 80-89 MM HG: CPT

## 2024-06-28 PROCEDURE — 99212 OFFICE O/P EST SF 10 MIN: CPT

## 2024-06-28 PROCEDURE — 1160F RVW MEDS BY RX/DR IN RCRD: CPT

## 2024-06-28 PROCEDURE — 1159F MED LIST DOCD IN RCRD: CPT

## 2024-06-28 PROCEDURE — 1170F FXNL STATUS ASSESSED: CPT

## 2024-06-28 NOTE — PROGRESS NOTES
HPI:   Liv is a 68 yr. Old female here today for left ear pain that started yesterday when she was sitting outside in the wind.          Current Outpatient Medications   Medication Sig Dispense Refill    traMADol 50 MG Oral Tab Take 1 tablet (50 mg total) by mouth 3 (three) times daily as needed. (Patient not taking: Reported on 6/19/2024)      busPIRone 10 MG Oral Tab Take 1 tablet (10 mg total) by mouth 3 (three) times daily as needed. 90 tablet 0    lamoTRIgine 100 MG Oral Tab       GABAPENTIN 400 MG Oral Cap TAKE 1 CAPSULE BY MOUTH THREE TIMES DAILY 270 capsule 1    MONTELUKAST 10 MG Oral Tab Take 1 tablet by mouth once daily 90 tablet 1    Budesonide-Formoterol Fumarate (SYMBICORT) 160-4.5 MCG/ACT Inhalation Aerosol INHALE 1 PUFF BY MOUTH TWICE A DAY *RINSE MOUTH OUT AFTER EACH USE* 10.2 g 5    atorvastatin 40 MG Oral Tab Take 1 tablet by mouth nightly 90 tablet 1    metoprolol succinate ER 25 MG Oral Tablet 24 Hr Take 1 tablet (25 mg total) by mouth daily. 90 tablet 3    albuterol (VENTOLIN HFA) 108 (90 Base) MCG/ACT Inhalation Aero Soln Inhale 2 puffs into the lungs every 4 (four) hours as needed for Wheezing or Shortness of Breath. 18 g 1    apixaban 2.5 MG Oral Tab Take 1 tablet (2.5 mg total) by mouth 2 (two) times daily.      dicyclomine 10 MG Oral Cap Take 1 capsule (10 mg total) by mouth 3 (three) times daily before meals.      ondansetron 4 MG Oral Tablet Dispersible       DULoxetine 60 MG Oral Cap DR Particles       Fluticasone Propionate 50 MCG/ACT Nasal Suspension 2 sprays by Nasal route daily.      acetaminophen 325 MG Oral Tab Take 1-2 tablets (325-650 mg total) by mouth.      Calcium Carbonate-Vitamin D 600-400 MG-UNIT Oral Tab Take 1 tablet by mouth daily.      TraZODone HCl 100 MG Oral Tab Take 1 tablet (100 mg total) by mouth nightly as needed for Sleep. 30 tablet 0    cetirizine (ZYRTEC) 10 MG Oral Tab Take 1 tablet (10 mg total) by mouth daily.      cholecalciferol (DRISDOL) 1000 UNITS  Oral Cap Take 5 capsules (5,000 Units total) by mouth daily.        Past Medical History:    Allergic rhinitis    Asthma (HCC)    Asthma with COPD (chronic obstructive pulmonary disease) (HCC)    Back pain    Depression    Diverticulosis of large intestine without hemorrhage    Esophageal reflux    Hiatal hernia    History of pulmonary embolism    Obesity    Obstructive sleep apnea (adult) (pediatric)    Osteoarthritis    Other and unspecified hyperlipidemia    PE (pulmonary embolism)    x2. prothrombin mutation found after 2nd one    Presence of IVC filter    Primary insomnia    Uses trazodone     Prothrombin mutation (HCC)    Pulmonary embolism (HCC)    Spinal stenosis    Thyroid nodule    Unspecified essential hypertension      Past Surgical History:   Procedure Laterality Date    Cholecystectomy  2006    Ir ivc filter placement      Knee replacement surgery  03/2017    L knee    Other surgical history      carpal tunnel right wrist (1981); Green field filter (PE 2004)    Other surgical history  03/06/2017    Left knee replacement    Removal gallbladder      Tonsillectomy  age 18    Tubal ligation  1981      Family History   Problem Relation Age of Onset    Depression Daughter     Anxiety Daughter     Depression Son     Psychiatric Father         dementia    Diabetes Mother     Heart Disorder Mother     Psychiatric Mother         dementia    Hypertension Sister     Hypertension Brother       Social History     Socioeconomic History    Marital status:    Tobacco Use    Smoking status: Never    Smokeless tobacco: Never   Vaping Use    Vaping status: Never Used   Substance and Sexual Activity    Alcohol use: No     Alcohol/week: 0.0 standard drinks of alcohol    Drug use: No   Other Topics Concern    Caffeine Concern Yes     Comment: 2-4 cups of coffee x day    Exercise No    Seat Belt Yes     Social Determinants of Health     Financial Resource Strain: Unknown (1/31/2023)    Received from [facility]  [facility], [facility]    Financial Resource Strain     How hard is it for you to pay for the very basics like food, housing, medical care, and heating?: Did not ask   Food Insecurity: No Food Insecurity (12/3/2023)    Received from [facility], [facility]    Food Insecurity     Currently or in the past 3 months, have you worried your food would run out before you had money to buy more?: No     In the past 12 months, have you run out of food or been unable to get more?: No    Received from [facility], [facility]    Transportation Needs   Physical Activity: Insufficiently Active (7/8/2020)    Received from [facility] [employer], [facility]    Exercise Vital Sign     Days of Exercise per Week: 1 day     Minutes of Exercise per Session: 10 min    Received from [facility], [facility]    Social Connections    Received from [facility]    Housing Stability         REVIEW OF SYSTEMS:   Review of Systems   Constitutional:  Negative for chills and fever.   HENT:  Positive for ear pain (see hpi) and postnasal drip. Negative for congestion, ear discharge, rhinorrhea and sore throat.    Eyes: Negative.    Respiratory: Negative.     Cardiovascular: Negative.    Gastrointestinal: Negative.    Neurological:  Negative for dizziness, light-headedness and headaches.       EXAM:   /80 (BP Location: Right arm, Patient Position: Sitting, Cuff Size: large)   Pulse 103   Temp 98.3 °F (36.8 °C) (Temporal)   Resp 18   Wt 196 lb (88.9 kg)   SpO2 98%   BMI 37.03 kg/m²   Physical Exam  Vitals and nursing note reviewed.   Constitutional:       General: She is not in acute distress.  HENT:      Head: Atraumatic.      Right Ear: Tympanic membrane, ear canal and external ear normal.      Left Ear: Tympanic membrane, ear canal and external ear normal.      Nose:  Nose normal.      Mouth/Throat:      Mouth: Mucous membranes are moist.   Cardiovascular:      Rate and Rhythm: Normal rate and regular rhythm.      Pulses: Normal pulses.      Heart sounds: Normal heart sounds.   Pulmonary:      Effort: Pulmonary effort is normal.      Breath sounds: Normal breath sounds.   Skin:     General: Skin is warm and dry.   Neurological:      Mental Status: She is alert and oriented to person, place, and time.         ASSESSMENT AND PLAN:   Diagnoses and all orders for this visit:    Left ear pain    Anxiety     -Resolved as of office visit. Discussed eustachian tube dysfunction versus irritation from the wind versus other.  Recommend intranasal flonase, can try coricidin HBP.  -Buspar working well per patient.  -Return for worsening, call with questions or problems.   -Patient verbalized understanding and in agreement with treatment plan.    20 minutes were spent on assessment, education, and discussion of treatment plan.    ARNOLD Fraser

## 2024-07-12 ENCOUNTER — OFFICE VISIT (OUTPATIENT)
Dept: FAMILY MEDICINE CLINIC | Facility: CLINIC | Age: 69
End: 2024-07-12
Payer: MEDICARE

## 2024-07-12 ENCOUNTER — PATIENT MESSAGE (OUTPATIENT)
Dept: FAMILY MEDICINE CLINIC | Facility: CLINIC | Age: 69
End: 2024-07-12

## 2024-07-12 VITALS
HEART RATE: 84 BPM | WEIGHT: 194 LBS | OXYGEN SATURATION: 97 % | TEMPERATURE: 99 F | BODY MASS INDEX: 37 KG/M2 | DIASTOLIC BLOOD PRESSURE: 82 MMHG | SYSTOLIC BLOOD PRESSURE: 130 MMHG | RESPIRATION RATE: 18 BRPM

## 2024-07-12 DIAGNOSIS — F41.9 ANXIETY: ICD-10-CM

## 2024-07-12 PROCEDURE — 1160F RVW MEDS BY RX/DR IN RCRD: CPT

## 2024-07-12 PROCEDURE — 3075F SYST BP GE 130 - 139MM HG: CPT

## 2024-07-12 PROCEDURE — 1159F MED LIST DOCD IN RCRD: CPT

## 2024-07-12 PROCEDURE — 99213 OFFICE O/P EST LOW 20 MIN: CPT

## 2024-07-12 PROCEDURE — 1170F FXNL STATUS ASSESSED: CPT

## 2024-07-12 PROCEDURE — 3079F DIAST BP 80-89 MM HG: CPT

## 2024-07-12 RX ORDER — FLUTICASONE PROPIONATE 50 MCG
2 SPRAY, SUSPENSION (ML) NASAL DAILY
Qty: 9.9 ML | Refills: 3 | Status: SHIPPED | OUTPATIENT
Start: 2024-07-12

## 2024-07-12 RX ORDER — BUSPIRONE HYDROCHLORIDE 10 MG/1
10 TABLET ORAL 3 TIMES DAILY PRN
Qty: 90 TABLET | Refills: 5 | Status: SHIPPED | OUTPATIENT
Start: 2024-07-12 | End: 2025-07-12

## 2024-07-12 NOTE — TELEPHONE ENCOUNTER
Fluticasone Propionate 50 MCG/ACT Nasal Suspension     Allergy Medication Protocol [id number]/12/2024 02:08 PM   Protocol Details In person appointment or virtual visit in the past 12 mos or appointment in next 3 mos      Last office visit:  6/28/24  No future appointments.  Last filled:  do not show we have prescribed   Last labs:  2/9/24

## 2024-07-12 NOTE — PROGRESS NOTES
HPI:   Liv is a 68 yr. Old female here today for a follow up of anxiety.  Started taking buspirone 10mg 1 month ago.  Per patient she is happy with the results and is able to deal with her social anxiety much better.           Current Outpatient Medications   Medication Sig Dispense Refill    busPIRone 10 MG Oral Tab Take 1 tablet (10 mg total) by mouth 3 (three) times daily as needed. 90 tablet 5    traMADol 50 MG Oral Tab Take 1 tablet (50 mg total) by mouth 3 (three) times daily as needed.      lamoTRIgine 100 MG Oral Tab       GABAPENTIN 400 MG Oral Cap TAKE 1 CAPSULE BY MOUTH THREE TIMES DAILY 270 capsule 1    MONTELUKAST 10 MG Oral Tab Take 1 tablet by mouth once daily 90 tablet 1    Budesonide-Formoterol Fumarate (SYMBICORT) 160-4.5 MCG/ACT Inhalation Aerosol INHALE 1 PUFF BY MOUTH TWICE A DAY *RINSE MOUTH OUT AFTER EACH USE* 10.2 g 5    atorvastatin 40 MG Oral Tab Take 1 tablet by mouth nightly 90 tablet 1    metoprolol succinate ER 25 MG Oral Tablet 24 Hr Take 1 tablet (25 mg total) by mouth daily. 90 tablet 3    albuterol (VENTOLIN HFA) 108 (90 Base) MCG/ACT Inhalation Aero Soln Inhale 2 puffs into the lungs every 4 (four) hours as needed for Wheezing or Shortness of Breath. 18 g 1    apixaban 2.5 MG Oral Tab Take 1 tablet (2.5 mg total) by mouth 2 (two) times daily.      ondansetron 4 MG Oral Tablet Dispersible       DULoxetine 60 MG Oral Cap DR Particles       Fluticasone Propionate 50 MCG/ACT Nasal Suspension 2 sprays by Nasal route daily.      acetaminophen 325 MG Oral Tab Take 1-2 tablets (325-650 mg total) by mouth.      Calcium Carbonate-Vitamin D 600-400 MG-UNIT Oral Tab Take 1 tablet by mouth daily.      TraZODone HCl 100 MG Oral Tab Take 1 tablet (100 mg total) by mouth nightly as needed for Sleep. 30 tablet 0    cetirizine (ZYRTEC) 10 MG Oral Tab Take 1 tablet (10 mg total) by mouth daily.      cholecalciferol (DRISDOL) 1000 UNITS Oral Cap Take 5 capsules (5,000 Units total) by mouth daily.       dicyclomine 10 MG Oral Cap Take 1 capsule (10 mg total) by mouth 3 (three) times daily before meals.        Past Medical History:    Allergic rhinitis    Asthma (HCC)    Asthma with COPD (chronic obstructive pulmonary disease) (HCC)    Back pain    Depression    Diverticulosis of large intestine without hemorrhage    Esophageal reflux    Hiatal hernia    History of pulmonary embolism    Obesity    Obstructive sleep apnea (adult) (pediatric)    Osteoarthritis    Other and unspecified hyperlipidemia    PE (pulmonary embolism)    x2. prothrombin mutation found after 2nd one    Presence of IVC filter    Primary insomnia    Uses trazodone     Prothrombin mutation (HCC)    Pulmonary embolism (HCC)    Spinal stenosis    Thyroid nodule    Unspecified essential hypertension      Past Surgical History:   Procedure Laterality Date    Cholecystectomy  2006    Ir ivc filter placement      Knee replacement surgery  03/2017    L knee    Other surgical history      carpal tunnel right wrist (1981); Green field filter (PE 2004)    Other surgical history  03/06/2017    Left knee replacement    Removal gallbladder      Tonsillectomy  age 18    Tubal ligation  1981      Family History   Problem Relation Age of Onset    Depression Daughter     Anxiety Daughter     Depression Son     Psychiatric Father         dementia    Diabetes Mother     Heart Disorder Mother     Psychiatric Mother         dementia    Hypertension Sister     Hypertension Brother       Social History     Socioeconomic History    Marital status:    Tobacco Use    Smoking status: Never    Smokeless tobacco: Never   Vaping Use    Vaping status: Never Used   Substance and Sexual Activity    Alcohol use: No     Alcohol/week: 0.0 standard drinks of alcohol    Drug use: No   Other Topics Concern    Caffeine Concern Yes     Comment: 2-4 cups of coffee x day    Exercise No    Seat Belt Yes     Social Determinants of Health     Financial Resource Strain: Unknown  (1/31/2023)    Received from [facility], [facility]    Financial Resource Strain     How hard is it for you to pay for the very basics like food, housing, medical care, and heating?: Did not ask   Food Insecurity: No Food Insecurity (12/3/2023)    Received from [facility], [facility]    Food Insecurity     Currently or in the past 3 months, have you worried your food would run out before you had money to buy more?: No     In the past 12 months, have you run out of food or been unable to get more?: No    Received from [facility], [facility]    Transportation Needs   Physical Activity: Insufficiently Active (7/8/2020)    Received from [facility], [facility]    Exercise Vital Sign     Days of Exercise per Week: 1 day     Minutes of Exercise per Session: 10 min    Received from [facility], [facility]    Social Connections    Received from [facility]    Housing Stability         REVIEW OF SYSTEMS:   Review of Systems   Constitutional:  Negative for activity change, appetite change, chills, fatigue and fever.   Respiratory:  Negative for cough.    Cardiovascular:  Negative for chest pain and palpitations.   Gastrointestinal:  Negative for abdominal pain, constipation and diarrhea.   Skin:  Negative for rash.   Neurological:  Negative for dizziness, light-headedness and headaches.       EXAM:   /82 (BP Location: Left arm, Patient Position: Sitting, Cuff Size: adult)   Pulse 84   Temp 98.6 °F (37 °C) (Temporal)   Resp 18   Wt 194 lb (88 kg)   SpO2 97%   BMI 36.66 kg/m²   Physical Exam  Vitals reviewed.   Constitutional:       General: She is not in acute distress.     Appearance: Normal appearance.   HENT:      Head: Atraumatic.      Mouth/Throat:      Mouth: Mucous membranes are moist.   Cardiovascular:       Rate and Rhythm: Normal rate and regular rhythm.      Heart sounds: Normal heart sounds.   Pulmonary:      Effort: Pulmonary effort is normal.      Breath sounds: Normal breath sounds.   Musculoskeletal:      Cervical back: Neck supple.   Skin:     General: Skin is warm and dry.   Neurological:      General: No focal deficit present.      Mental Status: She is alert and oriented to person, place, and time.   Psychiatric:         Mood and Affect: Mood normal.         Behavior: Behavior normal.         ASSESSMENT AND PLAN:   Diagnoses and all orders for this visit:    Anxiety  -     busPIRone 10 MG Oral Tab; Take 1 tablet (10 mg total) by mouth 3 (three) times daily as needed.     -Discussed continue buspirone tid as needed, patient taking prior to triggering activities and working well.  -Return in 6 months for medication monitoring, sooner as needed, call with questions or problems.  -Patient verbalized understanding and in agreement with treatment plan.    ARNOLD Fraser

## 2024-07-12 NOTE — TELEPHONE ENCOUNTER
From: Liv Wright  To: Melonie Whitney  Sent: 7/12/2024 1:55 PM CDT  Subject: Physical Therapy     I got the phone call from PT to set up appointments.    Keysha Wright

## 2024-07-22 RX ORDER — ATORVASTATIN CALCIUM 40 MG/1
40 TABLET, FILM COATED ORAL NIGHTLY
Qty: 90 TABLET | Refills: 1 | Status: CANCELLED | OUTPATIENT
Start: 2024-07-22

## 2024-07-22 NOTE — TELEPHONE ENCOUNTER
OV 02/09/24  LABS 05/13/24 COMP, 02/09/24 LIPID    REFILL 04/22/24 #90 +1 RF    No future appointments. REFILL REQUESTED TOO SOON. [employer] sent to notify patient - request denied.

## 2024-07-29 ENCOUNTER — NURSE TRIAGE (OUTPATIENT)
Dept: FAMILY MEDICINE CLINIC | Facility: CLINIC | Age: 69
End: 2024-07-29

## 2024-07-29 NOTE — TELEPHONE ENCOUNTER
Per patient started with chest discomfort and shortness of breath yesterday, Today has used symbicort and rescue inhaler with no improvement.         Reason for Disposition   Chest pain lasting longer than 5 minutes and over 44 years old    Answer Assessment - Initial Assessment Questions  1. RESPIRATORY STATUS: \"Describe your breathing?\" (e.g., wheezing, shortness of breath, unable to speak, severe coughing)                            Shortness of breath, able to speak, no coughing  2. ONSET: \"When did this breathing problem begin?\"                        Yesterday afternoon   3. PATTERN \"Does the difficult breathing come and go, or has it been constant since it started?\"                            Constant   4. SEVERITY: \"How bad is your breathing?\" (e.g., mild, moderate, severe)     - MILD: No SOB at rest, mild SOB with walking, speaks normally in sentences, can lie down, no retractions, pulse < 100.     - MODERATE: SOB at rest, SOB with minimal exertion and prefers to sit, cannot lie down flat, speaks in phrases, mild retractions, audible wheezing, pulse 100-120.     - SEVERE: Very SOB at rest, speaks in single words, struggling to breathe, sitting hunched forward, retractions, pulse > 120                                mild  5. RECURRENT SYMPTOM: \"Have you had difficulty breathing before?\" If Yes, ask: \"When was the last time?\" and \"What happened that time?\"                                   No  6. CARDIAC HISTORY: \"Do you have any history of heart disease?\" (e.g., heart attack, angina, bypass surgery, angioplasty)                                 No  7. LUNG HISTORY: \"Do you have any history of lung disease?\"  (e.g., pulmonary embolus, asthma, emphysema)                                 Asthma  8. CAUSE: \"What do you think is causing the breathing problem?\"                                     Unknown  9. OTHER SYMPTOMS: \"Do you have any other symptoms? (e.g., dizziness, runny nose, cough, chest pain, fever)                                     Mild chest comfort, no fever, no dizziness, no cough, runny nose due to allergies  10. O2 SATURATION MONITOR:  \"Do you use an oxygen saturation monitor (pulse oximeter) at home?\" If Yes, ask: \"What is your reading (oxygen level) today?\" \"What is your usual oxygen saturation reading?\" (e.g., 95%)                                93% this AM  11. PREGNANCY: \"Is there any chance you are pregnant?\" \"When was your last menstrual period?\"        NA  12. TRAVEL: \"Have you traveled out of the country in the last month?\" (e.g., travel history, exposures)           NO    Answer Assessment - Initial Assessment Questions  1. LOCATION: \"Where does it hurt?\"                           [place] area  2. RADIATION: \"Does the pain go anywhere else?\" (e.g., into neck, jaw, arms, back)                            No  3. ONSET: \"When did the chest pain begin?\" (Minutes, hours or days)                               Yesterday afternoon   4. PATTERN: \"Does the pain come and go, or has it been constant since it started?\"  \"Does it get worse with exertion?\"                              Constant  5. DURATION: \"How long does it last\" (e.g., seconds, minutes, hours)        6. SEVERITY: \"How bad is the pain?\"  (e.g., Scale 1-10; mild, moderate, or severe)     - MILD (1-3): doesn't interfere with normal activities      - MODERATE (4-7): interferes with normal activities or awakens from sleep     - SEVERE (8-10): excruciating pain, unable to do any normal activities                                        8  7. CARDIAC RISK FACTORS: \"Do you have any history of heart problems or risk factors for heart disease?\" (e.g., angina, prior heart attack; diabetes, high blood pressure, high cholesterol, smoker, or strong family history of heart disease)                                 No  8. PULMONARY RISK FACTORS: \"Do you have any history of lung disease?\"  (e.g., blood clots in lung, asthma, emphysema, birth control pills)       asthma  9. CAUSE: \"What do you think is causing the chest pain?\"      Unknown  10. OTHER SYMPTOMS: \"Do you have any other symptoms?\" (e.g., dizziness, nausea, vomiting, sweating, fever, difficulty breathing, cough)        No dizziness, vomiting, sweating, fever, mild SOB, no cough  11. PREGNANCY: \"Is there any chance you are pregnant?\" \"When was your last menstrual period?\"        NA    Protocols used: Breathing Difficulty-A-OH, Chest Pain-A-OH

## 2024-08-02 ENCOUNTER — OFFICE VISIT (OUTPATIENT)
Dept: FAMILY MEDICINE CLINIC | Facility: CLINIC | Age: 69
End: 2024-08-02
Payer: MEDICARE

## 2024-08-02 VITALS
RESPIRATION RATE: 26 BRPM | WEIGHT: 196 LBS | SYSTOLIC BLOOD PRESSURE: 146 MMHG | TEMPERATURE: 98 F | OXYGEN SATURATION: 97 % | DIASTOLIC BLOOD PRESSURE: 76 MMHG | HEART RATE: 69 BPM | BODY MASS INDEX: 37 KG/M2

## 2024-08-02 DIAGNOSIS — I10 ESSENTIAL HYPERTENSION: ICD-10-CM

## 2024-08-02 DIAGNOSIS — J44.89 ASTHMA WITH COPD (CHRONIC OBSTRUCTIVE PULMONARY DISEASE) (HCC): Primary | Chronic | ICD-10-CM

## 2024-08-02 PROCEDURE — 1159F MED LIST DOCD IN RCRD: CPT

## 2024-08-02 PROCEDURE — 1170F FXNL STATUS ASSESSED: CPT

## 2024-08-02 PROCEDURE — 3078F DIAST BP <80 MM HG: CPT

## 2024-08-02 PROCEDURE — 99213 OFFICE O/P EST LOW 20 MIN: CPT

## 2024-08-02 PROCEDURE — 3077F SYST BP >= 140 MM HG: CPT

## 2024-08-02 PROCEDURE — 1160F RVW MEDS BY RX/DR IN RCRD: CPT

## 2024-08-02 RX ORDER — GUAIFENESIN AND DEXTROMETHORPHAN HYDROBROMIDE 100; 10 MG/5ML; MG/5ML
10 SOLUTION ORAL EVERY 6 HOURS PRN
COMMUNITY
Start: 2024-07-29

## 2024-08-02 RX ORDER — ALBUTEROL SULFATE 2.5 MG/3ML
2.5 SOLUTION RESPIRATORY (INHALATION) EVERY 6 HOURS PRN
Qty: 50 EACH | Refills: 0 | Status: SHIPPED | OUTPATIENT
Start: 2024-08-02 | End: 2024-09-01

## 2024-08-02 RX ORDER — NEBULIZER ACCESSORIES
KIT MISCELLANEOUS
Qty: 1 EACH | Refills: 0 | Status: SHIPPED | OUTPATIENT
Start: 2024-08-02

## 2024-08-02 RX ORDER — ALBUTEROL SULFATE 90 UG/1
2 AEROSOL, METERED RESPIRATORY (INHALATION) EVERY 4 HOURS PRN
Qty: 18 G | Refills: 1 | Status: SHIPPED | OUTPATIENT
Start: 2024-08-02

## 2024-08-02 NOTE — PROGRESS NOTES
HPI:   Liv is a 68 yr. Old female here today for chest tightness and a cough.  Patient was seen in emergency department on 7/29/24 for chest pain/shortness of breath. Patient was taking care of her sister at home whom was recovering from pneumonia and was worried about respiratory infection.    Per ER note:  CBC without leukocytosis or anemia. CMP without electrolyte abnormality acute kidney injury. BNP negative, ruling out CHF. Troponin negative and EKG without acute ischemic changes. COVID, influenza and RSV test were negative    Imaging independently interpreted by myself as well as by the radiologist chest x-ray negative for any consolidations, effusions or pneumothorax     Patient presents today reports she is feeling better. She plans on going to [facility] after office visit. Albuterol is helping her symptoms, wondering if she can get a nebulizer.           Current Outpatient Medications   Medication Sig Dispense Refill    dextromethorphan-guaiFENesin  MG/5ML Oral Syrup Take 10 mL by mouth every 6 (six) hours as needed.      Respiratory Therapy Supplies (NEBULIZER/TUBING/MOUTHPIECE) Does not apply Kit Please provide nebulizer, tubing, and mouthpiece. 1 each 0    albuterol (2.5 MG/3ML) 0.083% Inhalation Nebu Soln Take 3 mL (2.5 mg total) by nebulization every 6 (six) hours as needed for Wheezing or Shortness of Breath (cough). 50 each 0    albuterol (VENTOLIN HFA) 108 (90 Base) MCG/ACT Inhalation Aero Soln Inhale 2 puffs into the lungs every 4 (four) hours as needed for Wheezing or Shortness of Breath. 18 g 1    busPIRone 10 MG Oral Tab Take 1 tablet (10 mg total) by mouth 3 (three) times daily as needed. 90 tablet 5    fluticasone propionate 50 MCG/ACT Nasal Suspension 2 sprays by Nasal route daily. 9.9 mL 3    traMADol 50 MG Oral Tab Take 1 tablet (50 mg total) by mouth 3 (three) times daily as needed.      lamoTRIgine 100 MG Oral Tab       GABAPENTIN 400 MG Oral Cap TAKE 1 CAPSULE BY MOUTH THREE  TIMES DAILY 270 capsule 1    MONTELUKAST 10 MG Oral Tab Take 1 tablet by mouth once daily 90 tablet 1    Budesonide-Formoterol Fumarate (SYMBICORT) 160-4.5 MCG/ACT Inhalation Aerosol INHALE 1 PUFF BY MOUTH TWICE A DAY *RINSE MOUTH OUT AFTER EACH USE* 10.2 g 5    atorvastatin 40 MG Oral Tab Take 1 tablet by mouth nightly 90 tablet 1    metoprolol succinate ER 25 MG Oral Tablet 24 Hr Take 1 tablet (25 mg total) by mouth daily. 90 tablet 3    apixaban 2.5 MG Oral Tab Take 1 tablet (2.5 mg total) by mouth 2 (two) times daily.      ondansetron 4 MG Oral Tablet Dispersible       DULoxetine 60 MG Oral Cap DR Particles       Calcium Carbonate-Vitamin D 600-400 MG-UNIT Oral Tab Take 1 tablet by mouth daily.      TraZODone HCl 100 MG Oral Tab Take 1 tablet (100 mg total) by mouth nightly as needed for Sleep. 30 tablet 0    cetirizine (ZYRTEC) 10 MG Oral Tab Take 1 tablet (10 mg total) by mouth daily.      cholecalciferol (DRISDOL) 1000 UNITS Oral Cap Take 5 capsules (5,000 Units total) by mouth daily.      dicyclomine 10 MG Oral Cap Take 1 capsule (10 mg total) by mouth 3 (three) times daily before meals.        Past Medical History:    Allergic rhinitis    Asthma (HCC)    Asthma with COPD (chronic obstructive pulmonary disease) (HCC)    Back pain    Depression    Diverticulosis of large intestine without hemorrhage    Esophageal reflux    Hiatal hernia    History of pulmonary embolism    Obesity    Obstructive sleep apnea (adult) (pediatric)    Osteoarthritis    Other and unspecified hyperlipidemia    PE (pulmonary embolism)    x2. prothrombin mutation found after 2nd one    Presence of IVC filter    Primary insomnia    Uses trazodone     Prothrombin mutation (HCC)    Pulmonary embolism (HCC)    Spinal stenosis    Thyroid nodule    Unspecified essential hypertension      Past Surgical History:   Procedure Laterality Date    Cholecystectomy  2006    Ir ivc filter placement      Knee replacement surgery  03/2017    L knee     Other surgical history      carpal tunnel right wrist (1981); Green field filter (PE 2004)    Other surgical history  03/06/2017    Left knee replacement    Removal gallbladder      Tonsillectomy  age 18    Tubal ligation  1981      Family History   Problem Relation Age of Onset    Depression Daughter     Anxiety Daughter     Depression Son     Psychiatric Father         dementia    Diabetes Mother     Heart Disorder Mother     Psychiatric Mother         dementia    Hypertension Sister     Hypertension Brother       Social History     Socioeconomic History    Marital status:    Tobacco Use    Smoking status: Never    Smokeless tobacco: Never   Vaping Use    Vaping status: Never Used   Substance and Sexual Activity    Alcohol use: No     Alcohol/week: 0.0 standard drinks of alcohol    Drug use: No   Other Topics Concern    Caffeine Concern Yes     Comment: 2-4 cups of coffee x day    Exercise No    Seat Belt Yes     Social Determinants of Health     Financial Resource Strain: Unknown (1/31/2023)    [facility], [facility]    Financial Resource Strain     How hard is it for you to pay for the very basics like food, housing, medical care, and heating?: Did not ask   Food Insecurity: No Food Insecurity (7/29/2024)    Received from [facility]    Food Insecurity     Currently or in the past 3 months, have you worried your food would run out before you had money to buy more?: No     In the past 12 months, have you run out of food or been unable to get more?: No   Transportation Needs: No Transportation Needs (7/29/2024)    Received from [facility]    Transportation Needs     Medical Transportation Needs?: No   Physical Activity: Insufficiently Active (7/8/2020)    Received from [facility], [facility]    Exercise Vital Sign     Days of Exercise per Week: 1 day     Minutes of Exercise per Session: 10  min    Received from [facility], [facility]    Social Connections    Received from [facility]    Housing Stability         REVIEW OF SYSTEMS:   Review of Systems   Constitutional:  Negative for chills, fatigue and fever.   HENT:  Positive for voice change. Negative for congestion, ear pain, rhinorrhea, sinus pressure and sore throat.    Eyes: Negative.    Respiratory:  Positive for chest tightness and shortness of breath.    Cardiovascular:  Negative for chest pain, palpitations and leg swelling.   Gastrointestinal:  Negative for nausea and vomiting.   Neurological:  Negative for dizziness, light-headedness and headaches.       EXAM:   /76 (BP Location: Left arm, Patient Position: Sitting, Cuff Size: adult)   Pulse 69   Temp 98.2 °F (36.8 °C) (Temporal)   Resp 26   Wt 196 lb (88.9 kg)   SpO2 97%   BMI 37.03 kg/m²   Physical Exam  Vitals and nursing note reviewed.   Constitutional:       General: She is not in acute distress.     Appearance: Normal appearance. She is not ill-appearing.   HENT:      Head: Atraumatic.      Right Ear: Tympanic membrane, ear canal and external ear normal.      Left Ear: Tympanic membrane, ear canal and external ear normal.      Nose: Nose normal.      Mouth/Throat:      Mouth: Mucous membranes are moist.      Pharynx: Oropharynx is clear.   Eyes:      General:         Right eye: No discharge.         Left eye: No discharge.      Conjunctiva/sclera: Conjunctivae normal.   Cardiovascular:      Rate and Rhythm: Normal rate and regular rhythm.      Heart sounds: Normal heart sounds.   Pulmonary:      Effort: Pulmonary effort is normal.      Breath sounds: Normal breath sounds.   Musculoskeletal:      Cervical back: Neck supple.   Skin:     General: Skin is warm and dry.   Neurological:      Mental Status: She is alert and oriented to person, place, and time.         ASSESSMENT AND PLAN:   Diagnoses and all orders for  this visit:    Asthma with COPD (chronic obstructive pulmonary disease) ([facility])  -     Respiratory Therapy Supplies (NEBULIZER/TUBING/MOUTHPIECE) Does not apply Kit; Please provide nebulizer, tubing, and mouthpiece.  -     albuterol (2.5 MG/3ML) 0.083% Inhalation Nebu Soln; Take 3 mL (2.5 mg total) by nebulization every 6 (six) hours as needed for Wheezing or Shortness of Breath (cough).  -     albuterol (VENTOLIN HFA) 108 (90 Base) MCG/ACT Inhalation Aero Soln; Inhale 2 puffs into the lungs every 4 (four) hours as needed for Wheezing or Shortness of Breath.    Essential hypertension     -Discussed medications as prescribed.  Recommend patient monitor blood pressures at home for 1-2 weeks as elevated in office today.  Patient agrees to call or return with home blood pressure readings.    -Return for worsening, call with questions or problems.  -Patient verbalized understanding and in agreement with treatment plan.    Melonie Whitney, ARNOLD

## 2024-08-12 ENCOUNTER — TELEPHONE (OUTPATIENT)
Dept: FAMILY MEDICINE CLINIC | Facility: CLINIC | Age: 69
End: 2024-08-12

## 2024-08-12 NOTE — TELEPHONE ENCOUNTER
\"Ruptured Plantar Fascia\" -PSR UNSURE IF THIS REQUIRES TRIAGE, SENDING precautionary -dsh NEXT APPT 08/14/2024

## 2024-08-12 NOTE — TELEPHONE ENCOUNTER
Call placed to patient, states she was seen in ER 8-10-24 and diagnosed with rupture of plantar fascia Left foot and given shoe to wear. Has been taking Tylenol for heel pain which patient feels is not helping much. Using walker and plans on also making appointment with podiatry for follow up.    Future Appointments   Date Time Provider Department Center   8/14/2024  8:30 AM Melonie Whitney APRN [facility] [place]     Routed as [facility]

## 2024-08-19 ENCOUNTER — TELEPHONE (OUTPATIENT)
Dept: FAMILY MEDICINE CLINIC | Facility: CLINIC | Age: 69
End: 2024-08-19

## 2024-08-19 RX ORDER — MONTELUKAST SODIUM 10 MG/1
10 TABLET ORAL DAILY
Qty: 90 TABLET | Refills: 0 | Status: SHIPPED | OUTPATIENT
Start: 2024-08-19

## 2024-08-19 NOTE — TELEPHONE ENCOUNTER
Requested Prescriptions     Pending Prescriptions Disp Refills    montelukast 10 MG Oral Tab 90 tablet 0     Sig: Take 1 tablet (10 mg total) by mouth daily.     Last refill was sent to [facility] 5/20/24 #90 x 1  Refill sent to [facility]

## 2024-08-19 NOTE — TELEPHONE ENCOUNTER
REFILL MONTELUKAST 10 MG Oral Tab QTY 90 TO [facility], PATIENT SAID THIS WAS ORIGINALLY FROM [facility] BUT IT NEEDS TO GO TO [id number]

## 2024-08-22 ENCOUNTER — TELEPHONE (OUTPATIENT)
Dept: FAMILY MEDICINE CLINIC | Facility: CLINIC | Age: 69
End: 2024-08-22

## 2024-08-22 NOTE — TELEPHONE ENCOUNTER
Follow up call to patient, she had called earlier due to feeling  increased anxiety but states she is doing better now and issues have resolved. Patient reports  she does not need anything further at this time.

## 2024-08-23 ENCOUNTER — PATIENT MESSAGE (OUTPATIENT)
Dept: FAMILY MEDICINE CLINIC | Facility: CLINIC | Age: 69
End: 2024-08-23

## 2024-08-23 NOTE — TELEPHONE ENCOUNTER
From: Liv Wright  To: Melonie Whitney  Sent: 8/23/2024 1:38 PM CDT  Subject: Appointment     Who am I supposed to schedule an appointment with? I got a message saying I need to schedule an appointment with someone.

## 2024-08-27 PROBLEM — F33.2 MDD (MAJOR DEPRESSIVE DISORDER), RECURRENT EPISODE, SEVERE (HCC): Status: ACTIVE | Noted: 2024-08-27

## 2024-09-04 NOTE — TELEPHONE ENCOUNTER
Future Appointments   Date Time Provider Department Center   9/9/2024  8:30 AM Melonie Whitney APRN [facility] [place]

## 2024-09-09 ENCOUNTER — PATIENT MESSAGE (OUTPATIENT)
Dept: FAMILY MEDICINE CLINIC | Facility: CLINIC | Age: 69
End: 2024-09-09

## 2024-09-09 ENCOUNTER — OFFICE VISIT (OUTPATIENT)
Dept: FAMILY MEDICINE CLINIC | Facility: CLINIC | Age: 69
End: 2024-09-09
Payer: MEDICARE

## 2024-09-09 VITALS
OXYGEN SATURATION: 97 % | DIASTOLIC BLOOD PRESSURE: 78 MMHG | HEART RATE: 76 BPM | TEMPERATURE: 98 F | RESPIRATION RATE: 18 BRPM | BODY MASS INDEX: 38 KG/M2 | SYSTOLIC BLOOD PRESSURE: 142 MMHG | WEIGHT: 194.81 LBS

## 2024-09-09 DIAGNOSIS — F32.A DEPRESSION, UNSPECIFIED DEPRESSION TYPE: Primary | ICD-10-CM

## 2024-09-09 PROCEDURE — 99215 OFFICE O/P EST HI 40 MIN: CPT

## 2024-09-09 PROCEDURE — 1170F FXNL STATUS ASSESSED: CPT

## 2024-09-09 PROCEDURE — 3077F SYST BP >= 140 MM HG: CPT

## 2024-09-09 PROCEDURE — 1111F DSCHRG MED/CURRENT MED MERGE: CPT

## 2024-09-09 PROCEDURE — 3078F DIAST BP <80 MM HG: CPT

## 2024-09-09 NOTE — PROGRESS NOTES
HPI:   Liv is a 68 yr. Old female here today for hospital follow up.  Per patient she was hospitalized at [facility] for 1 week from 8/31/24 to 9/2/24 for depression and anxiety with suicidal thoughts. Did see her counselor on 9/4/24, has upcoming appointment with counselor on 9/10/24, appointment with psychiatrist on 9/12/24. Patient reports that when she sees her counselor she does not talk about coping skills and/or depression.  Per patient she went to appointment with counselor with an agenda as suggested by hospital staff, did not get addressed.     [facility]  Psychiatrist is Akira Trejo    Takes gabapentin tid and has for years per patient.         Current Outpatient Medications   Medication Sig Dispense Refill    busPIRone 10 MG Oral Tab Take 1 tablet (10 mg total) by mouth 3 (three) times daily as needed (anxiety).      DULoxetine 60 MG Oral Cap DR Particles Take 1 capsule (60 mg total) by mouth daily.      lamoTRIgine 100 MG Oral Tab Take 1 tablet (100 mg total) by mouth daily.      traZODone 100 MG Oral Tab Take 1 tablet (100 mg total) by mouth nightly as needed for Sleep. Patient taking nightly for sleep      pantoprazole 40 MG Oral Tab EC Take 1 tablet (40 mg total) by mouth 2 (two) times daily before meals. 60 tablet 0    montelukast 10 MG Oral Tab Take 1 tablet (10 mg total) by mouth daily. (Patient taking differently: Take 1 tablet (10 mg total) by mouth nightly.) 90 tablet 0    albuterol (VENTOLIN HFA) 108 (90 Base) MCG/ACT Inhalation Aero Soln Inhale 2 puffs into the lungs every 4 (four) hours as needed for Wheezing or Shortness of Breath. 18 g 1    fluticasone propionate 50 MCG/ACT Nasal Suspension 2 sprays by Nasal route daily. 9.9 mL 3    Budesonide-Formoterol Fumarate (SYMBICORT) 160-4.5 MCG/ACT Inhalation Aerosol INHALE 1 PUFF BY MOUTH TWICE A DAY *RINSE MOUTH OUT AFTER EACH USE* 10.2 g 5    atorvastatin 40 MG Oral Tab Take 1 tablet by mouth nightly 90 tablet 1     metoprolol succinate ER 25 MG Oral Tablet 24 Hr Take 1 tablet (25 mg total) by mouth daily. 90 tablet 3    apixaban 2.5 MG Oral Tab Take 1 tablet (2.5 mg total) by mouth 2 (two) times daily.      Calcium Carbonate-Vitamin D 600-400 MG-UNIT Oral Tab Take 1 tablet by mouth daily.      cetirizine (ZYRTEC) 10 MG Oral Tab Take 1 tablet (10 mg total) by mouth daily.      cholecalciferol (DRISDOL) 1000 UNITS Oral Cap Take 5 capsules (5,000 Units total) by mouth daily.      Respiratory Therapy Supplies (NEBULIZER/TUBING/MOUTHPIECE) Does not apply Kit Please provide nebulizer, tubing, and mouthpiece. (Patient not taking: Reported on 8/26/2024) 1 each 0    dicyclomine 10 MG Oral Cap Take 1 capsule (10 mg total) by mouth 3 (three) times daily before meals.        Past Medical History:    Allergic rhinitis    Asthma (HCC)    Asthma with COPD (chronic obstructive pulmonary disease) ([facility])    Back pain    Depression    Diverticulosis of large intestine without hemorrhage    Esophageal reflux    Hiatal hernia    History of pulmonary embolism    Obesity    Obstructive sleep apnea (adult) (pediatric)    Osteoarthritis    Other and unspecified hyperlipidemia    PE (pulmonary embolism)    x2. prothrombin mutation found after 2nd one    Presence of IVC filter    Primary insomnia    Uses trazodone     Prothrombin mutation (HCC)    Pulmonary embolism (HCC)    Spinal stenosis    Thyroid nodule    Unspecified essential hypertension      Past Surgical History:   Procedure Laterality Date    Cholecystectomy  2006    Ir ivc filter placement      Knee replacement surgery  03/2017    L knee    Other surgical history      carpal tunnel right wrist (1981); Green field filter (PE 2004)    Other surgical history  03/06/2017    Left knee replacement    Removal gallbladder      Tonsillectomy  age 18    Tubal ligation  1981      Family History   Problem Relation Age of Onset    Depression Daughter     Anxiety Daughter     Depression Son      Psychiatric Father         dementia    Diabetes Mother     Heart Disorder Mother     Psychiatric Mother         dementia    Hypertension Sister     Hypertension Brother       Social History     Socioeconomic History    Marital status:    Tobacco Use    Smoking status: Never    Smokeless tobacco: Never   Vaping Use    Vaping status: Never Used   Substance and Sexual Activity    Alcohol use: No     Alcohol/week: 0.0 standard drinks of alcohol    Drug use: No   Other Topics Concern    Caffeine Concern Yes     Comment: 2-4 cups of coffee x day    Exercise No    Seat Belt Yes     Social Determinants of Health     Financial Resource Strain: Low Risk  (8/27/2024)    Financial Resource Strain     Med Affordability: No   Food Insecurity: No Food Insecurity (8/27/2024)    Food Insecurity     Food Insecurity: Never true   Transportation Needs: No Transportation Needs (8/27/2024)    Transportation Needs     Lack of Transportation: No   Physical Activity: Insufficiently Active (7/8/2020)    [email or web address]    Exercise Vital Sign     Days of Exercise per Week: 1 day     Minutes of Exercise per Session: 10 min    [facility]    Social Connections   Housing Stability: Low Risk  (8/27/2024)    Housing Stability     Housing Instability: No         REVIEW OF SYSTEMS:   Review of Systems   Constitutional:  Positive for fatigue and unexpected weight change (weight loss, no appetite). Negative for chills and fever.   HENT: Negative.     Eyes: Negative.    Respiratory: Negative.     Cardiovascular: Negative.  Negative for chest pain and palpitations.   Neurological:  Negative for dizziness, light-headedness and headaches.   Psychiatric/Behavioral:  Positive for dysphoric mood. The patient is nervous/anxious.        EXAM:   /78 (BP Location: Left arm, Patient Position: Sitting, Cuff Size: adult)   Pulse 76   Temp  98.2 °F (36.8 °C) (Temporal)   Resp 18   Wt 194 lb 12.8 oz (88.4 kg)   SpO2 97%   BMI 38.04 kg/m²   Physical Exam  Vitals and nursing note reviewed.   Constitutional:       General: She is not in acute distress.  HENT:      Head: Atraumatic.      Mouth/Throat:      Mouth: Mucous membranes are moist.   Cardiovascular:      Rate and Rhythm: Normal rate.   Pulmonary:      Effort: Pulmonary effort is normal.   Musculoskeletal:      Cervical back: Neck supple.   Skin:     General: Skin is warm and dry.   Neurological:      General: No focal deficit present.      Mental Status: She is alert.   Psychiatric:         Mood and Affect: Mood is depressed. Affect is tearful.         Speech: Speech normal.         Behavior: Behavior is cooperative.         Thought Content: Thought content normal.         ASSESSMENT AND PLAN:   Diagnoses and all orders for this visit:    Depression, unspecified depression type     -Discussed importance of keeping follow ups with mental health providers.  Discussed stress relief, self care.  -Return for worsening, call with questions or problems.  -Patient verbalized understanding and in agreement with treatment plan.    1 hour was spent on assessment, education, empathetic listening, and discussion of treatment plan.    ARNOLD Fraser

## 2024-09-09 NOTE — TELEPHONE ENCOUNTER
From: Liv Wright  To: Melonie Whitney  Sent: 9/9/2024 2:07 PM CDT  Subject: Gabapentin     I think I sent you the wrong one

## 2024-09-09 NOTE — TELEPHONE ENCOUNTER
From: Liv Wright  To: Melonie Whitney  Sent: 9/9/2024 1:56 PM CDT  Subject: Trazadone     Hope you can read these. Thanx again for your help!!

## 2024-09-19 ENCOUNTER — PATIENT MESSAGE (OUTPATIENT)
Dept: FAMILY MEDICINE CLINIC | Facility: CLINIC | Age: 69
End: 2024-09-19
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
## 2024-09-20 ENCOUNTER — TELEPHONE (OUTPATIENT)
Dept: FAMILY MEDICINE CLINIC | Facility: CLINIC | Age: 69
End: 2024-09-20

## 2024-09-20 NOTE — TELEPHONE ENCOUNTER
Left message for patient to call back on established voicemail    Patient called back and was notified of provider comments and recommendations. Expressed understanding and states she is doing fine and feels stable at this time. Will call back to schedule ER follow up.
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
## 2024-09-23 ENCOUNTER — OFFICE VISIT (OUTPATIENT)
Dept: FAMILY MEDICINE CLINIC | Facility: CLINIC | Age: 69
End: 2024-09-23
Payer: MEDICARE

## 2024-09-23 ENCOUNTER — PATIENT MESSAGE (OUTPATIENT)
Dept: FAMILY MEDICINE CLINIC | Facility: CLINIC | Age: 69
End: 2024-09-23

## 2024-09-23 VITALS
BODY MASS INDEX: 38 KG/M2 | OXYGEN SATURATION: 98 % | SYSTOLIC BLOOD PRESSURE: 138 MMHG | WEIGHT: 192 LBS | TEMPERATURE: 99 F | HEART RATE: 68 BPM | RESPIRATION RATE: 16 BRPM | DIASTOLIC BLOOD PRESSURE: 74 MMHG

## 2024-09-23 DIAGNOSIS — S09.90XD CLOSED HEAD INJURY, SUBSEQUENT ENCOUNTER: Primary | ICD-10-CM

## 2024-09-23 PROCEDURE — 3078F DIAST BP <80 MM HG: CPT

## 2024-09-23 PROCEDURE — 1160F RVW MEDS BY RX/DR IN RCRD: CPT

## 2024-09-23 PROCEDURE — 1159F MED LIST DOCD IN RCRD: CPT

## 2024-09-23 PROCEDURE — 1111F DSCHRG MED/CURRENT MED MERGE: CPT

## 2024-09-23 PROCEDURE — 1170F FXNL STATUS ASSESSED: CPT

## 2024-09-23 PROCEDURE — 99213 OFFICE O/P EST LOW 20 MIN: CPT

## 2024-09-23 PROCEDURE — 3075F SYST BP GE 130 - 139MM HG: CPT
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
## 2024-09-23 NOTE — TELEPHONE ENCOUNTER
From: Liv Wright  To: Melonie Whitney  Sent: 9/23/2024 10:22 AM CDT  Subject: ER    They gave me Prednisone and compazine and sent me home and told me that I'll probably feel like staying in bed all day but not to.  Keysha Peck

## 2024-09-25 ENCOUNTER — PATIENT MESSAGE (OUTPATIENT)
Dept: FAMILY MEDICINE CLINIC | Facility: CLINIC | Age: 69
End: 2024-09-25

## 2024-09-25 NOTE — TELEPHONE ENCOUNTER
From: Liv Wright  To: Melonie Whitney  Sent: 9/25/2024 11:41 AM CDT  Subject: Drs.    The doctor in the ER wants me to go to a neurologist. WhT do you think?  Thank you!   Physical Therapy  Physical Therapy Initial Assessment     Name: Kareen Heller  : 10/12/1932  MRN: [id number]    Referring Provider: LORNE Mcclain CNP    Date of Service: 2021    Evaluating PT: Aracelis Trivedi, PT, DPT, [id number]    Room #: 6739/2214-A  Diagnosis: Pneumonia due to COVID-19 virus  PMHx/PSHx: HLD, HTN  Precautions: Fall risk, Droplet Plus Isolation (COVID-19), 2 L O2/min, [place]    SUBJECTIVE:    Pt lives with  in a 2 story house with 1 stair(s) and no rail(s) to enter. Bed is on second floor and bath is on second floor. Full flight of stairs and 1 rail to second floor. Pt ambulated without AD prior to admission. OBJECTIVE:   Initial Evaluation  Date: 21 Treatment Date: Short Term/ Long Term   Goals   AM-PAC 6 Clicks 97/45     Was pt agreeable to Eval/treatment? Yes     Does pt have pain? No current complaints of pain     Bed Mobility  Rolling: NT  Supine to sit: Supervision  Sit to supine: NT  Scooting: Supervision to EOB  Rolling: Independent   Supine to sit: Independent   Sit to supine: Independent   Scooting: Independent    Transfers Sit to stand: SBA  Stand to sit: SBA  Stand pivot: CGA without AD  Sit to stand: Independent   Stand to sit: Independent   Stand pivot: Independent    Ambulation   10, 30, 50 feet without AD with CGA  200 feet Independent    Stair negotiation: ascended and descended NT  10 step(s) with 1 rail(s) Mod Independent    ROM BUE: Refer to OT note  BLE: WFL     Strength BUE: Refer to OT note  BLE: WFL     Balance Sitting EOB: Supervision  Dynamic Standing: CGA without AD  Sitting EOB: Independent   Dynamic Standing: Independent      Pt is A & O x: 3 to person, place, and month. Pt unable to recall year. Pt reported Oralia Anthony was President initially but was able to correct with cueing. Sensation: Pt denies numbness and tingling of extremities. Edema: Unremarkable.     Therapeutic Exercises:   5x STS from chair with SBA    Patient education  Pt educated on PT role in acute care setting. Patient response to education:   Pt verbalized understanding Pt demonstrated skill Pt requires further education in this area   Yes NA No     ASSESSMENT:    Vitals on 2 L O2/min:  Rest: O2 sat 93%, HR 66 bpm  Supine to sit transfer: O2 sat 90-93%, HR 69 bpm  After ambulating 10 feet without AD to commode: O2 sat 89%, HR 87 bpm  Seated rest on commode (unsupported sitting): O2 sat recovered to 91% in <1 minute  After ambulating 30 feet without AD: O2 sat 88%, HR 78 bpm  Seated rest in chair (supported sitting): O2 sat recovered to 92% in <1 minute  5x STS exercise: O2 sat 90%, HR 72 bpm  After ambulating 50 feet without AD: O2 sat 93%, HR 72 bpm  Conclusion of session: O2 sat 94%, HR 65 bpm    Comments:   Pt was supine in bed upon room entry, agreeable to PT evaluation. Vitals and symptoms were monitored closely throughout session. Pt ambulates with slow gait speed with mild unsteadiness. Pt had no LOB throughout session. O2 sat dropped as low as 88% on 2 L O2/min but recovered quickly with rest. Pt has good activity tolerance. Pt verbalized understanding to call for assistance when she wishes to ambulate/return to bed. Pt was left seated in chair with all needs met at conclusion of session. Treatment:  Patient practiced and was instructed in the following treatment:     Therapeutic activities: Pt completed all therapeutic activities noted above. Vitals and symptoms were skillfully monitored with all activity and during rest breaks. Pt was cued for hand placement during sit <> stand transfers. Pt completed multiple transfers from surfaces of varying heights (EOB x1, commode x1, chair x7). Pt ambulated several times around room as strength, endurance, and standing balance were challenged.  Therapeutic exercises: Pt completed 5x STS exercise as noted above. Pt's/family goals:   1. To return home.     Patient and or family understand(s) diagnosis, prognosis, and plan of care. Yes. PLAN:    Current Treatment Recommendations   [x] Strengthening     [] ROM   [x] Balance Training   [x] Endurance Training   [x] Transfer Training   [x] Gait Training   [x] Stair Training   [] Positioning   [] Safety and Education Training   [] Patient/Caregiver Education   [] HEP  [] Other     PT care will be provided in accordance with the objectives noted above. Exercises and functional mobility practice will be used as well as appropriate assistive devices or modalities to obtain goals. Patient and family education will also be administered as needed. Frequency of treatments: 2-5x/week x 2-3 days. Time in: 0825  Time out: 0855    Total Treatment Time 25 minutes     Evaluation Time includes thorough review of current medical information, gathering information on past medical history/social history and prior level of function, completion of standardized testing/informal observation of tasks, assessment of data and education on plan of care and goals.     CPT codes:  [] Low Complexity PT evaluation 46160  [x] Moderate Complexity PT evaluation 03710  [] High Complexity PT evaluation 49760  [] PT Re-evaluation 81362  [] Gait training 10284 0 minutes  [] Manual therapy 85659 0 minutes  [x] Therapeutic activities 77838 25 minutes  [] Therapeutic exercises 65000 0 minutes  [] Neuromuscular reeducation 74888 0 minutes     Keyla Andersen, PT, DPT  [id number]

## 2024-09-26 ENCOUNTER — OFFICE VISIT (OUTPATIENT)
Dept: FAMILY MEDICINE CLINIC | Facility: CLINIC | Age: 69
End: 2024-09-26
Payer: MEDICARE

## 2024-09-26 VITALS
RESPIRATION RATE: 16 BRPM | DIASTOLIC BLOOD PRESSURE: 84 MMHG | SYSTOLIC BLOOD PRESSURE: 142 MMHG | WEIGHT: 192 LBS | BODY MASS INDEX: 38 KG/M2 | OXYGEN SATURATION: 95 % | HEART RATE: 85 BPM | TEMPERATURE: 98 F

## 2024-09-26 DIAGNOSIS — F32.A DEPRESSION, UNSPECIFIED DEPRESSION TYPE: Primary | ICD-10-CM

## 2024-09-26 PROCEDURE — 1159F MED LIST DOCD IN RCRD: CPT

## 2024-09-26 PROCEDURE — 3077F SYST BP >= 140 MM HG: CPT

## 2024-09-26 PROCEDURE — 3079F DIAST BP 80-89 MM HG: CPT

## 2024-09-26 PROCEDURE — 99214 OFFICE O/P EST MOD 30 MIN: CPT

## 2024-09-26 PROCEDURE — 1111F DSCHRG MED/CURRENT MED MERGE: CPT

## 2024-09-26 PROCEDURE — 1160F RVW MEDS BY RX/DR IN RCRD: CPT

## 2024-09-26 PROCEDURE — 1170F FXNL STATUS ASSESSED: CPT

## 2024-09-26 NOTE — PROGRESS NOTES
HPI:   Liv is a 69 yr. Old female here today for anxiety and depression.  Per patient she has been feeling more down this week, avoiding social interactions and staying in her apartment.  Patient reports she has even been short with her cats.  Patient presents today tearful, reporting that she knows she is relying on this provider too much, reports she has always done everything alone her whole life.  Denies suicidal, homicidal ideations.       Current Outpatient Medications   Medication Sig Dispense Refill   • gabapentin 400 MG Oral Cap Take 1 capsule (400 mg total) by mouth 3 (three) times daily.     • busPIRone 10 MG Oral Tab Take 1 tablet (10 mg total) by mouth 3 (three) times daily as needed (anxiety).     • DULoxetine 60 MG Oral Cap DR Particles Take 1 capsule (60 mg total) by mouth daily.     • lamoTRIgine 100 MG Oral Tab Take 1 tablet (100 mg total) by mouth daily.     • traZODone 100 MG Oral Tab Take 1 tablet (100 mg total) by mouth nightly as needed for Sleep. Patient taking nightly for sleep     • pantoprazole 40 MG Oral Tab EC Take 1 tablet (40 mg total) by mouth 2 (two) times daily before meals. 60 tablet 0   • montelukast 10 MG Oral Tab Take 1 tablet (10 mg total) by mouth daily. (Patient taking differently: Take 1 tablet (10 mg total) by mouth nightly.) 90 tablet 0   • Respiratory Therapy Supplies (NEBULIZER/TUBING/MOUTHPIECE) Does not apply Kit Please provide nebulizer, tubing, and mouthpiece. (Patient not taking: Reported on 8/26/2024) 1 each 0   • albuterol (VENTOLIN HFA) 108 (90 Base) MCG/ACT Inhalation Aero Soln Inhale 2 puffs into the lungs every 4 (four) hours as needed for Wheezing or Shortness of Breath. 18 g 1   • fluticasone propionate 50 MCG/ACT Nasal Suspension 2 sprays by Nasal route daily. 9.9 mL 3   • Budesonide-Formoterol Fumarate (SYMBICORT) 160-4.5 MCG/ACT Inhalation Aerosol INHALE 1 PUFF BY MOUTH TWICE A DAY *RINSE MOUTH OUT AFTER EACH USE* 10.2 g 5   • atorvastatin 40 MG Oral  Tab Take 1 tablet by mouth nightly 90 tablet 1   • metoprolol succinate ER 25 MG Oral Tablet 24 Hr Take 1 tablet (25 mg total) by mouth daily. 90 tablet 3   • apixaban 2.5 MG Oral Tab Take 1 tablet (2.5 mg total) by mouth 2 (two) times daily.     • dicyclomine 10 MG Oral Cap Take 1 capsule (10 mg total) by mouth 3 (three) times daily before meals.     • Calcium Carbonate-Vitamin D 600-400 MG-UNIT Oral Tab Take 1 tablet by mouth daily.     • cetirizine (ZYRTEC) 10 MG Oral Tab Take 1 tablet (10 mg total) by mouth daily.     • cholecalciferol (DRISDOL) 1000 UNITS Oral Cap Take 5 capsules (5,000 Units total) by mouth daily.        Past Medical History:   • Allergic rhinitis   • Asthma (HCC)   • Asthma with COPD (chronic obstructive pulmonary disease) ([facility])   • Back pain   • Depression   • Diverticulosis of large intestine without hemorrhage   • Esophageal reflux   • Hiatal hernia   • History of pulmonary embolism   • Obesity   • Obstructive sleep apnea (adult) (pediatric)   • Osteoarthritis   • Other and unspecified hyperlipidemia   • PE (pulmonary embolism)    x2. prothrombin mutation found after 2nd one   • Presence of IVC filter   • Primary insomnia    Uses trazodone    • Prothrombin mutation (HCC)   • Pulmonary embolism (HCC)   • Spinal stenosis   • Thyroid nodule   • Unspecified essential hypertension      Past Surgical History:   Procedure Laterality Date   • Cholecystectomy  2006   • Ir ivc filter placement     • Knee replacement surgery  03/2017    L knee   • Other surgical history      carpal tunnel right wrist (1981); Green field filter (PE 2004)   • Other surgical history  03/06/2017    Left knee replacement   • Removal gallbladder     • Tonsillectomy  age 18   • Tubal ligation  1981      Family History   Problem Relation Age of Onset   • Depression Daughter    • Anxiety Daughter    • Depression Son    • Psychiatric Father         dementia   • Diabetes Mother    • Heart Disorder Mother    • Psychiatric Mother          dementia   • Hypertension Sister    • Hypertension Brother       Social History     Socioeconomic History   • Marital status:    Tobacco Use   • Smoking status: Never   • Smokeless tobacco: Never   Vaping Use   • Vaping status: Never Used   Substance and Sexual Activity   • Alcohol use: No     Alcohol/week: 0.0 standard drinks of alcohol   • Drug use: No   Other Topics Concern   • Caffeine Concern Yes     Comment: 2-4 cups of coffee x day   • Exercise No   • Seat Belt Yes     Social Determinants of Health     Financial Resource Strain: Low Risk  (8/27/2024)    Financial Resource Strain    • Med Affordability: No   Food Insecurity: No Food Insecurity (9/23/2024)    Received from [facility]    Food Insecurity    • Currently or in the past 3 months, have you worried your food would run out before you had money to buy more?: No   Transportation Needs: No Transportation Needs (9/23/2024)    Received from [facility]    Transportation Needs    • Medical Transportation Needs?: No   Physical Activity: Insufficiently Active (7/8/2020)    [facility] from [facility], [facility]    Exercise Vital Sign    • Days of Exercise per Week: 1 day    • Minutes of Exercise per Session: 10 min    Received from [facility], [facility]    Social Connections   Housing Stability: Low Risk  (8/27/2024)    Housing Stability    • Housing Instability: No         REVIEW OF SYSTEMS:   Review of Systems   Constitutional: Negative.    Neurological:  Positive for headaches (patient is s/p concussion). Negative for dizziness and light-headedness.   Psychiatric/Behavioral:  Positive for dysphoric mood and sleep disturbance (did not sleep well last night, helped a neighbor who fell prior to bed and she found it difficult to fall asleep after). Negative for self-injury and suicidal ideas.        EXAM:   /84 (BP Location: Right arm,  Patient Position: Sitting, Cuff Size: adult)   Pulse 85   Temp 98 °F (36.7 °C) (Temporal)   Resp 16   Wt 192 lb (87.1 kg)   SpO2 95%   BMI 37.50 kg/m²   Physical Exam  Vitals and nursing note reviewed.   Constitutional:       General: She is not in acute distress.  HENT:      Head: Atraumatic.      Mouth/Throat:      Mouth: Mucous membranes are moist.   Cardiovascular:      Rate and Rhythm: Normal rate.   Pulmonary:      Effort: Pulmonary effort is normal.   Skin:     General: Skin is warm and dry.   Neurological:      General: No focal deficit present.      Mental Status: She is alert.   Psychiatric:         Mood and Affect: Mood is depressed. Affect is tearful.         Speech: Speech normal.         Behavior: Behavior normal. Behavior is cooperative.         Thought Content: Thought content normal. Thought content does not include homicidal or suicidal ideation.       ASSESSMENT AND PLAN:   Diagnoses and all orders for this visit:    Depression, unspecified depression type     -Taking Duloxetine 60mg daily per psych provider. Recommend positive self talk.  Encouraged patient to push herself to do something physically/mentally active each day such as taking a walk weather permitting or doing an activity such as puzzle, card game etc.  Encouraged patient to reflect on how she feels after she has pushed herself to be active.  -Return in 1 week for follow up, sooner as needed, call with questions or problems.  -Patient verbalized understanding and in agreement with treatment plan.    More than 30 minutes were spent on assessment, empathetic listening    ARNLOD Fraser

## 2024-09-27 NOTE — TELEPHONE ENCOUNTER
Nurse visit May 8th. No orders in Epic. Thanks!   Future Appointments  Date Time Provider Juliana Lea   5/8/2018 8:45 AM  [facility],[address] [facility] Angela Beltran (3) adequate

## 2024-10-02 ENCOUNTER — PATIENT MESSAGE (OUTPATIENT)
Dept: FAMILY MEDICINE CLINIC | Facility: CLINIC | Age: 69
End: 2024-10-02

## 2024-10-02 ENCOUNTER — MED REC SCAN ONLY (OUTPATIENT)
Dept: FAMILY MEDICINE CLINIC | Facility: CLINIC | Age: 69
End: 2024-10-02

## 2024-10-02 DIAGNOSIS — K21.9 GASTROESOPHAGEAL REFLUX DISEASE, UNSPECIFIED WHETHER ESOPHAGITIS PRESENT: ICD-10-CM

## 2024-10-02 RX ORDER — PANTOPRAZOLE SODIUM 40 MG/1
40 TABLET, DELAYED RELEASE ORAL
Qty: 60 TABLET | Refills: 0 | Status: SHIPPED | OUTPATIENT
Start: 2024-10-02

## 2024-10-02 NOTE — TELEPHONE ENCOUNTER
Last refill: 09/02/24  Qty: 60  W/ 0 refills  Last ov: 09/26/24    Requested Prescriptions     Pending Prescriptions Disp Refills    PANTOPRAZOLE 40 MG Oral Tab EC [Pharmacy Med Name: PANTOPRAZOLE 40MG TABLETS] 60 tablet 0     Sig: TAKE 1 TABLET BY MOUTH TWICE DAILY BEFORE MEALS     Future Appointments   Date Time Provider Department Center   10/4/2024  8:30 AM Melonie Whitney APRN [facility] [place]

## 2024-10-02 NOTE — TELEPHONE ENCOUNTER
From: Liv Wright  To: Melonie Whitney  Sent: 10/2/2024 8:21 AM CDT  Subject: Appointment     I made an appointment for Friday but if I feel better I will cancel it. OK  Liv

## 2024-10-03 ENCOUNTER — PATIENT MESSAGE (OUTPATIENT)
Dept: FAMILY MEDICINE CLINIC | Facility: CLINIC | Age: 69
End: 2024-10-03

## 2024-10-03 NOTE — TELEPHONE ENCOUNTER
From: Liv Wright  To: Melonie Whitney  Sent: 10/3/2024 8:38 AM CDT  Subject: Sinuses     I going to keep my appointment for tomorrow because I need to talk to you about something else.

## 2024-10-04 ENCOUNTER — OFFICE VISIT (OUTPATIENT)
Dept: FAMILY MEDICINE CLINIC | Facility: CLINIC | Age: 69
End: 2024-10-04
Payer: MEDICARE

## 2024-10-04 VITALS
RESPIRATION RATE: 20 BRPM | OXYGEN SATURATION: 95 % | SYSTOLIC BLOOD PRESSURE: 136 MMHG | BODY MASS INDEX: 38 KG/M2 | TEMPERATURE: 98 F | HEART RATE: 87 BPM | DIASTOLIC BLOOD PRESSURE: 78 MMHG | WEIGHT: 194 LBS

## 2024-10-04 DIAGNOSIS — J01.10 ACUTE NON-RECURRENT FRONTAL SINUSITIS: Primary | ICD-10-CM

## 2024-10-04 PROCEDURE — 3075F SYST BP GE 130 - 139MM HG: CPT

## 2024-10-04 PROCEDURE — 1170F FXNL STATUS ASSESSED: CPT

## 2024-10-04 PROCEDURE — 1160F RVW MEDS BY RX/DR IN RCRD: CPT

## 2024-10-04 PROCEDURE — 1159F MED LIST DOCD IN RCRD: CPT

## 2024-10-04 PROCEDURE — 99213 OFFICE O/P EST LOW 20 MIN: CPT

## 2024-10-04 PROCEDURE — 3078F DIAST BP <80 MM HG: CPT

## 2024-10-04 RX ORDER — AZITHROMYCIN 250 MG/1
TABLET, FILM COATED ORAL
Qty: 6 TABLET | Refills: 0 | Status: SHIPPED | OUTPATIENT
Start: 2024-10-04 | End: 2024-10-08

## 2024-10-04 RX ORDER — PREDNISONE 20 MG/1
40 TABLET ORAL DAILY
Qty: 10 TABLET | Refills: 0 | Status: SHIPPED | OUTPATIENT
Start: 2024-10-04 | End: 2024-10-09

## 2024-10-04 NOTE — PROGRESS NOTES
HPI:   Liv is a 69 yr. Old female here today for upper respiratory symptoms. Per patient her allergy symptoms have been flaring.  Presents today with sinus pressure, headache, fatigue, nasal drainage, cough. Taking her daily antihistamine, montelukast, and flonase.  Denies fever, chills, body aches, chest tightness, shortness of breath, n/v/d.         Current Outpatient Medications   Medication Sig Dispense Refill    predniSONE 20 MG Oral Tab Take 2 tablets (40 mg total) by mouth daily for 5 days. 10 tablet 0    azithromycin 250 MG Oral Tab Take 2 tablets (500 mg total) by mouth daily for 1 day, THEN 1 tablet (250 mg total) daily for 4 days. 6 tablet 0    PANTOPRAZOLE 40 MG Oral Tab EC TAKE 1 TABLET BY MOUTH TWICE DAILY BEFORE MEALS 60 tablet 0    montelukast 10 MG Oral Tab Take 1 tablet (10 mg total) by mouth daily. (Patient taking differently: Take 1 tablet (10 mg total) by mouth nightly.) 90 tablet 0    fluticasone propionate 50 MCG/ACT Nasal Suspension 2 sprays by Nasal route daily. 9.9 mL 3    cetirizine (ZYRTEC) 10 MG Oral Tab Take 1 tablet (10 mg total) by mouth daily.      gabapentin 400 MG Oral Cap Take 1 capsule (400 mg total) by mouth 3 (three) times daily.      busPIRone 10 MG Oral Tab Take 1 tablet (10 mg total) by mouth 3 (three) times daily as needed (anxiety).      DULoxetine 60 MG Oral Cap DR Particles Take 1 capsule (60 mg total) by mouth daily.      lamoTRIgine 100 MG Oral Tab Take 1 tablet (100 mg total) by mouth daily.      traZODone 100 MG Oral Tab Take 1 tablet (100 mg total) by mouth nightly as needed for Sleep. Patient taking nightly for sleep      Respiratory Therapy Supplies (NEBULIZER/TUBING/MOUTHPIECE) Does not apply Kit Please provide nebulizer, tubing, and mouthpiece. (Patient not taking: Reported on 8/26/2024) 1 each 0    albuterol (VENTOLIN HFA) 108 (90 Base) MCG/ACT Inhalation Aero Soln Inhale 2 puffs into the lungs every 4 (four) hours as needed for Wheezing or Shortness of  Breath. 18 g 1    Budesonide-Formoterol Fumarate (SYMBICORT) 160-4.5 MCG/ACT Inhalation Aerosol INHALE 1 PUFF BY MOUTH TWICE A DAY *RINSE MOUTH OUT AFTER EACH USE* 10.2 g 5    atorvastatin 40 MG Oral Tab Take 1 tablet by mouth nightly 90 tablet 1    metoprolol succinate ER 25 MG Oral Tablet 24 Hr Take 1 tablet (25 mg total) by mouth daily. 90 tablet 3    apixaban 2.5 MG Oral Tab Take 1 tablet (2.5 mg total) by mouth 2 (two) times daily.      dicyclomine 10 MG Oral Cap Take 1 capsule (10 mg total) by mouth 3 (three) times daily before meals.      Calcium Carbonate-Vitamin D 600-400 MG-UNIT Oral Tab Take 1 tablet by mouth daily.      cholecalciferol (DRISDOL) 1000 UNITS Oral Cap Take 5 capsules (5,000 Units total) by mouth daily.        Past Medical History:    Allergic rhinitis    Asthma (HCC)    Asthma with COPD (chronic obstructive pulmonary disease) ([facility])    Back pain    Depression    Diverticulosis of large intestine without hemorrhage    Esophageal reflux    Hiatal hernia    History of pulmonary embolism    Obesity    Obstructive sleep apnea (adult) (pediatric)    Osteoarthritis    Other and unspecified hyperlipidemia    PE (pulmonary embolism)    x2. prothrombin mutation found after 2nd one    Presence of IVC filter    Primary insomnia    Uses trazodone     Prothrombin mutation (HCC)    Pulmonary embolism (HCC)    Spinal stenosis    Thyroid nodule    Unspecified essential hypertension      Past Surgical History:   Procedure Laterality Date    Cholecystectomy  2006    Ir ivc filter placement      Knee replacement surgery  03/2017    L knee    Other surgical history      carpal tunnel right wrist (1981); Green field filter (PE 2004)    Other surgical history  03/06/2017    Left knee replacement    Removal gallbladder      Tonsillectomy  age 18    Tubal ligation  1981      Family History   Problem Relation Age of Onset    Depression Daughter     Anxiety Daughter     Depression Son     Psychiatric Father          dementia    Diabetes Mother     Heart Disorder Mother     Psychiatric Mother         dementia    Hypertension Sister     Hypertension Brother       Social History     Socioeconomic History    Marital status:    Tobacco Use    Smoking status: Never    Smokeless tobacco: Never   Vaping Use    Vaping status: Never Used   Substance and Sexual Activity    Alcohol use: No     Alcohol/week: 0.0 standard drinks of alcohol    Drug use: No   Other Topics Concern    Caffeine Concern Yes     Comment: 2-4 cups of coffee x day    Exercise No    Seat Belt Yes     Social Determinants of Health     Financial Resource Strain: Low Risk  (8/27/2024)    Financial Resource Strain     Med Affordability: No   Food Insecurity: No Food Insecurity (9/23/2024)    Received from [facility]    Food Insecurity     Currently or in the past 3 months, have you worried your food would run out before you had money to buy more?: No   Transportation Needs: No Transportation Needs (9/23/2024)    Received from [facility]    Transportation Needs     Medical Transportation Needs?: No   Physical Activity: Insufficiently Active (7/8/2020)    Received from [facility]    Exercise Vital Sign     Days of Exercise per Week: 1 day     Minutes of Exercise per Session: 10 min    Received from [facility], [facility]    Social Connections   Housing Stability: Low Risk  (8/27/2024)    Housing Stability     Housing Instability: No         REVIEW OF SYSTEMS:   Review of Systems   Constitutional:  Positive for fatigue. Negative for chills and fever.   HENT:  Positive for congestion, postnasal drip, rhinorrhea and sinus pressure. Negative for ear discharge, ear pain, sore throat and trouble swallowing.    Eyes: Negative.    Respiratory:  Positive for cough. Negative for chest tightness and shortness of breath.    Cardiovascular: Negative.     Gastrointestinal:  Negative for abdominal pain, diarrhea, nausea and vomiting.   Neurological:  Positive for headaches. Negative for dizziness and light-headedness.       EXAM:   /78 (BP Location: Left arm, Patient Position: Sitting, Cuff Size: adult)   Pulse 87   Temp 98.3 °F (36.8 °C) (Temporal)   Resp 20   Wt 194 lb (88 kg)   SpO2 95%   BMI 37.89 kg/m²   Physical Exam  Vitals and nursing note reviewed.   Constitutional:       General: She is not in acute distress.     Appearance: Normal appearance. She is not toxic-appearing.   HENT:      Head: Normocephalic.      Right Ear: Tympanic membrane, ear canal and external ear normal.      Left Ear: Tympanic membrane, ear canal and external ear normal.      Nose: Congestion and rhinorrhea present.      Right Turbinates: Swollen.      Left Turbinates: Swollen.      Mouth/Throat:      Mouth: Mucous membranes are moist.      Pharynx: Oropharynx is clear.   Eyes:      Conjunctiva/sclera: Conjunctivae normal.   Cardiovascular:      Rate and Rhythm: Normal rate and regular rhythm.      Pulses: Normal pulses.      Heart sounds: Normal heart sounds.   Pulmonary:      Effort: Pulmonary effort is normal.      Breath sounds: Wheezing (expiratory with cough) present.   Musculoskeletal:      Cervical back: Neck supple.   Lymphadenopathy:      Cervical: No cervical adenopathy.   Skin:     General: Skin is warm and dry.   Neurological:      Mental Status: She is alert and oriented to person, place, and time.   Psychiatric:         Behavior: Behavior normal.         ASSESSMENT AND PLAN:   Diagnoses and all orders for this visit:    Acute non-recurrent frontal sinusitis  -     predniSONE 20 MG Oral Tab; Take 2 tablets (40 mg total) by mouth daily for 5 days.  -     azithromycin 250 MG Oral Tab; Take 2 tablets (500 mg total) by mouth daily for 1 day, THEN 1 tablet (250 mg total) daily for 4 days.     -Discussed medications as prescribed.  Recommend continue over the counter  treatment of symptoms.   -Return for worsening, call with questions or problems.  -Patient verbalized understanding and in agreement with treatment plan.    Melonie Whitney, APRN

## 2024-10-08 ENCOUNTER — TELEPHONE (OUTPATIENT)
Dept: FAMILY MEDICINE CLINIC | Facility: CLINIC | Age: 69
End: 2024-10-08

## 2024-10-08 NOTE — TELEPHONE ENCOUNTER
Patient has been on an antibiotic & finished it 2 days ago.  She felt fine taking the antibiotic.  Today she feels weak.  She wanted to discuss.

## 2024-10-08 NOTE — TELEPHONE ENCOUNTER
Phone call from patient.  Finished Z-brunilda this morning.  Feels really weak today.  Wondering if this is from medication.    No fever.   Drinking fluids well.    No other symptoms.

## 2024-10-08 NOTE — TELEPHONE ENCOUNTER
Per Dr. Raza -   Do not think antibiotics   push fluids   any other symptom?  Upper respiratory infection issues  cough?  Advised rest fluids and also be sure to eat well       Left message for patient to call.

## 2024-10-10 ENCOUNTER — PATIENT MESSAGE (OUTPATIENT)
Dept: FAMILY MEDICINE CLINIC | Facility: CLINIC | Age: 69
End: 2024-10-10

## 2024-10-11 ENCOUNTER — PATIENT OUTREACH (OUTPATIENT)
Dept: CASE MANAGEMENT | Age: 69
End: 2024-10-11

## 2024-10-11 ENCOUNTER — OFFICE VISIT (OUTPATIENT)
Dept: FAMILY MEDICINE CLINIC | Facility: CLINIC | Age: 69
End: 2024-10-11
Payer: MEDICARE

## 2024-10-11 ENCOUNTER — PATIENT MESSAGE (OUTPATIENT)
Dept: FAMILY MEDICINE CLINIC | Facility: CLINIC | Age: 69
End: 2024-10-11

## 2024-10-11 VITALS
WEIGHT: 187 LBS | RESPIRATION RATE: 18 BRPM | TEMPERATURE: 98 F | SYSTOLIC BLOOD PRESSURE: 156 MMHG | OXYGEN SATURATION: 97 % | HEART RATE: 111 BPM | BODY MASS INDEX: 37 KG/M2 | DIASTOLIC BLOOD PRESSURE: 90 MMHG
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
## 2024-10-11 PROCEDURE — 1160F RVW MEDS BY RX/DR IN RCRD: CPT

## 2024-10-11 PROCEDURE — 1159F MED LIST DOCD IN RCRD: CPT

## 2024-10-11 PROCEDURE — 3077F SYST BP >= 140 MM HG: CPT

## 2024-10-11 PROCEDURE — 3080F DIAST BP >= 90 MM HG: CPT

## 2024-10-11 PROCEDURE — 99213 OFFICE O/P EST LOW 20 MIN: CPT

## 2024-10-11 PROCEDURE — 1170F FXNL STATUS ASSESSED: CPT

## 2024-10-11 NOTE — PROGRESS NOTES
HPI:   Liv is a 69 yr. Old female here today for \"falling\".  Per patient she had a fall at her place of residence yesterday was assessed by EMS and found to be \"fine\".  Patient reports scheduled this appointment for weakness.  Per patient she has spent this morning in the local emergency department as she called EMS for weakness and chest pain.  Patient reports emergency department work up was normal and she drove herself to this office for appointment.  Patient reports she knows the cause of her weakness as she has not eaten or drank anything for atleast two days.  Reports she has not been hungry.  Reports he anxiety has been high.  Did not take her blood pressure medication today based on blood pressure reading from EMS on previous day.     Patient reports was instructed in emergency department to use her walker for ambulating.  Patient presents with out walker, denies need at this time.  Reports was informed recommend in home care giver, patient denies need.          Current Outpatient Medications   Medication Sig Dispense Refill    PANTOPRAZOLE 40 MG Oral Tab EC TAKE 1 TABLET BY MOUTH TWICE DAILY BEFORE MEALS 60 tablet 0    gabapentin 400 MG Oral Cap Take 1 capsule (400 mg total) by mouth 3 (three) times daily.      busPIRone 10 MG Oral Tab Take 1 tablet (10 mg total) by mouth 3 (three) times daily as needed (anxiety).      DULoxetine 60 MG Oral Cap DR Particles Take 1 capsule (60 mg total) by mouth daily.      lamoTRIgine 100 MG Oral Tab Take 1 tablet (100 mg total) by mouth daily.      traZODone 100 MG Oral Tab Take 1 tablet (100 mg total) by mouth nightly as needed for Sleep. Patient taking nightly for sleep      montelukast 10 MG Oral Tab Take 1 tablet (10 mg total) by mouth daily. (Patient taking differently: Take 1 tablet (10 mg total) by mouth nightly.) 90 tablet 0    Respiratory Therapy Supplies (NEBULIZER/TUBING/MOUTHPIECE) Does not apply Kit Please provide nebulizer, tubing, and mouthpiece. 1 each 0     albuterol (VENTOLIN HFA) 108 (90 Base) MCG/ACT Inhalation Aero Soln Inhale 2 puffs into the lungs every 4 (four) hours as needed for Wheezing or Shortness of Breath. 18 g 1    Budesonide-Formoterol Fumarate (SYMBICORT) 160-4.5 MCG/ACT Inhalation Aerosol INHALE 1 PUFF BY MOUTH TWICE A DAY *RINSE MOUTH OUT AFTER EACH USE* 10.2 g 5    atorvastatin 40 MG Oral Tab Take 1 tablet by mouth nightly 90 tablet 1    metoprolol succinate ER 25 MG Oral Tablet 24 Hr Take 1 tablet (25 mg total) by mouth daily. 90 tablet 3    apixaban 2.5 MG Oral Tab Take 1 tablet (2.5 mg total) by mouth 2 (two) times daily.      Calcium Carbonate-Vitamin D 600-400 MG-UNIT Oral Tab Take 1 tablet by mouth daily.      cetirizine (ZYRTEC) 10 MG Oral Tab Take 1 tablet (10 mg total) by mouth daily.      cholecalciferol (DRISDOL) 1000 UNITS Oral Cap Take 5 capsules (5,000 Units total) by mouth daily.      FLUTICASONE PROPIONATE 50 MCG/ACT Nasal Suspension SHAKE LIQUID AND USE 2 SPRAYS IN EACH NOSTRIL DAILY 16 g 0    dicyclomine 10 MG Oral Cap Take 1 capsule (10 mg total) by mouth 3 (three) times daily before meals.        Past Medical History:    Allergic rhinitis    Asthma (HCC)    Asthma with COPD (chronic obstructive pulmonary disease) (HCC)    Back pain    Depression    Diverticulosis of large intestine without hemorrhage    Esophageal reflux    Hiatal hernia    History of pulmonary embolism    Obesity    Obstructive sleep apnea (adult) (pediatric)    Osteoarthritis    Other and unspecified hyperlipidemia    PE (pulmonary embolism)    x2. prothrombin mutation found after 2nd one    Presence of IVC filter    Primary insomnia    Uses trazodone     Prothrombin mutation (HCC)    Pulmonary embolism (HCC)    Spinal stenosis    Thyroid nodule    Unspecified essential hypertension      Past Surgical History:   Procedure Laterality Date    Cholecystectomy  2006    Ir ivc filter placement      Knee replacement surgery  03/2017    L knee    Other surgical  history      carpal tunnel right wrist (1981); Green field filter (PE 2004)    Other surgical history  03/06/2017    Left knee replacement    Removal gallbladder      Tonsillectomy  age 18    Tubal ligation  1981      Family History   Problem Relation Age of Onset    Depression Daughter     Anxiety Daughter     Depression Son     Psychiatric Father         dementia    Diabetes Mother     Heart Disorder Mother     Psychiatric Mother         dementia    Hypertension Sister     Hypertension Brother       Social History     Socioeconomic History    Marital status:    Tobacco Use    Smoking status: Never    Smokeless tobacco: Never   Vaping Use    Vaping status: Never Used   Substance and Sexual Activity    Alcohol use: No     Alcohol/week: 0.0 standard drinks of alcohol    Drug use: No   Other Topics Concern    Caffeine Concern Yes     Comment: 2-4 cups of coffee x day    Exercise No    Seat Belt Yes     Social Drivers of Health     Financial Resource Strain: Low Risk  (8/27/2024)    Financial Resource Strain     Med Affordability: No   Food Insecurity: No Food Insecurity (9/23/2024)    Received from [facility]    Food Insecurity     Currently or in the past 3 months, have you worried your food would run out before you had money to buy more?: No   Transportation Needs: No Transportation Needs (9/23/2024)    Received from [facility]    Transportation Needs     Medical Transportation Needs?: No   Physical Activity: Insufficiently Active (7/8/2020)    Received from [facility], [facility]    Exercise Vital Sign     Days of Exercise per Week: 1 day     Minutes of Exercise per Session: 10 min    Received from [facility], [facility]    Social Connections   Housing Stability: Low Risk  (8/27/2024)    Housing Stability     Housing Instability: No         REVIEW OF SYSTEMS:   Review of Systems   Constitutional:   Positive for appetite change and fatigue.   HENT: Negative.     Cardiovascular:  Positive for chest pain (called EMS earlier on this day for symptoms). Negative for palpitations and leg swelling.   Gastrointestinal:  Negative for abdominal pain, diarrhea, nausea and vomiting.   Genitourinary:  Negative for difficulty urinating, dysuria, frequency and urgency.   Musculoskeletal:  Positive for gait problem (due to weakness per patient).   Skin:  Negative for color change and rash.   Neurological:  Positive for weakness. Negative for dizziness, light-headedness and headaches.   Psychiatric/Behavioral:  The patient is nervous/anxious.        EXAM:   /90 (BP Location: Left arm, Patient Position: Sitting, Cuff Size: adult)   Pulse 111   Temp 97.9 °F (36.6 °C) (Temporal)   Resp 18   Wt 187 lb (84.8 kg)   SpO2 97%   BMI 36.52 kg/m²   Physical Exam  Vitals and nursing note reviewed.   Constitutional:       General: She is not in acute distress.     Appearance: She is not ill-appearing.   HENT:      Head: Atraumatic.      Right Ear: External ear normal.      Left Ear: External ear normal.      Mouth/Throat:      Mouth: Mucous membranes are moist.   Eyes:      General:         Right eye: No discharge.         Left eye: No discharge.      Conjunctiva/sclera: Conjunctivae normal.   Cardiovascular:      Rate and Rhythm: Normal rate and regular rhythm.      Pulses: Normal pulses.      Heart sounds: Normal heart sounds.   Pulmonary:      Effort: Pulmonary effort is normal.      Breath sounds: Normal breath sounds.   Musculoskeletal:         General: Normal range of motion.      Cervical back: Neck supple.   Skin:     General: Skin is warm and dry.   Neurological:      Mental Status: She is alert. Mental status is at baseline.         ASSESSMENT AND PLAN:   Diagnoses and all orders for this visit:    Fall, initial encounter    -Discussed physical exam and Emergency Department record review reassuring.  Recommend patient  push fluids, consume small and more frequent meals.  Take blood pressure medication as prescribed.     -Follow up for persistent or worsening symptoms.  -Patient verbalized understanding and in agreement with treatment plan.    More than 20 minutes were spent on assessment, education, and discussion of plan.    ARNOLD Fraser

## 2024-10-14 ENCOUNTER — PATIENT MESSAGE (OUTPATIENT)
Dept: FAMILY MEDICINE CLINIC | Facility: CLINIC | Age: 69
End: 2024-10-14

## 2024-10-14 RX ORDER — FLUTICASONE PROPIONATE 50 MCG
2 SPRAY, SUSPENSION (ML) NASAL DAILY
Qty: 16 G | Refills: 0 | Status: SHIPPED | OUTPATIENT
Start: 2024-10-14

## 2024-10-14 NOTE — PROGRESS NOTES
Left message on mailbox for patient to call nurse care manager back for transitional care management/hospital follow-up call.  Nurse care  information included in message.

## 2024-10-14 NOTE — TELEPHONE ENCOUNTER
Last refill:  07/12/24  Qty:  9.9 mL  W/ 3 refills  Last ov: 10/11/24    Requested Prescriptions     Pending Prescriptions Disp Refills    FLUTICASONE PROPIONATE 50 MCG/ACT Nasal Suspension [Pharmacy Med Name: FLUTICASONE 50MCG NASAL SP (120) RX] 16 g 0     Sig: SHAKE LIQUID AND USE 2 SPRAYS IN EACH NOSTRIL DAILY     No future appointments.

## 2024-10-16 ENCOUNTER — TELEPHONE (OUTPATIENT)
Dept: FAMILY MEDICINE CLINIC | Facility: CLINIC | Age: 69
End: 2024-10-16

## 2024-10-16 NOTE — TELEPHONE ENCOUNTER
Per discussion with Dr. Luz Marina miller for patient to schedule appointment with cardiologist she was scheduled with in the past.

## 2024-10-16 NOTE — TELEPHONE ENCOUNTER
Patient sent a [employer] message on 10/11/24. Please review and advise.     The message stated the following:    I had an appointment with a cardiologist before but I cancelled it and never rescheduled it. Should I call and make an appointment or would you rather have me see one from there?

## 2024-10-16 NOTE — TELEPHONE ENCOUNTER
Saw Melonie a few days ago and a cardiologist appointment was discussed so she wants to know the update on that

## 2024-10-17 ENCOUNTER — PATIENT MESSAGE (OUTPATIENT)
Dept: FAMILY MEDICINE CLINIC | Facility: CLINIC | Age: 69
End: 2024-10-17

## 2024-10-21 RX ORDER — ATORVASTATIN CALCIUM 40 MG/1
40 TABLET, FILM COATED ORAL NIGHTLY
Qty: 90 TABLET | Refills: 1 | Status: SHIPPED | OUTPATIENT
Start: 2024-10-21

## 2024-10-21 NOTE — TELEPHONE ENCOUNTER
LOV:10-9-24  LAST LAB:lipids:8/27/24  LAST RX:   Medication Quantity Refills Start End   atorvastatin 40 MG Oral Tab 90 tablet 1 4/22/2024 --   Sig:   Take 1 tablet by mouth nightly     Next OV: No future appointments.   PROTOCOL

## 2024-10-22 ENCOUNTER — PATIENT OUTREACH (OUTPATIENT)
Dept: CASE MANAGEMENT | Age: 69
End: 2024-10-22

## 2024-10-22 DIAGNOSIS — Z02.9 ENCOUNTERS FOR UNSPECIFIED ADMINISTRATIVE PURPOSE: Primary | ICD-10-CM

## 2024-10-22 PROCEDURE — 1111F DSCHRG MED/CURRENT MED MERGE: CPT

## 2024-10-23 RX ORDER — LIDOCAINE 50 MG/G
PATCH TOPICAL
COMMUNITY
Start: 2024-10-21

## 2024-10-23 NOTE — PROGRESS NOTES
Transitions of Care Navigation  Discharge Date: 10/21/24  Contact Date: 10/23/2024    Transitions of Care Assessment:  SARAH Initial Assessment    General:  Assessment completed with: Patient  Patient Subjective: Spoke with patient.  She is feeling ok since emergency room discharge.  Her chest pain has resolved.  She does have a dry cough now. She is using over the counter robitussin for this.  She picked up the lidocaine patches and she is using them place one on skin for 12 hours and then removing for 12 hours.  She has an appointment with her primary care physician tomorrow. Patient denies the following symptoms:  fever, headache, dizziness, chest pain, shortness of breath, abdominal pain, nausea or vomiting. Patient does not monitor her blood pressure at home.  She cannot do it herself.  Chief Complaint: chest wall pain  Verify patient name and  with patient/ caregiver: Yes    Hospital Stay/Discharge:  Tell me what you understand of why you were in the hospital or emergency department: I had chest pain  Prior to leaving the hospital were your Discharge Instructions reviewed with you?: Yes  Did you receive a copy of your written Discharge Instructions?: Yes  What questions do you have about your Discharge Instructions?: Patient did not have any additional questions  Do you feel better or worse since you left the hospital or emergency department?: Better    Follow - Up Appointment:  Do you have a follow-up appointment?: Yes  Date: 10/24/24  Physician: PCP  Are there any barriers to getting to your follow-up appointment?: No    Home Health/DME:  Prior to leaving the hospital was Home Health (HH) arranged for you?: N/A  Are HH needs identified by staff during the assessment?: No     Prior to leaving the hospital or emergency department was Durable Medical Equipment (DME), medical supplies, or infusions arranged for you?: N/A  Are DME/medical supply/infusions needs identified by staff during this assessment?: No      Medications/Diet:  Did any of your medications change, during or after your hospital stay or ED visit?: No  Do you understand what your medications are for and possible side effects?: Yes  Are there any reasons that keep you from taking your medication as prescribed?: No  Any concerns about medication refills?: No    Were you given a different diet per your Discharge Instructions?: No  Reason: N/A     Questions/Concerns:  Do you have any questions or concerns that have not been discussed?: No     Nursing Interventions:  Assessed pain and for additional signs/symptoms. Reviewed medications. Confirmed appointment.      Medications:  Reviewed medications with patient.  Also, discussed new medication and potential side effects.  Patient did not have any additional questions.   Current Outpatient Medications   Medication Sig Dispense Refill    lidocaine 5 % External Patch Applied to the affected area 12 hours on 12 hours off as needed      atorvastatin 40 MG Oral Tab Take 1 tablet (40 mg total) by mouth nightly. 90 tablet 1    FLUTICASONE PROPIONATE 50 MCG/ACT Nasal Suspension SHAKE LIQUID AND USE 2 SPRAYS IN EACH NOSTRIL DAILY 16 g 0    PANTOPRAZOLE 40 MG Oral Tab EC TAKE 1 TABLET BY MOUTH TWICE DAILY BEFORE MEALS 60 tablet 0    gabapentin 400 MG Oral Cap Take 1 capsule (400 mg total) by mouth 3 (three) times daily.      busPIRone 10 MG Oral Tab Take 1 tablet (10 mg total) by mouth 3 (three) times daily as needed (anxiety).      DULoxetine 60 MG Oral Cap DR Particles Take 1 capsule (60 mg total) by mouth daily.      lamoTRIgine 100 MG Oral Tab Take 1 tablet (100 mg total) by mouth daily.      traZODone 100 MG Oral Tab Take 1 tablet (100 mg total) by mouth nightly as needed for Sleep. Patient taking nightly for sleep      montelukast 10 MG Oral Tab Take 1 tablet (10 mg total) by mouth daily. 90 tablet 0    Respiratory Therapy Supplies (NEBULIZER/TUBING/MOUTHPIECE) Does not apply Kit Please provide nebulizer, tubing,  and mouthpiece. 1 each 0    albuterol (VENTOLIN HFA) 108 (90 Base) MCG/ACT Inhalation Aero Soln Inhale 2 puffs into the lungs every 4 (four) hours as needed for Wheezing or Shortness of Breath. 18 g 1    Budesonide-Formoterol Fumarate (SYMBICORT) 160-4.5 MCG/ACT Inhalation Aerosol INHALE 1 PUFF BY MOUTH TWICE A DAY *RINSE MOUTH OUT AFTER EACH USE* 10.2 g 5    metoprolol succinate ER 25 MG Oral Tablet 24 Hr Take 1 tablet (25 mg total) by mouth daily. 90 tablet 3    apixaban 2.5 MG Oral Tab Take 1 tablet (2.5 mg total) by mouth 2 (two) times daily.      dicyclomine 10 MG Oral Cap Take 1 capsule (10 mg total) by mouth 3 (three) times daily before meals.      Calcium Carbonate-Vitamin D 600-400 MG-UNIT Oral Tab Take 1 tablet by mouth daily.      cetirizine (ZYRTEC) 10 MG Oral Tab Take 1 tablet (10 mg total) by mouth daily.      cholecalciferol (DRISDOL) 1000 UNITS Oral Cap Take 5 capsules (5,000 Units total) by mouth daily.           Follow-up Appointments:  Your appointments       Date & Time Appointment Department (Center)    Oct 24, 2024 9:20 AM [facility] Follow Up with Miguel Raza MD [facility] ([facility])              [place] [address] [facility] [id number] [phone]            Transitional [facility]  Was TCC Ordered: No      Primary Care Provider (If no TCC appointment)  Does patient already have a PCP appointment scheduled? Yes  Nurse Care Manager Confirmed PCP office ER Follow-up appointment with patient    Specialist  Does the patient have any other follow-up appointment(s) need to be scheduled? No     [x]  Patient verbally agrees to additional follow-up calls from Nurse Care Manager    Book By Date: 10/28/24

## 2024-10-24 ENCOUNTER — OFFICE VISIT (OUTPATIENT)
Dept: FAMILY MEDICINE CLINIC | Facility: CLINIC | Age: 69
End: 2024-10-24
Payer: MEDICARE

## 2024-10-24 VITALS
BODY MASS INDEX: 37.3 KG/M2 | HEIGHT: 60 IN | DIASTOLIC BLOOD PRESSURE: 84 MMHG | RESPIRATION RATE: 12 BRPM | SYSTOLIC BLOOD PRESSURE: 138 MMHG | HEART RATE: 56 BPM | OXYGEN SATURATION: 100 % | WEIGHT: 190 LBS | TEMPERATURE: 97 F

## 2024-10-24 DIAGNOSIS — M94.0 COSTOCHONDRITIS: Primary | ICD-10-CM

## 2024-10-24 DIAGNOSIS — R07.89 ATYPICAL CHEST PAIN: ICD-10-CM

## 2024-10-24 PROCEDURE — 99214 OFFICE O/P EST MOD 30 MIN: CPT | Performed by: FAMILY MEDICINE

## 2024-10-24 PROCEDURE — 1159F MED LIST DOCD IN RCRD: CPT | Performed by: FAMILY MEDICINE

## 2024-10-24 PROCEDURE — 3079F DIAST BP 80-89 MM HG: CPT | Performed by: FAMILY MEDICINE

## 2024-10-24 PROCEDURE — 1170F FXNL STATUS ASSESSED: CPT | Performed by: FAMILY MEDICINE

## 2024-10-24 PROCEDURE — G2211 COMPLEX E/M VISIT ADD ON: HCPCS | Performed by: FAMILY MEDICINE

## 2024-10-24 PROCEDURE — 1160F RVW MEDS BY RX/DR IN RCRD: CPT | Performed by: FAMILY MEDICINE

## 2024-10-24 PROCEDURE — 3075F SYST BP GE 130 - 139MM HG: CPT | Performed by: FAMILY MEDICINE

## 2024-10-24 PROCEDURE — 3008F BODY MASS INDEX DOCD: CPT | Performed by: FAMILY MEDICINE

## 2024-10-24 RX ORDER — PREDNISONE 10 MG/1
TABLET ORAL
Qty: 12 TABLET | Refills: 0 | Status: SHIPPED | OUTPATIENT
Start: 2024-10-24 | End: 2024-10-24

## 2024-10-24 RX ORDER — PREDNISONE 10 MG/1
TABLET ORAL
Qty: 12 TABLET | Refills: 0 | Status: SHIPPED | OUTPATIENT
Start: 2024-10-24 | End: 2024-11-10

## 2024-10-24 NOTE — PROGRESS NOTES
Subjective:   Liv Wright is a 69 year old female who presents for ER F/U (Seen @ [facility] for chest pain )     Patient here for follow-up from emergency room.    She was seen at [facility] for chest pain this is approximately 1 week ago.  She was at the [facility] when she had chest pain pressure.  Transferred to the emergency room.  When she was there she had normal troponin normal BNP normal x-ray normal EKG.    She did get better with some lidocaine and aspirin.  The aspirin was given for antiplatelet therapy but seem to help her.    She usually cannot take NSAIDs because of her prior histories.  Intolerant of these.    Patient can take prednisone and we discussed using this is an anti-inflammation.    She does have appointment to follow-up with her cardiologist in general although this does not seem to be cardiogenic in nature.      RESULTS REVIEWED FROM PRIOR VISITS BY   DR LLOYD Florentino and progress and imaging  reviewed in care everywhere    History/Other:   has a current medication list which includes the following prescription(s): prednisone, lidocaine, atorvastatin, fluticasone propionate, pantoprazole, gabapentin, buspirone, duloxetine, lamotrigine, trazodone, montelukast, nebulizer/tubing/mouthpiece, albuterol, budesonide-formoterol fumarate, metoprolol succinate er, apixaban, calcium carbonate-vitamin d, cetirizine, cholecalciferol, and dicyclomine.     is allergic to colchicine, contrast dye [gadolinium derivatives], nitrofurantoin, penicillins, and radiology contrast iodinated dyes.     Review of Systems:  GENERAL HEALTH: feels well no complaints  SKIN: denies any unusual skin lesions or rashes  RESPIRATORY: denies shortness of breath with exertion  CARDIOVASCULAR: denies chest pain on exertion  See H&P for that her chest pain description that is still present with movement  GI: denies abdominal pain and denies heartburn  NEURO: denies headaches    Objective:   /84 (BP  Location: Right arm, Patient Position: Sitting, Cuff Size: large)   Pulse 56   Temp 97.4 °F (36.3 °C) (Temporal)   Resp 12   Ht 5' (1.524 m)   Wt 190 lb (86.2 kg)   SpO2 100%   BMI 37.11 kg/m²  Estimated body mass index is 37.11 kg/m² as calculated from the following:    Height as of this encounter: 5' (1.524 m).    Weight as of this encounter: 190 lb (86.2 kg).  GENERAL: well developed, well nourished,in no apparent distress  SKIN: no rashes,no suspicious lesions  NECK: supple,no adenopathy,no bruits  LUNGS: clear to auscultation  CARDIO: RRR without murmur  CHEST WALL patient has exquisite chest tenderness especially at the sternal costal border on the left lower side of the sternum.  Very exquisite tenderness to light palpation patient jumps with pain.      EXTREMITIES: no cyanosis, clubbing or edema    Assessment & Plan:   1. Costochondritis (Primary)  Comments:  Will treat with prednisone, follow-up as needed, has been using rolling walker may cause aggravation  Orders:  -     predniSONE; Take 1 tablet (10 mg total) by mouth daily for 7 days, THEN 0.5 tablets (5 mg total) daily for 10 days. 2 tabs daily 5 days 1 tab daily 5 days.  Dispense: 12 tablet; Refill: 0  2. Atypical chest pain  Comments:  This occurrence chest pain seems to be musculoskeletal and not cardiogenic will follow-up with cardiology routine  Orders:  -     predniSONE; Take 1 tablet (10 mg total) by mouth daily for 7 days, THEN 0.5 tablets (5 mg total) daily for 10 days. 2 tabs daily 5 days 1 tab daily 5 days.  Dispense: 12 tablet; Refill: 0          Miguel Raza MD, 10/24/2024, 11:07 AM

## 2024-10-30 ENCOUNTER — PATIENT OUTREACH (OUTPATIENT)
Dept: CASE MANAGEMENT | Age: 69
End: 2024-10-30

## 2024-10-30 ENCOUNTER — TELEPHONE (OUTPATIENT)
Dept: FAMILY MEDICINE CLINIC | Facility: CLINIC | Age: 69
End: 2024-10-30

## 2024-10-30 NOTE — TELEPHONE ENCOUNTER
Patient stated that Melonie was supposed to speak with her counselor and she was wanting to follow up on that and wanted to know if she has yet.

## 2024-10-30 NOTE — TELEPHONE ENCOUNTER
Spoke with patient and advised we are waiting on records that we requested. She verbalized understanding.

## 2024-10-30 NOTE — TELEPHONE ENCOUNTER
Dr. Raza, I did speak with Melonie who states we sent a release of information to the counselor a couple of weeks ago, but Melonie hasn't seen anything come back. Have you?

## 2024-10-30 NOTE — PROGRESS NOTES
Patient seen primary care physician on 10/24 and given prednisone taper for chest pain and costochondritis.  Seen cardiologist on 10/28 and they Recommend TTE, Rosa Isela PET.  Recommend low sodium diet, daily exercise and maintain a normal BMI. Recommend monitor blood pressure at home.         SARAH Follow-up Assessment    General:  Assessment completed with: Patient  Community Resources: [facility]    Progress/Care Plan:  Is the patient progressing as planned?: Yes  Care Plan Update: Spoke with patient she is still taking her prednisone taper.  She is tolerating this well with no side effects. Her chest pain was resolved but she just finished playing two hours of chair volleyball and it is now hurting.  Currently 8/10.  She denies any shortness of breath. She has scheduled her TTE and rosa isela scan in November.  She didn't know the exact dates off of the top of her head. She is following a low sodium diet and exercising daily. Patient did not have any additional questions or concerns.  New Care Plan: complete prednisone taper, testing ordered by cardiology, daily exercise, low sodium diet  Frequency/Follow Up Plan: 1 week     Notes:  Navigator Notes: prednisone taper done, date of tests, checking blood pressure.

## 2024-11-06 ENCOUNTER — PATIENT OUTREACH (OUTPATIENT)
Dept: CASE MANAGEMENT | Age: 69
End: 2024-11-06

## 2024-11-07 ENCOUNTER — TELEPHONE (OUTPATIENT)
Dept: FAMILY MEDICINE CLINIC | Facility: CLINIC | Age: 69
End: 2024-11-07

## 2024-11-07 NOTE — PROGRESS NOTES
SARAH Follow-up Assessment    General:  Assessment completed with: Patient  Community Resources: Other (n/a)    Progress/Care Plan:  Is the patient progressing as planned?: Yes  Care Plan Update: Spoke with patient she reports chest pain has resolved. She has a stress test on Monday and she finished her prednisone. Patient did not have any additional questions or concerns.  New Care Plan: complete stress test  Frequency/Follow Up Plan: 1 week     Notes:  Navigator Notes: stress test results

## 2024-11-07 NOTE — TELEPHONE ENCOUNTER
RELEASE OF INFORMATION  RECEIVED FROM [facility] ALLOWING EXCHANGE OF INFORMATION

## 2024-11-12 ENCOUNTER — PATIENT OUTREACH (OUTPATIENT)
Dept: CASE MANAGEMENT | Age: 69
End: 2024-11-12

## 2024-11-12 NOTE — PROGRESS NOTES
Patient left message on voicemail yesterday to inform nurse care manager that she had her stress test yesterday and is scheduled for her echo next week.    SARAH Follow-up Assessment    General:  Assessment completed with: Patient  Community Resources: Other (n.a)    Progress/Care Plan:  Is the patient progressing as planned?: Yes  Care Plan Update: Spoke with patient. Her chest pain has resolved. Patient reports she had a little yesterday during her stress test but it resolved. Patient was in emergency room on 11/8 for right 5th digit laceration.  She dropped a glass bottle on her floor and it broke.  She was cleaning up the glass and lacerated her right pinky.  She is on eliquis and couldn't get the bleeding to stop.  She had the laceration cleaned and dermabond applied.  Dermabound fell off yesterday and laceration looks good.  No redness, swelling or drainage. Patient did not have any additional questions or concerns.  New Care Plan: continue to monitor laceration for infection, complete echo next week  Frequency/Follow Up Plan: 1 week     Notes:  Navigator Notes: stress test results/chest pain/laceration

## 2024-11-20 ENCOUNTER — TELEPHONE (OUTPATIENT)
Dept: FAMILY MEDICINE CLINIC | Facility: CLINIC | Age: 69
End: 2024-11-20

## 2024-11-20 NOTE — TELEPHONE ENCOUNTER
See intake message      Spoke with patient who is experiencing pain in Left lower groin area off/on for one week. No change in appetite, no nausea, vomiting, or diarrhea.   Patient wondering if anything she should be doing or watching for.

## 2024-11-20 NOTE — TELEPHONE ENCOUNTER
Any newer symptom like you asked, fever, change in appetite,  nausea, vomiting, or diarrhea.   Otherwise observe

## 2024-11-20 NOTE — TELEPHONE ENCOUNTER
Patient notified and verbalized understanding. Agrees to contact office with any change in condition or persistent/worsening symptoms

## 2024-11-20 NOTE — TELEPHONE ENCOUNTER
She thinks she has diverticulitis and wants to talk to you about that.  She said she has a history of this

## 2024-11-21 ENCOUNTER — PATIENT OUTREACH (OUTPATIENT)
Dept: CASE MANAGEMENT | Age: 69
End: 2024-11-21

## 2024-11-22 ENCOUNTER — APPOINTMENT (OUTPATIENT)
Dept: CARDIOLOGY | Age: 69
End: 2024-11-22

## 2024-11-22 NOTE — PROGRESS NOTES
Dear Miguel Raza MD,    My name is Dominique Alcaraz RN and I am this patient's Transition of Care Navigator.     Thank you for allowing me to participate in your patient's care over the past 30 days.     The goal of this program is to assist individuals in meeting their health care needs post discharge.  This is done by providing consistent care coordination and connecting patients with needed resources throughout this episode of care. I'd like to inform you that your patient Liv Wright has completed the 30 day SARAH navigation program as of today.     In discussion with the patient/family contact, I reviewed their providers and ongoing support contacts for the future.     Areas of need: completion of Echo that is scheduled next week   Areas of progress: chest pain resolved, stress test completed, medication completed, all follow up appointments attended    It has been a pleasure communicating and working with you and thank you for allowing me to participate in the care of your patient.     If for any reason you have questions about the program, please feel free to contact me at the information below.      Sincerely,    Dominique Alcaraz RN      SARAH Graduation Assessment    General:  Assessment completed with: Patient  Community Resources: Other (n/a)    Care Plan/Instructions:   Care Plan Summary (Recap of navigation period including # of ED & Hospital Admission, and if goals met or unmet): Spoke with patient her chest pain remains resolved and her laceration on her right pinky is healed.  She has an echo scheduled for next week. Patient aware this is the last outreach call with nurse care manager as TRANSITIONS OF CARE program has ended.  Patient did not have any additional questions or concerns.   Primary care physician updated.       Navigation period 10/23 - 11/22.  One emergency room visit on 10/21 that initiated SARAH.  Goals have been met.  Patient Graduation Instructions (Ongoing barriers to care identified,  Areas of Need, Areas of Progress): Barriers:  None  Needs:  completion of Echo that is scheduled next week   Progress:  chest pain resolved, stress test completed, medication completed, all follow up appointments attended

## 2024-12-04 ENCOUNTER — OFFICE VISIT (OUTPATIENT)
Dept: FAMILY MEDICINE CLINIC | Facility: CLINIC | Age: 69
End: 2024-12-04
Payer: MEDICARE

## 2024-12-04 VITALS
BODY MASS INDEX: 35.09 KG/M2 | HEART RATE: 84 BPM | HEIGHT: 61.25 IN | DIASTOLIC BLOOD PRESSURE: 83 MMHG | WEIGHT: 188.25 LBS | RESPIRATION RATE: 12 BRPM | OXYGEN SATURATION: 96 % | SYSTOLIC BLOOD PRESSURE: 127 MMHG | TEMPERATURE: 97 F

## 2024-12-04 DIAGNOSIS — Z01.818 PRE-PROCEDURAL EXAMINATION: Primary | ICD-10-CM

## 2024-12-04 DIAGNOSIS — I10 ESSENTIAL HYPERTENSION: ICD-10-CM

## 2024-12-04 DIAGNOSIS — Z01.810 PREOP CARDIOVASCULAR EXAM: ICD-10-CM

## 2024-12-04 DIAGNOSIS — H25.013 CORTICAL SENILE CATARACT, BILATERAL: ICD-10-CM

## 2024-12-04 LAB
ATRIAL RATE: 74 BPM
P AXIS: 58 DEGREES
P-R INTERVAL: 174 MS
Q-T INTERVAL: 358 MS
QRS DURATION: 84 MS
QTC CALCULATION (BEZET): 397 MS
R AXIS: 17 DEGREES
T AXIS: 16 DEGREES
VENTRICULAR RATE: 74 BPM

## 2024-12-04 PROCEDURE — 3079F DIAST BP 80-89 MM HG: CPT | Performed by: FAMILY MEDICINE

## 2024-12-04 PROCEDURE — 1159F MED LIST DOCD IN RCRD: CPT | Performed by: FAMILY MEDICINE

## 2024-12-04 PROCEDURE — 93000 ELECTROCARDIOGRAM COMPLETE: CPT | Performed by: FAMILY MEDICINE

## 2024-12-04 PROCEDURE — 99215 OFFICE O/P EST HI 40 MIN: CPT | Performed by: FAMILY MEDICINE

## 2024-12-04 PROCEDURE — 3008F BODY MASS INDEX DOCD: CPT | Performed by: FAMILY MEDICINE

## 2024-12-04 PROCEDURE — 3074F SYST BP LT 130 MM HG: CPT | Performed by: FAMILY MEDICINE

## 2024-12-04 PROCEDURE — 1160F RVW MEDS BY RX/DR IN RCRD: CPT | Performed by: FAMILY MEDICINE

## 2024-12-04 NOTE — PROGRESS NOTES
PRE-OP Physical   What testing is needed for this surgery/patient? H&P  What is the full name of procedure/ surgery?  Left eye cataract 12/17  Right eye cataract 12/31  Date being surgery or procedure is being done? 12/17/24 & 12/31/24  What is the doctor’s full name  that is doing the surgery?  Joey Winchester   What hospital or facility will it  [facility] done at[facility]   Have you  ever had anesthesia before? Yes   If yes, any previous complications with anesthesia?yes   Pt states she does not wake up from anesthesia

## 2024-12-04 NOTE — H&P
Liv Wright is a 69 year old female.  Chief Complaint   Patient presents with    Pre-procedure Outreach       HPI:   PREOP EXAM    PRE-OP Physical   What testing is needed for this surgery/patient? H&P  What is the full name of procedure/ surgery?  Cataract removal and IOL implant   Left eye cataract 12/17  Right eye cataract 12/31  Date being surgery or procedure is being done? 12/17/24 & 12/31/24  What is the doctor’s full name  that is doing the surgery?  Dr Joey Winchester   What hospital or facility will it  be done at[facility]   Have you  ever had anesthesia before? Yes   If yes, any previous complications with anesthesia?yes   Pt states she does not wake up from anesthesia       MEDICALLY CLEAR FOR SURGERY      ALLERGIES:  Allergies[1]       has a past surgical history that includes tubal ligation (1981); cholecystectomy (2006); ir ivc filter placement; tonsillectomy (age 18); knee replacement surgery (03/2017); other surgical history; other surgical history (03/06/2017); and removal gallbladder.   has a past medical history of Allergic rhinitis, Asthma ([facility]), Asthma with COPD (chronic obstructive pulmonary disease) ([facility]) (10/30/2015), Back pain, Depression, Diverticulosis of large intestine without hemorrhage (07/14/2018), Esophageal reflux, Hiatal hernia (01/25/2017), History of pulmonary embolism (12/05/2014), Obesity, Obstructive sleep apnea (adult) (pediatric) (04/25/2019), Osteoarthritis, Other and unspecified hyperlipidemia, PE (pulmonary embolism) (Feb 2004 and March 2004), Presence of IVC filter (03/19/2018), Primary insomnia (08/25/2022), Prothrombin mutation ([facility]), Pulmonary embolism (HCC), Spinal stenosis, Thyroid nodule, and Unspecified essential hypertension.    She has no past medical history of Arrhythmia, Cancer ([facility]), Coronary atherosclerosis, Dementia (HCC), Fibromyalgia, Hypothyroidism, IBS (irritable bowel syndrome), PCOS (polycystic ovarian syndrome), Post partum depression,  Seizure disorder (HCC), Systemic lupus (HCC), Thyrotoxicosis, Type 1 diabetes mellitus (HCC), or Type II or unspecified type diabetes mellitus without mention of complication, not stated as uncontrolled.  family history includes Anxiety in her daughter; Depression in her daughter and son; Diabetes in her mother; Heart Disorder in her mother; Hypertension in her brother and sister; Psychiatric in her father and mother.     has a current medication list which includes the following prescription(s): lidocaine, atorvastatin, fluticasone propionate, pantoprazole, gabapentin, buspirone, duloxetine, lamotrigine, trazodone, montelukast, nebulizer/tubing/mouthpiece, albuterol, budesonide-formoterol fumarate, metoprolol succinate er, apixaban, dicyclomine, calcium carbonate-vitamin d, cetirizine, and cholecalciferol.        Social History:  Social History     Socioeconomic History    Marital status:    Tobacco Use    Smoking status: Never    Smokeless tobacco: Never   Vaping Use    Vaping status: Never Used   Substance and Sexual Activity    Alcohol use: No     Alcohol/week: 0.0 standard drinks of alcohol    Drug use: No   Other Topics Concern    Caffeine Concern Yes     Comment: 2-4 cups of coffee x day    Exercise No    Seat Belt Yes     Social Drivers of Health     Financial Resource Strain: Low Risk  (10/23/2024)    Financial Resource Strain     Med Affordability: No   Food Insecurity: No Food Insecurity (9/23/2024)    Received from [facility]    Food Insecurity     Currently or in the past 3 months, have you worried your food would run out before you had money to buy more?: No   Transportation Needs: No Transportation Needs (10/23/2024)    Transportation Needs     Lack of Transportation: No   Physical Activity: Insufficiently Active (7/8/2020)    Received from [facility]    Exercise Vital Sign     Days of Exercise per Week: 1 day     Minutes of Exercise per  Session: 10 min    [facility] from [facility], [facility]    Social Connections   Housing Stability: Low Risk  (8/27/2024)    Housing Stability     Housing Instability: No          REVIEW OF SYSTEMS:   GENERAL HEALTH: feels well no complaints  SKIN: denies any unusual skin lesions or rashes  RESPIRATORY: denies shortness of breath with exertion  CARDIOVASCULAR: denies chest pain on exertion  GI: denies abdominal pain and denies heartburn  NEURO: denies headaches    EXAM:   /83 (BP Location: Right arm, Patient Position: Sitting, Cuff Size: large)   Pulse 84   Temp 97 °F (36.1 °C) (Temporal)   Resp 12   Ht 5' 1.25\" (1.556 m)   Wt 188 lb 4 oz (85.4 kg)   SpO2 96%   BMI 35.28 kg/m²  Body mass index is 35.28 kg/m².      GENERAL: well developed, well nourished,in no apparent distress  SKIN: no rashes,no suspicious lesions  HEENT: atraumatic, normocephalic,ears and throat are clear  NECK: supple,no adenopathy,no bruits  LUNGS: clear to auscultation  CARDIO: RRR without murmur  GI: good BS's,no masses, HSM or tenderness  EXTREMITIES: no cyanosis, clubbing or edema    ASSESSMENT AND PLAN:     1. Pre-procedural examination (Primary)  Comments:  medically stable for cataract sgy  2. Preop cardiovascular exam  Comments:  nl ekg  controlled htn  3. Cortical senile cataract, bilateral  Comments:  for removal and iol implant Left eye cataract 12/17  Right eye cataract 12/31  4. Essential hypertension  Overview:  Blood Pressure and Cardiac Medications            metoprolol succinate ER 25 MG Oral Tablet 24 Hr            Orders:  -     EKG with interpretation and Report -IN OFFICE [[id number]]          EKG  INTERPRETED BY DR GLASER         Component  Ref Range & Units  2:56 PM   Ventricular rate  BPM 74   Atrial rate  BPM 74   P-R Interval  ms 174   QRS Duration  ms 84   Q-T Interval  ms 358   QTC Calculation (Bezet)  ms 397   P Axis  degrees 58   R Axis  degrees 17   T Axis  degrees 16              Normal sinus rhythm  Normal ECG  When compared with ECG of 06-NOV-2015 13:39,  T wave inversion no longer evident in Anterior leads  Confirmed by Miguel Raza ([id number]) on 12/4/2024 4:47:34 PM                      [1]   Allergies  Allergen Reactions    Colchicine ANAPHYLAXIS    Contrast Dye [Gadolinium Derivatives] HIVES    Nitrofurantoin RASH and OTHER (SEE COMMENTS)     Other reaction(s): Unknown    Penicillins RASH and OTHER (SEE COMMENTS)     rash in childhood    Radiology Contrast Iodinated Dyes HIVES     iodine

## 2024-12-19 ENCOUNTER — NURSE TRIAGE (OUTPATIENT)
Dept: FAMILY MEDICINE CLINIC | Facility: CLINIC | Age: 69
End: 2024-12-19

## 2024-12-19 ENCOUNTER — PATIENT MESSAGE (OUTPATIENT)
Dept: FAMILY MEDICINE CLINIC | Facility: CLINIC | Age: 69
End: 2024-12-19

## 2024-12-19 DIAGNOSIS — K21.9 GASTROESOPHAGEAL REFLUX DISEASE, UNSPECIFIED WHETHER ESOPHAGITIS PRESENT: ICD-10-CM

## 2024-12-19 NOTE — TELEPHONE ENCOUNTER
Spoke with patient who states she started vomiting this AM, 2 episodes so far today along with nausea and one episode of diarrhea. Denies fever, abdominal pain. Not vomiting up any blood.  Able to keep down liquids and is urinating. Agrees to do BRAT diet today and go to walk in clinic/urgent care with any change in condition.  Reason for Disposition   Vomiting with diarrhea    Answer Assessment - Initial Assessment Questions  1. VOMITING SEVERITY: \"How many times have you vomited in the past 24 hours?\"      - MILD:  1 - 2 times/day     - MODERATE: 3 - 5 times/day, decreased oral intake without significant weight loss or symptoms of dehydration     - SEVERE: 6 or more times/day, vomits everything or nearly everything, with significant weight loss, symptoms of dehydration       2 episodes of vomiting starting this AM  2. ONSET: \"When did the vomiting begin?\"       Today  3. FLUIDS: \"What fluids or food have you vomited up today?\" \"Have you been able to keep any fluids down?\"      Able to keep down fluids  4. ABDOMEN PAIN: \"Are your having any abdomen pain?\" If Yes : \"How bad is it and what does it feel like?\" (e.g., crampy, dull, intermittent, constant)       No abdominal pain  5. DIARRHEA: \"Is there any diarrhea?\" If Yes, ask: \"How many times today?\"       One episode of diarrhea since this AM  6. CONTACTS: \"Is there anyone else in the family with the same symptoms?\"       Yes (niece)  7. CAUSE: \"What do you think is causing your vomiting?\"      Unknown  8. HYDRATION STATUS: \"Any signs of dehydration?\" (e.g., dry mouth [not only dry lips], too weak to stand) \"When did you last urinate?\"      No signs of dehydration  9. OTHER SYMPTOMS: \"Do you have any other symptoms?\" (e.g., fever, headache, vertigo, vomiting blood or coffee grounds, recent head injury)      No other symptoms  10. PREGNANCY: \"Is there any chance you are pregnant?\" \"When was your last menstrual period?\"        NA    Protocols used: Vomiting-A-OH

## 2024-12-20 ENCOUNTER — PATIENT MESSAGE (OUTPATIENT)
Dept: FAMILY MEDICINE CLINIC | Facility: CLINIC | Age: 69
End: 2024-12-20

## 2024-12-20 RX ORDER — PANTOPRAZOLE SODIUM 40 MG/1
40 TABLET, DELAYED RELEASE ORAL
Qty: 60 TABLET | Refills: 3 | Status: SHIPPED | OUTPATIENT
Start: 2024-12-20

## 2024-12-20 NOTE — TELEPHONE ENCOUNTER
Patient sent My Chart message:   Yesterday I called about getting a medication but couldn't remember the right name is ondansetron.  Thank you

## 2024-12-23 RX ORDER — ONDANSETRON 4 MG/1
4 TABLET, FILM COATED ORAL EVERY 8 HOURS PRN
Qty: 12 TABLET | Refills: 2 | Status: SHIPPED | OUTPATIENT
Start: 2024-12-23

## 2024-12-23 NOTE — TELEPHONE ENCOUNTER
Left detained message for patient that prescription was sent and  to call back if further assistance needed.

## 2025-01-06 DIAGNOSIS — M48.05 SPINAL STENOSIS OF THORACOLUMBAR REGION: Primary | ICD-10-CM

## 2025-01-06 RX ORDER — GABAPENTIN 400 MG/1
400 CAPSULE ORAL 3 TIMES DAILY
Qty: 270 CAPSULE | Refills: 1 | Status: SHIPPED | OUTPATIENT
Start: 2025-01-06 | End: 2025-07-05

## 2025-01-06 NOTE — TELEPHONE ENCOUNTER
Request for refill on gabapentin 400 mg    LOV  12-4-24    LAST LAB      LAST RX  9-19-24  #[id number]    Next OV  No future appointments.    PROTOCOL    Neurology Medications [id number]/2025 11:19 AM   Protocol Details In person appointment or virtual visit in the past 6 mos or appointment in next 3 mos

## 2025-01-15 ENCOUNTER — TELEPHONE (OUTPATIENT)
Dept: FAMILY MEDICINE CLINIC | Facility: CLINIC | Age: 70
End: 2025-01-15

## 2025-02-06 RX ORDER — ATORVASTATIN CALCIUM 40 MG/1
40 TABLET, FILM COATED ORAL NIGHTLY
Qty: 90 TABLET | Refills: 1 | Status: SHIPPED | OUTPATIENT
Start: 2025-02-06

## 2025-02-06 NOTE — TELEPHONE ENCOUNTER
LOV:12/4/2024  LAST LAB:10-  LAST RX:  Medication Quantity Refills Start End   atorvastatin 40 MG Oral Tab 90 tablet 1 10/21/2024 --   Sig:   Take 1 tablet (40 mg total) by mouth nightly.     Next OV:   Future Appointments   Date Time Provider Department Center   2/13/2025 11:00 AM Miguel Raza MD [facility]    PROTOCOL

## 2025-02-11 RX ORDER — MONTELUKAST SODIUM 10 MG/1
10 TABLET ORAL DAILY
Qty: 90 TABLET | Refills: 0 | Status: SHIPPED | OUTPATIENT
Start: 2025-02-11

## 2025-02-11 NOTE — TELEPHONE ENCOUNTER
LOV:12/4/24  LAST LAB:2/9/24  LAST RX:  montelukast 10 MG Oral Tab 90 tablet 0 8/19/2024 --   Sig:   Take 1 tablet (10 mg total) by mouth daily.     Next OV:   Future Appointments   Date Time Provider Department Center   2/13/2025 11:00 AM Miguel Raza MD [facility]    [facility]

## 2025-02-13 ENCOUNTER — LABORATORY ENCOUNTER (OUTPATIENT)
Dept: LAB | Age: 70
End: 2025-02-13
Attending: FAMILY MEDICINE
Payer: MEDICARE

## 2025-02-13 ENCOUNTER — OFFICE VISIT (OUTPATIENT)
Dept: FAMILY MEDICINE CLINIC | Facility: CLINIC | Age: 70
End: 2025-02-13
Payer: MEDICARE

## 2025-02-13 VITALS
SYSTOLIC BLOOD PRESSURE: 130 MMHG | TEMPERATURE: 97 F | HEART RATE: 67 BPM | WEIGHT: 179 LBS | RESPIRATION RATE: 12 BRPM | BODY MASS INDEX: 34 KG/M2 | OXYGEN SATURATION: 100 % | DIASTOLIC BLOOD PRESSURE: 76 MMHG

## 2025-02-13 DIAGNOSIS — M81.0 AGE-RELATED OSTEOPOROSIS WITHOUT CURRENT PATHOLOGICAL FRACTURE: ICD-10-CM

## 2025-02-13 DIAGNOSIS — Z12.31 VISIT FOR SCREENING MAMMOGRAM: ICD-10-CM

## 2025-02-13 DIAGNOSIS — E78.2 MIXED HYPERLIPIDEMIA: ICD-10-CM

## 2025-02-13 DIAGNOSIS — Z79.899 LONG-TERM USE OF HIGH-RISK MEDICATION: ICD-10-CM

## 2025-02-13 DIAGNOSIS — Z00.00 ENCOUNTER FOR ANNUAL HEALTH EXAMINATION: Primary | ICD-10-CM

## 2025-02-13 DIAGNOSIS — F41.1 GAD (GENERALIZED ANXIETY DISORDER): ICD-10-CM

## 2025-02-13 DIAGNOSIS — D68.52 PROTHROMBIN MUTATION (HCC): ICD-10-CM

## 2025-02-13 DIAGNOSIS — F33.0 MILD EPISODE OF RECURRENT MAJOR DEPRESSIVE DISORDER: ICD-10-CM

## 2025-02-13 DIAGNOSIS — M48.05 SPINAL STENOSIS OF THORACOLUMBAR REGION: ICD-10-CM

## 2025-02-13 DIAGNOSIS — G47.33 OSA (OBSTRUCTIVE SLEEP APNEA): ICD-10-CM

## 2025-02-13 DIAGNOSIS — N18.31 STAGE 3A CHRONIC KIDNEY DISEASE (HCC): ICD-10-CM

## 2025-02-13 DIAGNOSIS — I10 ESSENTIAL HYPERTENSION: ICD-10-CM

## 2025-02-13 DIAGNOSIS — Z51.81 THERAPEUTIC DRUG MONITORING: ICD-10-CM

## 2025-02-13 DIAGNOSIS — Z01.818 PRE-PROCEDURAL EXAMINATION: ICD-10-CM

## 2025-02-13 DIAGNOSIS — K21.00 GASTROESOPHAGEAL REFLUX DISEASE WITH ESOPHAGITIS WITHOUT HEMORRHAGE: ICD-10-CM

## 2025-02-13 DIAGNOSIS — E66.01 SEVERE OBESITY (BMI 35.0-39.9) WITH COMORBIDITY (HCC): Chronic | ICD-10-CM

## 2025-02-13 DIAGNOSIS — J44.89 ASTHMA WITH COPD (CHRONIC OBSTRUCTIVE PULMONARY DISEASE) (HCC): Chronic | ICD-10-CM

## 2025-02-13 DIAGNOSIS — Z79.899 ENCOUNTER FOR LONG-TERM (CURRENT) USE OF MEDICATIONS: ICD-10-CM

## 2025-02-13 DIAGNOSIS — E55.9 VITAMIN D DEFICIENCY: ICD-10-CM

## 2025-02-13 DIAGNOSIS — H25.011 CORTICAL SENILE CATARACT, RIGHT: ICD-10-CM

## 2025-02-13 LAB
ALBUMIN SERPL-MCNC: 4.2 G/DL (ref 3.2–4.8)
ALBUMIN/GLOB SERPL: 1.6 {RATIO} (ref 1–2)
ALP LIVER SERPL-CCNC: 75 U/L
ALT SERPL-CCNC: 15 U/L
ANION GAP SERPL CALC-SCNC: 5 MMOL/L (ref 0–18)
AST SERPL-CCNC: 19 U/L (ref ?–34)
BILIRUB SERPL-MCNC: 0.3 MG/DL (ref 0.2–1.1)
BUN BLD-MCNC: 14 MG/DL (ref 9–23)
CALCIUM BLD-MCNC: 10.6 MG/DL (ref 8.7–10.6)
CHLORIDE SERPL-SCNC: 105 MMOL/L (ref 98–112)
CHOLEST SERPL-MCNC: 202 MG/DL (ref ?–200)
CO2 SERPL-SCNC: 30 MMOL/L (ref 21–32)
CREAT BLD-MCNC: 1.18 MG/DL
EGFRCR SERPLBLD CKD-EPI 2021: 50 ML/MIN/1.73M2 (ref 60–?)
FASTING PATIENT LIPID ANSWER: NO
FASTING STATUS PATIENT QL REPORTED: NO
GLOBULIN PLAS-MCNC: 2.6 G/DL (ref 2–3.5)
GLUCOSE BLD-MCNC: 96 MG/DL (ref 70–99)
HDLC SERPL-MCNC: 55 MG/DL (ref 40–59)
LDLC SERPL CALC-MCNC: 118 MG/DL (ref ?–100)
NONHDLC SERPL-MCNC: 147 MG/DL (ref ?–130)
OSMOLALITY SERPL CALC.SUM OF ELEC: 290 MOSM/KG (ref 275–295)
POTASSIUM SERPL-SCNC: 3.9 MMOL/L (ref 3.5–5.1)
PROT SERPL-MCNC: 6.8 G/DL (ref 5.7–8.2)
SODIUM SERPL-SCNC: 140 MMOL/L (ref 136–145)
TRIGL SERPL-MCNC: 166 MG/DL (ref 30–149)
VLDLC SERPL CALC-MCNC: 29 MG/DL (ref 0–30)

## 2025-02-13 PROCEDURE — 36415 COLL VENOUS BLD VENIPUNCTURE: CPT

## 2025-02-13 PROCEDURE — 80053 COMPREHEN METABOLIC PANEL: CPT

## 2025-02-13 PROCEDURE — 80061 LIPID PANEL: CPT

## 2025-02-13 PROCEDURE — 80175 DRUG SCREEN QUAN LAMOTRIGINE: CPT

## 2025-02-13 RX ORDER — WARFARIN SODIUM 2 MG/1
TABLET ORAL
COMMUNITY
Start: 2025-02-07

## 2025-02-13 NOTE — PROGRESS NOTES
The following individual(s) verbally consented to be recorded using ambient AI listening technology and understand that they can each withdraw their consent to this listening technology at any point by asking the clinician to turn off or pause the recording:    Patient name: Liv Wright  Additional names:

## 2025-02-13 NOTE — ASSESSMENT & PLAN NOTE
Cholesterol shows Good control. Long term heart-healthy diet and lifestyle discussed and encouraged to reduce risk of cardiovascular disease.  2/13/2025: Cholesterol, Total 202 (H); HDL Cholesterol 55; Triglycerides 166 (H); LDL Cholesterol 118 (H)  Cholesterol Meds: atorvastatin Tabs - 40 MG  stable  Continue with current treatment plan   Orders:    Lipid Panel; Future; Expected date: 02/13/2025

## 2025-02-13 NOTE — PROGRESS NOTES
Subjective:   Liv Wright is a 69 year old female who presents for a MA AHA (Medicare Advantage Annual Health Assessment) and Subsequent Annual Wellness visit (Pt already had Initial Annual Wellness) and scheduled follow up of multiple significant but stable problems.     History of Present Illness  The patient, with a history of sleep apnea, atrial fibrillation, and epilepsy, presents for a preoperative evaluation. She is scheduled for cataract surgery at [facility], the same location where she had her previous cataract surgery. The surgery was initially scheduled earlier this month but was postponed due to a persistent cough. The cough has since improved, allowing for the rescheduling of the surgery.    The patient reports that she has not used her CPAP machine for sleep apnea for a year, as she experienced episodes of apnea even with the machine. She is currently on warfarin for atrial fibrillation and has an INR check scheduled for tomorrow. The patient also takes Lamictal for epilepsy, but the level has not been checked recently.    In addition to these conditions, the patient has been experiencing a cough, which has been persistent but has improved enough to proceed with the surgery. She also reports a recent weight loss, which she is pleased with.     History/Other:   Fall Risk Assessment:   She has been screened for Falls and is High Risk. Fall Prevention information provided to patient in After Visit Summary.    Do you feel unsteady when standing or walking?: (Patient-Rptd) Yes  Do you worry about falling?: (Patient-Rptd) Yes  Have you fallen in the past year?: (Patient-Rptd) Yes  How many times have you fallen?: (Patient-Rptd) (P) 2  Were you injured?: (Patient-Rptd) (P) No     Cognitive Assessment:   She had a completely normal cognitive assessment - see flowsheet entries     Functional Ability/Status:   Liv Wright has some abnormal functions as listed below:  She has difficulties Affording Meds based  on screening of functional status. She has Vision problems based on screening of functional status. She has Walking problems based on screening of functional status.       Depression Screening (PHQ):  PHQ-2 SCORE: 0  , done 2/6/2025   Trouble falling or staying asleep, or sleeping too much: 1     Feeling tired or having little energy: 3    Poor appetite or overeating: 3    Trouble concentrating on things, such as reading the newspaper or watching television: 3    Moving or speaking so slowly that other people could have noticed. Or the opposite - being so fidgety or restless that you have been moving around a lot more than usual: 3    If you checked off any problems, how difficult have these problems made it for you to do your work, take care of things at home, or get along with other people?: Extremely dIfficult    Thoughts that you would be better off dead, or of hurting yourself in some way: 2              Advanced Directives:   She does NOT have a Living Will. [Do you have a living will?: (Patient-Rptd) No]  She has a Power of  for Health Care on file in [facility].  Discussed Advance Care Planning with patient (and family/surrogate if present). Standard forms made available to patient in After Visit Summary.      Patient Active Problem List   Diagnosis    Spinal stenosis    Mixed hyperlipidemia    Vitamin D deficiency    Gastroesophageal reflux disease with esophagitis    Prothrombin mutation (HCC)    Essential hypertension    CHINTAN (obstructive sleep apnea)    Stage 3a chronic kidney disease ([facility])    Asthma with COPD (chronic obstructive pulmonary disease) ([facility])    Severe obesity (BMI 35.0-39.9) with comorbidity (HCC)    Mild episode of recurrent major depressive disorder    JARRETT (generalized anxiety disorder)    Age-related osteoporosis without current pathological fracture     Allergies:  She is allergic to colchicine, contrast dye [gadolinium derivatives], nitrofurantoin, penicillins, and radiology contrast  iodinated dyes.    Current Medications:  Outpatient Medications Marked as Taking for the 2/13/25 encounter (Office Visit) with Miguel Raza MD   Medication Sig    warfarin 2 MG Oral Tab TAKE 2 TABLETS BY MOUTH DAILY OR AS INSTRUCTED PER MOST RECENT INR    montelukast 10 MG Oral Tab Take 1 tablet (10 mg total) by mouth daily.    atorvastatin 40 MG Oral Tab Take 1 tablet (40 mg total) by mouth nightly.    gabapentin 400 MG Oral Cap Take 1 capsule (400 mg total) by mouth 3 (three) times daily.    ondansetron (ZOFRAN) 4 mg tablet Take 1 tablet (4 mg total) by mouth every 8 (eight) hours as needed for Nausea.    PANTOPRAZOLE 40 MG Oral Tab EC TAKE 1 TABLET BY MOUTH TWICE DAILY BEFORE MEALS    lidocaine 5 % External Patch Applied to the affected area 12 hours on 12 hours off as needed    FLUTICASONE PROPIONATE 50 MCG/ACT Nasal Suspension SHAKE LIQUID AND USE 2 SPRAYS IN EACH NOSTRIL DAILY    busPIRone 10 MG Oral Tab Take 1 tablet (10 mg total) by mouth 3 (three) times daily as needed (anxiety).    DULoxetine 60 MG Oral Cap DR Particles Take 1 capsule (60 mg total) by mouth daily.    lamoTRIgine 100 MG Oral Tab Take 1 tablet (100 mg total) by mouth daily.    traZODone 100 MG Oral Tab Take 1 tablet (100 mg total) by mouth nightly as needed for Sleep. Patient taking nightly for sleep    Respiratory Therapy Supplies (NEBULIZER/TUBING/MOUTHPIECE) Does not apply Kit Please provide nebulizer, tubing, and mouthpiece.    albuterol (VENTOLIN HFA) 108 (90 Base) MCG/ACT Inhalation Aero Soln Inhale 2 puffs into the lungs every 4 (four) hours as needed for Wheezing or Shortness of Breath.    Budesonide-Formoterol Fumarate (SYMBICORT) 160-4.5 MCG/ACT Inhalation Aerosol INHALE 1 PUFF BY MOUTH TWICE A DAY *RINSE MOUTH OUT AFTER EACH USE*    metoprolol succinate ER 25 MG Oral Tablet 24 Hr Take 1 tablet (25 mg total) by mouth daily.    Calcium Carbonate-Vitamin D 600-400 MG-UNIT Oral Tab Take 1 tablet by mouth daily.    cetirizine  (ZYRTEC) 10 MG Oral Tab Take 1 tablet (10 mg total) by mouth daily.    cholecalciferol (DRISDOL) 1000 UNITS Oral Cap Take 5 capsules (5,000 Units total) by mouth daily.       Medical History:  She  has a past medical history of Allergic rhinitis, Asthma ([facility]), Asthma with COPD (chronic obstructive pulmonary disease) ([facility]) (10/30/2015), Back pain, Depression, Disorder of right rotator cuff (03/08/2024), Diverticulosis of large intestine without hemorrhage (07/14/2018), Esophageal reflux, Hiatal hernia (01/25/2017), History of pulmonary embolism (12/05/2014), Obesity, Obstructive sleep apnea (adult) (pediatric) (04/25/2019), Osteoarthritis, Other and unspecified hyperlipidemia, PE (pulmonary embolism) (Feb 2004 and March 2004), Presence of IVC filter (03/19/2018), Primary insomnia (08/25/2022), Prothrombin mutation ([facility]), Pulmonary embolism ([facility]), Spinal stenosis, Thyroid nodule, and Unspecified essential hypertension.  Surgical History:  She  has a past surgical history that includes tubal ligation (1981); cholecystectomy (2006); ir ivc filter placement; tonsillectomy (age 18); knee replacement surgery (03/2017); other surgical history; other surgical history (03/06/2017); and removal gallbladder.   Family History:  Her family history includes Anxiety in her daughter; Depression in her daughter and son; Diabetes in her mother; Heart Disorder in her mother; Hypertension in her brother and sister; Psychiatric in her father and mother.  Social History:  She  reports that she has never smoked. She has never used smokeless tobacco. She reports that she does not drink alcohol and does not use drugs.    Tobacco:  She has never smoked tobacco.    CAGE Alcohol Screen:   CAGE screening score of 0 on 2/6/2025, showing low risk of alcohol abuse.      Patient Care Team:  Miguel Raza MD as PCP - General (Family Medicine)  Aniyah Mcdaniel MD (OBSTETRICS & GYNECOLOGY)  Gayatri Thakkar APN as Psychiatrist/APN (Nurse  Practitioner)  Sergio Hampton, PT as Physical Therapist  Melonie Whitney APRN (Nurse Practitioner Family)    Results  LABS  INR: 5.6 (02/10/2025)  GFR: 45 (02/01/2025)  ALT: 15 (02/01/2025)  AST: 27 (02/01/2025)  Blood Glucose: 112 (02/01/2025)  Troponin: Negative (02/01/2025)  Hb: 12.8 (02/01/2025)    Review of Systems  GENERAL: feels well otherwise  SKIN: denies any unusual skin lesions  EYES: left eye good status post cataract removal blurred right vision needs cataract removal  HEENT: denies nasal congestion, sinus pain or ST  LUNGS: denies shortness of breath with exertion  CARDIOVASCULAR: denies chest pain on exertion  GI: denies abdominal pain, denies heartburn  : denies dysuria, vaginal discharge or itching, no complaint of urinary incontinence   MUSCULOSKELETAL: denies back pain  NEURO: denies headaches  PSYCHE: has both depression or anxiety  currently controlled   HEMATOLOGIC: denies hx of anemia  ENDOCRINE: denies thyroid history  ALL/ASTHMA: denies hx of allergy or asthma    Objective:   Physical Exam  General Appearance:  Alert, cooperative, no distress, appears stated age   Head:  Normocephalic, without obvious abnormality, atraumatic   Eyes:  PERRL, conjunctiva/corneas clear, EOM's intact both eyes   Ears:  Normal TM's and external ear canals, both ears   Back:   Symmetric, no curvature, ROM normal, no CVA tenderness   Lungs:   Clear to auscultation bilaterally, respirations unlabored   Heart:  Regular rate and rhythm, S1 and S2 normal, no murmur, rub, or gallop   Abdomen:   Soft, non-tender, bowel sounds active all four quadrants,  no masses, no organomegaly   Pelvic: Deferred   Extremities: Extremities normal, atraumatic, no cyanosis or edema   Pulses: 2+ and symmetric   Skin: Skin color, texture, turgor normal, no rashes or lesions   Lymph nodes: Cervical, supraclavicular, and axillary nodes normal   Neurologic: Normal       /76 (BP Location: Left arm, Patient Position: Sitting, Cuff  Size: adult)   Pulse 67   Temp 97.1 °F (36.2 °C) (Temporal)   Resp 12   Wt 179 lb (81.2 kg)   SpO2 100%   BMI 33.55 kg/m²  Estimated body mass index is 33.55 kg/m² as calculated from the following:    Height as of 12/4/24: 5' 1.25\" (1.556 m).    Weight as of this encounter: 179 lb (81.2 kg).    Physical Exam  VITALS: BP- 130/76, WT- 179  CHEST: No tenderness on palpation. Presence of cough noted.       Medicare Hearing Assessment:   Hearing Screening    Time taken: 2/13/2025 10:39 AM  Entry User: Lisy Lau MA  Screening Method: Whisper Test  Whisper Test Result: Pass         Visual Acuity:   Right Eye Visual Acuity: Uncorrected     Left Eye Visual Acuity: Uncorrected Left Eye Chart Acuity: 20/40   Both Eyes Visual Acuity: Uncorrected Both Eyes Chart Acuity: 20/30   Able To Tolerate Visual Acuity: Yes        Assessment & Plan:   Liv Wright is a 69 year old female who presents for a Medicare Assessment.       The patient indicates understanding of these issues and agrees to the plan.  Cleared for surgery   Continue with current treatment plan.  Lab work ordered.  Reinforced healthy diet, lifestyle, and exercise.    Assessment & Plan  Encounter for annual health examination         Stage 3a chronic kidney disease (HCC)  Last Glomerular Filtration Rate: CKD 3a (GFR 50). .   2/13/2025: Creatinine 1.18 (H) CKD etiologies : Hypertension  Plan/Management: Control Hypertension/Diabetes          Prothrombin mutation (HCC)  On warfarin  cannot do Eliquis prior thrombotic issues  Continue medications  follow with hematology as she is        Asthma with COPD (chronic obstructive pulmonary disease) ([facility])  COPD: chronic bronchitis; simple chronic bronchitis, under Excellent control and Good compliance. No PFT on file.   Asthma controller Meds: montelukast Tabs - 10 MG  Asthma rescue Meds: albuterol Aers - 108 (90 Base) MCG/ACT        Severe obesity (BMI 35.0-39.9) with comorbidity (HCC)  BMI, height, weight: No  data recorded, Weight: 179 lb (81.2 kg), Body mass index is 33.55 kg/m².. Counseled weight loss diet and exercise. BMI is affecting their hypertension, hyperlipidemia, sleep apnea, and Obstructive lung disease.          Cortical senile cataract, right  To have removal this month       Mixed hyperlipidemia  Cholesterol shows Good control. Long term heart-healthy diet and lifestyle discussed and encouraged to reduce risk of cardiovascular disease.  2/13/2025: Cholesterol, Total 202 (H); HDL Cholesterol 55; Triglycerides 166 (H); LDL Cholesterol 118 (H)  Cholesterol Meds: atorvastatin Tabs - 40 MG  stable  Continue with current treatment plan   Orders:    Lipid Panel; Future; Expected date: 02/13/2025    Essential hypertension  BP shows good control with last BP of 130/76. Continue lifestyle changes, diet, exercise and weight loss.   2/13/2025: Potassium 3.9; Creatinine 1.18 (H); eGFR-Cr 50 (L)          CHINTAN (obstructive sleep apnea)  Could not tolerate bipap CPAP and uses nothing states she sleeps well       Spinal stenosis of thoracolumbar region   Stable    [x] chronic stable condition, noted historically, continues present status        Mild episode of recurrent major depressive disorder   Major Depressive Disorder, Last PHQ score 19, on 8/26/2024,  , Severity: Moderately Severe (PHQ Score 15-19)  Recurrent episode in partial remission (having some symptoms of major depression present but no longer meeting the full criteria for MDD)  Clinical Course: stable Good control Plan: Medications: anticonvulsants  , BuSpar, Selective Seratonin Reuptake Inhibitor  , and see list . Currently stable and controlled with the current treatment plan. Continue present management. Counseling Recommendations : Continue with counseling.  Antidepressant Meds: DULoxetine Cpep - 60 MG; traZODone Tabs - 100 MG  Anxiolytic Meds: busPIRone Tabs - 10 MG  Reassess this in: 3 months.        Gastroesophageal reflux disease with esophagitis  without hemorrhage   Stable    [x] chronic stable condition, noted historically, continues present status stable on proton pump inhibitor  continue medications  report if not controlled        JARRETT (generalized anxiety disorder)  Sees counselor/psy and on medications   Mood and Thought Medications            DULoxetine 60 MG Oral Cap DR Paula    traZODone 100 MG Oral Tab         Buspirone also  Continue medications        Long-term use of high-risk medication  Uses for mood disorder  check levels   Orders:    Lamotrigine (Lamictal), Serum; Future; Expected date: 02/13/2025    Therapeutic drug monitoring    Orders:    Lamotrigine (Lamictal), Serum; Future; Expected date: 02/13/2025    Vitamin D deficiency  On vit d  stable        Age-related osteoporosis without current pathological fracture  On supplement  care for fall risk        Visit for screening mammogram    Orders:    Mammogram Bilateral Screening with 3D; Future; Expected date: 02/13/2025    Pre-procedural examination         Encounter for long-term (current) use of medications           Assessment & Plan  Cataract  Second cataract surgery scheduled with Dr. Lorenzo on 2/27/2025. Previous surgery was successful.  -Continue current plan with Dr. Lorenzo.    Sleep Apnea  Patient reports not using CPAP machine due to cessation of breathing while using it. No current symptoms reported.  -No changes to current management.    Warfarin Use  Patient reports variable INR levels, with recent levels being 4.6 and 5.6. Patient has an INR check scheduled for tomorrow.  -Continue current management and follow up on INR results.    Mammogram  Patient due for mammogram.  -Provide order for mammogram at the hospital and advise patient to schedule.    Lamictal Level  Patient on Lamictal, level not recently checked.  -Order Lamictal level test.    Lipid Panel  Last lipid panel was in August.  -Order lipid panel.    General Health Maintenance  Patient up to date on flu and  COVID-19 vaccinations.  -No further action required at this time.    No follow-ups on file.     Miguel Raza MD, 2/13/2025     Supplementary Documentation:   General Health:  In the past six months, have you lost more than 10 pounds without trying?: (Patient-Rptd) 1 - Yes  Has your appetite been poor?: (Patient-Rptd) Yes  Type of Diet: (Patient-Rptd) Other  How does the patient maintain a good energy level?: (Patient-Rptd) Other  How would you describe your daily physical activity?: (Patient-Rptd) Light  How would you describe your current health state?: (Patient-Rptd) Good  How do you maintain positive mental well-being?: (Patient-Rptd) Games;Visiting Friends  On a scale of 0 to 10, with 0 being no pain and 10 being severe pain, what is your pain level?: (Patient-Rptd) 6 - (Moderate)  In the past six months, have you experienced urine leakage?: (Patient-Rptd) 1-Yes  At any time do you feel concerned for the safety/well-being of yourself and/or your children, in your home or elsewhere?: (Patient-Rptd) No  Have you had any immunizations at another office such as Influenza, Hepatitis B, Tetanus, or Pneumococcal?: (Patient-Rptd) Yes    Health Maintenance   Topic Date Due    Zoster Vaccines (3 of 3) 07/10/2023    Annual Well Visit  01/01/2025    Mammogram  04/26/2025    COVID-19 Vaccine (4 - 2024-25 season) 03/17/2025    Colorectal Cancer Screening  10/06/2031    Influenza Vaccine  Completed    DEXA Scan  Completed    Annual Depression Screening  Completed    Fall Risk Screening (Annual)  Completed    Pneumococcal Vaccine: 50+ Years  Completed    Meningococcal B Vaccine  Aged Out

## 2025-02-13 NOTE — PROGRESS NOTES
PRE-OP Physical   What testing is needed for this surgery/patient? H&P  What is the full name of procedure/ surgery? CATARACT SURGERY   Date being surgery or procedure is being done?2/27/24  What is the doctor’s full name  that is doing the surgery?    What hospital or facility will it  be done at[facility]   Have you  ever had anesthesia before? YES   If yes, any previous complications with anesthesia? YES ,PATIENT STATES SHE DOES NOT WAKE UP

## 2025-02-13 NOTE — H&P
Liv Wright is a 69 year old female.  Chief Complaint   Patient presents with    Well Adult     MA SUPERVISIT        HPI:   PREOP EXAM    PRE-OP Physical   What testing is needed for this surgery/patient? H&P  What is the full name of procedure/ surgery? CATARACT REMOVAL AND IOL IMPLANT RIGHT EYE  Date being surgery or procedure is being done?2/27/24  What hospital or facility will it  be done at? [facility]   Have you  ever had anesthesia before? YES   If yes, any previous complications with anesthesia? YES ,PATIENT STATES SHE DOES NOT WAKE UP       MEDICALLY CLEAR FOR SURGERY      ALLERGIES:  Allergies[1]       has a past surgical history that includes tubal ligation (1981); cholecystectomy (2006); ir ivc filter placement; tonsillectomy (age 18); knee replacement surgery (03/2017); other surgical history; other surgical history (03/06/2017); and removal gallbladder.   has a past medical history of Allergic rhinitis, Asthma ([facility]), Asthma with COPD (chronic obstructive pulmonary disease) ([facility]) (10/30/2015), Back pain, Depression, Diverticulosis of large intestine without hemorrhage (07/14/2018), Esophageal reflux, Hiatal hernia (01/25/2017), History of pulmonary embolism (12/05/2014), Obesity, Obstructive sleep apnea (adult) (pediatric) (04/25/2019), Osteoarthritis, Other and unspecified hyperlipidemia, PE (pulmonary embolism) (Feb 2004 and March 2004), Presence of IVC filter (03/19/2018), Primary insomnia (08/25/2022), Prothrombin mutation ([facility]), Pulmonary embolism (HCC), Spinal stenosis, Thyroid nodule, and Unspecified essential hypertension.    She has no past medical history of Arrhythmia, Cancer ([facility]), Coronary atherosclerosis, Dementia ([facility]), Fibromyalgia, Hypothyroidism, IBS (irritable bowel syndrome), PCOS (polycystic ovarian syndrome), Post partum depression, Seizure disorder (HCC), Systemic lupus (HCC), Thyrotoxicosis, Type 1 diabetes mellitus (HCC), or Type II or unspecified type diabetes mellitus  without mention of complication, not stated as uncontrolled.  family history includes Anxiety in her daughter; Depression in her daughter and son; Diabetes in her mother; Heart Disorder in her mother; Hypertension in her brother and sister; Psychiatric in her father and mother.     has a current medication list which includes the following prescription(s): warfarin, montelukast, atorvastatin, gabapentin, ondansetron, pantoprazole, lidocaine, fluticasone propionate, buspirone, duloxetine, lamotrigine, trazodone, nebulizer/tubing/mouthpiece, albuterol, budesonide-formoterol fumarate, metoprolol succinate er, calcium carbonate-vitamin d, cetirizine, cholecalciferol, and dicyclomine.        Social History:  Social History     Socioeconomic History    Marital status:    Tobacco Use    Smoking status: Never    Smokeless tobacco: Never   Vaping Use    Vaping status: Never Used   Substance and Sexual Activity    Alcohol use: No     Alcohol/week: 0.0 standard drinks of alcohol    Drug use: No   Other Topics Concern    Caffeine Concern Yes     Comment: 2-4 cups of coffee x day    Exercise No    Seat Belt Yes     Social Drivers of Health     Food Insecurity: No Food Insecurity (2/1/2025)    Received from [facility]    Food Insecurity     Currently or in the past 3 months, have you worried your food would run out before you had money to buy more?: No     In the past 12 months, have you run out of food or been unable to get more?: No   Transportation Needs: No Transportation Needs (2/1/2025)    Received from [facility]    Transportation Needs     Currently or in the past 3 months, has lack of transportation kept you from medical appointments, getting food or medicine, or providing care to a family member?: Unrecognized value     Medical Transportation Needs?: No   Housing Stability: Low Risk  (8/27/2024)    Housing Stability     Housing Instability: No          REVIEW OF SYSTEMS:    GENERAL HEALTH: feels well no complaints  SKIN: denies any unusual skin lesions or rashes  RESPIRATORY: denies shortness of breath with exertion  CARDIOVASCULAR: denies chest pain on exertion  GI: denies abdominal pain and denies heartburn  NEURO: denies headaches    EXAM:   /76 (BP Location: Left arm, Patient Position: Sitting, Cuff Size: adult)   Pulse 67   Temp 97.1 °F (36.2 °C) (Temporal)   Resp 12   Wt 179 lb (81.2 kg)   SpO2 100%   BMI 33.55 kg/m²  Body mass index is 33.55 kg/m².      GENERAL: well developed, well nourished,in no apparent distress  SKIN: no rashes,no suspicious lesions  HEENT: atraumatic, normocephalic,ears and throat are clear  NECK: supple,no adenopathy,no bruits  LUNGS: clear to auscultation  CARDIO: RRR without murmur  GI: good BS's,no masses, HSM or tenderness  EXTREMITIES: no cyanosis, clubbing or edema    ASSESSMENT AND PLAN:     1. Pre-procedural examination (Primary)    Medically clear for surgery     2. Cortical senile cataract, right    For remaoal and iol implant Feb 2025                                 [1]   Allergies  Allergen Reactions    Colchicine ANAPHYLAXIS    Contrast Dye [Gadolinium Derivatives] HIVES    Nitrofurantoin RASH and OTHER (SEE COMMENTS)     Other reaction(s): Unknown    Penicillins RASH and OTHER (SEE COMMENTS)     rash in childhood    Radiology Contrast Iodinated Dyes HIVES     iodine

## 2025-02-14 PROBLEM — M19.90 OSTEOARTHRITIS: Status: RESOLVED | Noted: 2017-08-15 | Resolved: 2025-02-14

## 2025-02-14 PROBLEM — G47.33 OSA (OBSTRUCTIVE SLEEP APNEA): Status: ACTIVE | Noted: 2019-04-25

## 2025-02-14 PROBLEM — D68.59 HYPERCOAGULABLE STATE (HCC): Status: ACTIVE | Noted: 2024-04-12

## 2025-02-14 PROBLEM — M67.911 DISORDER OF RIGHT ROTATOR CUFF: Status: RESOLVED | Noted: 2024-03-08 | Resolved: 2025-02-14

## 2025-02-14 NOTE — ASSESSMENT & PLAN NOTE
Sees counselor/psy and on medications   Mood and Thought Medications            DULoxetine 60 MG Oral Cap DR Particles    traZODone 100 MG Oral Tab         Buspirone also  Continue medications

## 2025-02-14 NOTE — ASSESSMENT & PLAN NOTE
Last Glomerular Filtration Rate: CKD 3a (GFR 50). .   2/13/2025: Creatinine 1.18 (H) CKD etiologies : Hypertension  Plan/Management: Control Hypertension/Diabetes

## 2025-02-14 NOTE — ASSESSMENT & PLAN NOTE
Major Depressive Disorder, Last PHQ score 19, on 8/26/2024,  , Severity: Moderately Severe (PHQ Score 15-19)  Recurrent episode in partial remission (having some symptoms of major depression present but no longer meeting the full criteria for MDD)  Clinical Course: stable Good control Plan: Medications: anticonvulsants  , BuSpar, Selective Seratonin Reuptake Inhibitor  , and see list . Currently stable and controlled with the current treatment plan. Continue present management. Counseling Recommendations : Continue with counseling.  Antidepressant Meds: DULoxetine Cpep - 60 MG; traZODone Tabs - 100 MG  Anxiolytic Meds: busPIRone Tabs - 10 MG  Reassess this in: 3 months.

## 2025-02-14 NOTE — ASSESSMENT & PLAN NOTE
COPD: chronic bronchitis; simple chronic bronchitis, under Excellent control and Good compliance. No PFT on file.   Asthma controller Meds: montelukast Tabs - 10 MG  Asthma rescue Meds: albuterol Aers - 108 (90 Base) MCG/ACT

## 2025-02-14 NOTE — ASSESSMENT & PLAN NOTE
BP shows good control with last BP of 130/76. Continue lifestyle changes, diet, exercise and weight loss.   2/13/2025: Potassium 3.9; Creatinine 1.18 (H); eGFR-Cr 50 (L)

## 2025-02-14 NOTE — ASSESSMENT & PLAN NOTE
On warfarin  cannot do Eliquis prior thrombotic issues  Continue medications  follow with hematology as she is

## 2025-02-14 NOTE — ASSESSMENT & PLAN NOTE
Stable    [x] chronic stable condition, noted historically, continues present status stable on proton pump inhibitor  continue medications  report if not controlled

## 2025-02-14 NOTE — ASSESSMENT & PLAN NOTE
BMI, height, weight: No data recorded, Weight: 179 lb (81.2 kg), Body mass index is 33.55 kg/m².. Counseled weight loss diet and exercise. BMI is affecting their hypertension, hyperlipidemia, sleep apnea, and Obstructive lung disease.

## 2025-02-17 LAB — LAMOTRIGINE LVL: 5.9 UG/ML

## 2025-02-19 DIAGNOSIS — Z51.81 THERAPEUTIC DRUG MONITORING: ICD-10-CM

## 2025-02-19 DIAGNOSIS — E78.2 MIXED HYPERLIPIDEMIA: Primary | ICD-10-CM

## 2025-02-19 DIAGNOSIS — Z79.899 LONG-TERM USE OF HIGH-RISK MEDICATION: ICD-10-CM

## 2025-02-19 DIAGNOSIS — I10 ESSENTIAL HYPERTENSION: ICD-10-CM

## 2025-04-04 RX ORDER — METOPROLOL SUCCINATE 25 MG/1
25 TABLET, EXTENDED RELEASE ORAL DAILY
Qty: 90 TABLET | Refills: 3 | Status: SHIPPED | OUTPATIENT
Start: 2025-04-04

## 2025-04-04 NOTE — TELEPHONE ENCOUNTER
Patient would like to speak with nurse. Patient stated that she was in the ER last night and there was confusion with her medications.

## 2025-04-04 NOTE — TELEPHONE ENCOUNTER
Follow up call to patient states negative ER workup.      Per patient BP was elevated in ER and patient discovered prescription for Metoprolol ER 25 mg did not get transferred from [facility] and has not been taking, she is unsure for how long.    LOV  2-13-25  BP) 130/76    LAST LAB  4-3-25  Chem Profile gfr 49    LAST RX  12-4-24  #90    Next OV  No future appointments.    PROTOCOL    Hypertension Medications Protocol [id number]/04/2025 08:24 AM   Protocol Details EGFRCR or GFRNAA > 50    CMP or BMP in past 12 months    Last BP reading less than 140/90    In person appointment or virtual visit in the past 12 mos or appointment in next 3 mos    Medication is active on med list

## 2025-04-11 ENCOUNTER — APPOINTMENT (OUTPATIENT)
Dept: ULTRASOUND IMAGING | Age: 70
End: 2025-04-11
Attending: OTOLARYNGOLOGY

## 2025-04-11 DIAGNOSIS — E04.1 THYROID NODULE: ICD-10-CM

## 2025-04-11 PROCEDURE — 76536 US EXAM OF HEAD AND NECK: CPT | Performed by: RADIOLOGY

## 2025-04-14 DIAGNOSIS — E04.1 THYROID NODULE: Primary | ICD-10-CM

## 2025-04-21 ENCOUNTER — LABORATORY ENCOUNTER (OUTPATIENT)
Dept: LAB | Age: 70
End: 2025-04-21
Payer: MEDICARE

## 2025-04-21 ENCOUNTER — OFFICE VISIT (OUTPATIENT)
Dept: FAMILY MEDICINE CLINIC | Facility: CLINIC | Age: 70
End: 2025-04-21
Payer: MEDICARE

## 2025-04-21 VITALS
HEART RATE: 70 BPM | HEIGHT: 61.25 IN | DIASTOLIC BLOOD PRESSURE: 80 MMHG | SYSTOLIC BLOOD PRESSURE: 170 MMHG | OXYGEN SATURATION: 97 % | BODY MASS INDEX: 34.33 KG/M2 | WEIGHT: 184.19 LBS | TEMPERATURE: 98 F | RESPIRATION RATE: 14 BRPM

## 2025-04-21 DIAGNOSIS — R25.2 LEG CRAMPS: ICD-10-CM

## 2025-04-21 DIAGNOSIS — R53.83 OTHER FATIGUE: Primary | ICD-10-CM

## 2025-04-21 DIAGNOSIS — I10 ESSENTIAL HYPERTENSION: ICD-10-CM

## 2025-04-21 DIAGNOSIS — Z63.0 PROBLEMS IN RELATIONSHIP WITH SPOUSE OR PARTNER: ICD-10-CM

## 2025-04-21 DIAGNOSIS — R53.83 OTHER FATIGUE: ICD-10-CM

## 2025-04-21 LAB
ALBUMIN SERPL-MCNC: 4.3 G/DL (ref 3.2–4.8)
ALBUMIN/GLOB SERPL: 1.9 {RATIO} (ref 1–2)
ALP LIVER SERPL-CCNC: 70 U/L (ref 55–142)
ALT SERPL-CCNC: 16 U/L (ref 10–49)
ANION GAP SERPL CALC-SCNC: 8 MMOL/L (ref 0–18)
AST SERPL-CCNC: 31 U/L (ref ?–34)
BASOPHILS # BLD AUTO: 0.04 X10(3) UL (ref 0–0.2)
BASOPHILS NFR BLD AUTO: 0.8 %
BILIRUB SERPL-MCNC: 0.3 MG/DL (ref 0.2–1.1)
BUN BLD-MCNC: 16 MG/DL (ref 9–23)
CALCIUM BLD-MCNC: 10.3 MG/DL (ref 8.7–10.6)
CHLORIDE SERPL-SCNC: 108 MMOL/L (ref 98–112)
CO2 SERPL-SCNC: 28 MMOL/L (ref 21–32)
CREAT BLD-MCNC: 1.08 MG/DL (ref 0.55–1.02)
EGFRCR SERPLBLD CKD-EPI 2021: 56 ML/MIN/1.73M2 (ref 60–?)
EOSINOPHIL # BLD AUTO: 0.13 X10(3) UL (ref 0–0.7)
EOSINOPHIL NFR BLD AUTO: 2.7 %
ERYTHROCYTE [DISTWIDTH] IN BLOOD BY AUTOMATED COUNT: 13.6 %
GLOBULIN PLAS-MCNC: 2.3 G/DL (ref 2–3.5)
GLUCOSE BLD-MCNC: 101 MG/DL (ref 70–99)
HCT VFR BLD AUTO: 40.7 % (ref 35–48)
HGB BLD-MCNC: 13.4 G/DL (ref 12–16)
IMM GRANULOCYTES # BLD AUTO: 0 X10(3) UL (ref 0–1)
IMM GRANULOCYTES NFR BLD: 0 %
LYMPHOCYTES # BLD AUTO: 1.28 X10(3) UL (ref 1–4)
LYMPHOCYTES NFR BLD AUTO: 26.9 %
MAGNESIUM SERPL-MCNC: 1.9 MG/DL (ref 1.6–2.6)
MCH RBC QN AUTO: 31.3 PG (ref 26–34)
MCHC RBC AUTO-ENTMCNC: 32.9 G/DL (ref 31–37)
MCV RBC AUTO: 95.1 FL (ref 80–100)
MONOCYTES # BLD AUTO: 0.43 X10(3) UL (ref 0.1–1)
MONOCYTES NFR BLD AUTO: 9.1 %
NEUTROPHILS # BLD AUTO: 2.87 X10 (3) UL (ref 1.5–7.7)
NEUTROPHILS # BLD AUTO: 2.87 X10(3) UL (ref 1.5–7.7)
NEUTROPHILS NFR BLD AUTO: 60.5 %
OSMOLALITY SERPL CALC.SUM OF ELEC: 299 MOSM/KG (ref 275–295)
PLATELET # BLD AUTO: 235 10(3)UL (ref 150–450)
POTASSIUM SERPL-SCNC: 4.8 MMOL/L (ref 3.5–5.1)
PROT SERPL-MCNC: 6.6 G/DL (ref 5.7–8.2)
RBC # BLD AUTO: 4.28 X10(6)UL (ref 3.8–5.3)
SODIUM SERPL-SCNC: 144 MMOL/L (ref 136–145)
TSI SER-ACNC: 0.75 UIU/ML (ref 0.55–4.78)
VIT B12 SERPL-MCNC: 1123 PG/ML (ref 211–911)
VIT D+METAB SERPL-MCNC: 36.6 NG/ML (ref 30–100)
WBC # BLD AUTO: 4.8 X10(3) UL (ref 4–11)

## 2025-04-21 PROCEDURE — 3008F BODY MASS INDEX DOCD: CPT

## 2025-04-21 PROCEDURE — 82607 VITAMIN B-12: CPT

## 2025-04-21 PROCEDURE — 84443 ASSAY THYROID STIM HORMONE: CPT

## 2025-04-21 PROCEDURE — 1159F MED LIST DOCD IN RCRD: CPT

## 2025-04-21 PROCEDURE — 80053 COMPREHEN METABOLIC PANEL: CPT

## 2025-04-21 PROCEDURE — 36415 COLL VENOUS BLD VENIPUNCTURE: CPT

## 2025-04-21 PROCEDURE — 3077F SYST BP >= 140 MM HG: CPT

## 2025-04-21 PROCEDURE — 82306 VITAMIN D 25 HYDROXY: CPT

## 2025-04-21 PROCEDURE — 85025 COMPLETE CBC W/AUTO DIFF WBC: CPT

## 2025-04-21 PROCEDURE — 99214 OFFICE O/P EST MOD 30 MIN: CPT

## 2025-04-21 PROCEDURE — 1160F RVW MEDS BY RX/DR IN RCRD: CPT

## 2025-04-21 PROCEDURE — 83735 ASSAY OF MAGNESIUM: CPT

## 2025-04-21 PROCEDURE — 3079F DIAST BP 80-89 MM HG: CPT

## 2025-04-21 RX ORDER — APIXABAN 2.5 MG/1
2.5 TABLET, FILM COATED ORAL 2 TIMES DAILY
COMMUNITY
Start: 2025-03-04

## 2025-04-21 SDOH — SOCIAL STABILITY - SOCIAL INSECURITY: PROBLEMS IN RELATIONSHIP WITH SPOUSE OR PARTNER: Z63.0

## 2025-04-21 NOTE — PROGRESS NOTES
Liv Wright is a 69 year old female.  HPI:     Patient in office for fatigue that started 4-5 days ago and foot crams that occurred last night while in bed.  She reports she was seen in the Emergency Room at the beginning of this month due to elevated blood pressure with chest pain.  Patient has lost 25 pounds since August 2024.  Asked patient if she was trying to loose weight and she said no she just isn't hungry.  She reports having coffee for breakfast, a granola bar, lunch at the [employer] and does not eat dinner since she does not feel hungry.  Patients blood pressure is elevated in office today.  She currently takes metoprolol 25 mg daily.  Reports she took metoprolol this am.     Last mamogram was one year ago 4/26/24 and normal    Current Medications[1]   Past Medical History[2]   Social History:  Short Social Hx on File[3]       REVIEW OF SYSTEMS:     Review of Systems   Constitutional:  Positive for appetite change, fatigue and unexpected weight change.   HENT: Negative.     Eyes: Negative.    Respiratory: Negative.     Cardiovascular: Negative.    Gastrointestinal: Negative.    Genitourinary: Negative.    Skin: Negative.    Neurological: Negative.        EXAM:   BP (!) 170/80   Pulse 70   Temp 98 °F (36.7 °C) (Temporal)   Resp 14   Ht 5' 1.25\" (1.556 m)   Wt 184 lb 3.2 oz (83.6 kg)   SpO2 97%   BMI 34.52 kg/m²     Physical Exam  Constitutional:       Appearance: Normal appearance. She is normal weight.   HENT:      Head: Normocephalic and atraumatic.      Nose: Nose normal.      Mouth/Throat:      Mouth: Mucous membranes are moist.      Pharynx: Oropharynx is clear.   Eyes:      Extraocular Movements: Extraocular movements intact.      Conjunctiva/sclera: Conjunctivae normal.      Pupils: Pupils are equal, round, and reactive to light.   Cardiovascular:      Rate and Rhythm: Normal rate and regular rhythm.      Pulses: Normal pulses.      Heart sounds: Normal heart sounds.   Pulmonary:       Effort: Pulmonary effort is normal.      Breath sounds: Normal breath sounds.   Musculoskeletal:         General: Normal range of motion.   Skin:     General: Skin is warm and dry.      Capillary Refill: Capillary refill takes less than 2 seconds.   Neurological:      Mental Status: She is alert and oriented to person, place, and time.   Psychiatric:         Mood and Affect: Mood normal.         Behavior: Behavior normal.          ASSESSMENT AND PLAN:     1. Other fatigue  Labs ordered and patient will be contacted with results.  - CBC With Diff With Platelet; Future  - Comp Metabolic Panel; Future  - TSH W Reflex To Free T4; Future  - Vitamin D, 25-Hydroxy; Future  - Vitamin B12; Future  - Magnesium [E]; Future    2. Essential hypertension  Recommended patient take blood pressure at bedtime and in the am 1-2 hours after taking blood pressure medication and report readings to office. Will contact patient in 3 days.    3. Leg cramps  Labs ordered and patient will be contacted with results.  Recommended drinking electrolyte solution such as Gatorade or Pedialyte.  - Magnesium [E]; Future    4. Problems in relationship with spouse or partner      The patient indicates understanding of these issues and agrees to the plan.  The patient is asked to contact office in 3 days with blood pressure readings.    Lyric Pinto, ARNOLD  4/21/2025           [1]   Current Outpatient Medications   Medication Sig Dispense Refill    metoprolol succinate ER 25 MG Oral Tablet 24 Hr Take 1 tablet (25 mg total) by mouth daily. 90 tablet 3    warfarin 2 MG Oral Tab TAKE 2 TABLETS BY MOUTH DAILY OR AS INSTRUCTED PER MOST RECENT INR      montelukast 10 MG Oral Tab Take 1 tablet (10 mg total) by mouth daily. 90 tablet 0    atorvastatin 40 MG Oral Tab Take 1 tablet (40 mg total) by mouth nightly. 90 tablet 1    gabapentin 400 MG Oral Cap Take 1 capsule (400 mg total) by mouth 3 (three) times daily. 270 capsule 1    ondansetron (ZOFRAN) 4 mg tablet  Take 1 tablet (4 mg total) by mouth every 8 (eight) hours as needed for Nausea. 12 tablet 2    PANTOPRAZOLE 40 MG Oral Tab EC TAKE 1 TABLET BY MOUTH TWICE DAILY BEFORE MEALS 60 tablet 3    lidocaine 5 % External Patch Applied to the affected area 12 hours on 12 hours off as needed      FLUTICASONE PROPIONATE 50 MCG/ACT Nasal Suspension SHAKE LIQUID AND USE 2 SPRAYS IN EACH NOSTRIL DAILY 16 g 0    busPIRone 10 MG Oral Tab Take 1 tablet (10 mg total) by mouth 3 (three) times daily as needed (anxiety).      DULoxetine 60 MG Oral Cap DR Particles Take 1 capsule (60 mg total) by mouth daily.      lamoTRIgine 100 MG Oral Tab Take 1 tablet (100 mg total) by mouth daily.      traZODone 100 MG Oral Tab Take 1 tablet (100 mg total) by mouth nightly as needed for Sleep. Patient taking nightly for sleep      Respiratory Therapy Supplies (NEBULIZER/TUBING/MOUTHPIECE) Does not apply Kit Please provide nebulizer, tubing, and mouthpiece. 1 each 0    albuterol (VENTOLIN HFA) 108 (90 Base) MCG/ACT Inhalation Aero Soln Inhale 2 puffs into the lungs every 4 (four) hours as needed for Wheezing or Shortness of Breath. 18 g 1    Budesonide-Formoterol Fumarate (SYMBICORT) 160-4.5 MCG/ACT Inhalation Aerosol INHALE 1 PUFF BY MOUTH TWICE A DAY *RINSE MOUTH OUT AFTER EACH USE* 10.2 g 5    dicyclomine 10 MG Oral Cap Take 1 capsule (10 mg total) by mouth 3 (three) times daily before meals.      Calcium Carbonate-Vitamin D 600-400 MG-UNIT Oral Tab Take 1 tablet by mouth daily.      cetirizine (ZYRTEC) 10 MG Oral Tab Take 1 tablet (10 mg total) by mouth daily.      cholecalciferol (DRISDOL) 1000 UNITS Oral Cap Take 5 capsules (5,000 Units total) by mouth daily.     [2]   Past Medical History:   Allergic rhinitis    Asthma ([facility])    Asthma with COPD (chronic obstructive pulmonary disease) ([facility])    Back pain    Depression    Disorder of right rotator cuff    Diverticulosis of large intestine without hemorrhage    Esophageal reflux    Hiatal hernia     History of pulmonary embolism    Obesity    Obstructive sleep apnea (adult) (pediatric)    Osteoarthritis    Other and unspecified hyperlipidemia    PE (pulmonary embolism)    x2. prothrombin mutation found after 2nd one    Presence of IVC filter    Primary insomnia    Uses trazodone     Prothrombin mutation (HCC)    Pulmonary embolism (HCC)    Spinal stenosis    Thyroid nodule    Unspecified essential hypertension   [3]   Social History  Socioeconomic History    Marital status:    Tobacco Use    Smoking status: Never    Smokeless tobacco: Never   Vaping Use    Vaping status: Never Used   Substance and Sexual Activity    Alcohol use: No     Alcohol/week: 0.0 standard drinks of alcohol    Drug use: No   Other Topics Concern    Caffeine Concern Yes     Comment: 2-4 cups of coffee x day    Exercise No    Seat Belt Yes     Social Drivers of Health     Food Insecurity: No Food Insecurity (2/1/2025)    Received from [facility]    Food Insecurity     Currently or in the past 3 months, have you worried your food would run out before you had money to buy more?: No     In the past 12 months, have you run out of food or been unable to get more?: No   Transportation Needs: No Transportation Needs (2/1/2025)    Received from [facility]    Transportation Needs     Currently or in the past 3 months, has lack of transportation kept you from medical appointments, getting food or medicine, or providing care to a family member?: Unrecognized value     Medical Transportation Needs?: No   Housing Stability: Low Risk  (8/27/2024)    Housing Stability     Housing Instability: No

## 2025-05-08 DIAGNOSIS — M48.05 SPINAL STENOSIS OF THORACOLUMBAR REGION: ICD-10-CM

## 2025-05-08 RX ORDER — GABAPENTIN 400 MG/1
400 CAPSULE ORAL 3 TIMES DAILY
Qty: 270 CAPSULE | Refills: 1 | Status: SHIPPED | OUTPATIENT
Start: 2025-05-08

## 2025-05-08 NOTE — TELEPHONE ENCOUNTER
LOV:4-21-25    LAST LAB: 4-    LAST RX:  gabapentin 400 MG Oral Cap 270 capsule 1 1/6/2025 7/5/2025   Sig:   Take 1 capsule (400 mg total) by mouth 3 (three) times daily.         Next OV: No future appointments.       PROTOCOL:     Neurology Medications [id number]/08/2025 07:20 AM   Protocol Details In person appointment or virtual visit in the past 6 mos or appointment in next 3 mos    Medication is active on med list

## 2025-05-30 ENCOUNTER — OFFICE VISIT (OUTPATIENT)
Dept: FAMILY MEDICINE CLINIC | Facility: CLINIC | Age: 70
End: 2025-05-30
Payer: MEDICARE

## 2025-05-30 VITALS
TEMPERATURE: 98 F | WEIGHT: 182.81 LBS | HEIGHT: 61.25 IN | OXYGEN SATURATION: 96 % | HEART RATE: 80 BPM | RESPIRATION RATE: 18 BRPM | DIASTOLIC BLOOD PRESSURE: 80 MMHG | BODY MASS INDEX: 34.07 KG/M2 | SYSTOLIC BLOOD PRESSURE: 155 MMHG

## 2025-05-30 DIAGNOSIS — M16.11 OSTEOARTHRITIS OF RIGHT HIP, UNSPECIFIED OSTEOARTHRITIS TYPE: Primary | ICD-10-CM

## 2025-05-30 DIAGNOSIS — M48.05 SPINAL STENOSIS OF THORACOLUMBAR REGION: ICD-10-CM

## 2025-05-30 PROCEDURE — 3077F SYST BP >= 140 MM HG: CPT

## 2025-05-30 PROCEDURE — 3008F BODY MASS INDEX DOCD: CPT

## 2025-05-30 PROCEDURE — 99214 OFFICE O/P EST MOD 30 MIN: CPT

## 2025-05-30 PROCEDURE — 1159F MED LIST DOCD IN RCRD: CPT

## 2025-05-30 PROCEDURE — 1160F RVW MEDS BY RX/DR IN RCRD: CPT

## 2025-05-30 PROCEDURE — 3079F DIAST BP 80-89 MM HG: CPT

## 2025-05-30 RX ORDER — MONTELUKAST SODIUM 10 MG/1
10 TABLET ORAL DAILY
Qty: 90 TABLET | Refills: 1 | Status: SHIPPED | OUTPATIENT
Start: 2025-05-30

## 2025-05-30 RX ORDER — HYDROCODONE BITARTRATE AND ACETAMINOPHEN 10; 325 MG/1; MG/1
1 TABLET ORAL EVERY 6 HOURS PRN
COMMUNITY
Start: 2025-05-29

## 2025-05-30 NOTE — TELEPHONE ENCOUNTER
OV 02/13/25  REFILL 02/11/25 #90    Future Appointments   Date Time Provider Department Center   5/30/2025  2:20 PM Lyric Pinto APRN [facility] [place]

## 2025-05-30 NOTE — PROGRESS NOTES
Liv Wright is a 69 year old female.  HPI:     Patient in office for Emergency Room follow up.  She was seen in the Emergency Room yesterday for right hip pain that started 5 days ago and diagnosed with osteoarthritis of the hip.  She reported pain 10/10 in Emergency Room and was using a walker to ambulate.She was given norco for pain management.  She reports Norco has been hleping with pain.  She reports her walker broke and she has been using a walker she got at the [facility] and feels this walker is what is causing her pain and is in need of a new walker/Rolator.        Current Medications[1]   Past Medical History[2]   Social History:  Short Social Hx on File[3]       REVIEW OF SYSTEMS:     Review of Systems   Constitutional: Negative.    Respiratory: Negative.     Cardiovascular: Negative.    Gastrointestinal: Negative.    Musculoskeletal:  Positive for arthralgias (see hpi) and myalgias.   Skin: Negative.    Neurological: Negative.        EXAM:   /80   Pulse 80   Temp 98 °F (36.7 °C) (Temporal)   Resp 18   Ht 5' 1.25\" (1.556 m)   Wt 182 lb 12.8 oz (82.9 kg)   SpO2 96%   BMI 34.26 kg/m²   GENERAL: well developed, well nourished,in no apparent distress  SKIN: no rashes,no suspicious lesions  HEENT: atraumatic, normocephalic,ears and throat are clear  NECK: supple,no adenopathy,no bruits  LUNGS: clear to auscultation  CARDIO: RRR without murmur  EXTREMITIES: no cyanosis, clubbing or edema      ASSESSMENT AND PLAN:     1. Osteoarthritis of right hip, unspecified osteoarthritis type  Ortho order placed and DME order placed  - ORTHOPEDIC - EXTERNAL  - DME - External    2. Spinal stenosis of thoracolumbar region  DME order placed for Rolator   - DME - External    The patient indicates understanding of these issues and agrees to the plan.      Lyric Pinto, APRN  5/30/2025           [1]   Current Outpatient Medications   Medication Sig Dispense Refill    GABAPENTIN 400 MG Oral Cap TAKE 1 CAPSULE(400  MG) BY MOUTH THREE TIMES DAILY 270 capsule 1    ELIQUIS 2.5 MG Oral Tab Take 1 tablet (2.5 mg total) by mouth 2 (two) times daily. (Patient taking differently: Take 1 tablet (2.5 mg total) by mouth 2 (two) times daily. Patient has only been taking this once a day.)      metoprolol succinate ER 25 MG Oral Tablet 24 Hr Take 1 tablet (25 mg total) by mouth daily. 90 tablet 3    montelukast 10 MG Oral Tab Take 1 tablet (10 mg total) by mouth daily. 90 tablet 0    atorvastatin 40 MG Oral Tab Take 1 tablet (40 mg total) by mouth nightly. 90 tablet 1    ondansetron (ZOFRAN) 4 mg tablet Take 1 tablet (4 mg total) by mouth every 8 (eight) hours as needed for Nausea. 12 tablet 2    PANTOPRAZOLE 40 MG Oral Tab EC TAKE 1 TABLET BY MOUTH TWICE DAILY BEFORE MEALS 60 tablet 3    lidocaine 5 % External Patch Applied to the affected area 12 hours on 12 hours off as needed      FLUTICASONE PROPIONATE 50 MCG/ACT Nasal Suspension SHAKE LIQUID AND USE 2 SPRAYS IN EACH NOSTRIL DAILY 16 g 0    busPIRone 10 MG Oral Tab Take 1 tablet (10 mg total) by mouth as needed in the morning and 1 tablet (10 mg total) as needed at noon and 1 tablet (10 mg total) as needed in the evening (anxiety).      DULoxetine 60 MG Oral Cap DR Particles Take 1 capsule (60 mg total) by mouth daily.      lamoTRIgine 100 MG Oral Tab Take 1 tablet (100 mg total) by mouth daily.      traZODone 100 MG Oral Tab Take 1 tablet (100 mg total) by mouth nightly as needed for Sleep. Patient taking nightly for sleep      albuterol (VENTOLIN HFA) 108 (90 Base) MCG/ACT Inhalation Aero Soln Inhale 2 puffs into the lungs every 4 (four) hours as needed for Wheezing or Shortness of Breath. 18 g 1    Budesonide-Formoterol Fumarate (SYMBICORT) 160-4.5 MCG/ACT Inhalation Aerosol INHALE 1 PUFF BY MOUTH TWICE A DAY *RINSE MOUTH OUT AFTER EACH USE* 10.2 g 5    dicyclomine 10 MG Oral Cap Take 1 capsule (10 mg total) by mouth 3 (three) times daily before meals.      Calcium Carbonate-Vitamin  D 600-400 MG-UNIT Oral Tab Take 1 tablet by mouth daily.      cetirizine (ZYRTEC) 10 MG Oral Tab Take 1 tablet (10 mg total) by mouth daily.      cholecalciferol (DRISDOL) 1000 UNITS Oral Cap Take 5 capsules (5,000 Units total) by mouth daily.     [2]   Past Medical History:   Allergic rhinitis    Asthma (HCC)    Asthma with COPD (chronic obstructive pulmonary disease) (HCC)    Back pain    Depression    Disorder of right rotator cuff    Diverticulosis of large intestine without hemorrhage    Esophageal reflux    Hiatal hernia    History of pulmonary embolism    Obesity    Obstructive sleep apnea (adult) (pediatric)    Osteoarthritis    Other and unspecified hyperlipidemia    PE (pulmonary embolism)    x2. prothrombin mutation found after 2nd one    Presence of IVC filter    Primary insomnia    Uses trazodone     Prothrombin mutation (HCC)    Pulmonary embolism (HCC)    Spinal stenosis    Thyroid nodule    Unspecified essential hypertension   [3]   Social History  Socioeconomic History    Marital status:    Tobacco Use    Smoking status: Never    Smokeless tobacco: Never   Vaping Use    Vaping status: Never Used   Substance and Sexual Activity    Alcohol use: No     Alcohol/week: 0.0 standard drinks of alcohol    Drug use: No   Other Topics Concern    Caffeine Concern Yes     Comment: 2-4 cups of coffee x day    Exercise No    Seat Belt Yes     Social Drivers of Health     Food Insecurity: No Food Insecurity (4/21/2025)    NCSS - Food Insecurity     Worried About Running Out of Food in the Last Year: No     Ran Out of Food in the Last Year: No   Transportation Needs: No Transportation Needs (4/21/2025)    NCSS - Transportation     Lack of Transportation: No   Housing Stability: Not At Risk (4/21/2025)    NCSS - Housing/Utilities     Has Housing: Yes     Worried About Losing Housing: No     Unable to Get Utilities: No

## 2025-06-10 ENCOUNTER — PATIENT MESSAGE (OUTPATIENT)
Dept: FAMILY MEDICINE CLINIC | Facility: CLINIC | Age: 70
End: 2025-06-10

## 2025-06-10 DIAGNOSIS — K21.9 GASTROESOPHAGEAL REFLUX DISEASE, UNSPECIFIED WHETHER ESOPHAGITIS PRESENT: ICD-10-CM

## 2025-06-10 RX ORDER — PANTOPRAZOLE SODIUM 40 MG/1
40 TABLET, DELAYED RELEASE ORAL
Qty: 180 TABLET | Refills: 0 | Status: SHIPPED | OUTPATIENT
Start: 2025-06-10

## 2025-06-12 NOTE — TELEPHONE ENCOUNTER
Made appt    Future Appointments   Date Time Provider Department Center   6/14/2025  9:20 AM Lyric Pinto APRN [facility] [place]

## 2025-06-14 ENCOUNTER — OFFICE VISIT (OUTPATIENT)
Dept: FAMILY MEDICINE CLINIC | Facility: CLINIC | Age: 70
End: 2025-06-14
Payer: MEDICARE

## 2025-06-14 DIAGNOSIS — M48.05 SPINAL STENOSIS OF THORACOLUMBAR REGION: ICD-10-CM

## 2025-06-14 DIAGNOSIS — M16.11 OSTEOARTHRITIS OF RIGHT HIP, UNSPECIFIED OSTEOARTHRITIS TYPE: Primary | ICD-10-CM

## 2025-06-14 PROCEDURE — 1159F MED LIST DOCD IN RCRD: CPT

## 2025-06-14 PROCEDURE — 1160F RVW MEDS BY RX/DR IN RCRD: CPT

## 2025-06-14 PROCEDURE — 99213 OFFICE O/P EST LOW 20 MIN: CPT

## 2025-06-14 PROCEDURE — 1170F FXNL STATUS ASSESSED: CPT

## 2025-06-14 RX ORDER — TRAMADOL HYDROCHLORIDE 50 MG/1
50 TABLET ORAL EVERY 6 HOURS PRN
Qty: 30 TABLET | Refills: 1 | Status: SHIPPED | OUTPATIENT
Start: 2025-06-14

## 2025-06-14 NOTE — PROGRESS NOTES
Liv Wright is a 69 year old female.  HPI:     Patient in office for arthritis.  She reports her entire body feels sore.  She has been doing chair vollyball at the [facility] and reports bilateral arm pain, pain in her hands, spine and legs.  Feels pain worse in her joints.  She has been having increased pain since her walker/rollator broke and has been using one from the [facility].  She has not heard anything about order that was placed for her last time she was in office.    Current Medications[1]   Past Medical History[2]   Social History:  Short Social Hx on File[3]       REVIEW OF SYSTEMS:     Review of Systems   Constitutional:  Negative for activity change, appetite change and fatigue.   HENT:  Negative for congestion, hearing loss and sore throat.    Eyes:  Negative for discharge and redness.   Respiratory:  Negative for shortness of breath and wheezing.    Cardiovascular:  Negative for chest pain.   Gastrointestinal:  Negative for abdominal distention and abdominal pain.   Endocrine: Negative for cold intolerance and heat intolerance.   Genitourinary:  Negative for difficulty urinating and urgency.   Musculoskeletal:  Positive for arthralgias, back pain and myalgias.   Skin:  Negative for color change.   Allergic/Immunologic: Negative for environmental allergies.   Neurological:  Negative for dizziness, syncope and headaches.   Hematological:  Negative for adenopathy. Does not bruise/bleed easily.   Psychiatric/Behavioral:  Negative for agitation, behavioral problems and confusion.        EXAM:   There were no vitals taken for this visit.    Physical Exam  Constitutional:       Appearance: Normal appearance. She is normal weight.   HENT:      Head: Normocephalic and atraumatic.   Cardiovascular:      Rate and Rhythm: Normal rate and regular rhythm.      Pulses: Normal pulses.      Heart sounds: Normal heart sounds.   Pulmonary:      Effort: Pulmonary effort is normal.      Breath sounds: Normal  breath sounds.   Musculoskeletal:         General: Normal range of motion.      Right upper arm: Tenderness and bony tenderness present.      Left upper arm: Tenderness and bony tenderness present.      Right elbow: Normal range of motion. Tenderness present.      Left elbow: Normal range of motion. Tenderness present.      Cervical back: Tenderness present.      Thoracic back: Tenderness present.      Lumbar back: Tenderness present.   Skin:     General: Skin is warm and dry.      Capillary Refill: Capillary refill takes less than 2 seconds.   Neurological:      Mental Status: She is alert and oriented to person, place, and time.   Psychiatric:         Mood and Affect: Mood normal.         Behavior: Behavior normal.        ASSESSMENT AND PLAN:     1. Osteoarthritis of right hip, unspecified osteoarthritis type  Tramadol sent to pharmacy and instructions provided.  Spoke with patient about follow up with Ortho  - traMADol 50 MG Oral Tab; Take 1 tablet (50 mg total) by mouth every 6 (six) hours as needed for Pain.  Dispense: 30 tablet; Refill: 1    2. Spinal stenosis of thoracolumbar region  Tramadol sent to pharmacy and instructions provided.  Spoke with patient about follow up with Ortho  - traMADol 50 MG Oral Tab; Take 1 tablet (50 mg total) by mouth every 6 (six) hours as needed for Pain.  Dispense: 30 tablet; Refill: 1    The patient indicates understanding of these issues and agrees to the plan.  The patient is asked to return as needed.    Lyric Pinto, ARNOLD  6/14/2025           [1]   Current Outpatient Medications   Medication Sig Dispense Refill    traMADol 50 MG Oral Tab Take 1 tablet (50 mg total) by mouth every 6 (six) hours as needed for Pain. 30 tablet 1    pantoprazole 40 MG Oral Tab EC TAKE 1 TABLET BY MOUTH TWICE DAILY BEFORE MEALS 180 tablet 0    montelukast 10 MG Oral Tab TAKE 1 TABLET(10 MG) BY MOUTH DAILY 90 tablet 1    HYDROcodone-acetaminophen  MG Oral Tab Take 1 tablet by mouth every 6  (six) hours as needed for Pain (PRN).      GABAPENTIN 400 MG Oral Cap TAKE 1 CAPSULE(400 MG) BY MOUTH THREE TIMES DAILY 270 capsule 1    ELIQUIS 2.5 MG Oral Tab Take 1 tablet (2.5 mg total) by mouth 2 (two) times daily.      metoprolol succinate ER 25 MG Oral Tablet 24 Hr Take 1 tablet (25 mg total) by mouth daily. 90 tablet 3    atorvastatin 40 MG Oral Tab Take 1 tablet (40 mg total) by mouth nightly. 90 tablet 1    ondansetron (ZOFRAN) 4 mg tablet Take 1 tablet (4 mg total) by mouth every 8 (eight) hours as needed for Nausea. 12 tablet 2    lidocaine 5 % External Patch Applied to the affected area 12 hours on 12 hours off as needed      FLUTICASONE PROPIONATE 50 MCG/ACT Nasal Suspension SHAKE LIQUID AND USE 2 SPRAYS IN EACH NOSTRIL DAILY 16 g 0    busPIRone 10 MG Oral Tab Take 1 tablet (10 mg total) by mouth as needed in the morning and 1 tablet (10 mg total) as needed at noon and 1 tablet (10 mg total) as needed in the evening (anxiety).      DULoxetine 60 MG Oral Cap DR Particles Take 1 capsule (60 mg total) by mouth daily.      lamoTRIgine 100 MG Oral Tab Take 1 tablet (100 mg total) by mouth daily.      traZODone 100 MG Oral Tab Take 1 tablet (100 mg total) by mouth nightly as needed for Sleep. Patient taking nightly for sleep      albuterol (VENTOLIN HFA) 108 (90 Base) MCG/ACT Inhalation Aero Soln Inhale 2 puffs into the lungs every 4 (four) hours as needed for Wheezing or Shortness of Breath. 18 g 1    Budesonide-Formoterol Fumarate (SYMBICORT) 160-4.5 MCG/ACT Inhalation Aerosol INHALE 1 PUFF BY MOUTH TWICE A DAY *RINSE MOUTH OUT AFTER EACH USE* 10.2 g 5    dicyclomine 10 MG Oral Cap Take 1 capsule (10 mg total) by mouth 3 (three) times daily before meals.      Calcium Carbonate-Vitamin D 600-400 MG-UNIT Oral Tab Take 1 tablet by mouth daily.      cetirizine (ZYRTEC) 10 MG Oral Tab Take 1 tablet (10 mg total) by mouth daily.      cholecalciferol (DRISDOL) 1000 UNITS Oral Cap Take 5 capsules (5,000 Units  total) by mouth daily.     [2]   Past Medical History:   Allergic rhinitis    Asthma (HCC)    Asthma with COPD (chronic obstructive pulmonary disease) (HCC)    Back pain    Depression    Disorder of right rotator cuff    Diverticulosis of large intestine without hemorrhage    Esophageal reflux    Hiatal hernia    History of pulmonary embolism    Obesity    Obstructive sleep apnea (adult) (pediatric)    Osteoarthritis    Other and unspecified hyperlipidemia    PE (pulmonary embolism)    x2. prothrombin mutation found after 2nd one    Presence of IVC filter    Primary insomnia    Uses trazodone     Prothrombin mutation (HCC)    Pulmonary embolism (HCC)    Spinal stenosis    Thyroid nodule    Unspecified essential hypertension   [3]   Social History  Socioeconomic History    Marital status:    Tobacco Use    Smoking status: Never    Smokeless tobacco: Never   Vaping Use    Vaping status: Never Used   Substance and Sexual Activity    Alcohol use: No     Alcohol/week: 0.0 standard drinks of alcohol    Drug use: No   Other Topics Concern    Caffeine Concern Yes     Comment: 2-4 cups of coffee x day    Exercise No    Seat Belt Yes     Social Drivers of Health     Food Insecurity: No Food Insecurity (5/30/2025)    NCSS - Food Insecurity     Worried About Running Out of Food in the Last Year: No     Ran Out of Food in the Last Year: No   Transportation Needs: No Transportation Needs (5/30/2025)    NCSS - Transportation     Lack of Transportation: No   Housing Stability: Not At Risk (5/30/2025)    NCSS - Housing/Utilities     Has Housing: Yes     Worried About Losing Housing: No     Unable to Get Utilities: No

## 2025-06-17 ENCOUNTER — TELEPHONE (OUTPATIENT)
Dept: FAMILY MEDICINE CLINIC | Facility: CLINIC | Age: 70
End: 2025-06-17

## 2025-06-17 NOTE — TELEPHONE ENCOUNTER
Per patient lives in senior housing and having trouble stepping over bathtub and is wanting to get it cut out. She has a form that needs to be completed. Agrees to bring document to office for Dr Raza to review.

## 2025-06-20 NOTE — PROGRESS NOTES
Addended by: JACKIE SMITH on: 6/20/2025 08:12 AM     Modules accepted: Orders     Meli Norton is a 61year old female. HPI:   Pt is here to f/u on BP.     Current prescribed medication:    Current Outpatient Medications:   •  hydrOXYzine HCl 10 MG Oral Tab, TK 1 T PO BID PRA, Disp: , Rfl: 0  •  Enalapril Maleate 5 MG Oral Tab, Take 1 t TraZODone HCl 100 MG Oral Tab, Take 1 tablet (100 mg total) by mouth nightly as needed for Sleep., Disp: 30 tablet, Rfl: 0  •  Venlafaxine HCl ER (EFFEXOR XR) 150 MG Oral Capsule SR 24 Hr, Take 2 capsules (300 mg total) by mouth daily. , Disp: 60 capsule, R • Prothrombin mutation [facility])    • Spinal stenosis    • Unspecified essential hypertension       Past Surgical History:   Procedure Laterality Date   • CHOLECYSTECTOMY  2006   • IR IVC FILTER PLACEMENT     • KNEE REPLACEMENT SURGERY  03/2017    L knee   • symtpoms of low blood pressure (woozy, lightheaded, more fatigued, etc) let me know  History of pulmonary embolism  -     PROTHROMBIN TIME (PT)  -     VENIPUNCTURE    Anticoagulation goal of INR 2.5 to 3.5  -     PROTHROMBIN TIME (PT)  -     VENIPUNCTURE

## 2025-06-26 ENCOUNTER — MED REC SCAN ONLY (OUTPATIENT)
Dept: FAMILY MEDICINE CLINIC | Facility: CLINIC | Age: 70
End: 2025-06-26

## 2025-06-30 ENCOUNTER — TELEPHONE (OUTPATIENT)
Dept: FAMILY MEDICINE CLINIC | Facility: CLINIC | Age: 70
End: 2025-06-30

## 2025-06-30 NOTE — TELEPHONE ENCOUNTER
Faxed to [facility] for DME for knock down rollator w/seat 300 lbs blue, rollator walker, faxed to [phone].  Went through successfully at 6/30/25 3:00 pm

## 2025-07-13 DIAGNOSIS — K21.9 GASTROESOPHAGEAL REFLUX DISEASE, UNSPECIFIED WHETHER ESOPHAGITIS PRESENT: ICD-10-CM

## 2025-07-14 ENCOUNTER — MED REC SCAN ONLY (OUTPATIENT)
Dept: FAMILY MEDICINE CLINIC | Facility: CLINIC | Age: 70
End: 2025-07-14

## 2025-07-14 DIAGNOSIS — K21.9 GASTROESOPHAGEAL REFLUX DISEASE, UNSPECIFIED WHETHER ESOPHAGITIS PRESENT: ICD-10-CM

## 2025-07-14 RX ORDER — PANTOPRAZOLE SODIUM 40 MG/1
40 TABLET, DELAYED RELEASE ORAL
Qty: 180 TABLET | Refills: 0 | Status: SHIPPED | OUTPATIENT
Start: 2025-07-14

## 2025-07-14 RX ORDER — PANTOPRAZOLE SODIUM 40 MG/1
40 TABLET, DELAYED RELEASE ORAL
Qty: 180 TABLET | Refills: 0 | OUTPATIENT
Start: 2025-07-14

## 2025-07-14 NOTE — TELEPHONE ENCOUNTER
Last refill: 06/10/25  Qty 180  W/ 0 refills  Last ov: 06/14/25    Requested Prescriptions     Pending Prescriptions Disp Refills    PANTOPRAZOLE 40 MG Oral Tab EC [Pharmacy Med Name: PANTOPRAZOLE 40MG TABLETS] 180 tablet 0     Sig: TAKE 1 TABLET BY MOUTH TWICE DAILY BEFORE MEALS     No future appointments.

## 2025-07-14 NOTE — TELEPHONE ENCOUNTER
Last refill: 07/14/25  Qty 180  W/ 0 refills  Last ov: 06/14/25    Requested Prescriptions     Pending Prescriptions Disp Refills    PANTOPRAZOLE 40 MG Oral Tab EC [Pharmacy Med Name: PANTOPRAZOLE 40MG TABLETS] 180 tablet 0     Sig: TAKE 1 TABLET BY MOUTH TWICE DAILY BEFORE MEALS     No future appointments.

## 2025-07-16 ENCOUNTER — TELEPHONE (OUTPATIENT)
Dept: FAMILY MEDICINE CLINIC | Facility: CLINIC | Age: 70
End: 2025-07-16

## 2025-07-16 NOTE — TELEPHONE ENCOUNTER
Patient advised to contact pharmacy.  She should have a refill available. Refill was authorized on 5/8/25 for 90 days with 1 refill.   Patient verbalizes understanding.

## 2025-07-16 NOTE — TELEPHONE ENCOUNTER
GABAPENTIN 400 MG Oral Cap -was denied and patient wondering why, only has 1 pill left and would like to speak with clinical

## 2025-08-02 RX ORDER — FLUTICASONE PROPIONATE 50 MCG
2 SPRAY, SUSPENSION (ML) NASAL DAILY
Qty: 16 G | Refills: 0 | Status: SHIPPED | OUTPATIENT
Start: 2025-08-02

## 2025-08-05 RX ORDER — ATORVASTATIN CALCIUM 40 MG/1
40 TABLET, FILM COATED ORAL NIGHTLY
Qty: 90 TABLET | Refills: 1 | Status: SHIPPED | OUTPATIENT
Start: 2025-08-05

## 2025-08-12 DIAGNOSIS — J44.89 ASTHMA WITH COPD (CHRONIC OBSTRUCTIVE PULMONARY DISEASE) (HCC): Chronic | ICD-10-CM

## 2025-08-12 RX ORDER — ALBUTEROL SULFATE 90 UG/1
2 INHALANT RESPIRATORY (INHALATION) EVERY 4 HOURS PRN
Qty: 8.5 G | Refills: 0 | Status: SHIPPED | OUTPATIENT
Start: 2025-08-12

## 2025-08-26 ENCOUNTER — TELEPHONE (OUTPATIENT)
Dept: FAMILY MEDICINE CLINIC | Facility: CLINIC | Age: 70
End: 2025-08-26

## (undated) NOTE — MR AVS SNAPSHOT
[address] [facility] [address] [phone]               Thank you for choosing us for your health care visit with Vanna Griffin DO.   We are glad to serve you and happy to provide you with this sum Take 3 mL (2.5 mg total) by nebulization every 4 (four) hours as needed for Wheezing or Shortness of Breath. Commonly known as:  VENTOLIN           atenolol 25 MG Tabs   Take 0.5 tablets (12.5 mg total) by mouth 2 (two) times daily.    Commonly known as: TraZODone HCl 100 MG Tabs   Take 1 tablet (100 mg total) by mouth nightly. Commonly known as:  DESYREL           Warfarin Sodium 4 MG Tabs   Take 1 tablet (4 mg total) by mouth daily. Commonly known as:  COUMADIN           * Notice:   This list has 4 m

## (undated) NOTE — LETTER
[facility], [address] [facility] [address]  [facility]: [phone]  FAX: [phone]        22  Terence Jc, :  9/10/1955  [address] [place] [address]      To whom it may concern:     This christy

## (undated) NOTE — Clinical Note
I saw Sharon Fermincolette in the [employer] in [facility] today for acute sinusitis and bronchitis with bronchospasm,  she was treated with Cefdinir. She will continue to utilize her neb prn. Sharon Kilgore will follow up with you if no better or as needed.  Thank you for the oppor

## (undated) NOTE — MR AVS SNAPSHOT
[address] [facility] [id number] [phone]               Thank you for choosing us for your health care visit [facility]   We are glad to serve you and happy to provide you with this Atorvastatin Calcium 40 MG Tabs   take 1 tablet by mouth nightly   Commonly known as:  LIPITOR           Budesonide 180 MCG/ACT Aepb   Inhale 1 puff into the lungs 2 (two) times daily.    Commonly known as:  PULMICORT FLEXHALER           * BuPROPion HCl your doctor or other care provider to review them with you.             Today's Orders     Prothrombin Time (PT) [E]    Complete by:  Jan 06, 2017 (Approximate)              Referral Information     Referral Order Referred to [address] Phone Visits [facility]

## (undated) NOTE — MR AVS SNAPSHOT
[address] [facility] [id number] [phone]               Thank you for choosing us for [facility]   We are glad to serve you and happy to provide you with this Take 5,000 Units by mouth daily.  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

## (undated) NOTE — MR AVS SNAPSHOT
[address] [facility] [address] [phone]               Thank you for choosing us [facility]   We are glad to serve you and happy to provide you with this Atorvastatin Calcium 40 MG Tabs   take 1 tablet by mouth nightly   Commonly known as:  LIPITOR           Budesonide 180 MCG/ACT Aepb   Inhale 1 puff into the lungs 2 (two) times daily.    Commonly known as:  PULMICORT FLEXHALER           * BuPROPion HCl ER your doctor or other care provider to review them with you. Today's Orders     Prothrombin Time (PT) [E]    Complete by:   Feb 06, 2017 (Approximate)    Assoc Dx:  History of pulmonary embolism [Z86.711], Encounter for monitoring coumadin therapy

## (undated) NOTE — LETTER
ASTHMA ACTION PLAN for Liana Aldridge     : 9/10/1955     Date: 2017  Doctor:  Kiera Cano MD  Phone for doctor or clinic: [place] [address]  [address]  [phone] If your symptoms do not improve in ONE hour -  go to the emergency room or call 911 immediately! If symptoms improve, call office for appointment immediately.     Xopenex inhaler 2 puffs every 20 minutes for three treatments       Don't forget:  · Rinse mo

## (undated) NOTE — LETTER
ASTHMA ACTION PLAN for James aRy     : 9/10/1955     Date: 20  Doctor:  Jose Guadalupe Polanco MD  Phone for doctor or clinic: [facility]  [address] [facility] [phone] If symptoms are not improving in 24-48 hrs, call office for further instructions  Medications     Leukotriene Modulators Instructions     MONTELUKAST SODIUM 10 MG Oral Tab    TAKE 1 TABLET(10 MG) BY MOUTH DAILY    Sympathomimetics Instructions     Levon

## (undated) NOTE — MR AVS SNAPSHOT
[address] [facility] [address] [phone]               Thank you for choosing us for your health care visit with Jose Guadalupe Polanco MD.  We are glad to serve you and happy to provide you with this sum Take 0.5 mg by mouth every 4 (four) hours as needed for Anxiety. atorvastatin 40 MG Tabs   Take 1 tablet (40 mg total) by mouth nightly.    Commonly known as:  LIPITOR           BuPROPion HCl ER (XL) 300 MG Tb24   Take 1 tablet (300 mg total) by m Total Calciums are not corrected for effects of low albumin. If needed, use the following correction formula. Corrected Calcium Formula:      ((4.0 - Albumin) x 0.8 + Calcium    Note: Calculation is only valid when Albumin is less than 4.0g/dL.       A Neutrophil % 62.4 %    Lymphocyte % 27.3 %    Monocyte % 6.4 %    Eosinophil % 2.7 %    Basophil % 1.0 %    Immature Granulocyte % 0.2 %                PROTHROMBIN TIME (PT)      Component Value Standard Range & Units    PT 30.4 12.0-14.3 seconds      New

## (undated) NOTE — LETTER
12/23/19  Day Wilkerson  [address] [place] [address]      Dear Day Wilkerson. To help us provide the highest quality medical care, [facility] uses a sophisticated computer system to track our patient records.  During a review o

## (undated) NOTE — IP AVS SNAPSHOT
[address]            (For Outpatient Use Only) Initial Admit Date: 4/25/2020   Inpt/Obs Admit Date: Inpt: 4/25/20 / Obs: N/A   Discharge Date:    Orin Vale:  [de-identified]   MRN: [de-identified]   CSN: [id number]   CEID: [id number] Subscriber ID:  Pt Rel to Subscriber:    Hospital Account Financial Class: Medicare    April 28, 2020

## (undated) NOTE — IP AVS SNAPSHOT
Patient Demographics     Address  [address] [place] [address] Phone  [phone] [facility])  [phone] (Mobile) *Preferred* E-mail Address  [email or web address]      Emergency Contact(s)     Name Relation Home Work Mobile    Daniel White INHALE 1 PUFF INTO THE LUNGS TWICE DAILY   Antoinette Maher MD         buPROPion HCl ER (SR) 200 MG Tb12  Commonly known as:  WELLBUTRIN SR  Next dose due:  4/28/20 at 9pm       Take 1 tablet (200 mg total) by mouth 2 (two) times daily.    [facility] Metoprolol Succinate ER 25 MG Tb24  Commonly known as:   Toprol XL  Next dose due:  20 @ 9am       TAKE 1 TABLET(25 MG) BY MOUTH DAILY   Lopez Coreas MD         Montelukast Sodium 10 MG Tabs  Commonly known as:  SINGULAIR  Next dose due:  20 at 9 [id number] Venlafaxine HCl [facility]) tab 150 mg 04/27/20 2055 Given      [id number] Venlafaxine HCl [facility]) tab 37.5 mg 04/28/20 0807 Given      [id number] Warfarin Sodium (COUMADIN) tab 4 mg 04/27/20 2055 Given      [id number] atorvastatin (LIPITOR) tab 40 is 12.4-14.6 seconds. INR 2.98 0.89 - 1.11 H [facility])   Comment:         New lot of PT reagent started on March 23,2020. The new INR reference range is 0.89-1. 11.                 Testing Performed By     [address] Name Director Addres Past Medical History:   Diagnosis Date   • Allergic rhinitis    • Asthma    • Back pain    • Depression    • Esophageal reflux    • Obesity    • Obstructive sleep apnea (adult) (pediatric) 4/25/2019   • Osteoarthritis    • Other and unspecified hyperlipide QUEtiapine Fumarate 25 MG Oral Tab, Take 1 tablet (25 mg total) by mouth nightly., Disp: 1 tablet, Rfl: 0  ENALAPRIL MALEATE 5 MG Oral Tab, TAKE 1 TABLET(5 MG) BY MOUTH DAILY, Disp: 90 tablet, Rfl: 1  hydrOXYzine HCl 25 MG Oral Tab, TK 1 T PO TID, Disp: , IPRATROPIUM BROMIDE 0.06 % Nasal Solution, USE 2 SPRAYS IN EACH NOSTRIL FOUR TIMES DAILY AS NEEDED FOR RHINITIS, Disp: 135 mL, Rfl: 3  BuPROPion HCl ER, SR, 200 MG Oral Tablet 12 Hr, Take 1 tablet (200 mg total) by mouth 2 (two) times daily. , Disp: 60 tabl No results for input(s): GLU, BUN, CREATSERUM, GFRAA, GFRNAA, CA, ALB, NA, K, CL, CO2, ALKPHO, AST, ALT, BILT, TP in the last 168 hours. CrCl cannot be calculated (Patient's most recent lab result is older than the maximum 7 days allowed. ).     Recent La .[address] [place] [address] Patient Status:  Inpatient    9/10/1955 MRN [id number]   [facility] 7NE-A Attending Maximus Enrique MD   Hosp Day # 2 PCP Liliana Bella MD     Patient Identification  Vivian Campbell is a 59year old female. Support System and Family Circumstances (i.e., potential caregivers): lives alone  Home Environment / Accessibility: 1-story house/ trailer, apartment  Current Functional Status: Min A 76 ft      Past Medical History:  @Medical hx@  Past Medical History: buPROPion HCl ER (SR) (WELLBUTRIN SR) 12 hr tab 200 mg, 200 mg, Oral, BID  gabapentin (NEURONTIN) cap 600 mg, 600 mg, Oral, BID  hydrOXYzine HCl (ATARAX) tab 25 mg, 25 mg, Oral, TID  Methylphenidate HCl (RITALIN) tab 5 mg, 5 mg, Oral, QAM  Pantoprazole Sod Complete review of systems performed and negative except as that outlined in HPI. OBJECTIVE:    Blood pressure 105/84, pulse 81, temperature 98 °F (36.7 °C), temperature source Oral, resp.  rate 14, height 5' 2\" (1.575 m), weight 194 lb (88 kg), SpO2 ASSESSMENT:  Impaired mobility and self-care secondary to new onset gai Filed:  4/28/2020  1:39 PM Date of Service:  4/28/2020  1:30 PM Status:  Signed    :  Alejandra Raines PTA (Physical Therapy Assistant)        PHYSICAL THERAPY TREATMENT NOTE - INPATIENT    Room Number: 8263/6947-M     Session: 2  Number of Visit • Other and unspecified hyperlipidemia    • PE (pulmonary embolism) Feb 2004 and March 2004    x2.  prothrombin mutation found after 2nd one   • Prothrombin mutation [facility])    • Spinal stenosis    • Unspecified essential hypertension        Past Surgical His Standardized Score (AM-PAC Scale): 42.13   CMS Modifier (G-Code): CK    FUNCTIONAL ABILITY STATUS  Gait Assessment   Gait Assistance: Minimum assistance  Distance (ft): 75  Assistive Device: Rolling walker  Pattern: Ataxic; Shuffle  Stoop/Curb Assistance: N Goal Comments: Goals established on 4/26/2020;goals ongoing 4/27[BR.1]            Attribution Mackenzie    BR. 1 - Robya Alert, PTA on 4/28/2020  1:30 PM               Physical Therapy Note signed by Tejas Dotson PT at 4/27/2020  2:42 PM  Version 1 o Problem List  Active Problems:    CHINTAN treated with BiPAP    Ataxia    Uncomplicated asthma    Hypercoagulable state [facility])      Past Medical History  Past Medical History:   Diagnosis Date   • Allergic rhinitis    • Asthma    • Back pain    • Depression -   Moving from lying on back to sitting on the side of the bed?: A Little   How much help from another person does the patient currently need. ..   -   Moving to and from a bed to a chair (including a wheelchair)?: A Little   -   Need to walk in hospital r the above deficits to assist patient in returning to prior level of function. [EP.2]      DISCHARGE RECOMMENDATIONS  PT Discharge Recommendations: Acute rehabilitation     PLAN  PT Treatment Plan: Body mechanics; Patient education;Gait training;Neuromuscular 4/26/20 Brain MRI = neg for acute process. 4/26/20 Lumbar MRI = \"No high-grade spinal canal stenosis. Mild to moderate osteoarthritic changes are noted. Levoscoliosis of the lumbar spine is noted. \"    Therapy significant co-morbidities:  Hx Anxiety a Left knee replacement   • TONSILLECTOMY  age 25   • TUBAL LIGATION  1981       HOME SITUATION  Type of Home: Apartment   Home Layout: One level  Stairs to Enter : (elevator)             Lives With: Alone  Drives: Yes  Patient Owned Equipment: Rolling walk Right Knee extension  4/5  Left Knee extension  4-/5    BALANCE  Static Sitting: Normal  Dynamic Sitting: Normal  Static Standing: Poor +  Dynamic Standing: Poor    ADDITIONAL TESTS  Additional Tests: Elderly Mobility Scale     Elderly Mobility Scale: 5 VC's for expectations, t/f tech's, upright posture to resolve posterior lean in standing. Rec'd pt in supine. Mobility as indicated above. Sitting EOB with no posterior lean. No tremors of ataxia noted with MMT of BLE's.    Tremor noted with asses In this PT evaluation, the patient presents with the following impairments: dec in balance, ataxia, dec in strength, fear of falling. Functional outcome measures completed include:     The AM-PAC '6-Clicks' Inpatient Basic Mobility Short Form was compl Physical Therapy Note signed by Talisha Stapleton PT at 4/26/2020  8:37 AM  Version 1 of 1    Author:  Talisha Stapleton PT Service:  — Author Type:  Physical Therapist    Filed:  4/26/2020  8:37 AM Date of Service:  4/26/2020  8:37 AM Status tremors. Noting multiple telephone encounters from pt with her doctor in the medical chart, which suggest possible recent increase in distress. Tremors in UE per pt of chronic nature but used to resolve every morning.  Chronic right shoulder injury from a ACTIVITY TOLERANCE                         O2 SATURATIONS                ACTIVITIES OF DAILY LIVING ASSESSMENT  AM-PAC ‘6-Clicks’ Inpatient Daily Activity Short Form  How much help from another person does the patient currently need…[MS.1]  -   Putting on OT Discharge Recommendations: Acute rehabilitation  OT Device Recommendations: TBD[MS.2]    PLAN[MS.1]  OT Treatment Plan: Balance activities; Energy conservation/work simplification techniques;ADL training;Functional transfer training;Visual perceptual tra 4/25/2020 from home with c/o inability to ambulate. Pt diagnosed with presenting problem above.   Pt with medical workup continuing, including psych consult pending.     Therapy significant imaging (date, test, result):  4/26/20 Brain MRI = neg for acute p • IR IVC FILTER PLACEMENT     • KNEE REPLACEMENT SURGERY  03/2017    L knee   • OTHER SURGICAL HISTORY      carpal tunnel right wrist (1981);  Green field filter (PE 2004)   • OTHER SURGICAL HISTORY  03/06/2017    Left knee replacement   • TONSILLECTOMY  ag Motor Planning: impaired  Perseveration: not present  Safety Judgement:  decreased awareness of need for safety  Awareness of Errors:  decreased awareness of errors     VISION  Current Vision: wears glasses all the time    PERCEPTION  Overall Perception [facility] -   Eating meals?: A Little    AM-PAC Score:  Score: 15  Approx Degree of Impairment: 56.46%  Standardized Score (AM-PAC Scale): 34.69  CMS Modifier (G-Code): CK    FUNCTIONAL TRANSFER ASSESSMENT  Supine to Sit : Supervision  Sit to Stand:  Moderate assista measures completed include: The AM-ALEJANDRA ' '6-Clicks' Inpatient Daily Activity Short Form was completed and  this patient  is demonstrating a  56% degree impairment in activities of daily living.  Research supports that patients with this level of impairment comprehensive assessments, multiple treatment options    Overall Complexity HIGH     OT Discharge Recommendations: Acute rehabilitation  OT Device Recommendations: TBD    PLAN  OT Treatment Plan: Balance activities; Energy conservation/work simplification t SLP Note - SLP Notes      SLP Note signed by HERMELINDO Bowers at 4/27/2020 11:06 AM  Version 1 of 1    Author:  HERMELINDO Bowers Service:  Rehab Author Type:  SPEECH-LANGUAGE PATHOLOGIST    Filed:  4/27/2020 11:06 AM Date of Service:  4/27/2020 11:06 AM  - See additional Care Plan goals for specific interventions    Patient/Family Short Term Goal     Interdisciplinary Progressing    Description:  Patient's Short Term Goal: to go home    Interventions:   - neuro to see  - echo  - carotid ultrasound  - mri/m

## (undated) NOTE — Clinical Note
[employer]ception [employer],  Although her not using any PAP therapy for her significant CHINTAN, CSA. She is really having trouble with her memory. Discussed asking you if her medications can be adjusted or if she should see neuro for full diagnostic work up . Thanks.

## (undated) NOTE — Clinical Note
Initial assessment completed with patient.  Appointment scheduled for 10/24/24.  Patient agrees to additional follow-up calls from nurse care manager.  Thank you!

## (undated) NOTE — MR AVS SNAPSHOT
[address] [facility] [id number]  [phone]               Thank you for choosing us for your health care visit with Shelly Fernando MD.  We are glad to serve you and happy to provide you with this sum Chronic bilateral low back pain, with sciatica presence unspecified          Instructions and Information about Your Health      Recommended Websites for Advanced Directives    Seek[employer].no. org/publications/Documents/personal_dec. pdf  An information pa Take 0.5 mg by mouth every 4 (four) hours as needed for Anxiety. Atorvastatin Calcium 40 MG Tabs   Take 1 tablet (40 mg total) by mouth nightly. Commonly known as:  LIPITOR           * BuPROPion HCl ER (XL) 300 MG Tb24   300 mg daily.  In additi * Notice: This list has 4 medication(s) that are the same as other medications prescribed for you. Read the directions carefully, and ask your doctor or other care provider to review them with you.          Where to Get Your Medications      These Bilirubin Urine Negative Negative    Ketones Urine Negative Negative mg/dL    Blood Urine Negative Negative    pH Urine 5.0 4.5-8.0    Protein Urine Negative Negative mg/dl    Urobilinogen Urine <2.0 0.2-2.0 mg/dL    Nitrite Urine Negative Negative    Keo Immature Granulocyte Absolute 0.01 0.00-1.00 x10(3) uL    Neutrophil % 66.3 %    Lymphocyte % 23.1 %    Monocyte % 6.3 %    Eosinophil % 3.1 %    Basophil % 1.0 %    Immature Granulocyte % 0.2 %                  Immunizations Administered in the Office To

## (undated) NOTE — LETTER
03/01/21          [address] [facility]           Dear Michelle Lopes records indicate that you have outstanding lab work and or testing that was ordered for you and has not yet been completed:  Lab Frequency Next O

## (undated) NOTE — LETTER
ASTHMA ACTION PLAN for Reynaldo Tran     : 9/10/1955     Date: 2018  Provider:  [facility] NURSE  Phone for doctor or clinic: [address] [facility]  [address] [place] [id number]  [phone]

## (undated) NOTE — LETTER
ASTHMA ACTION PLAN for Meli Norton     : 9/10/1955     Date: 20  Doctor:  Vaughn Marquez MD  Phone for doctor or clinic: [address] [phone] If symptoms are not improving in 24-48 hrs, call office for further instructions  Medications     Leukotriene Modulators Instructions     MONTELUKAST SODIUM 10 MG Oral Tab    TAKE 1 TABLET(10 MG) BY MOUTH DAILY    Sympathomimetics Instructions     Levon

## (undated) NOTE — MR AVS SNAPSHOT
After Visit Summary   5/1/2021    Richard Sutton    MRN: [id number]           Visit Information     Date & Time  5/1/2021  9:40 AM Provider  Kamlesh Byrnes  [address], [facility] Dept.  Phone  [phone]      All mouth every evening. triamcinolone acetonide 0.5 % External Cream Apply bid for 1-2 weeks to affected right lower leg. Metoprolol Succinate ER 25 MG Oral Tablet 24 Hr Take 1 tablet (25 mg total) by mouth daily.     Albuterol Sulfate HFA (VENTOLIN HFA) 6/12/2021). Instructions    Mail SD card to our office.    Inder Rice, [address]      Advised if still with sleep apnea and not using CPAP has a  7 fold increase in risk of heart attack, stroke, abnormal heart rhythm  and death for mild illness or injury that does not require immediate attention.  Average cost  $70*   [facility]  Monday – Friday  8:00 am – 8:00 pm   Saturday – Sunday  8:00 am – 4:00 pm    WALK-IN CARE  Primary Care Providers

## (undated) NOTE — MR AVS SNAPSHOT
[address] [id number] [phone]               Thank you for choosing us for your [facility]   We are glad to serve you and happy to provide you with this What changed:    - how much to take  - when to take this   Commonly known as:  TENORMIN           ATIVAN 0.5 MG Tabs   Generic drug:  LORazepam   Take 0.5 mg by mouth every 4 (four) hours as needed for Anxiety.            Atorvastatin Calcium 40 MG Tabs   T Commonly known as:  EFFEXOR XR           Warfarin Sodium 4 MG Tabs   Take 1 tablet (4 mg total) by mouth daily. Commonly known as:  COUMADIN           * Notice: This list has 4 medication(s) that are the same as other medications prescribed for you.  Gabriel Jacobson

## (undated) NOTE — LETTER
Patient Name: Rachael Saucedo  : 9/10/1955  MRN: [id number]  Patient Address: [address] [place] [address]      Coronavirus Disease 2019 (COVID-19)     [facility] is committed to the safety and well-being of our patients, members, carefully. If your symptoms get worse, call your healthcare provider immediately. 3. Get rest and stay hydrated.    4. If you have a medical appointment, call the healthcare provider ahead of time and tell them that you have or may have COVID-19.  5. For m of fever-reducing medications; and  · Improvement in respiratory symptoms (e.g., cough, shortness of breath); and  · At least 10 days have passed since symptoms first appeared OR if asymptomatic patient or date of symptom onset is unclear then use 10 days donors must:    · Have had a confirmed diagnosis of COVID-19  · Be symptom-free for at least 14 days*    *Some people will be required to have a repeat COVID-19 test in order to be eligible to donate.  If you’re instructed by Wayne that a repeat test is r